# Patient Record
Sex: FEMALE | Race: OTHER | NOT HISPANIC OR LATINO | URBAN - METROPOLITAN AREA
[De-identification: names, ages, dates, MRNs, and addresses within clinical notes are randomized per-mention and may not be internally consistent; named-entity substitution may affect disease eponyms.]

---

## 2020-08-20 ENCOUNTER — INPATIENT (INPATIENT)
Facility: HOSPITAL | Age: 80
LOS: 2 days | Discharge: HOME HEALTH | DRG: 884 | End: 2020-08-23
Attending: STUDENT IN AN ORGANIZED HEALTH CARE EDUCATION/TRAINING PROGRAM | Admitting: STUDENT IN AN ORGANIZED HEALTH CARE EDUCATION/TRAINING PROGRAM
Payer: MEDICARE

## 2020-08-20 VITALS
SYSTOLIC BLOOD PRESSURE: 179 MMHG | OXYGEN SATURATION: 98 % | HEART RATE: 109 BPM | RESPIRATION RATE: 18 BRPM | TEMPERATURE: 98 F | DIASTOLIC BLOOD PRESSURE: 84 MMHG

## 2020-08-20 DIAGNOSIS — D50.9 IRON DEFICIENCY ANEMIA, UNSPECIFIED: ICD-10-CM

## 2020-08-20 DIAGNOSIS — H91.90 UNSPECIFIED HEARING LOSS, UNSPECIFIED EAR: ICD-10-CM

## 2020-08-20 DIAGNOSIS — N18.3 CHRONIC KIDNEY DISEASE, STAGE 3 (MODERATE): ICD-10-CM

## 2020-08-20 DIAGNOSIS — I12.9 HYPERTENSIVE CHRONIC KIDNEY DISEASE WITH STAGE 1 THROUGH STAGE 4 CHRONIC KIDNEY DISEASE, OR UNSPECIFIED CHRONIC KIDNEY DISEASE: ICD-10-CM

## 2020-08-20 DIAGNOSIS — E87.1 HYPO-OSMOLALITY AND HYPONATREMIA: ICD-10-CM

## 2020-08-20 DIAGNOSIS — F03.90 UNSPECIFIED DEMENTIA WITHOUT BEHAVIORAL DISTURBANCE: ICD-10-CM

## 2020-08-20 DIAGNOSIS — G93.40 ENCEPHALOPATHY, UNSPECIFIED: ICD-10-CM

## 2020-08-20 DIAGNOSIS — I95.1 ORTHOSTATIC HYPOTENSION: ICD-10-CM

## 2020-08-20 DIAGNOSIS — E11.22 TYPE 2 DIABETES MELLITUS WITH DIABETIC CHRONIC KIDNEY DISEASE: ICD-10-CM

## 2020-08-20 DIAGNOSIS — Z66 DO NOT RESUSCITATE: ICD-10-CM

## 2020-08-20 DIAGNOSIS — D63.1 ANEMIA IN CHRONIC KIDNEY DISEASE: ICD-10-CM

## 2020-08-20 DIAGNOSIS — I44.0 ATRIOVENTRICULAR BLOCK, FIRST DEGREE: ICD-10-CM

## 2020-08-20 DIAGNOSIS — R07.9 CHEST PAIN, UNSPECIFIED: ICD-10-CM

## 2020-08-20 LAB
ALBUMIN SERPL ELPH-MCNC: 3.5 G/DL — SIGNIFICANT CHANGE UP (ref 3.5–5)
ALP SERPL-CCNC: 83 U/L — SIGNIFICANT CHANGE UP (ref 40–120)
ALT FLD-CCNC: 58 U/L DA — SIGNIFICANT CHANGE UP (ref 10–60)
ANION GAP SERPL CALC-SCNC: 7 MMOL/L — SIGNIFICANT CHANGE UP (ref 5–17)
APAP SERPL-MCNC: 15 UG/ML — SIGNIFICANT CHANGE UP (ref 10–30)
APPEARANCE UR: CLEAR — SIGNIFICANT CHANGE UP
AST SERPL-CCNC: 31 U/L — SIGNIFICANT CHANGE UP (ref 10–40)
BACTERIA # UR AUTO: ABNORMAL /HPF
BASOPHILS # BLD AUTO: 0.03 K/UL — SIGNIFICANT CHANGE UP (ref 0–0.2)
BASOPHILS NFR BLD AUTO: 0.5 % — SIGNIFICANT CHANGE UP (ref 0–2)
BILIRUB SERPL-MCNC: 0.4 MG/DL — SIGNIFICANT CHANGE UP (ref 0.2–1.2)
BILIRUB UR-MCNC: NEGATIVE — SIGNIFICANT CHANGE UP
BUN SERPL-MCNC: 17 MG/DL — SIGNIFICANT CHANGE UP (ref 7–18)
CALCIUM SERPL-MCNC: 8.5 MG/DL — SIGNIFICANT CHANGE UP (ref 8.4–10.5)
CHLORIDE SERPL-SCNC: 100 MMOL/L — SIGNIFICANT CHANGE UP (ref 96–108)
CO2 SERPL-SCNC: 23 MMOL/L — SIGNIFICANT CHANGE UP (ref 22–31)
COLOR SPEC: YELLOW — SIGNIFICANT CHANGE UP
CREAT SERPL-MCNC: 1.06 MG/DL — SIGNIFICANT CHANGE UP (ref 0.5–1.3)
DIFF PNL FLD: NEGATIVE — SIGNIFICANT CHANGE UP
EOSINOPHIL # BLD AUTO: 0.1 K/UL — SIGNIFICANT CHANGE UP (ref 0–0.5)
EOSINOPHIL NFR BLD AUTO: 1.8 % — SIGNIFICANT CHANGE UP (ref 0–6)
EPI CELLS # UR: SIGNIFICANT CHANGE UP /HPF
GLUCOSE SERPL-MCNC: 191 MG/DL — HIGH (ref 70–99)
GLUCOSE UR QL: NEGATIVE — SIGNIFICANT CHANGE UP
HCT VFR BLD CALC: 33.6 % — LOW (ref 34.5–45)
HGB BLD-MCNC: 11.2 G/DL — LOW (ref 11.5–15.5)
IMM GRANULOCYTES NFR BLD AUTO: 0.2 % — SIGNIFICANT CHANGE UP (ref 0–1.5)
KETONES UR-MCNC: NEGATIVE — SIGNIFICANT CHANGE UP
LEUKOCYTE ESTERASE UR-ACNC: ABNORMAL
LIDOCAIN IGE QN: 249 U/L — SIGNIFICANT CHANGE UP (ref 73–393)
LYMPHOCYTES # BLD AUTO: 1.14 K/UL — SIGNIFICANT CHANGE UP (ref 1–3.3)
LYMPHOCYTES # BLD AUTO: 20.1 % — SIGNIFICANT CHANGE UP (ref 13–44)
MCHC RBC-ENTMCNC: 27.3 PG — SIGNIFICANT CHANGE UP (ref 27–34)
MCHC RBC-ENTMCNC: 33.3 GM/DL — SIGNIFICANT CHANGE UP (ref 32–36)
MCV RBC AUTO: 82 FL — SIGNIFICANT CHANGE UP (ref 80–100)
MONOCYTES # BLD AUTO: 0.57 K/UL — SIGNIFICANT CHANGE UP (ref 0–0.9)
MONOCYTES NFR BLD AUTO: 10.1 % — SIGNIFICANT CHANGE UP (ref 2–14)
NEUTROPHILS # BLD AUTO: 3.82 K/UL — SIGNIFICANT CHANGE UP (ref 1.8–7.4)
NEUTROPHILS NFR BLD AUTO: 67.3 % — SIGNIFICANT CHANGE UP (ref 43–77)
NITRITE UR-MCNC: NEGATIVE — SIGNIFICANT CHANGE UP
NRBC # BLD: 0 /100 WBCS — SIGNIFICANT CHANGE UP (ref 0–0)
NT-PROBNP SERPL-SCNC: 86 PG/ML — SIGNIFICANT CHANGE UP (ref 0–450)
PH UR: 6 — SIGNIFICANT CHANGE UP (ref 5–8)
PLATELET # BLD AUTO: 304 K/UL — SIGNIFICANT CHANGE UP (ref 150–400)
POTASSIUM SERPL-MCNC: 3.7 MMOL/L — SIGNIFICANT CHANGE UP (ref 3.5–5.3)
POTASSIUM SERPL-SCNC: 3.7 MMOL/L — SIGNIFICANT CHANGE UP (ref 3.5–5.3)
PROT SERPL-MCNC: 7 G/DL — SIGNIFICANT CHANGE UP (ref 6–8.3)
PROT UR-MCNC: NEGATIVE — SIGNIFICANT CHANGE UP
RBC # BLD: 4.1 M/UL — SIGNIFICANT CHANGE UP (ref 3.8–5.2)
RBC # FLD: 15.1 % — HIGH (ref 10.3–14.5)
RBC CASTS # UR COMP ASSIST: SIGNIFICANT CHANGE UP /HPF (ref 0–2)
SALICYLATES SERPL-MCNC: <1.7 MG/DL — LOW (ref 2.8–20)
SODIUM SERPL-SCNC: 130 MMOL/L — LOW (ref 135–145)
SP GR SPEC: 1.01 — SIGNIFICANT CHANGE UP (ref 1.01–1.02)
T4 AB SER-ACNC: 11.4 UG/DL — SIGNIFICANT CHANGE UP (ref 4.6–12)
TROPONIN I SERPL-MCNC: <0.015 NG/ML — SIGNIFICANT CHANGE UP (ref 0–0.04)
TSH SERPL-MCNC: 0.2 UU/ML — LOW (ref 0.34–4.82)
UROBILINOGEN FLD QL: NEGATIVE — SIGNIFICANT CHANGE UP
WBC # BLD: 5.67 K/UL — SIGNIFICANT CHANGE UP (ref 3.8–10.5)
WBC # FLD AUTO: 5.67 K/UL — SIGNIFICANT CHANGE UP (ref 3.8–10.5)
WBC UR QL: SIGNIFICANT CHANGE UP /HPF (ref 0–5)

## 2020-08-20 PROCEDURE — 70450 CT HEAD/BRAIN W/O DYE: CPT | Mod: 26

## 2020-08-20 PROCEDURE — 93010 ELECTROCARDIOGRAM REPORT: CPT

## 2020-08-20 RX ORDER — SODIUM CHLORIDE 9 MG/ML
1000 INJECTION INTRAMUSCULAR; INTRAVENOUS; SUBCUTANEOUS ONCE
Refills: 0 | Status: COMPLETED | OUTPATIENT
Start: 2020-08-20 | End: 2020-08-20

## 2020-08-20 RX ORDER — SODIUM CHLORIDE 9 MG/ML
3 INJECTION INTRAMUSCULAR; INTRAVENOUS; SUBCUTANEOUS EVERY 8 HOURS
Refills: 0 | Status: DISCONTINUED | OUTPATIENT
Start: 2020-08-20 | End: 2020-08-23

## 2020-08-20 RX ORDER — ONDANSETRON 8 MG/1
4 TABLET, FILM COATED ORAL ONCE
Refills: 0 | Status: COMPLETED | OUTPATIENT
Start: 2020-08-20 | End: 2020-08-20

## 2020-08-20 RX ORDER — ACETAMINOPHEN 500 MG
650 TABLET ORAL ONCE
Refills: 0 | Status: COMPLETED | OUTPATIENT
Start: 2020-08-20 | End: 2020-08-20

## 2020-08-20 RX ADMIN — Medication 650 MILLIGRAM(S): at 21:10

## 2020-08-20 RX ADMIN — ONDANSETRON 4 MILLIGRAM(S): 8 TABLET, FILM COATED ORAL at 21:01

## 2020-08-20 RX ADMIN — SODIUM CHLORIDE 1000 MILLILITER(S): 9 INJECTION INTRAMUSCULAR; INTRAVENOUS; SUBCUTANEOUS at 21:02

## 2020-08-20 RX ADMIN — Medication 650 MILLIGRAM(S): at 21:01

## 2020-08-20 RX ADMIN — SODIUM CHLORIDE 3 MILLILITER(S): 9 INJECTION INTRAMUSCULAR; INTRAVENOUS; SUBCUTANEOUS at 22:09
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
## 2020-08-21 ENCOUNTER — TRANSCRIPTION ENCOUNTER (OUTPATIENT)
Age: 80
End: 2020-08-21

## 2020-08-21 DIAGNOSIS — I10 ESSENTIAL (PRIMARY) HYPERTENSION: ICD-10-CM

## 2020-08-21 DIAGNOSIS — Z29.9 ENCOUNTER FOR PROPHYLACTIC MEASURES, UNSPECIFIED: ICD-10-CM

## 2020-08-21 DIAGNOSIS — D64.9 ANEMIA, UNSPECIFIED: ICD-10-CM

## 2020-08-21 DIAGNOSIS — R42 DIZZINESS AND GIDDINESS: ICD-10-CM

## 2020-08-21 DIAGNOSIS — E11.9 TYPE 2 DIABETES MELLITUS WITHOUT COMPLICATIONS: ICD-10-CM

## 2020-08-21 DIAGNOSIS — F03.90 UNSPECIFIED DEMENTIA WITHOUT BEHAVIORAL DISTURBANCE: ICD-10-CM

## 2020-08-21 DIAGNOSIS — G93.40 ENCEPHALOPATHY, UNSPECIFIED: ICD-10-CM

## 2020-08-21 DIAGNOSIS — R51 HEADACHE: ICD-10-CM

## 2020-08-21 DIAGNOSIS — N18.3 CHRONIC KIDNEY DISEASE, STAGE 3 (MODERATE): ICD-10-CM

## 2020-08-21 DIAGNOSIS — Z78.9 OTHER SPECIFIED HEALTH STATUS: Chronic | ICD-10-CM

## 2020-08-21 DIAGNOSIS — E87.1 HYPO-OSMOLALITY AND HYPONATREMIA: ICD-10-CM

## 2020-08-21 LAB
24R-OH-CALCIDIOL SERPL-MCNC: 32.7 NG/ML — SIGNIFICANT CHANGE UP (ref 30–80)
A1C WITH ESTIMATED AVERAGE GLUCOSE RESULT: 6.9 % — HIGH (ref 4–5.6)
ALBUMIN SERPL ELPH-MCNC: 3.3 G/DL — LOW (ref 3.5–5)
ALP SERPL-CCNC: 84 U/L — SIGNIFICANT CHANGE UP (ref 40–120)
ALT FLD-CCNC: 55 U/L DA — SIGNIFICANT CHANGE UP (ref 10–60)
ANION GAP SERPL CALC-SCNC: 7 MMOL/L — SIGNIFICANT CHANGE UP (ref 5–17)
AST SERPL-CCNC: 32 U/L — SIGNIFICANT CHANGE UP (ref 10–40)
BASOPHILS # BLD AUTO: 0.02 K/UL — SIGNIFICANT CHANGE UP (ref 0–0.2)
BASOPHILS NFR BLD AUTO: 0.3 % — SIGNIFICANT CHANGE UP (ref 0–2)
BILIRUB SERPL-MCNC: 0.4 MG/DL — SIGNIFICANT CHANGE UP (ref 0.2–1.2)
BUN SERPL-MCNC: 13 MG/DL — SIGNIFICANT CHANGE UP (ref 7–18)
CALCIUM SERPL-MCNC: 8.9 MG/DL — SIGNIFICANT CHANGE UP (ref 8.4–10.5)
CHLORIDE SERPL-SCNC: 105 MMOL/L — SIGNIFICANT CHANGE UP (ref 96–108)
CHOLEST SERPL-MCNC: 133 MG/DL — SIGNIFICANT CHANGE UP (ref 10–199)
CHOLEST SERPL-MCNC: 135 MG/DL — SIGNIFICANT CHANGE UP (ref 10–199)
CO2 SERPL-SCNC: 24 MMOL/L — SIGNIFICANT CHANGE UP (ref 22–31)
CREAT ?TM UR-MCNC: <13 MG/DL — SIGNIFICANT CHANGE UP
CREAT SERPL-MCNC: 1.02 MG/DL — SIGNIFICANT CHANGE UP (ref 0.5–1.3)
EOSINOPHIL # BLD AUTO: 0.07 K/UL — SIGNIFICANT CHANGE UP (ref 0–0.5)
EOSINOPHIL NFR BLD AUTO: 1 % — SIGNIFICANT CHANGE UP (ref 0–6)
ERYTHROCYTE [SEDIMENTATION RATE] IN BLOOD: 14 MM/HR — SIGNIFICANT CHANGE UP (ref 0–20)
ESTIMATED AVERAGE GLUCOSE: 151 MG/DL — HIGH (ref 68–114)
FERRITIN SERPL-MCNC: 33 NG/ML — SIGNIFICANT CHANGE UP (ref 15–150)
FOLATE SERPL-MCNC: >20 NG/ML — SIGNIFICANT CHANGE UP
GLUCOSE BLDC GLUCOMTR-MCNC: 126 MG/DL — HIGH (ref 70–99)
GLUCOSE BLDC GLUCOMTR-MCNC: 161 MG/DL — HIGH (ref 70–99)
GLUCOSE BLDC GLUCOMTR-MCNC: 87 MG/DL — SIGNIFICANT CHANGE UP (ref 70–99)
GLUCOSE BLDC GLUCOMTR-MCNC: 99 MG/DL — SIGNIFICANT CHANGE UP (ref 70–99)
GLUCOSE SERPL-MCNC: 204 MG/DL — HIGH (ref 70–99)
HCT VFR BLD CALC: 35.6 % — SIGNIFICANT CHANGE UP (ref 34.5–45)
HDLC SERPL-MCNC: 52 MG/DL — SIGNIFICANT CHANGE UP
HDLC SERPL-MCNC: 53 MG/DL — SIGNIFICANT CHANGE UP
HGB BLD-MCNC: 11.8 G/DL — SIGNIFICANT CHANGE UP (ref 11.5–15.5)
IMM GRANULOCYTES NFR BLD AUTO: 0.1 % — SIGNIFICANT CHANGE UP (ref 0–1.5)
IRON SATN MFR SERPL: 10 % — LOW (ref 15–50)
IRON SATN MFR SERPL: 30 UG/DL — LOW (ref 40–150)
LACTATE SERPL-SCNC: 2.3 MMOL/L — HIGH (ref 0.7–2)
LIPID PNL WITH DIRECT LDL SERPL: 52 MG/DL — SIGNIFICANT CHANGE UP
LIPID PNL WITH DIRECT LDL SERPL: 53 MG/DL — SIGNIFICANT CHANGE UP
LYMPHOCYTES # BLD AUTO: 0.99 K/UL — LOW (ref 1–3.3)
LYMPHOCYTES # BLD AUTO: 14.2 % — SIGNIFICANT CHANGE UP (ref 13–44)
MAGNESIUM SERPL-MCNC: 1.8 MG/DL — SIGNIFICANT CHANGE UP (ref 1.6–2.6)
MCHC RBC-ENTMCNC: 27.6 PG — SIGNIFICANT CHANGE UP (ref 27–34)
MCHC RBC-ENTMCNC: 33.1 GM/DL — SIGNIFICANT CHANGE UP (ref 32–36)
MCV RBC AUTO: 83.2 FL — SIGNIFICANT CHANGE UP (ref 80–100)
MONOCYTES # BLD AUTO: 0.44 K/UL — SIGNIFICANT CHANGE UP (ref 0–0.9)
MONOCYTES NFR BLD AUTO: 6.3 % — SIGNIFICANT CHANGE UP (ref 2–14)
NEUTROPHILS # BLD AUTO: 5.45 K/UL — SIGNIFICANT CHANGE UP (ref 1.8–7.4)
NEUTROPHILS NFR BLD AUTO: 78.1 % — HIGH (ref 43–77)
NRBC # BLD: 0 /100 WBCS — SIGNIFICANT CHANGE UP (ref 0–0)
OSMOLALITY UR: 169 MOS/KG — SIGNIFICANT CHANGE UP (ref 50–1200)
PHOSPHATE SERPL-MCNC: 2.9 MG/DL — SIGNIFICANT CHANGE UP (ref 2.5–4.5)
PLATELET # BLD AUTO: 327 K/UL — SIGNIFICANT CHANGE UP (ref 150–400)
POTASSIUM SERPL-MCNC: 3.3 MMOL/L — LOW (ref 3.5–5.3)
POTASSIUM SERPL-SCNC: 3.3 MMOL/L — LOW (ref 3.5–5.3)
PROT SERPL-MCNC: 6.9 G/DL — SIGNIFICANT CHANGE UP (ref 6–8.3)
RBC # BLD: 4.28 M/UL — SIGNIFICANT CHANGE UP (ref 3.8–5.2)
RBC # FLD: 15.2 % — HIGH (ref 10.3–14.5)
SARS-COV-2 IGG SERPL QL IA: NEGATIVE — SIGNIFICANT CHANGE UP
SARS-COV-2 IGM SERPL IA-ACNC: <0.1 INDEX — SIGNIFICANT CHANGE UP
SARS-COV-2 RNA SPEC QL NAA+PROBE: SIGNIFICANT CHANGE UP
SODIUM SERPL-SCNC: 136 MMOL/L — SIGNIFICANT CHANGE UP (ref 135–145)
SODIUM UR-SCNC: 49 MMOL/L — SIGNIFICANT CHANGE UP
T PALLIDUM AB TITR SER: NEGATIVE — SIGNIFICANT CHANGE UP
T3 SERPL-MCNC: 113 NG/DL — SIGNIFICANT CHANGE UP (ref 80–200)
TIBC SERPL-MCNC: 311 UG/DL — SIGNIFICANT CHANGE UP (ref 250–450)
TOTAL CHOLESTEROL/HDL RATIO MEASUREMENT: 2.5 RATIO — LOW (ref 3.3–7.1)
TOTAL CHOLESTEROL/HDL RATIO MEASUREMENT: 2.6 RATIO — LOW (ref 3.3–7.1)
TRANSFERRIN SERPL-MCNC: 239 MG/DL — SIGNIFICANT CHANGE UP (ref 200–360)
TRIGL SERPL-MCNC: 142 MG/DL — SIGNIFICANT CHANGE UP (ref 10–149)
TRIGL SERPL-MCNC: 148 MG/DL — SIGNIFICANT CHANGE UP (ref 10–149)
TSH SERPL-MCNC: 0.19 UU/ML — LOW (ref 0.34–4.82)
UIBC SERPL-MCNC: 281 UG/DL — SIGNIFICANT CHANGE UP (ref 110–370)
VIT B12 SERPL-MCNC: 1575 PG/ML — HIGH (ref 232–1245)
WBC # BLD: 6.98 K/UL — SIGNIFICANT CHANGE UP (ref 3.8–10.5)
WBC # FLD AUTO: 6.98 K/UL — SIGNIFICANT CHANGE UP (ref 3.8–10.5)

## 2020-08-21 PROCEDURE — 71045 X-RAY EXAM CHEST 1 VIEW: CPT | Mod: 26

## 2020-08-21 PROCEDURE — 99223 1ST HOSP IP/OBS HIGH 75: CPT

## 2020-08-21 PROCEDURE — 99223 1ST HOSP IP/OBS HIGH 75: CPT | Mod: GC

## 2020-08-21 PROCEDURE — 99255 IP/OBS CONSLTJ NEW/EST HI 80: CPT

## 2020-08-21 PROCEDURE — 99285 EMERGENCY DEPT VISIT HI MDM: CPT

## 2020-08-21 RX ORDER — FERROUS SULFATE 325(65) MG
325 TABLET ORAL DAILY
Refills: 0 | Status: DISCONTINUED | OUTPATIENT
Start: 2020-08-21 | End: 2020-08-23

## 2020-08-21 RX ORDER — NIFEDIPINE 30 MG
30 TABLET, EXTENDED RELEASE 24 HR ORAL DAILY
Refills: 0 | Status: DISCONTINUED | OUTPATIENT
Start: 2020-08-21 | End: 2020-08-22

## 2020-08-21 RX ORDER — LABETALOL HCL 100 MG
5 TABLET ORAL ONCE
Refills: 0 | Status: COMPLETED | OUTPATIENT
Start: 2020-08-21 | End: 2020-08-21

## 2020-08-21 RX ORDER — METOPROLOL TARTRATE 50 MG
25 TABLET ORAL
Refills: 0 | Status: DISCONTINUED | OUTPATIENT
Start: 2020-08-21 | End: 2020-08-23

## 2020-08-21 RX ORDER — AMLODIPINE BESYLATE 2.5 MG/1
5 TABLET ORAL DAILY
Refills: 0 | Status: DISCONTINUED | OUTPATIENT
Start: 2020-08-21 | End: 2020-08-21

## 2020-08-21 RX ORDER — QUETIAPINE FUMARATE 200 MG/1
25 TABLET, FILM COATED ORAL
Refills: 0 | Status: DISCONTINUED | OUTPATIENT
Start: 2020-08-21 | End: 2020-08-23

## 2020-08-21 RX ORDER — INSULIN LISPRO 100/ML
VIAL (ML) SUBCUTANEOUS
Refills: 0 | Status: DISCONTINUED | OUTPATIENT
Start: 2020-08-21 | End: 2020-08-23

## 2020-08-21 RX ORDER — POTASSIUM CHLORIDE 20 MEQ
40 PACKET (EA) ORAL EVERY 4 HOURS
Refills: 0 | Status: COMPLETED | OUTPATIENT
Start: 2020-08-21 | End: 2020-08-21

## 2020-08-21 RX ORDER — ENOXAPARIN SODIUM 100 MG/ML
40 INJECTION SUBCUTANEOUS DAILY
Refills: 0 | Status: DISCONTINUED | OUTPATIENT
Start: 2020-08-21 | End: 2020-08-23

## 2020-08-21 RX ORDER — AMLODIPINE BESYLATE 2.5 MG/1
5 TABLET ORAL ONCE
Refills: 0 | Status: COMPLETED | OUTPATIENT
Start: 2020-08-21 | End: 2020-08-21

## 2020-08-21 RX ADMIN — Medication 40 MILLIEQUIVALENT(S): at 19:14

## 2020-08-21 RX ADMIN — Medication 1: at 08:10

## 2020-08-21 RX ADMIN — AMLODIPINE BESYLATE 5 MILLIGRAM(S): 2.5 TABLET ORAL at 01:21

## 2020-08-21 RX ADMIN — Medication 5 MILLIGRAM(S): at 06:15

## 2020-08-21 RX ADMIN — AMLODIPINE BESYLATE 5 MILLIGRAM(S): 2.5 TABLET ORAL at 06:15

## 2020-08-21 RX ADMIN — QUETIAPINE FUMARATE 25 MILLIGRAM(S): 200 TABLET, FILM COATED ORAL at 11:56

## 2020-08-21 RX ADMIN — SODIUM CHLORIDE 3 MILLILITER(S): 9 INJECTION INTRAMUSCULAR; INTRAVENOUS; SUBCUTANEOUS at 22:11

## 2020-08-21 RX ADMIN — SODIUM CHLORIDE 3 MILLILITER(S): 9 INJECTION INTRAMUSCULAR; INTRAVENOUS; SUBCUTANEOUS at 06:19

## 2020-08-21 RX ADMIN — Medication 40 MILLIEQUIVALENT(S): at 13:34

## 2020-08-21 RX ADMIN — Medication 325 MILLIGRAM(S): at 17:51

## 2020-08-21 RX ADMIN — Medication 25 MILLIGRAM(S): at 19:14

## 2020-08-21 RX ADMIN — SODIUM CHLORIDE 3 MILLILITER(S): 9 INJECTION INTRAMUSCULAR; INTRAVENOUS; SUBCUTANEOUS at 13:24

## 2020-08-21 RX ADMIN — ENOXAPARIN SODIUM 40 MILLIGRAM(S): 100 INJECTION SUBCUTANEOUS at 11:56

## 2020-08-21 RX ADMIN — QUETIAPINE FUMARATE 25 MILLIGRAM(S): 200 TABLET, FILM COATED ORAL at 19:14

## 2020-08-21 NOTE — PATIENT PROFILE ADULT - IS PATIENT POST-MENOPAUSAL?
13 yo F with no PMH p/w left occipital scalp pain. Pt states that since yesterday, she initially developed a paresthesia to the scalp that today has progressed into a pain. Pt states that she feels tenderness when touching the left occipital scalp and left forehead. Pt denies any fever/chills, ha, dizziness, visual changes, sore throat, nasal congestion, cough, chest pain, sob, abdominal pain, n/v. NO trauma to the area, no rashes noted. a/p: vss, pt appears in nad, nontoxic appearing, ncat, perrla, no rashes noted to the scalp, minimal ttp to left occipital scalp, no erythema/warmth/indruation noted, norm TM b/l, norm post oropharynx, norm cardiac exam, lungs cta b/l, no clear etiology for pain, recommend d/c home with oral pain reliever and f/u with PMD if sx persist. Also advised to return to ED if sx acutely worsen
yes

## 2020-08-21 NOTE — DISCHARGE NOTE PROVIDER - NSDCCPCAREPLAN_GEN_ALL_CORE_FT
PRINCIPAL DISCHARGE DIAGNOSIS  Diagnosis: Dementia  Assessment and Plan of Treatment: You were worked up for encephalopathy and altered mental status in the hospital. Imaging of your head did not show any significant findings. Your infectious workup was negative. You are reccommended to follow up with a neurologist and your PCP when discharged.      SECONDARY DISCHARGE DIAGNOSES  Diagnosis: HTN (hypertension)  Assessment and Plan of Treatment: Your blood pressure was controlled in the hospital. Please resume your medications as directed and follow up with your PCP.    Diagnosis: Headache  Assessment and Plan of Treatment: Your headache resolved with tylenol. Please see above plan.    Diagnosis: Anemia, unspecified type  Assessment and Plan of Treatment: Your hemoglobin was low and your labs showed iron deficiency anemia. Please continue to take your iron supplement and follow up with your PCP for possible GI workup.    Diagnosis: Dizziness  Assessment and Plan of Treatment: You were found to have orthostatic hypotention - you get dizzy when you stand up. Your dizziness resolved after holding your trazodone and some blood pressure medications. Please only take your medications as directed and stand up slowly to avoid dizziness. Follow up with your PCP and neurology.    Diagnosis: DM (diabetes mellitus)  Assessment and Plan of Treatment: Your diabetes was controlled in the hospital. Please resume your home medications and follow up with your PCP. PRINCIPAL DISCHARGE DIAGNOSIS  Diagnosis: Dementia  Assessment and Plan of Treatment: You were worked up for encephalopathy and altered mental status in the hospital. Imaging of your head did not show any significant findings. Your infectious workup was negative. Your metabolic abnormalities were corrected. You likely have dementia that is worsening. You are reccommended to follow up with a neurologist and your PCP when discharged. Please call the neurologist office when they are open to schedule an appointment.      SECONDARY DISCHARGE DIAGNOSES  Diagnosis: HTN (hypertension)  Assessment and Plan of Treatment: Your blood pressure was controlled in the hospital on metoprolol and nifedipine only. Please discontinue all BP medications and only take metoprolol and nifedipine. Please resume your medications as directed and follow up with your PCP.    Diagnosis: Headache  Assessment and Plan of Treatment: Your headache resolved with tylenol. Please see above plan.    Diagnosis: Anemia, unspecified type  Assessment and Plan of Treatment: Your hemoglobin was low and your labs showed iron deficiency anemia. Please continue to take your iron supplement and follow up with your PCP for possible GI workup.    Diagnosis: Dizziness  Assessment and Plan of Treatment: You were found to have orthostatic hypotention - you get dizzy when you stand up. Your dizziness resolved after holding your trazodone and some blood pressure medications. Please only take your medications as directed and stand up slowly to avoid dizziness. Follow up with your PCP and neurology.    Diagnosis: DM (diabetes mellitus)  Assessment and Plan of Treatment: Your diabetes was controlled in the hospital. Please resume your home medications and follow up with your PCP. PRINCIPAL DISCHARGE DIAGNOSIS  Diagnosis: Dementia  Assessment and Plan of Treatment: You were worked up for encephalopathy and altered mental status in the hospital. Imaging of your head did not show any significant findings. Your infectious workup was negative. Your metabolic abnormalities were corrected. You likely have dementia that is worsening. You are reccommended to follow up with a neurologist and your PCP when discharged. Please call the neurologist office to schedule an appointment.      SECONDARY DISCHARGE DIAGNOSES  Diagnosis: Dizziness  Assessment and Plan of Treatment: You were found to have orthostatic hypotention - you get dizzy when you stand up. Your dizziness resolved after holding your trazodone and some blood pressure medications. Please only take your medications as directed and stand up slowly to avoid dizziness. Follow up with your PCP and neurology.    Diagnosis: Headache  Assessment and Plan of Treatment: Your headache resolved with tylenol. Please see above plan.    Diagnosis: Anemia, unspecified type  Assessment and Plan of Treatment: Your hemoglobin was low and your labs showed iron deficiency anemia. Please continue to take your iron supplement and follow up with your PCP for possible GI workup.    Diagnosis: HTN (hypertension)  Assessment and Plan of Treatment: Your blood pressure was controlled in the hospital on metoprolol and nifedipine only. Please discontinue all BP medications and only take metoprolol and nifedipine. Please resume your medications as directed and follow up with your PCP.    Diagnosis: DM (diabetes mellitus)  Assessment and Plan of Treatment: Your diabetes was controlled in the hospital. Please resume your home medications and follow up with your PCP.

## 2020-08-21 NOTE — PROGRESS NOTE ADULT - PROBLEM SELECTOR PLAN 6
p/w Hb 11.2  -f/u iron study  - monitor CBC
anemia, unspecified cause  iron studies shows decreased total iron, % sat iron  monitor CBC

## 2020-08-21 NOTE — PROGRESS NOTE ADULT - ATTENDING COMMENTS
- med rec done  - IVF   - appreciate SW eval  - verify if patient has CKD  - hyponatremia corrected   - rest of the management as above  - neuro recs to follow up as outpatient

## 2020-08-21 NOTE — H&P ADULT - NSHPPHYSICALEXAM_GEN_ALL_CORE
Vital Signs Last 24 Hrs  T(C): 36.5 (21 Aug 2020 03:43), Max: 37.1 (21 Aug 2020 00:17)  T(F): 97.7 (21 Aug 2020 03:43), Max: 98.7 (21 Aug 2020 00:17)  HR: 69 (21 Aug 2020 03:43) (69 - 109)  BP: 186/72 (21 Aug 2020 03:43) (179/84 - 197/81)  BP(mean): --  RR: 17 (21 Aug 2020 03:43) (17 - 18)  SpO2: 100% (21 Aug 2020 03:43) (98% - 100%)      PHYSICAL EXAM:  GENERAL: NAD, well-groomed, well-developed  HEAD:  Atraumatic, Normocephalic  EYES: PERRLA, conjunctiva and sclera clear  ENMT: No tonsillar erythema, exudates, or enlargement; Moist mucous membranes, Good dentition, No lesions  NECK: Supple, No JVD, Normal thyroid  NERVOUS SYSTEM:  Alert & Oriented X2, Good concentration; Motor Strength 5/5 B/L upper and lower extremities  CHEST/LUNG: Clear to percussion bilaterally; No rales, rhonchi, wheezing, or rubs  HEART: Regular rate and rhythm; No murmurs, rubs, or gallops  ABDOMEN: Soft, Nontender, Nondistended; Bowel sounds present  EXTREMITIES:  2+ Peripheral Pulses, No clubbing, cyanosis, or edema  LYMPH: No lymphadenopathy noted  SKIN: No rashes or lesions

## 2020-08-21 NOTE — ED PROVIDER NOTE - CLINICAL SUMMARY MEDICAL DECISION MAKING FREE TEXT BOX
Labs, urine , and CT head showing no acute path. Spoke at length with daughter in law Nancy who states that pt has "dementia for a few months" and that they're having trouble taking care of her at home and they need either HHA or placement. Endorsed to inpatient team. Pt stable.

## 2020-08-21 NOTE — H&P ADULT - PROBLEM SELECTOR PROBLEM 4
"0301/0301-01  Tika Au 84 year old female   Admission date:12/11/2019   Residence:   Code status: DNR/DNI   Isolation:No active isolations   Principal Problem:Hypernatremia   Diet: NDD1, nectar  Mobility Status: bedrest, personal w/c in bathroom  Discharge timeline & plan: unsure of discharge date and/or discharge needs at this time.  Vital signs:  Temp: 97.4  F (36.3  C) Temp src: Tympanic BP: 132/63 Pulse: 63 Heart Rate: 56 Resp: 12 SpO2: 95 % O2 Device: None (Room air) Oxygen Delivery: 2 LPM(Titrated to 1L) Height: 157.5 cm (5' 2\") Weight: 61.3 kg (135 lb 2.3 oz)  Estimated body mass index is 24.72 kg/m  as calculated from the following:    Height as of this encounter: 1.575 m (5' 2\").    Weight as of this encounter: 61.3 kg (135 lb 2.3 oz).  Abnormal Physical Assessment:LS diminished. Coffee ground emesis. Continuous drooling, hx of jaw dislocation and lock jaw. Minimal urine output, hammond patent. Diarrhea.    Labs: see results  Telemetry: No  Comments: Coccyx pressure sore. Asp. Precautions.      SAFETY CHECKLIST  Arm Bands/Risk clasps correct and in place (DNR, Fall risk, Allergy, Latex, Limb):  Yes  IVF and rate ordered correct: Yes  Physical assessment verification(IV site, wounds, dressing intact, incisions, LDA's, neuro, CIWA...): Yes  Environmental assessment (bed/chair alarm on, call light, side rails, restraints, sitter....): Yes  Whiteboard updated by oncoming RN:Yes    Nelsy Ivey RN ,.................................... 12/17/19, 5:02 AM      Bedside Handoff complete, safety checks verified by writer and are correct.    Jennifer Elliott RN on 12/17/2019 at 7:23 AM                " Hyponatremia

## 2020-08-21 NOTE — PROGRESS NOTE ADULT - PROBLEM SELECTOR PLAN 2
Patient complained of dizziness upon admission  -CTH negative  -orthostatic vital signs: lying down (196/80); sitting (186/74), standing (152/67)

## 2020-08-21 NOTE — H&P ADULT - PROBLEM SELECTOR PLAN 5
p/w /81  -s/p amlodipine 5mg and labetalol 5mg IV   - starting amlodipine 5mg daily  - please confirm home meds and adjust BP meds  - monitor BP

## 2020-08-21 NOTE — H&P ADULT - HISTORY OF PRESENT ILLNESS
80y Female from home, living with family, with PMH of HTN presented to ED c/o headache.  Labs, urine , and CT head showing no acute path. Spoke at length with daughter in law Nancy who states that pt has "dementia for a few months" and that they're having trouble taking care of her at home and they need either HHA or placement. Endorsed to inpatient team. Pt stable. 80y Female from home, living with family, walking with cane, with PMH of HTN presented to ED c/o headache and dizziness.   As per daughter in law Nancy ([phone], via [facility]  Fan # [phone]), pt has "dementia for a few months", getting worse and that they're having trouble taking care of her at home and they need either HHA or placement.     In ED;   UA negative, and CT head showing no acute pathology.     GOC: DNR/DNI

## 2020-08-21 NOTE — DISCHARGE NOTE PROVIDER - NSDCPNSUBOBJ_GEN_ALL_CORE
Patient is a 80y old  Female who presents with a chief complaint of Encephalopathy (22 Aug 2020 20:01)        Patient was seen and examined     Denies any complains         INTERVAL HPI/OVERNIGHT EVENTS:    T(C): 36.4 (08-23-20 @ 15:52), Max: 36.7 (08-23-20 @ 14:16)    HR: 81 (08-23-20 @ 15:52) (74 - 92)    BP: 148/71 (08-23-20 @ 15:52) (129/73 - 148/71)    RR: 17 (08-23-20 @ 15:52) (17 - 18)    SpO2: 98% (08-23-20 @ 15:52) (97% - 100%)    Wt(kg): --    I&O's Summary            REVIEW OF SYSTEMS: denies fever, chills, SOB, palpitations, chest pain, abdominal pain, nausea, vomiting, diarrhea, constipation, dizziness        MEDICATIONS  (STANDING):    enoxaparin Injectable 40 milliGRAM(s) SubCutaneous daily    ferrous    sulfate 325 milliGRAM(s) Oral daily    insulin lispro (HumaLOG) corrective regimen sliding scale   SubCutaneous three times a day before meals    melatonin 3 milliGRAM(s) Oral at bedtime    metoprolol tartrate 25 milliGRAM(s) Oral two times a day    NIFEdipine XL 60 milliGRAM(s) Oral daily    QUEtiapine 25 milliGRAM(s) Oral two times a day    sodium chloride 0.9% lock flush 3 milliLiter(s) IV Push every 8 hours        MEDICATIONS  (PRN):            PHYSICAL EXAM:    GENERAL: NAD    NERVOUS SYSTEM:  Alert & Oriented X2, Good concentration, no focal deficit     CHEST/LUNG: Clear to auscultation bilaterally; No rales, rhonchi, wheezing, or rubs    HEART: Regular rate and rhythm; No murmurs, rubs, or gallops    ABDOMEN: Soft, Nontender, Nondistended; Bowel sounds present    EXTREMITIES:  2+ Peripheral Pulses, No clubbing, cyanosis, or edema    SKIN: No rashes or lesions        LABS:                            11.6     5.70  )-----------( 341      ( 23 Aug 2020 07:40 )               36.2         133<L>  |  104  |  18    ----------------------------<  196<H>    4.1   |  23  |  1.06        Assessment and plan:    Dizziness- improved possibly due to orthostatic hypotension vs hyponatremia     DM    HTN     Possible worsening dementia     Hyponatremia resolved     Polypharmacy    possible h/o thyroidectomy         Plan:    CT head neg     T3 T4 normal    Normal vit B12, folate, syphilis screen    Cont Seroquel 25mg BID    Can follow up with Neuro as outpatient     Stable to be discharged Patient is a 80y old  Female who presents with a chief complaint of Encephalopathy (22 Aug 2020 20:01)        Patient was seen and examined     Denies any complains         INTERVAL HPI/OVERNIGHT EVENTS:    T(C): 36.4 (08-23-20 @ 15:52), Max: 36.7 (08-23-20 @ 14:16)    HR: 81 (08-23-20 @ 15:52) (74 - 92)    BP: 148/71 (08-23-20 @ 15:52) (129/73 - 148/71)    RR: 17 (08-23-20 @ 15:52) (17 - 18)    SpO2: 98% (08-23-20 @ 15:52) (97% - 100%)    Wt(kg): --    I&O's Summary            REVIEW OF SYSTEMS: denies fever, chills, SOB, palpitations, chest pain, abdominal pain, nausea, vomiting, diarrhea, constipation, dizziness        MEDICATIONS  (STANDING):    enoxaparin Injectable 40 milliGRAM(s) SubCutaneous daily    ferrous    sulfate 325 milliGRAM(s) Oral daily    insulin lispro (HumaLOG) corrective regimen sliding scale   SubCutaneous three times a day before meals    melatonin 3 milliGRAM(s) Oral at bedtime    metoprolol tartrate 25 milliGRAM(s) Oral two times a day    NIFEdipine XL 60 milliGRAM(s) Oral daily    QUEtiapine 25 milliGRAM(s) Oral two times a day    sodium chloride 0.9% lock flush 3 milliLiter(s) IV Push every 8 hours        MEDICATIONS  (PRN):            PHYSICAL EXAM:    GENERAL: NAD    NERVOUS SYSTEM:  Alert & Oriented X2, Good concentration, no focal deficit     CHEST/LUNG: Clear to auscultation bilaterally; No rales, rhonchi, wheezing, or rubs    HEART: Regular rate and rhythm; No murmurs, rubs, or gallops    ABDOMEN: Soft, Nontender, Nondistended; Bowel sounds present    EXTREMITIES:  2+ Peripheral Pulses, No clubbing, cyanosis, or edema    SKIN: No rashes or lesions        LABS:                            11.6     5.70  )-----------( 341      ( 23 Aug 2020 07:40 )               36.2         133<L>  |  104  |  18    ----------------------------<  196<H>    4.1   |  23  |  1.06        Assessment and plan:    Dizziness- improved possibly due to orthostatic hypotension vs hyponatremia     DM    HTN     Possible worsening dementia     Hyponatremia resolved     Polypharmacy    possible h/o thyroidectomy         Plan:    CT head neg     T3 T4 normal    Normal vit B12, folate, syphilis screen    Cont Seroquel 25mg BID    Can follow up with Neuro as outpatient     Urine cx grew <[id number].UA was neg. Patient was asymptomatic. Not treated for UTI    Stable to be discharged

## 2020-08-21 NOTE — ED PROVIDER NOTE - OBJECTIVE STATEMENT
80 year old female with PMHx of HTN, sent in by family members at home for worsening cognitive impairment. Patient herself complains of headache annd dysuria, however patient is disoriented. Daughter in law, Radha, states that for months, the patient has been increasingly disoriented and difficult to take care of at home. She states that the patient either needs either a home health aide or placement. Radha denies recent fever, vomiting, diarrhea, other recent illness, or other recent hospitalizations.

## 2020-08-21 NOTE — DISCHARGE NOTE PROVIDER - CARE PROVIDER_API CALL
Yuliana Alfaro (MD)  Clinical Neurophysiology; Neurology  [address] [facility], [address] [place] [address]  Phone: [phone]  Fax: [phone]  Follow Up Time:

## 2020-08-21 NOTE — H&P ADULT - NSHPREVIEWOFSYSTEMS_GEN_ALL_CORE
REVIEW OF SYSTEMS:  CONSTITUTIONAL: No fever, weight loss, or fatigue  EYES: No eye pain, visual disturbances, or discharge  ENMT:  No difficulty hearing, tinnitus, vertigo; No sinus or throat pain  NECK: No pain or stiffness  BREASTS: No pain, masses, or nipple discharge  RESPIRATORY: No cough, wheezing, chills or hemoptysis; No shortness of breath  CARDIOVASCULAR: No chest pain, palpitations, (+) dizziness, no leg swelling  GASTROINTESTINAL: No abdominal or epigastric pain. No nausea, vomiting, or hematemesis; No diarrhea or constipation. No melena or hematochezia.  GENITOURINARY: No dysuria, frequency  NEUROLOGICAL: (+) headaches, no memory loss, loss of strength, numbness, or tremors  SKIN: No itching, burning, rashes, or lesions   LYMPH NODES: No enlarged glands  ENDOCRINE: No heat or cold intolerance; No hair loss  MUSCULOSKELETAL: No joint pain or swelling; No muscle, back, or extremity pain  HEME/LYMPH: No easy bruising, or bleeding gums  ALLERY AND IMMUNOLOGIC: No hives or eczema

## 2020-08-21 NOTE — H&P ADULT - PROBLEM SELECTOR PLAN 2
Pt states she feel "dizzy"  - CTH negative  - f/u orthostatic vital sign  Neuro Dr. Wilkinson consulted

## 2020-08-21 NOTE — PROGRESS NOTE ADULT - PROBLEM SELECTOR PLAN 3
CTH negative  - p/w headache, subsided with tylenol  - tylenol PRN   Neuro eval
CTH negative  -p/w HA subsided with tylenol  -give tyelonol prn  Neuro eval

## 2020-08-21 NOTE — PROGRESS NOTE ADULT - PROBLEM SELECTOR PLAN 8
- Patient takes DM meds at home  - will hold home medications  - will start sliding scale  - f/u HgA1c  - diabetic diet - Patient takes DM meds at home - Janumet 100-100xr,   - will hold home medications  - will start sliding scale  - f/u HgA1c  - diabetic diet

## 2020-08-21 NOTE — H&P ADULT - ASSESSMENT
80y Female from home, living with family, walking with cane, with PMH of HTN presented to ED c/o headache and dizziness.   As per daughter in law Nancy ([phone], via [employer]  [place] # [id number]), pt has "dementia for a few months", getting worse and that they're having trouble taking care of her at home and they need either HHA or placement.     In ED;   UA negative, and CT head showing no acute pathology.     Pt was admitted for encephalopathy    pt's family doesn't know the medicaiton's name, only know the pt is taking meds for HTN, DM and sleep.  Please call pt's pharmacy ([facility] [phone])and confirm, adjust meds

## 2020-08-21 NOTE — PROGRESS NOTE ADULT - SUBJECTIVE AND OBJECTIVE BOX
PGY 1 Note discussed with supervising resident and primary attending  PGY-1: Kirera Rodriguez MD  PAGER #: [phone] TILL 5:00 PM  Please contact On Call team 5PM - 8:30PM  Please contact Nightfloat team 8:30PM - 7:30AM  Patient is a 80y old  Female who presents with a chief complaint of Encephalopathy (21 Aug 2020 11:04)    Brief Hospital Course: 80F from home PMH of diabetes, HTN here because family states she has had change in mental status with hallucinations over past few months, now worsened and with falls.     INTERVAL HPI/OVERNIGHT EVENTS: No acute events noted overnight. Patient with no complaints this AM, is not sure why she is in the hospital. Alert and oriented to place and name only, not to date.      MEDICATIONS  (STANDING):  enoxaparin Injectable 40 milliGRAM(s) SubCutaneous daily  insulin lispro (HumaLOG) corrective regimen sliding scale   SubCutaneous three times a day before meals  metoprolol tartrate 25 milliGRAM(s) Oral two times a day  NIFEdipine XL 30 milliGRAM(s) Oral daily  QUEtiapine 25 milliGRAM(s) Oral two times a day  sodium chloride 0.9% lock flush 3 milliLiter(s) IV Push every 8 hours    MEDICATIONS  (PRN):      __________________________________________________  REVIEW OF SYSTEMS:  CONSTITUTIONAL: No fever, weight loss, or fatigue  RESPIRATORY: No cough, wheezing, chills or hemoptysis; No shortness of breath  CARDIOVASCULAR: No chest pain, palpitations, dizziness, or leg swelling  GASTROINTESTINAL: No abdominal pain. No nausea, vomiting, or hematemesis; No diarrhea or constipation. No melena or hematochezia.  GENITOURINARY: No dysuria or hematuria, no burning micturition, no polyuria, no urinary hesitancy, no nocturia, normal urinary frequency  NEUROLOGICAL: No headaches, memory loss, loss of strength, numbness, or tremors  SKIN: No itching, burning, rashes, or lesions   All other ROS negative except noted above    Vital Signs Last 24 Hrs  T(C): 36.6 (21 Aug 2020 08:35), Max: 37.1 (21 Aug 2020 00:17)  T(F): 97.8 (21 Aug 2020 08:35), Max: 98.7 (21 Aug 2020 00:17)  HR: 88 (21 Aug 2020 11:15) (69 - 109)  BP: 158/70 (21 Aug 2020 11:15) (145/78 - 197/81)  BP(mean): --  RR: 16 (21 Aug 2020 08:35) (16 - 18)  SpO2: 98% (21 Aug 2020 11:15) (97% - 100%)    ________________________________________________  PHYSICAL EXAMINATION:  GENERAL: NAD, well-developed  HEAD:  Atraumatic, Normocephalic  EYES:  conjunctiva and sclera clear  NECK: Supple, No JVD, Normal thyroid  CHEST/LUNG: Clear to auscultation bilaterally; No rales, rhonchi, wheezing, or rubs  HEART: Regular rate and rhythm; No murmurs, rubs, or gallops  ABDOMEN: Soft, Nontender, Nondistended; Bowel sounds present  NERVOUS SYSTEM:  Alert & Oriented X2 to person and place,  Moving all 4 extremities  EXTREMITIES:  Peripheral pulses palpable. No clubbing, cyanosis, or edema  SKIN: Warm, Dry, no rashes or lesions    _________________________________________________  LABS:                        11.8   6.98  )-----------( 327      ( 21 Aug 2020 10:27 )             35.6     08-21    136  |  105  |  13  ----------------------------<  204<H>  3.3<L>   |  24  |  1.02    Ca    8.9      21 Aug 2020 10:27  Phos  2.9     08-21  Mg     1.8     08-21    TPro  6.9  /  Alb  3.3<L>  /  TBili  0.4  /  DBili  x   /  AST  32  /  ALT  55  /  AlkPhos  84  08-21      Urinalysis Basic - ( 20 Aug 2020 22:10 )    Color: Yellow / Appearance: Clear / S.010 / pH: x  Gluc: x / Ketone: Negative  / Bili: Negative / Urobili: Negative   Blood: x / Protein: Negative / Nitrite: Negative   Leuk Esterase: Trace / RBC: 0-2 /HPF / WBC 0-2 /HPF   Sq Epi: x / Non Sq Epi: Few /HPF / Bacteria: Trace /HPF      CAPILLARY BLOOD GLUCOSE      POCT Blood Glucose.: 161 mg/dL (21 Aug 2020 07:41)        RADIOLOGY & ADDITIONAL TESTS:  < from: CT Head No Cont (20 @ 23:23) >  There is no intracranial hemorrhage, abnormal extra-axial fluid collection, mass effect, midline shift or large acute cortical infarct.    Gray-white differentiation is maintained.  There are age appropriate involutional changes with commensurate dilatation of the CSF spaces.  There are extensive subcortical and periventricular white matter lucencies likely representing microvascular ischemic disease.    The mastoid air cells and visualized paranasal sinuses are well aerated.    IMPRESSION:    No intracranial hemorrhage, mass effect or large acute cortical infarct.    < end of copied text >    Imaging Personally Reviewed:  YES    Consultant(s) Notes Reviewed:   YES    Care Discussed with Consultants : YES     Plan of care was discussed with patient and /or primary care giver; all questions and concerns were addressed and care was aligned with patient's wishes. PGY 1 Note discussed with supervising resident and primary attending  PGY-1: Kierra Rodriguez MD  PAGER #: [phone] TILL 5:00 PM  Please contact On Call team 5PM - 8:30PM  Please contact Nightfloat team 8:30PM - 7:30AM  Patient is a 80y old  Female who presents with a chief complaint of Encephalopathy (21 Aug 2020 11:04)    Brief Hospital Course: 80F from home PMH of diabetes, HTN here to r/o encephalopathy after c/o headache and dizziness at PCP office. Family states she has had change in mental status with hallucinations over past few months, now worsened and with 2 falls in the past week. CT head negative. Infectious workup negative.     INTERVAL HPI/OVERNIGHT EVENTS: No acute events noted overnight. Patient with no complaints this AM, is not sure why she is in the hospital. Alert and oriented to place and name only, not to date.      MEDICATIONS  (STANDING):  enoxaparin Injectable 40 milliGRAM(s) SubCutaneous daily  insulin lispro (HumaLOG) corrective regimen sliding scale   SubCutaneous three times a day before meals  metoprolol tartrate 25 milliGRAM(s) Oral two times a day  NIFEdipine XL 30 milliGRAM(s) Oral daily  QUEtiapine 25 milliGRAM(s) Oral two times a day  sodium chloride 0.9% lock flush 3 milliLiter(s) IV Push every 8 hours    MEDICATIONS  (PRN):      __________________________________________________  REVIEW OF SYSTEMS:  CONSTITUTIONAL: No fever, weight loss, or fatigue  RESPIRATORY: No cough, wheezing, chills or hemoptysis; No shortness of breath  CARDIOVASCULAR: No chest pain, palpitations, dizziness, or leg swelling  GASTROINTESTINAL: No abdominal pain. No nausea, vomiting, or hematemesis; No diarrhea or constipation. No melena or hematochezia.  GENITOURINARY: No dysuria or hematuria, no burning micturition, no polyuria, no urinary hesitancy, no nocturia, normal urinary frequency  NEUROLOGICAL: No headaches, memory loss, loss of strength, numbness, or tremors  SKIN: No itching, burning, rashes, or lesions   All other ROS negative except noted above    Vital Signs Last 24 Hrs  T(C): 36.6 (21 Aug 2020 08:35), Max: 37.1 (21 Aug 2020 00:17)  T(F): 97.8 (21 Aug 2020 08:35), Max: 98.7 (21 Aug 2020 00:17)  HR: 88 (21 Aug 2020 11:15) (69 - 109)  BP: 158/70 (21 Aug 2020 11:15) (145/78 - 197/81)  BP(mean): --  RR: 16 (21 Aug 2020 08:35) (16 - 18)  SpO2: 98% (21 Aug 2020 11:15) (97% - 100%)    ________________________________________________  PHYSICAL EXAMINATION:  GENERAL: NAD, well-developed  HEAD:  Atraumatic, Normocephalic  EYES:  conjunctiva and sclera clear  NECK: Supple, No JVD, Normal thyroid  CHEST/LUNG: Clear to auscultation bilaterally; No rales, rhonchi, wheezing, or rubs  HEART: Regular rate and rhythm; No murmurs, rubs, or gallops  ABDOMEN: Soft, Nontender, Nondistended; Bowel sounds present  NERVOUS SYSTEM:  Alert & Oriented X2 to person and place,  Moving all 4 extremities  EXTREMITIES:  Peripheral pulses palpable. No clubbing, cyanosis, or edema  SKIN: Warm, Dry, no rashes or lesions    _________________________________________________  LABS:                        11.8   6.98  )-----------( 327      ( 21 Aug 2020 10:27 )             35.6     08-21    136  |  105  |  13  ----------------------------<  204<H>  3.3<L>   |  24  |  1.02    Ca    8.9      21 Aug 2020 10:27  Phos  2.9     08-21  Mg     1.8     08-21    TPro  6.9  /  Alb  3.3<L>  /  TBili  0.4  /  DBili  x   /  AST  32  /  ALT  55  /  AlkPhos  84  08-      Urinalysis Basic - ( 20 Aug 2020 22:10 )    Color: Yellow / Appearance: Clear / S.010 / pH: x  Gluc: x / Ketone: Negative  / Bili: Negative / Urobili: Negative   Blood: x / Protein: Negative / Nitrite: Negative   Leuk Esterase: Trace / RBC: 0-2 /HPF / WBC 0-2 /HPF   Sq Epi: x / Non Sq Epi: Few /HPF / Bacteria: Trace /HPF      CAPILLARY BLOOD GLUCOSE      POCT Blood Glucose.: 161 mg/dL (21 Aug 2020 07:41)        RADIOLOGY & ADDITIONAL TESTS:  < from: CT Head No Cont (20 @ 23:23) >  There is no intracranial hemorrhage, abnormal extra-axial fluid collection, mass effect, midline shift or large acute cortical infarct.    Gray-white differentiation is maintained.  There are age appropriate involutional changes with commensurate dilatation of the CSF spaces.  There are extensive subcortical and periventricular white matter lucencies likely representing microvascular ischemic disease.    The mastoid air cells and visualized paranasal sinuses are well aerated.    IMPRESSION:    No intracranial hemorrhage, mass effect or large acute cortical infarct.    < end of copied text >    Imaging Personally Reviewed:  YES    Consultant(s) Notes Reviewed:   YES    Care Discussed with Consultants : YES     Plan of care was discussed with patient and /or primary care giver; all questions and concerns were addressed and care was aligned with patient's wishes. PGY 1 Note discussed with supervising resident and primary attending  PGY-1: Kierra Rodriguez MD  PAGER #: [phone] TILL 5:00 PM  Please contact On Call team 5PM - 8:30PM  Please contact Nightfloat team 8:30PM - 7:30AM  Patient is a 80y old  Female who presents with a chief complaint of Encephalopathy (21 Aug 2020 11:04)    Brief Hospital Course: 80F from home PMH of diabetes, HTN here to r/o encephalopathy after c/o headache and dizziness at PCP office. Family states she has had change in mental status with hallucinations over past few months, now worsened and with 2 falls in the past week. CT head negative. Infectious workup negative.     INTERVAL HPI/OVERNIGHT EVENTS: No acute events noted overnight. Patient with no complaints this AM, is not sure why she is in the hospital. Alert and oriented to place and name only, not to date.      MEDICATIONS  (STANDING):  enoxaparin Injectable 40 milliGRAM(s) SubCutaneous daily  insulin lispro (HumaLOG) corrective regimen sliding scale   SubCutaneous three times a day before meals  metoprolol tartrate 25 milliGRAM(s) Oral two times a day  NIFEdipine XL 30 milliGRAM(s) Oral daily  QUEtiapine 25 milliGRAM(s) Oral two times a day  sodium chloride 0.9% lock flush 3 milliLiter(s) IV Push every 8 hours    MEDICATIONS  (PRN):      __________________________________________________  REVIEW OF SYSTEMS:  CONSTITUTIONAL: No fever, weight loss, or fatigue  RESPIRATORY: No cough, wheezing, chills or hemoptysis; No shortness of breath  CARDIOVASCULAR: No chest pain, palpitations, dizziness, or leg swelling  GASTROINTESTINAL: No abdominal pain. No nausea, vomiting, or hematemesis; No diarrhea or constipation. No melena or hematochezia.  GENITOURINARY: No dysuria or hematuria, no burning micturition, no polyuria, no urinary hesitancy, no nocturia, normal urinary frequency  NEUROLOGICAL: No headaches, memory loss, loss of strength, numbness, or tremors  SKIN: No itching, burning, rashes, or lesions   All other ROS negative except noted above    Vital Signs Last 24 Hrs  T(C): 36.6 (21 Aug 2020 08:35), Max: 37.1 (21 Aug 2020 00:17)  T(F): 97.8 (21 Aug 2020 08:35), Max: 98.7 (21 Aug 2020 00:17)  HR: 88 (21 Aug 2020 11:15) (69 - 109)  BP: 158/70 (21 Aug 2020 11:15) (145/78 - 197/81)  BP(mean): --  RR: 16 (21 Aug 2020 08:35) (16 - 18)  SpO2: 98% (21 Aug 2020 11:15) (97% - 100%)    ________________________________________________  PHYSICAL EXAMINATION:  GENERAL: NAD, well-developed  HEAD:  Atraumatic, Normocephalic  EYES:  conjunctiva and sclera clear  NECK: Supple, No JVD, Normal thyroid  CHEST/LUNG: Clear to auscultation bilaterally; No rales, rhonchi, wheezing, or rubs  HEART: Regular rate and rhythm; No murmurs, rubs, or gallops  ABDOMEN: Soft, Nontender, Nondistended; Bowel sounds present  NERVOUS SYSTEM:  Alert & Oriented X2 to person and place,  Moving all 4 extremities  EXTREMITIES:  Peripheral pulses palpable. No clubbing, cyanosis, or edema  SKIN: Warm, Dry, no rashes or lesions    _________________________________________________  LABS:                        11.8   6.98  )-----------( 327      ( 21 Aug 2020 10:27 )             35.6     08-21    136  |  105  |  13  ----------------------------<  204<H>  3.3<L>   |  24  |  1.02    Ca    8.9      21 Aug 2020 10:27  Phos  2.9     08-21  Mg     1.8     08-21    TPro  6.9  /  Alb  3.3<L>  /  TBili  0.4  /  DBili  x   /  AST  32  /  ALT  55  /  AlkPhos  84  -      Urinalysis Basic - ( 20 Aug 2020 22:10 )    Color: Yellow / Appearance: Clear / S.010 / pH: x  Gluc: x / Ketone: Negative  / Bili: Negative / Urobili: Negative   Blood: x / Protein: Negative / Nitrite: Negative   Leuk Esterase: Trace / RBC: 0-2 /HPF / WBC 0-2 /HPF   Sq Epi: x / Non Sq Epi: Few /HPF / Bacteria: Trace /HPF      CAPILLARY BLOOD GLUCOSE      POCT Blood Glucose.: 161 mg/dL (21 Aug 2020 07:41)        RADIOLOGY & ADDITIONAL TESTS:  < from: Xray Chest 1 View- PORTABLE-Urgent (20 @ 06:16) >    FINDINGS: Heart size appears within normal limits. No hilar or superior mediastinal abnormalities are identified. There is no evidence for focal infiltrate, lobar consolidation or pulmonary edema. No pleural effusion or pneumothorax is demonstrated. No mediastinal shift is noted. Degenerative changes of the bilateral shoulder regions noted.    IMPRESSION: No evidence for focal infiltrate or lobar consolidation.    < end of copied text >      < from: CT Head No Cont (20 @ 23:23) >  There is no intracranial hemorrhage, abnormal extra-axial fluid collection, mass effect, midline shift or large acute cortical infarct.    Gray-white differentiation is maintained.  There are age appropriate involutional changes with commensurate dilatation of the CSF spaces.  There are extensive subcortical and periventricular white matter lucencies likely representing microvascular ischemic disease.    The mastoid air cells and visualized paranasal sinuses are well aerated.    IMPRESSION:    No intracranial hemorrhage, mass effect or large acute cortical infarct.    < end of copied text >    Imaging Personally Reviewed:  YES    Consultant(s) Notes Reviewed:   YES    Care Discussed with Consultants : YES     Plan of care was discussed with patient and /or primary care giver; all questions and concerns were addressed and care was aligned with patient's wishes.

## 2020-08-21 NOTE — PROGRESS NOTE ADULT - PROBLEM SELECTOR PLAN 9
IMPROVE VTE Individual Risk Assessment  RISK                                                                Points  [  ] Previous VTE                                                  3  [  ] Thrombophilia                                               2  [  ] Lower limb paralysis                                      2        (unable to hold up >15 seconds)    [  ] Current Cancer                                              2         (within 6 months)  [x  ] Immobilization > 24 hrs                                1  [  ] ICU/CCU stay > 24 hours                              1  [ x ] Age > 60                                                      1  IMPROVE VTE Score ___2______  lovenox 40mg daily for DVT ppx
started on lovenox, age 80, hx diabetes

## 2020-08-21 NOTE — H&P ADULT - PROBLEM SELECTOR PLAN 8
- Patient takes DM meds at home  - will hold home medications  - will start sliding scale  - f/u HgA1c  - diabetic diet

## 2020-08-21 NOTE — PROGRESS NOTE ADULT - PROBLEM SELECTOR PLAN 1
CTH shows no acute pathology. UA was negative.  -evaluate cause of progression of dementia and hallucinations vs. infectious etiology  -low TSH, normal free T4   -CXR normal  -consult neuro on recent changes and AMS

## 2020-08-21 NOTE — H&P ADULT - PROBLEM SELECTOR PLAN 9
IMPROVE VTE Individual Risk Assessment  RISK                                                                Points  [  ] Previous VTE                                                  3  [  ] Thrombophilia                                               2  [  ] Lower limb paralysis                                      2        (unable to hold up >15 seconds)    [  ] Current Cancer                                              2         (within 6 months)  [x  ] Immobilization > 24 hrs                                1  [  ] ICU/CCU stay > 24 hours                              1  [ x ] Age > 60                                                      1  IMPROVE VTE Score ___2______  lovenox 40mg daily for DVT ppx

## 2020-08-21 NOTE — H&P ADULT - PROBLEM SELECTOR PLAN 4
p/w Na 130  - serum osm 277mosm, euvolemic, Urine Na 49, urine [id number]  - likely SIADH  - monitor BMP for now

## 2020-08-21 NOTE — PROGRESS NOTE ADULT - ASSESSMENT
80F from home PMH of diabetes, HTN here because family states she has had change in mental status with hallucinations over past few months, now worsened and with falls.     In ED;   UA negative, and CT head showing no acute pathology.   Pt was admitted for encephalopathy workup. 80F from home PMH of diabetes, HTN here because family states she has had change in mental status with hallucinations over past few months, now worsened and with falls c/o headache and dizziness.     In ED;   UA negative, and CT head showing no acute pathology.   Pt was admitted for encephalopathy workup.

## 2020-08-21 NOTE — PROGRESS NOTE ADULT - PROBLEM SELECTOR PLAN 7
Cr 1.06, GFR 49  - KIMBERLY vs CKD  - FeNa 3.1% -intrinsic  - monitor BMP
Patient takes DM meds at home  c/w home meds  f/u A1c  diabetic diet

## 2020-08-21 NOTE — CONSULT NOTE ADULT - ASSESSMENT
79yo female w/ toxic metabolic encephalopathy    Recommendations:    -TSH and Na abnormalities noted.  Consider TSH workup per primary team.    -PT as tolerated 81yo female w/ toxic metabolic encephalopathy and dizziness/ headache later likely due to orthostasis    Recommendations:  Correct metabolic dysfunction as per primary team  Consider hydration  The patient should be re-evaluated by her neurologist as outpatient once she is stable at home to evaluate for progession of her dementia    dw resident and Dr. Reyes

## 2020-08-21 NOTE — DISCHARGE NOTE PROVIDER - NSDCMRMEDTOKEN_GEN_ALL_CORE_FT
atorvastatin 20 mg oral tablet: 1 tab(s) orally once a day  hydrALAZINE 25 mg oral tablet: 1 tab(s) orally 3 times a day  Janumet  mg-1000 mg oral tablet, extended release: 1 tab(s) orally once a day (in the evening)  losartan 100 mg oral tablet: 1 tab(s) orally once a day  Metoprolol Tartrate 25 mg oral tablet: 0.5 tab(s) orally 2 times a day  Nifedical XL 60 mg oral tablet, extended release: 1 tab(s) orally once a day  QUEtiapine 25 mg oral tablet: 1 tab(s) orally 2 times a day  traZODone 50 mg oral tablet: 1 tab(s) orally once a day (at bedtime)  Vitamin C 500 mg oral capsule: 1 cap(s) orally once a day with food atorvastatin 20 mg oral tablet: 1 tab(s) orally once a day  ferrous sulfate 325 mg (65 mg elemental iron) oral tablet: 1 tab(s) orally once a day  Janumet  mg-1000 mg oral tablet, extended release: 1 tab(s) orally once a day (in the evening)  Metoprolol Tartrate 25 mg oral tablet: 0.5 tab(s) orally 2 times a day  Nifedical XL 60 mg oral tablet, extended release: 1 tab(s) orally once a day  QUEtiapine 25 mg oral tablet: 1 tab(s) orally 2 times a day  Vitamin C 500 mg oral capsule: 1 cap(s) orally once a day with food

## 2020-08-21 NOTE — CONSULT NOTE ADULT - SUBJECTIVE AND OBJECTIVE BOX
++++++++++++++++++++NOTE NOT COMPLETED++++++++++++++++++++++++++++++++++++++++++++++++++++++++++++    Patient is a 80y old  Female who presents with a chief complaint of Encephalopathy (21 Aug 2020 00:28)      HPI:  80y Female from home, living with family, walking with cane, with PMH of HTN presented to ED c/o headache and dizziness.   As per daughter in law Nancy ([phone], via [employer] # [id number]), pt has "dementia for a few months", getting worse and that they're having trouble taking care of her at home and they need either HHA or placement.     In ED;   UA negative, and CT head showing no acute pathology.     GOC: DNR/DNI (21 Aug 2020 00:28)    Pt stated, "I don't know."   However, reports ambulating w/ a cane.   [facility] -Christine # [id number].      Neurological Review of Systems:  No difficulty with language.  No vision loss or double vision.  No dizziness, vertigo or new hearing loss.  No difficulty with speech or swallowing.  No focal weakness.  No focal sensory changes.  No numbness or tingling in the bilateral lower extremities.  No difficulty with balance.  No difficulty with ambulation.        MEDICATIONS  (STANDING):  enoxaparin Injectable 40 milliGRAM(s) SubCutaneous daily  insulin lispro (HumaLOG) corrective regimen sliding scale   SubCutaneous three times a day before meals  metoprolol tartrate 25 milliGRAM(s) Oral two times a day  NIFEdipine XL 30 milliGRAM(s) Oral daily  QUEtiapine 25 milliGRAM(s) Oral two times a day  sodium chloride 0.9% lock flush 3 milliLiter(s) IV Push every 8 hours    MEDICATIONS  (PRN):    Allergies    No Known Allergies    Intolerances      PAST MEDICAL & SURGICAL HISTORY:  Dementia  HTN (hypertension)  DM (diabetes mellitus)  No pertinent past surgical history    FAMILY HISTORY:    SOCIAL HISTORY: non smoker    Review of Systems:  Limited exam in pt w/ reported Dementia  Constitutional: No generalized weakness. No fevers or chills               Eyes, Ears, Mouth, Throat: No vision loss.  Denies HA or dizziness  Respiratory: No shortness of breath or cough                              Cardiovascular: No chest pain or palpitations  Gastrointestinal: No nausea or vomiting                                      Genitourinary: No urinary incontinence or burning on urination  Musculoskeletal: No joint pain                                                       Dermatologic: No rash  Neurological: as per HPI                                                                      Psychiatric: No behavioral problems  Endocrine: No known hypoglycemia               Hematologic/Lymphatic: No easy bleeding    O:  Vital Signs Last 24 Hrs  T(C): 36.6 (21 Aug 2020 08:35), Max: 37.1 (21 Aug 2020 00:17)  T(F): 97.8 (21 Aug 2020 08:35), Max: 98.7 (21 Aug 2020 00:17)  HR: 92 (21 Aug 2020 08:35) (69 - 109)  BP: 166/69 (21 Aug 2020 08:35) (145/78 - 197/81)  RR: 16 (21 Aug 2020 08:35) (16 - 18)  SpO2: 98% (21 Aug 2020 08:35) (97% - 100%)    General Exam:   General appearance: No acute distress                 Cardiovascular: Pedal dorsalis pulses intact bilaterally    Mental Status: Orientated to self, date and place.  Attention intact.  No dysarthria, aphasia or neglect.  Knowledge, registration, short and long term memory grossly impaired.     Cranial Nerves: CN I - not tested.  PERRL, EOMI, visually impaired, no nystagmus or diplopia.  No APD.  Fundi not visualized.  CN V1-3 intact to light touch.  No facial asymmetry.  Hearing intact to finger rub bilaterally.  Tongue, uvula and palate midline.  Sternocleidomastoid and Trapezius intact bilaterally.    Motor:   Tone: Normal                  Strength intact throughout  No pronator drift bilaterally                      No dysmetria on finger-nose-finger or heel-shin-heel  No truncal ataxia.  No resting, postural or action tremor.  No myoclonus.    Sensation: intact to light touch    Deep Tendon Reflexes: 2+ bilateral biceps, triceps, brachioradialis, knee and ankle  Toes flexor bilaterally    Gait: Unsteady.  Rhomberg -prakash.    Other:     LABS:                        11.8   6.98  )-----------( 327      ( 21 Aug 2020 10:27 )             35.6     08-21    136  |  105  |  13  ----------------------------<  204<H>  3.3<L>   |  24  |  1.02    Ca    8.9      21 Aug 2020 10:27  Phos  2.9     08-  Mg     1.8     -    TPro  6.9  /  Alb  3.3<L>  /  TBili  0.4  /  DBili  x   /  AST  32  /  ALT  55  /  AlkPhos  84  08-      Urinalysis Basic - ( 20 Aug 2020 22:10 )    Color: Yellow / Appearance: Clear / S.010 / pH: x  Gluc: x / Ketone: Negative  / Bili: Negative / Urobili: Negative   Blood: x / Protein: Negative / Nitrite: Negative   Leuk Esterase: Trace / RBC: 0-2 /HPF / WBC 0-2 /HPF   Sq Epi: x / Non Sq Epi: Few /HPF / Bacteria: Trace /HPF          RADIOLOGY & ADDITIONAL STUDIES:  < from: CT Head No Cont (20 @ 23:23) >    EXAM:  CT BRAIN                            PROCEDURE DATE:  2020          INTERPRETATION:  Brain CT    Indication: Altered Mental status    Technique: Axial images were obtained, along with reformatted coronal and sagittal images.    Comparison:  None    FINDINGS:    There is no intracranial hemorrhage, abnormal extra-axial fluid collection, mass effect, midline shift or large acute cortical infarct.    Gray-white differentiation is maintained.  There are age appropriate involutional changes with commensurate dilatation of the CSF spaces.  There are extensive subcortical and periventricular white matter lucencies likely representing microvascular ischemic disease.    The mastoid air cells and visualized paranasal sinuses are well aerated.    IMPRESSION:    No intracranial hemorrhage, mass effect or large acute cortical infarct.              MICHAEL LOCKE M.D, ATTENDING RADIOLOGIST  This document has been electronically signed. Aug 20 2020 11:36PM                < end of copied text > Patient is a 80y old  Female who presents with a chief complaint of Encephalopathy (21 Aug 2020 00:28)      HPI:  80y Female from home, living with family, walking with cane, with PMH of HTN presented to ED c/o headache and dizziness.   As per daughter in law Cruz ([phone], via [employer] # [id number]), pt has "dementia for a few months", getting worse and that they're having trouble taking care of her at home and they need either HHA or placement.     In ED;   UA negative, and CT head showing no acute pathology.     GOC: DNR/DNI (21 Aug 2020 00:28)    Pt stated, "I don't know."   However, reports ambulating w/ a cane.   [facility] # [id number].      Neurological Review of Systems:  No difficulty with language.  No vision loss or double vision.  No dizziness, vertigo or new hearing loss.  No difficulty with speech or swallowing.  No focal weakness.  No focal sensory changes.  No numbness or tingling in the bilateral lower extremities.  No difficulty with balance.  No difficulty with ambulation.        MEDICATIONS  (STANDING):  enoxaparin Injectable 40 milliGRAM(s) SubCutaneous daily  insulin lispro (HumaLOG) corrective regimen sliding scale   SubCutaneous three times a day before meals  metoprolol tartrate 25 milliGRAM(s) Oral two times a day  NIFEdipine XL 30 milliGRAM(s) Oral daily  QUEtiapine 25 milliGRAM(s) Oral two times a day  sodium chloride 0.9% lock flush 3 milliLiter(s) IV Push every 8 hours    MEDICATIONS  (PRN):    Allergies    No Known Allergies    Intolerances      PAST MEDICAL & SURGICAL HISTORY:  Dementia  HTN (hypertension)  DM (diabetes mellitus)  No pertinent past surgical history    FAMILY HISTORY:    SOCIAL HISTORY: non smoker    Review of Systems:  Limited exam in pt w/ reported Dementia  Constitutional: No generalized weakness. No fevers or chills               Eyes, Ears, Mouth, Throat: No vision loss.  Denies HA or dizziness  Respiratory: No shortness of breath or cough                              Cardiovascular: No chest pain or palpitations  Gastrointestinal: No nausea or vomiting                                      Genitourinary: No urinary incontinence or burning on urination  Musculoskeletal: No joint pain                                                       Dermatologic: No rash  Neurological: as per HPI                                                                      Psychiatric: No behavioral problems  Endocrine: No known hypoglycemia               Hematologic/Lymphatic: No easy bleeding    O:  Vital Signs Last 24 Hrs  T(C): 36.6 (21 Aug 2020 08:35), Max: 37.1 (21 Aug 2020 00:17)  T(F): 97.8 (21 Aug 2020 08:35), Max: 98.7 (21 Aug 2020 00:17)  HR: 92 (21 Aug 2020 08:35) (69 - 109)  BP: 166/69 (21 Aug 2020 08:35) (145/78 - 197/81)  RR: 16 (21 Aug 2020 08:35) (16 - 18)  SpO2: 98% (21 Aug 2020 08:35) (97% - 100%)    General Exam:   General appearance: No acute distress                 Cardiovascular: Pedal dorsalis pulses intact bilaterally    Mental Status: Orientated to self, date and place.  Attention intact.  No dysarthria, aphasia or neglect.  Knowledge, registration, short and long term memory grossly impaired.     Cranial Nerves: CN I - not tested.  PERRL, EOMI, visually impaired, no nystagmus or diplopia.  No APD.  Fundi not visualized.  CN V1-3 intact to light touch.  No facial asymmetry.  Hearing intact to finger rub bilaterally.  Tongue, uvula and palate midline.  Sternocleidomastoid and Trapezius intact bilaterally.    Motor:   Tone: Normal                  Strength intact throughout  No pronator drift bilaterally                      No dysmetria on finger-nose-finger or heel-shin-heel  No truncal ataxia.  No resting, postural or action tremor.  No myoclonus.    Sensation: intact to light touch    Deep Tendon Reflexes: 2+ bilateral biceps, triceps, brachioradialis, knee and ankle  Toes flexor bilaterally    Gait: Unsteady.  Rhomberg -prakash.    Other:     LABS:                        11.8   6.98  )-----------( 327      ( 21 Aug 2020 10:27 )             35.6     08-21    136  |  105  |  13  ----------------------------<  204<H>  3.3<L>   |  24  |  1.02    Ca    8.9      21 Aug 2020 10:27  Phos  2.9     08-21  Mg     1.8     08-21    TPro  6.9  /  Alb  3.3<L>  /  TBili  0.4  /  DBili  x   /  AST  32  /  ALT  55  /  AlkPhos  84  08-      Urinalysis Basic - ( 20 Aug 2020 22:10 )    Color: Yellow / Appearance: Clear / S.010 / pH: x  Gluc: x / Ketone: Negative  / Bili: Negative / Urobili: Negative   Blood: x / Protein: Negative / Nitrite: Negative   Leuk Esterase: Trace / RBC: 0-2 /HPF / WBC 0-2 /HPF   Sq Epi: x / Non Sq Epi: Few /HPF / Bacteria: Trace /HPF          RADIOLOGY & ADDITIONAL STUDIES:  < from: CT Head No Cont (20 @ 23:23) >    EXAM:  CT BRAIN                            PROCEDURE DATE:  2020          INTERPRETATION:  Brain CT    Indication: Altered Mental status    Technique: Axial images were obtained, along with reformatted coronal and sagittal images.    Comparison:  None    FINDINGS:    There is no intracranial hemorrhage, abnormal extra-axial fluid collection, mass effect, midline shift or large acute cortical infarct.    Gray-white differentiation is maintained.  There are age appropriate involutional changes with commensurate dilatation of the CSF spaces.  There are extensive subcortical and periventricular white matter lucencies likely representing microvascular ischemic disease.    The mastoid air cells and visualized paranasal sinuses are well aerated.    IMPRESSION:    No intracranial hemorrhage, mass effect or large acute cortical infarct.              MICHAEL LOCKE M.D, ATTENDING RADIOLOGIST  This document has been electronically signed. Aug 20 2020 11:36PM                < end of copied text > Patient is a 80y old  Female who presents with a chief complaint of Encephalopathy (21 Aug 2020 00:28)      HPI:  80y Female from home, living with family, walking with cane, with PMH of HTN presented to ED c/o headache and dizziness.   As per daughter in law Cruz ([phone], via [employer] # [id number]), pt has "dementia for a few months", getting worse and that they're having trouble taking care of her at home and they need either HHA or placement.     In ED;   UA negative, and CT head showing no acute pathology.     GOC: DNR/DNI (21 Aug 2020 00:28)    Pt stated, "I don't know."   However, reports ambulating w/ a cane.   [facility] # [id number].      Neurological Review of Systems:  No difficulty with language.  No vision loss or double vision.  No dizziness, vertigo or new hearing loss.  No difficulty with speech or swallowing.  No focal weakness.  No focal sensory changes.  No numbness or tingling in the bilateral lower extremities.  No difficulty with balance.  No difficulty with ambulation.        MEDICATIONS  (STANDING):  enoxaparin Injectable 40 milliGRAM(s) SubCutaneous daily  insulin lispro (HumaLOG) corrective regimen sliding scale   SubCutaneous three times a day before meals  metoprolol tartrate 25 milliGRAM(s) Oral two times a day  NIFEdipine XL 30 milliGRAM(s) Oral daily  QUEtiapine 25 milliGRAM(s) Oral two times a day  sodium chloride 0.9% lock flush 3 milliLiter(s) IV Push every 8 hours    MEDICATIONS  (PRN):    Allergies    No Known Allergies    Intolerances      PAST MEDICAL & SURGICAL HISTORY:  Dementia  HTN (hypertension)  DM (diabetes mellitus)  No pertinent past surgical history    FAMILY HISTORY:    SOCIAL HISTORY: non smoker    Review of Systems:  Limited exam in pt w/ reported Dementia  Constitutional: No generalized weakness. No fevers or chills               Eyes, Ears, Mouth, Throat: No vision loss.  Denies HA or dizziness  Respiratory: No shortness of breath or cough                              Cardiovascular: No chest pain or palpitations  Gastrointestinal: No nausea or vomiting                                      Genitourinary: No urinary incontinence or burning on urination  Musculoskeletal: No joint pain                                                       Dermatologic: No rash  Neurological: as per HPI                                                                      Psychiatric: No behavioral problems  Endocrine: No known hypoglycemia               Hematologic/Lymphatic: No easy bleeding    O:  Vital Signs Last 24 Hrs  T(C): 36.6 (21 Aug 2020 08:35), Max: 37.1 (21 Aug 2020 00:17)  T(F): 97.8 (21 Aug 2020 08:35), Max: 98.7 (21 Aug 2020 00:17)  HR: 92 (21 Aug 2020 08:35) (69 - 109)  BP: 166/69 (21 Aug 2020 08:35) (145/78 - 197/81)  RR: 16 (21 Aug 2020 08:35) (16 - 18)  SpO2: 98% (21 Aug 2020 08:35) (97% - 100%)    General Exam:   General appearance: No acute distress                 Cardiovascular: Pedal dorsalis pulses intact bilaterally    Mental Status: Orientated to self and place, not date.  Attention intact.  No dysarthria, aphasia or neglect.  Knowledge, registration, short and long term memory grossly impaired.     Cranial Nerves: CN I - not tested.  PERRL, EOMI, visually impaired, no nystagmus or diplopia.  No APD.  Fundi not visualized.  CN V1-3 intact to light touch.  No facial asymmetry.  Hearing intact to finger rub bilaterally.  Tongue, uvula and palate midline.  Sternocleidomastoid and Trapezius intact bilaterally.    Motor:   Tone: Normal                  Strength intact throughout  No pronator drift bilaterally                      No dysmetria on finger-nose-finger or heel-shin-heel  No truncal ataxia.  No resting, postural or action tremor.  No myoclonus.    Sensation: intact to light touch    Deep Tendon Reflexes: 2+ bilateral biceps, triceps, brachioradialis, knee and ankle  Toes flexor bilaterally    Gait: Unsteady.  Rhomberg -prakash.    Other:     LABS:                        11.8   6.98  )-----------( 327      ( 21 Aug 2020 10:27 )             35.6     08-21    136  |  105  |  13  ----------------------------<  204<H>  3.3<L>   |  24  |  1.02    Ca    8.9      21 Aug 2020 10:27  Phos  2.9     08-21  Mg     1.8     08-21    TPro  6.9  /  Alb  3.3<L>  /  TBili  0.4  /  DBili  x   /  AST  32  /  ALT  55  /  AlkPhos  84  -      Urinalysis Basic - ( 20 Aug 2020 22:10 )    Color: Yellow / Appearance: Clear / S.010 / pH: x  Gluc: x / Ketone: Negative  / Bili: Negative / Urobili: Negative   Blood: x / Protein: Negative / Nitrite: Negative   Leuk Esterase: Trace / RBC: 0-2 /HPF / WBC 0-2 /HPF   Sq Epi: x / Non Sq Epi: Few /HPF / Bacteria: Trace /HPF          RADIOLOGY & ADDITIONAL STUDIES:  < from: CT Head No Cont (20 @ 23:23) >    EXAM:  CT BRAIN                            PROCEDURE DATE:  2020          INTERPRETATION:  Brain CT    Indication: Altered Mental status    Technique: Axial images were obtained, along with reformatted coronal and sagittal images.    Comparison:  None    FINDINGS:    There is no intracranial hemorrhage, abnormal extra-axial fluid collection, mass effect, midline shift or large acute cortical infarct.    Gray-white differentiation is maintained.  There are age appropriate involutional changes with commensurate dilatation of the CSF spaces.  There are extensive subcortical and periventricular white matter lucencies likely representing microvascular ischemic disease.    The mastoid air cells and visualized paranasal sinuses are well aerated.    IMPRESSION:    No intracranial hemorrhage, mass effect or large acute cortical infarct.              MICHAEL LOCKE M.D, ATTENDING RADIOLOGIST  This document has been electronically signed. Aug 20 2020 11:36PM                < end of copied text > Patient is a 80y old  Female who presents with a chief complaint of Encephalopathy (21 Aug 2020 00:28)  [facility] -Christine # [id number].      HPI:  80y Female from home, living with family, walking with cane, with PMH of HTN presented to ED c/o headache and dizziness.  As per daughter in law Nancy ([phone], via [employer] # [id number]), pt has "dementia for a few months", getting worse and that they're having trouble taking care of her at home and they need either HHA or placement.     In ED;   UA negative, and CT head showing no acute pathology.     Pt stated, "I don't know," when asked why she is here.   However, reports ambulating w/ a cane.       Neurological Review of Systems:  No difficulty with language.  No vision loss or double vision.  + dizziness.  No vertigo.  + chronic hearing loss.  No difficulty with speech or swallowing.  No focal weakness.  No focal sensory changes.  No numbness or tingling in the bilateral lower extremities.  No difficulty with balance.      MEDICATIONS  (STANDING):  enoxaparin Injectable 40 milliGRAM(s) SubCutaneous daily  insulin lispro (HumaLOG) corrective regimen sliding scale   SubCutaneous three times a day before meals  metoprolol tartrate 25 milliGRAM(s) Oral two times a day  NIFEdipine XL 30 milliGRAM(s) Oral daily  QUEtiapine 25 milliGRAM(s) Oral two times a day  sodium chloride 0.9% lock flush 3 milliLiter(s) IV Push every 8 hours    MEDICATIONS  (PRN):    Allergies    No Known Allergies    Intolerances      PAST MEDICAL & SURGICAL HISTORY:  Dementia  HTN (hypertension)  DM (diabetes mellitus)  No pertinent past surgical history    FAMILY HISTORY: nc parents    SOCIAL HISTORY: non smoker    Review of Systems:  Limited exam in pt w/ reported Dementia  Constitutional: No fevers   Eyes, Ears, Mouth, Throat: +HA or dizziness  Respiratory: No cough                              Cardiovascular: No chest pain  Gastrointestinal: No vomiting                                      Genitourinary: No known burning on urination  Musculoskeletal: No known joint pain                                                       Dermatologic: No known  rash  Neurological: as per HPI                                                                      Psychiatric: No known behavioral problems  Endocrine: No known hypoglycemia               Hematologic/Lymphatic: No known  easy bleeding    O:  Vital Signs Last 24 Hrs  T(C): 36.6 (21 Aug 2020 08:35), Max: 37.1 (21 Aug 2020 00:17)  T(F): 97.8 (21 Aug 2020 08:35), Max: 98.7 (21 Aug 2020 00:17)  HR: 92 (21 Aug 2020 08:35) (69 - 109)  BP: 166/69 (21 Aug 2020 08:35) (145/78 - 197/81)  RR: 16 (21 Aug 2020 08:35) (16 - 18)  SpO2: 98% (21 Aug 2020 08:35) (97% - 100%)    General Exam:   General appearance: No acute distress                 Cardiovascular: Pedal dorsalis pulses intact bilaterally    Mental Status: Orientated to self but  not to place, not date.  Attention intact.  No dysarthria, aphasia or neglect.  Knowledge, registration, short and long term memory grossly impaired.     Cranial Nerves: CN I - not tested.  PERRL, EOMI, visually impaired, no nystagmus or diplopia.  No APD.  Fundi not visualized.  CN V1-3 intact to light touch.  No facial asymmetry.  Hearing intact to finger rub bilaterally.  Tongue, uvula and palate midline.  Sternocleidomastoid and Trapezius intact bilaterally.    Motor:   Tone: Normal                  Strength intact throughout  No pronator drift bilaterally                      No dysmetria on finger-nose-finger or heel-shin-heel    Sensation: intact to light touch    Deep Tendon Reflexes: 2+ bilateral biceps, triceps, brachioradialis, knee and ankle  Toes flexor bilaterally    Gait: Unsteady.      Other: + grasp reflex bl, no palmomental reflex    LABS:                        11.8   6.98  )-----------( 327      ( 21 Aug 2020 10:27 )             35.6     08-21    136  |  105  |  13  ----------------------------<  204<H>  3.3<L>   |  24  |  1.02    Ca    8.9      21 Aug 2020 10:27  Phos  2.9     08-21  Mg     1.8     08-21    TPro  6.9  /  Alb  3.3<L>  /  TBili  0.4  /  DBili  x   /  AST  32  /  ALT  55  /  AlkPhos  84  08-21      Urinalysis Basic - ( 20 Aug 2020 22:10 )    Color: Yellow / Appearance: Clear / S.010 / pH: x  Gluc: x / Ketone: Negative  / Bili: Negative / Urobili: Negative   Blood: x / Protein: Negative / Nitrite: Negative   Leuk Esterase: Trace / RBC: 0-2 /HPF / WBC 0-2 /HPF   Sq Epi: x / Non Sq Epi: Few /HPF / Bacteria: Trace /HPF      Na on admission 130  TSH low    RADIOLOGY & ADDITIONAL STUDIES:      < from: CT Head No Cont (20 @ 23:23) > (images reviwed_    EXAM:  CT BRAIN                            PROCEDURE DATE:  2020          INTERPRETATION:  Brain CT    Indication: Altered Mental status    Technique: Axial images were obtained, along with reformatted coronal and sagittal images.    Comparison:  None    FINDINGS:    There is no intracranial hemorrhage, abnormal extra-axial fluid collection, mass effect, midline shift or large acute cortical infarct.    Gray-white differentiation is maintained.  There are age appropriate involutional changes with commensurate dilatation of the CSF spaces.  There are extensive subcortical and periventricular white matter lucencies likely representing microvascular ischemic disease.    The mastoid air cells and visualized paranasal sinuses are well aerated.    IMPRESSION:    No intracranial hemorrhage, mass effect or large acute cortical infarct.              MICHAEL LOCKE M.D, ATTENDING RADIOLOGIST  This document has been electronically signed. Aug 20 2020 11:36PM                < end of copied text >

## 2020-08-21 NOTE — PHYSICAL THERAPY INITIAL EVALUATION ADULT - LIVES WITH, PROFILE
family in a house. Patient is mainly homebound and lives on main floor. Family assist if patient needs to negotiate stairs.

## 2020-08-21 NOTE — DISCHARGE NOTE PROVIDER - HOSPITAL COURSE
The patient is an 80 year old [place]speaking [place]  female who presents from home to the ED with headaches and dizziness. The patient has a PMH of HTN and diabetes and is a poor historian. HPI was obtained from her daughter in law, Nancy ([phone]), who lives at home with the patient and from the patient's son. Of note, the daughter describes that the patient has had "dementia for a few months". For the past few months, she has had altered mental status described as "seeing people, people giving her medicine and saying they will kill her", additionally her memory has deteriorated and she can no longer remember things. The patient fell twice in the last week but did not hit her head. The family mentions they are having trouble taking care of her at home and are requesting a HHA or NH placement. The patient denies SOB, chest pain, abdominal pain, vision changes, pain in limbs, paresthesias, N/V/D. The patient is hard of hearing.         In the hospital workup was negative for infectious or metabolic pathology. CT head showed only chronic microvascular changes. Patient found to have orthostatic hypotension, trazodone and excess HTN medications held. Patient discharged on --- The patient is an 80 year old [place]speaking [place]  female who presents from home to the ED with headaches and dizziness. The patient has a PMH of HTN and diabetes and is a poor historian. HPI was obtained from her daughter in law, Nancy ([phone]), who lives at home with the patient and from the patient's son. Of note, the daughter describes that the patient has had "dementia for a few months". For the past few months, she has had altered mental status described as "seeing people, people giving her medicine and saying they will kill her", additionally her memory has deteriorated and she can no longer remember things. The patient fell twice in the last week but did not hit her head. The family mentions they are having trouble taking care of her at home and are requesting a HHA or NH placement. The patient denies SOB, chest pain, abdominal pain, vision changes, pain in limbs, paresthesias, N/V/D. The patient is hard of hearing.         In the hospital workup of encephalopathy was negative for infectious or metabolic pathology. CT head showed only chronic microvascular changes. Patient found to have orthostatic hypotension, trazodone and excess HTN medications discontinued. Patient discharged on metoprolol 25mg BID and Nifedipine 60mg QD. To follow with PCP for further BP management.        Patient is stable for discharge to follow up with Neurology outpatient for dementia workup.        Case discussed with attending. The patient is an 80 year old [place]speaking [place]  female who presents from home to the ED with headaches and dizziness. The patient has a PMH of HTN and diabetes and is a poor historian. HPI was obtained from her daughter in law, Nancy ([phone]), who lives at home with the patient and from the patient's son. Of note, the daughter describes that the patient has had "dementia for a few months". For the past few months, she has had altered mental status described as "seeing people, people giving her medicine and saying they will kill her", additionally her memory has deteriorated and she can no longer remember things. The patient fell twice in the last week but did not hit her head. The family mentions they are having trouble taking care of her at home and are requesting a HHA or NH placement. The patient denies SOB, chest pain, abdominal pain, vision changes, pain in limbs, paresthesias, N/V/D. The patient is hard of hearing.         In the hospital workup of encephalopathy was negative for infectious or metabolic pathology (Folate, B12, syphillis wnl). TSH was low, but FT4 and Total T3 were within normal limits. CT head showed only chronic microvascular changes. Patient found to have orthostatic hypotension - trazodone and excess HTN medications discontinued. Patient discharged on metoprolol 25mg BID and Nifedipine 60mg QD. To follow with PCP for further BP management.        Patient is stable for discharge to follow up with Neurology outpatient for dementia workup.        Case discussed with attending.

## 2020-08-21 NOTE — H&P ADULT - PROBLEM SELECTOR PLAN 1
CTH: no acute pathology, UA negative  - progression of dementia vs infectious etiology   - TSH is low, f/u free T4, total T3  - f/u vit B12, folate, syphilis screen, lactate  - f/u CXR  Neuro Dr. Wilkinson consulted

## 2020-08-21 NOTE — PROGRESS NOTE ADULT - SUBJECTIVE AND OBJECTIVE BOX
Medical Student note discussed with residents and attending    CHIEF COMPLAINT: headache and dizziness    HPI:  The patient is an 80 year old [place]speaking [place]  female who presents from home to the ED with headaches and dizziness. The patient has a PMH of HTN and diabetes and is a poor historian. HPI was obtained from her daughter in law, Nancy ([phone]), who lives at home with the patient and from the patient's son. Of note, the daughter describes that the patient has had "dementia for a few months". For the past few months, she has had altered mental status described as "seeing people, people giving her medicine and saying they will kill her", additionally her memory has deteriorated and she can no longer remember things. The patient fell twice in the last week but did not hit her head. The family mentions they are having trouble taking care of her at home and are requesting a HHA or NH placement. The patient denies SOB, chest pain, abdominal pain, vision changes, pain in limbs, paresthesias, N/V/D. The patient is hard of hearing.     Overnight: no overnight events. Patient was given insulin 1U this morning. She denies HA and dizziness this morning. She also denies any pain.    PMH: diabetes, HTN    PSH: thyroid/neck surgery     ALL: NKDA, NKA    Rx: medrec obtained from pharmacy ([phone])    FHx: hypertension in the family    Social: The patient is a poor historian so social hx was obtained from the son. The patient lives at home with her daughter in law and son. She is able to ambulate at home by herself, without assistance. She is a nonsmoker (never smoked), denies alcohol or recreational drug use. The patient has been having memory issues within the past few months.    HCM: The patient last saw her PCP, Dr. Zhang, last Saturday. Dr. Zhang referred the patient to [facility] ED after being told the patient was experiencing HA and dizziness.    REVIEW OF SYSTEMS:  CONSTITUTIONAL: No fever, weight loss, or fatigue  RESPIRATORY: No cough, wheezing, chills or hemoptysis; No shortness of breath   CARDIOVASCULAR: No chest pain, palpitations, or leg swelling  GASTROINTESTINAL: No abdominal pain. No nausea, vomiting, or hematemesis; No diarrhea or constipation. No melena or hematochezia.  GENITOURINARY: No dysuria or hematuria, urinary frequency  NEUROLOGICAL: HA upon admission, memory loss observed; no loss of strength, numbness, or tremors  SKIN: No itching, burning, rashes, or lesions     MEDICATIONS  (STANDING):  amLODIPine   Tablet 5 milliGRAM(s) Oral daily  enoxaparin Injectable 40 milliGRAM(s) SubCutaneous daily  insulin lispro (HumaLOG) corrective regimen sliding scale   SubCutaneous three times a day before meals  sodium chloride 0.9% lock flush 3 milliLiter(s) IV Push every 8 hours    MEDICATIONS  (PRN):      Vital Signs Last 24 Hrs  T(C): 36.6 (21 Aug 2020 08:35), Max: 37.1 (21 Aug 2020 00:17)  T(F): 97.8 (21 Aug 2020 08:35), Max: 98.7 (21 Aug 2020 00:17)  HR: 92 (21 Aug 2020 08:35) (69 - 109)  BP: 166/69 (21 Aug 2020 08:35) (145/78 - 197/81)  BP(mean): --  RR: 16 (21 Aug 2020 08:35) (16 - 18)  SpO2: 98% (21 Aug 2020 08:35) (97% - 100%)    PHYSICAL EXAMINATION:  GENERAL: The patient is a thinly built 81 yo F, laying supine in bed in NAD.  HEAD:  Atraumatic, Normocephalic  EYES:  conjunctiva and sclera clear  NECK: Supple, No JVD, Normal thyroid  CHEST/LUNG: Clear to auscultation. Clear to percussion bilaterally; No rales, rhonchi, wheezing, or rubs  HEART: Regular rate and rhythm; No murmurs, rubs, or gallops  ABDOMEN: Soft, Nontender, Nondistended; Bowel sounds present  NERVOUS SYSTEM:  Alert & Oriented x2 (the patient is able to state her name and her location, the hospital, but does not know the date/year; she said "I would know if I looked at a calendar")   EXTREMITIES:  2+ Peripheral Pulses, No clubbing, cyanosis, or edema  SKIN: warm dry, no bruises, no ulcers                          11.8   6.98  )-----------( 327      ( 21 Aug 2020 10:27 )             35.6     08-20    130<L>  |  100  |  17  ----------------------------<  191<H>  3.7   |  23  |  1.06    Ca    8.5      20 Aug 2020 21:26    TPro  7.0  /  Alb  3.5  /  TBili  0.4  /  DBili  x   /  AST  31  /  ALT  58  /  AlkPhos  83  08-20    LIVER FUNCTIONS - ( 20 Aug 2020 21:26 )  Alb: 3.5 g/dL / Pro: 7.0 g/dL / ALK PHOS: 83 U/L / ALT: 58 U/L DA / AST: 31 U/L / GGT: x           CARDIAC MARKERS ( 20 Aug 2020 21:26 )  <0.015 ng/mL / x     / x     / x     / x              CAPILLARY BLOOD GLUCOSE      RADIOLOGY & ADDITIONAL TESTS: Medical Student note discussed with residents and attending    CHIEF COMPLAINT: headache and dizziness    HPI:  The patient is an 80 year old [place]speaking [place]  female who presents from home to the ED with headaches and dizziness. The patient has a PMH of HTN and diabetes and is a poor historian. HPI was obtained from her daughter in law, Nancy ([phone]), who lives at home with the patient and from the patient's son. Of note, the daughter describes that the patient has had "dementia for a few months". For the past few months, she has had altered mental status described as "seeing people, people giving her medicine and saying they will kill her", additionally her memory has deteriorated and she can no longer remember things. The patient fell twice in the last week but did not hit her head. The family mentions they are having trouble taking care of her at home and are requesting a HHA or NH placement. The patient denies SOB, chest pain, abdominal pain, vision changes, pain in limbs, paresthesias, N/V/D. The patient is hard of hearing.     RECENT HOSPITAL COURSE: In ED- UA negative and CT showed no acute pathology.    OVERNIGHT: no overnight events. Patient was given insulin 1U this morning. She denies HA and dizziness this morning. She also denies any pain. Patient does not know why she is in the hospital    PMH: diabetes, HTN    PSH: thyroid/neck surgery     ALL: NKDA, NKA    Rx: medrec obtained from pharmacy ([phone])    FHx: hypertension in the family    Social: The patient is a poor historian so social hx was obtained from the son. The patient lives at home with her daughter in law and son. She is able to ambulate at home by herself, without assistance. She is a nonsmoker (never smoked), denies alcohol or recreational drug use. The patient has been having memory issues within the past few months.    HCM: The patient last saw her PCP, Dr. Zhang, last Saturday. Dr. Zhang referred the patient to [facility] ED after being told the patient was experiencing HA and dizziness.    REVIEW OF SYSTEMS:  CONSTITUTIONAL: No fever, weight loss, or fatigue  RESPIRATORY: No cough, wheezing, chills or hemoptysis; No shortness of breath   CARDIOVASCULAR: No chest pain, palpitations, or leg swelling  GASTROINTESTINAL: No abdominal pain. No nausea, vomiting, or hematemesis; No diarrhea or constipation. No melena or hematochezia.  GENITOURINARY: No dysuria or hematuria, urinary frequency  NEUROLOGICAL: HA upon admission, memory loss observed; no loss of strength, numbness, or tremors  SKIN: No itching, burning, rashes, or lesions     MEDICATIONS  (STANDING):  amLODIPine   Tablet 5 milliGRAM(s) Oral daily  enoxaparin Injectable 40 milliGRAM(s) SubCutaneous daily  insulin lispro (HumaLOG) corrective regimen sliding scale   SubCutaneous three times a day before meals  sodium chloride 0.9% lock flush 3 milliLiter(s) IV Push every 8 hours    MEDICATIONS  (PRN):      Vital Signs Last 24 Hrs  T(C): 36.6 (21 Aug 2020 08:35), Max: 37.1 (21 Aug 2020 00:17)  T(F): 97.8 (21 Aug 2020 08:35), Max: 98.7 (21 Aug 2020 00:17)  HR: 92 (21 Aug 2020 08:35) (69 - 109)  BP: 166/69 (21 Aug 2020 08:35) (145/78 - 197/81)  BP(mean): --  RR: 16 (21 Aug 2020 08:35) (16 - 18)  SpO2: 98% (21 Aug 2020 08:35) (97% - 100%)    PHYSICAL EXAMINATION:  GENERAL: The patient is a thinly built 81 yo F, laying supine in bed in NAD.  HEAD:  Atraumatic, Normocephalic  EYES:  conjunctiva and sclera clear  NECK: Supple, No JVD, Normal thyroid  CHEST/LUNG: Clear to auscultation. Clear to percussion bilaterally; No rales, rhonchi, wheezing, or rubs  HEART: Regular rate and rhythm; No murmurs, rubs, or gallops  ABDOMEN: Soft, Nontender, Nondistended; Bowel sounds present  NERVOUS SYSTEM:  Alert & Oriented x2 (the patient is able to state her name and her location, the hospital, but does not know the date/year; she said "I would know if I looked at a calendar")   EXTREMITIES:  2+ Peripheral Pulses, No clubbing, cyanosis, or edema  SKIN: warm dry, no bruises, no ulcers                          11.8   6.98  )-----------( 327      ( 21 Aug 2020 10:27 )             35.6     08-20    130<L>  |  100  |  17  ----------------------------<  191<H>  3.7   |  23  |  1.06    Ca    8.5      20 Aug 2020 21:26    TPro  7.0  /  Alb  3.5  /  TBili  0.4  /  DBili  x   /  AST  31  /  ALT  58  /  AlkPhos  83  08-20    LIVER FUNCTIONS - ( 20 Aug 2020 21:26 )  Alb: 3.5 g/dL / Pro: 7.0 g/dL / ALK PHOS: 83 U/L / ALT: 58 U/L DA / AST: 31 U/L / GGT: x           CARDIAC MARKERS ( 20 Aug 2020 21:26 )  <0.015 ng/mL / x     / x     / x     / x              CAPILLARY BLOOD GLUCOSE      RADIOLOGY & ADDITIONAL TESTS:

## 2020-08-21 NOTE — PROGRESS NOTE ADULT - PROBLEM SELECTOR PLAN 2
Pt states she feel "dizzy"  Orthostatics Positive  CTH negative Pt states she feel "dizzy"  Orthostatics Positive  Holding home trazadone  CTH negative

## 2020-08-21 NOTE — PROGRESS NOTE ADULT - PROBLEM SELECTOR PLAN 1
CTH: no acute pathology, UA negative  - progression of dementia vs infectious etiology   - TSH is low, f/u free T4, total T3  - f/u vit B12, folate, syphilis screen, lactate  - f/u CXR  Neuro Dr. Wilkinson consulted Recent falls with hallucinations, headache, dizziness  CTH: no acute pathology, UA negative, ESR wnl  Lactate 2.3  - progression of dementia vs infectious etiology vs other cause of dementia  - TSH is low, f/u free T4, total T3 - possible hx of thyroidectomy  - f/u vit B12, folate, syphilis screen,  - f/u CXR  -Holding home trazadone  -C/w home Seroquel 25mg BID  Neuro Dr. Wilkinson consulted Recent falls with hallucinations, headache, dizziness  CTH: no acute pathology, UA negative, ESR wnl, CXR wnl  Lactate 2.3  - progression of dementia vs infectious etiology vs other cause of dementia  - TSH is low, f/u free T4, total T3 - possible hx of thyroidectomy  - f/u vit B12, folate, syphilis screen  - f/u CXR  -Holding home trazadone  -C/w home Seroquel 25mg BID  Neuro Dr. Wilkinson consulted

## 2020-08-21 NOTE — PROGRESS NOTE ADULT - PROBLEM SELECTOR PLAN 5
p/w /81  -s/p amlodipine 5mg and labetalol 5mg IV   -On Metoprolol 25mg, Nifedipine 60mg, Losartan 100mg, Hydralazine 25 TID at home  -C/w Metoprolol 25mg QD and Nifedipine 30mg daily for now  -Re add medications as needed  - monitor BP
p/w /81  -given labetalol 5 mg IV push this morning  -confirmed home medications, continue with home meds

## 2020-08-21 NOTE — PROGRESS NOTE ADULT - PROBLEM SELECTOR PLAN 4
p/w Na 130, now resolved with IVF bolus  - serum osm 277mosm, euvolemic, Urine Na 49, urine [id number]  - monitor BMP for now p/w Na 130, now resolved with IVF bolus  - serum osm 277mosm, euvolemic, Urine Na 49, urine [id number]  -Holding home trazadone  - monitor BMP

## 2020-08-22 LAB
ALBUMIN SERPL ELPH-MCNC: 3.2 G/DL — LOW (ref 3.5–5)
ALP SERPL-CCNC: 73 U/L — SIGNIFICANT CHANGE UP (ref 40–120)
ALT FLD-CCNC: 50 U/L DA — SIGNIFICANT CHANGE UP (ref 10–60)
ANION GAP SERPL CALC-SCNC: 7 MMOL/L — SIGNIFICANT CHANGE UP (ref 5–17)
AST SERPL-CCNC: 32 U/L — SIGNIFICANT CHANGE UP (ref 10–40)
BASOPHILS # BLD AUTO: 0.03 K/UL — SIGNIFICANT CHANGE UP (ref 0–0.2)
BASOPHILS NFR BLD AUTO: 0.5 % — SIGNIFICANT CHANGE UP (ref 0–2)
BILIRUB SERPL-MCNC: 0.4 MG/DL — SIGNIFICANT CHANGE UP (ref 0.2–1.2)
BUN SERPL-MCNC: 12 MG/DL — SIGNIFICANT CHANGE UP (ref 7–18)
CALCIUM SERPL-MCNC: 8.8 MG/DL — SIGNIFICANT CHANGE UP (ref 8.4–10.5)
CHLORIDE SERPL-SCNC: 107 MMOL/L — SIGNIFICANT CHANGE UP (ref 96–108)
CO2 SERPL-SCNC: 23 MMOL/L — SIGNIFICANT CHANGE UP (ref 22–31)
CREAT SERPL-MCNC: 0.87 MG/DL — SIGNIFICANT CHANGE UP (ref 0.5–1.3)
CRP SERPL-MCNC: <0.1 MG/DL — SIGNIFICANT CHANGE UP (ref 0–0.4)
EOSINOPHIL # BLD AUTO: 0.21 K/UL — SIGNIFICANT CHANGE UP (ref 0–0.5)
EOSINOPHIL NFR BLD AUTO: 3.2 % — SIGNIFICANT CHANGE UP (ref 0–6)
GLUCOSE BLDC GLUCOMTR-MCNC: 116 MG/DL — HIGH (ref 70–99)
GLUCOSE BLDC GLUCOMTR-MCNC: 118 MG/DL — HIGH (ref 70–99)
GLUCOSE BLDC GLUCOMTR-MCNC: 130 MG/DL — HIGH (ref 70–99)
GLUCOSE BLDC GLUCOMTR-MCNC: 134 MG/DL — HIGH (ref 70–99)
GLUCOSE SERPL-MCNC: 115 MG/DL — HIGH (ref 70–99)
HCT VFR BLD CALC: 34 % — LOW (ref 34.5–45)
HGB BLD-MCNC: 10.9 G/DL — LOW (ref 11.5–15.5)
IMM GRANULOCYTES NFR BLD AUTO: 0.3 % — SIGNIFICANT CHANGE UP (ref 0–1.5)
LYMPHOCYTES # BLD AUTO: 1.11 K/UL — SIGNIFICANT CHANGE UP (ref 1–3.3)
LYMPHOCYTES # BLD AUTO: 17.1 % — SIGNIFICANT CHANGE UP (ref 13–44)
MAGNESIUM SERPL-MCNC: 1.9 MG/DL — SIGNIFICANT CHANGE UP (ref 1.6–2.6)
MCHC RBC-ENTMCNC: 27.5 PG — SIGNIFICANT CHANGE UP (ref 27–34)
MCHC RBC-ENTMCNC: 32.1 GM/DL — SIGNIFICANT CHANGE UP (ref 32–36)
MCV RBC AUTO: 85.9 FL — SIGNIFICANT CHANGE UP (ref 80–100)
MONOCYTES # BLD AUTO: 0.56 K/UL — SIGNIFICANT CHANGE UP (ref 0–0.9)
MONOCYTES NFR BLD AUTO: 8.6 % — SIGNIFICANT CHANGE UP (ref 2–14)
NEUTROPHILS # BLD AUTO: 4.57 K/UL — SIGNIFICANT CHANGE UP (ref 1.8–7.4)
NEUTROPHILS NFR BLD AUTO: 70.3 % — SIGNIFICANT CHANGE UP (ref 43–77)
NRBC # BLD: 0 /100 WBCS — SIGNIFICANT CHANGE UP (ref 0–0)
PHOSPHATE SERPL-MCNC: 2.6 MG/DL — SIGNIFICANT CHANGE UP (ref 2.5–4.5)
PLATELET # BLD AUTO: 308 K/UL — SIGNIFICANT CHANGE UP (ref 150–400)
POTASSIUM SERPL-MCNC: 4.4 MMOL/L — SIGNIFICANT CHANGE UP (ref 3.5–5.3)
POTASSIUM SERPL-SCNC: 4.4 MMOL/L — SIGNIFICANT CHANGE UP (ref 3.5–5.3)
PROT SERPL-MCNC: 6.7 G/DL — SIGNIFICANT CHANGE UP (ref 6–8.3)
RBC # BLD: 3.96 M/UL — SIGNIFICANT CHANGE UP (ref 3.8–5.2)
RBC # FLD: 15.4 % — HIGH (ref 10.3–14.5)
SODIUM SERPL-SCNC: 137 MMOL/L — SIGNIFICANT CHANGE UP (ref 135–145)
T4 FREE SERPL-MCNC: 1.7 NG/DL — SIGNIFICANT CHANGE UP (ref 0.9–1.8)
WBC # BLD: 6.5 K/UL — SIGNIFICANT CHANGE UP (ref 3.8–10.5)
WBC # FLD AUTO: 6.5 K/UL — SIGNIFICANT CHANGE UP (ref 3.8–10.5)

## 2020-08-22 PROCEDURE — 99232 SBSQ HOSP IP/OBS MODERATE 35: CPT | Mod: GC

## 2020-08-22 RX ORDER — NIFEDIPINE 30 MG
60 TABLET, EXTENDED RELEASE 24 HR ORAL DAILY
Refills: 0 | Status: DISCONTINUED | OUTPATIENT
Start: 2020-08-22 | End: 2020-08-23

## 2020-08-22 RX ORDER — LANOLIN ALCOHOL/MO/W.PET/CERES
1 CREAM (GRAM) TOPICAL AT BEDTIME
Refills: 0 | Status: DISCONTINUED | OUTPATIENT
Start: 2020-08-22 | End: 2020-08-23

## 2020-08-22 RX ADMIN — ENOXAPARIN SODIUM 40 MILLIGRAM(S): 100 INJECTION SUBCUTANEOUS at 11:59

## 2020-08-22 RX ADMIN — QUETIAPINE FUMARATE 25 MILLIGRAM(S): 200 TABLET, FILM COATED ORAL at 17:33

## 2020-08-22 RX ADMIN — Medication 1 MILLIGRAM(S): at 23:10

## 2020-08-22 RX ADMIN — Medication 25 MILLIGRAM(S): at 17:33

## 2020-08-22 RX ADMIN — Medication 25 MILLIGRAM(S): at 05:20

## 2020-08-22 RX ADMIN — SODIUM CHLORIDE 3 MILLILITER(S): 9 INJECTION INTRAMUSCULAR; INTRAVENOUS; SUBCUTANEOUS at 05:21

## 2020-08-22 RX ADMIN — SODIUM CHLORIDE 3 MILLILITER(S): 9 INJECTION INTRAMUSCULAR; INTRAVENOUS; SUBCUTANEOUS at 22:16

## 2020-08-22 RX ADMIN — SODIUM CHLORIDE 3 MILLILITER(S): 9 INJECTION INTRAMUSCULAR; INTRAVENOUS; SUBCUTANEOUS at 14:32

## 2020-08-22 RX ADMIN — Medication 325 MILLIGRAM(S): at 12:00

## 2020-08-22 RX ADMIN — Medication 30 MILLIGRAM(S): at 05:21

## 2020-08-22 RX ADMIN — QUETIAPINE FUMARATE 25 MILLIGRAM(S): 200 TABLET, FILM COATED ORAL at 05:21

## 2020-08-22 NOTE — PROGRESS NOTE ADULT - SUBJECTIVE AND OBJECTIVE BOX
Patient is a 80y old  Female who presents with a chief complaint of Encephalopathy (21 Aug 2020 16:53)    Metafused  used [id number]  Patient complains of ankle pain and dizziness    INTERVAL HPI/OVERNIGHT EVENTS:  T(C): 36.7 (20 @ 13:03), Max: 36.7 (20 @ 13:03)  HR: 77 (20 @ 17:34) (64 - 86)  BP: 129/73 (20 @ 17:34) (125/66 - 186/79)  RR: 18 (20 @ 13:03) (18 - 18)  SpO2: 99% (20 @ 13:03) (97% - 100%)  Wt(kg): --  I&O's Summary      REVIEW OF SYSTEMS: denies fever, chills, SOB, palpitations, chest pain, abdominal pain, nausea, vomiting diarrhea, constipation, dizziness    MEDICATIONS  (STANDING):  enoxaparin Injectable 40 milliGRAM(s) SubCutaneous daily  ferrous    sulfate 325 milliGRAM(s) Oral daily  insulin lispro (HumaLOG) corrective regimen sliding scale   SubCutaneous three times a day before meals  metoprolol tartrate 25 milliGRAM(s) Oral two times a day  NIFEdipine XL 60 milliGRAM(s) Oral daily  QUEtiapine 25 milliGRAM(s) Oral two times a day  sodium chloride 0.9% lock flush 3 milliLiter(s) IV Push every 8 hours    MEDICATIONS  (PRN):      PHYSICAL EXAM:  GENERAL: NAD, well-groomed, well-developed  NERVOUS SYSTEM:  Alert & Oriented X2, Good concentration; no focal deficit   CHEST/LUNG: Clear to auscultation bilaterally; No rales, rhonchi, wheezing, or rubs  HEART: Regular rate and rhythm; No murmurs, rubs, or gallops  ABDOMEN: Soft, Nontender, Nondistended; Bowel sounds present  EXTREMITIES:  2+ Peripheral Pulses, No clubbing, cyanosis, or edema, no joint pain or swelling   SKIN: No rashes or lesions    LABS:                        10.9   6.50  )-----------( 308      ( 22 Aug 2020 05:49 )             34.0         137  |  107  |  12  ----------------------------<  115<H>  4.4   |  23  |  0.87    Ca    8.8      22 Aug 2020 05:49  Phos  2.6     08-  Mg     1.9     -    TPro  6.7  /  Alb  3.2<L>  /  TBili  0.4  /  DBili  x   /  AST  32  /  ALT  50  /  AlkPhos  73  08-      Urinalysis Basic - ( 20 Aug 2020 22:10 )    Color: Yellow / Appearance: Clear / S.010 / pH: x  Gluc: x / Ketone: Negative  / Bili: Negative / Urobili: Negative   Blood: x / Protein: Negative / Nitrite: Negative   Leuk Esterase: Trace / RBC: 0-2 /HPF / WBC 0-2 /HPF   Sq Epi: x / Non Sq Epi: Few /HPF / Bacteria: Trace /HPF

## 2020-08-22 NOTE — PROGRESS NOTE ADULT - ASSESSMENT
Dizziness possibly due to orthostatic hypotension vs hyponatremia   DM  HTN   Possible worsening dementia   Hyponatremia resolved   Polypharmacy  possible h/o thyroidectomy     Plan:  CT head neg   Follow up TFT   F/U vit B12, folate, syphilis screen  Cont Seroquel 25mg BID  Increased Nifedipine   pending Neuro consult

## 2020-08-23 ENCOUNTER — TRANSCRIPTION ENCOUNTER (OUTPATIENT)
Age: 80
End: 2020-08-23

## 2020-08-23 VITALS
SYSTOLIC BLOOD PRESSURE: 148 MMHG | OXYGEN SATURATION: 98 % | DIASTOLIC BLOOD PRESSURE: 71 MMHG | RESPIRATION RATE: 17 BRPM | HEART RATE: 81 BPM | TEMPERATURE: 98 F

## 2020-08-23 LAB
-  AMIKACIN: SIGNIFICANT CHANGE UP
-  AMOXICILLIN/CLAVULANIC ACID: SIGNIFICANT CHANGE UP
-  AMPICILLIN/SULBACTAM: SIGNIFICANT CHANGE UP
-  AMPICILLIN: SIGNIFICANT CHANGE UP
-  AZTREONAM: SIGNIFICANT CHANGE UP
-  CEFAZOLIN: SIGNIFICANT CHANGE UP
-  CEFEPIME: SIGNIFICANT CHANGE UP
-  CEFOTAXIME: SIGNIFICANT CHANGE UP
-  CEFOXITIN: SIGNIFICANT CHANGE UP
-  CEFTAZIDIME: SIGNIFICANT CHANGE UP
-  CEFTRIAXONE: SIGNIFICANT CHANGE UP
-  CEFUROXIME: SIGNIFICANT CHANGE UP
-  CIPROFLOXACIN: SIGNIFICANT CHANGE UP
-  ERTAPENEM: SIGNIFICANT CHANGE UP
-  GENTAMICIN: SIGNIFICANT CHANGE UP
-  LEVOFLOXACIN: SIGNIFICANT CHANGE UP
-  MEROPENEM: SIGNIFICANT CHANGE UP
-  MINOCYCLINE: SIGNIFICANT CHANGE UP
-  NITROFURANTOIN: SIGNIFICANT CHANGE UP
-  PIPERACILLIN/TAZOBACTAM: SIGNIFICANT CHANGE UP
-  TIGECYCLINE: SIGNIFICANT CHANGE UP
-  TOBRAMYCIN: SIGNIFICANT CHANGE UP
-  TRIMETHOPRIM/SULFAMETHOXAZOLE: SIGNIFICANT CHANGE UP
ALBUMIN SERPL ELPH-MCNC: 3.6 G/DL — SIGNIFICANT CHANGE UP (ref 3.5–5)
ALP SERPL-CCNC: 83 U/L — SIGNIFICANT CHANGE UP (ref 40–120)
ALT FLD-CCNC: 54 U/L DA — SIGNIFICANT CHANGE UP (ref 10–60)
ANION GAP SERPL CALC-SCNC: 6 MMOL/L — SIGNIFICANT CHANGE UP (ref 5–17)
AST SERPL-CCNC: 27 U/L — SIGNIFICANT CHANGE UP (ref 10–40)
BASOPHILS # BLD AUTO: 0.03 K/UL — SIGNIFICANT CHANGE UP (ref 0–0.2)
BASOPHILS NFR BLD AUTO: 0.5 % — SIGNIFICANT CHANGE UP (ref 0–2)
BILIRUB SERPL-MCNC: 0.3 MG/DL — SIGNIFICANT CHANGE UP (ref 0.2–1.2)
BUN SERPL-MCNC: 18 MG/DL — SIGNIFICANT CHANGE UP (ref 7–18)
CALCIUM SERPL-MCNC: 9.1 MG/DL — SIGNIFICANT CHANGE UP (ref 8.4–10.5)
CHLORIDE SERPL-SCNC: 104 MMOL/L — SIGNIFICANT CHANGE UP (ref 96–108)
CO2 SERPL-SCNC: 23 MMOL/L — SIGNIFICANT CHANGE UP (ref 22–31)
CREAT SERPL-MCNC: 1.06 MG/DL — SIGNIFICANT CHANGE UP (ref 0.5–1.3)
CULTURE RESULTS: SIGNIFICANT CHANGE UP
EOSINOPHIL # BLD AUTO: 0.19 K/UL — SIGNIFICANT CHANGE UP (ref 0–0.5)
EOSINOPHIL NFR BLD AUTO: 3.3 % — SIGNIFICANT CHANGE UP (ref 0–6)
GLUCOSE BLDC GLUCOMTR-MCNC: 166 MG/DL — HIGH (ref 70–99)
GLUCOSE BLDC GLUCOMTR-MCNC: 189 MG/DL — HIGH (ref 70–99)
GLUCOSE SERPL-MCNC: 196 MG/DL — HIGH (ref 70–99)
HCT VFR BLD CALC: 36.2 % — SIGNIFICANT CHANGE UP (ref 34.5–45)
HGB BLD-MCNC: 11.6 G/DL — SIGNIFICANT CHANGE UP (ref 11.5–15.5)
IMM GRANULOCYTES NFR BLD AUTO: 0.4 % — SIGNIFICANT CHANGE UP (ref 0–1.5)
LYMPHOCYTES # BLD AUTO: 0.84 K/UL — LOW (ref 1–3.3)
LYMPHOCYTES # BLD AUTO: 14.7 % — SIGNIFICANT CHANGE UP (ref 13–44)
MAGNESIUM SERPL-MCNC: 2.1 MG/DL — SIGNIFICANT CHANGE UP (ref 1.6–2.6)
MCHC RBC-ENTMCNC: 27.7 PG — SIGNIFICANT CHANGE UP (ref 27–34)
MCHC RBC-ENTMCNC: 32 GM/DL — SIGNIFICANT CHANGE UP (ref 32–36)
MCV RBC AUTO: 86.4 FL — SIGNIFICANT CHANGE UP (ref 80–100)
METHOD TYPE: SIGNIFICANT CHANGE UP
MONOCYTES # BLD AUTO: 0.43 K/UL — SIGNIFICANT CHANGE UP (ref 0–0.9)
MONOCYTES NFR BLD AUTO: 7.5 % — SIGNIFICANT CHANGE UP (ref 2–14)
NEUTROPHILS # BLD AUTO: 4.19 K/UL — SIGNIFICANT CHANGE UP (ref 1.8–7.4)
NEUTROPHILS NFR BLD AUTO: 73.6 % — SIGNIFICANT CHANGE UP (ref 43–77)
NRBC # BLD: 0 /100 WBCS — SIGNIFICANT CHANGE UP (ref 0–0)
ORGANISM # SPEC MICROSCOPIC CNT: SIGNIFICANT CHANGE UP
ORGANISM # SPEC MICROSCOPIC CNT: SIGNIFICANT CHANGE UP
PHOSPHATE SERPL-MCNC: 4.1 MG/DL — SIGNIFICANT CHANGE UP (ref 2.5–4.5)
PLATELET # BLD AUTO: 341 K/UL — SIGNIFICANT CHANGE UP (ref 150–400)
POTASSIUM SERPL-MCNC: 4.1 MMOL/L — SIGNIFICANT CHANGE UP (ref 3.5–5.3)
POTASSIUM SERPL-SCNC: 4.1 MMOL/L — SIGNIFICANT CHANGE UP (ref 3.5–5.3)
PROT SERPL-MCNC: 7.3 G/DL — SIGNIFICANT CHANGE UP (ref 6–8.3)
RBC # BLD: 4.19 M/UL — SIGNIFICANT CHANGE UP (ref 3.8–5.2)
RBC # FLD: 15.6 % — HIGH (ref 10.3–14.5)
SODIUM SERPL-SCNC: 133 MMOL/L — LOW (ref 135–145)
SPECIMEN SOURCE: SIGNIFICANT CHANGE UP
WBC # BLD: 5.7 K/UL — SIGNIFICANT CHANGE UP (ref 3.8–10.5)
WBC # FLD AUTO: 5.7 K/UL — SIGNIFICANT CHANGE UP (ref 3.8–10.5)

## 2020-08-23 PROCEDURE — 82962 GLUCOSE BLOOD TEST: CPT

## 2020-08-23 PROCEDURE — 83935 ASSAY OF URINE OSMOLALITY: CPT

## 2020-08-23 PROCEDURE — 84300 ASSAY OF URINE SODIUM: CPT

## 2020-08-23 PROCEDURE — 70450 CT HEAD/BRAIN W/O DYE: CPT

## 2020-08-23 PROCEDURE — 83540 ASSAY OF IRON: CPT

## 2020-08-23 PROCEDURE — 84436 ASSAY OF TOTAL THYROXINE: CPT

## 2020-08-23 PROCEDURE — 86769 SARS-COV-2 COVID-19 ANTIBODY: CPT

## 2020-08-23 PROCEDURE — 85652 RBC SED RATE AUTOMATED: CPT

## 2020-08-23 PROCEDURE — 86780 TREPONEMA PALLIDUM: CPT

## 2020-08-23 PROCEDURE — 84100 ASSAY OF PHOSPHORUS: CPT

## 2020-08-23 PROCEDURE — 87086 URINE CULTURE/COLONY COUNT: CPT

## 2020-08-23 PROCEDURE — 83735 ASSAY OF MAGNESIUM: CPT

## 2020-08-23 PROCEDURE — 84443 ASSAY THYROID STIM HORMONE: CPT

## 2020-08-23 PROCEDURE — 86140 C-REACTIVE PROTEIN: CPT

## 2020-08-23 PROCEDURE — 84480 ASSAY TRIIODOTHYRONINE (T3): CPT

## 2020-08-23 PROCEDURE — 87635 SARS-COV-2 COVID-19 AMP PRB: CPT

## 2020-08-23 PROCEDURE — 81001 URINALYSIS AUTO W/SCOPE: CPT

## 2020-08-23 PROCEDURE — 99239 HOSP IP/OBS DSCHRG MGMT >30: CPT | Mod: GC

## 2020-08-23 PROCEDURE — 84439 ASSAY OF FREE THYROXINE: CPT

## 2020-08-23 PROCEDURE — 84466 ASSAY OF TRANSFERRIN: CPT

## 2020-08-23 PROCEDURE — 83880 ASSAY OF NATRIURETIC PEPTIDE: CPT

## 2020-08-23 PROCEDURE — 93005 ELECTROCARDIOGRAM TRACING: CPT

## 2020-08-23 PROCEDURE — 96374 THER/PROPH/DIAG INJ IV PUSH: CPT

## 2020-08-23 PROCEDURE — 99285 EMERGENCY DEPT VISIT HI MDM: CPT | Mod: 25

## 2020-08-23 PROCEDURE — 83550 IRON BINDING TEST: CPT

## 2020-08-23 PROCEDURE — 83036 HEMOGLOBIN GLYCOSYLATED A1C: CPT

## 2020-08-23 PROCEDURE — 87186 SC STD MICRODIL/AGAR DIL: CPT

## 2020-08-23 PROCEDURE — 82306 VITAMIN D 25 HYDROXY: CPT

## 2020-08-23 PROCEDURE — 82746 ASSAY OF FOLIC ACID SERUM: CPT

## 2020-08-23 PROCEDURE — 82570 ASSAY OF URINE CREATININE: CPT

## 2020-08-23 PROCEDURE — 36415 COLL VENOUS BLD VENIPUNCTURE: CPT

## 2020-08-23 PROCEDURE — 84484 ASSAY OF TROPONIN QUANT: CPT

## 2020-08-23 PROCEDURE — 71045 X-RAY EXAM CHEST 1 VIEW: CPT

## 2020-08-23 PROCEDURE — 82728 ASSAY OF FERRITIN: CPT

## 2020-08-23 PROCEDURE — 83690 ASSAY OF LIPASE: CPT

## 2020-08-23 PROCEDURE — 80061 LIPID PANEL: CPT

## 2020-08-23 PROCEDURE — 85027 COMPLETE CBC AUTOMATED: CPT

## 2020-08-23 PROCEDURE — 82607 VITAMIN B-12: CPT

## 2020-08-23 PROCEDURE — 80053 COMPREHEN METABOLIC PANEL: CPT

## 2020-08-23 PROCEDURE — 80307 DRUG TEST PRSMV CHEM ANLYZR: CPT

## 2020-08-23 PROCEDURE — 83605 ASSAY OF LACTIC ACID: CPT

## 2020-08-23 RX ORDER — LOSARTAN POTASSIUM 100 MG/1
1 TABLET, FILM COATED ORAL
Qty: 0 | Refills: 0 | DISCHARGE

## 2020-08-23 RX ORDER — FERROUS SULFATE 325(65) MG
1 TABLET ORAL
Qty: 0 | Refills: 0 | DISCHARGE
Start: 2020-08-23

## 2020-08-23 RX ORDER — TRAZODONE HCL 50 MG
1 TABLET ORAL
Qty: 0 | Refills: 0 | DISCHARGE

## 2020-08-23 RX ORDER — LANOLIN ALCOHOL/MO/W.PET/CERES
3 CREAM (GRAM) TOPICAL AT BEDTIME
Refills: 0 | Status: DISCONTINUED | OUTPATIENT
Start: 2020-08-23 | End: 2020-08-23

## 2020-08-23 RX ORDER — HYDRALAZINE HCL 50 MG
1 TABLET ORAL
Qty: 0 | Refills: 0 | DISCHARGE

## 2020-08-23 RX ADMIN — Medication 1: at 08:07

## 2020-08-23 RX ADMIN — ENOXAPARIN SODIUM 40 MILLIGRAM(S): 100 INJECTION SUBCUTANEOUS at 12:11

## 2020-08-23 RX ADMIN — Medication 25 MILLIGRAM(S): at 06:30

## 2020-08-23 RX ADMIN — Medication 325 MILLIGRAM(S): at 12:10

## 2020-08-23 RX ADMIN — QUETIAPINE FUMARATE 25 MILLIGRAM(S): 200 TABLET, FILM COATED ORAL at 06:30

## 2020-08-23 RX ADMIN — Medication 1: at 12:10

## 2020-08-23 RX ADMIN — Medication 60 MILLIGRAM(S): at 06:30

## 2020-08-23 NOTE — DISCHARGE NOTE NURSING/CASE MANAGEMENT/SOCIAL WORK - PATIENT PORTAL LINK FT
You can access the FollowMyHealth Patient Portal offered by [facility] by registering at the following website: [email or web address]. By joining [facility]’s FollowMyHealth portal, you will also be able to view your health information using other applications (apps) compatible with our system.

## 2020-10-04 ENCOUNTER — INPATIENT (INPATIENT)
Facility: HOSPITAL | Age: 80
LOS: 10 days | Discharge: HOME | DRG: 56 | End: 2020-10-15
Attending: INTERNAL MEDICINE | Admitting: INTERNAL MEDICINE
Payer: MEDICARE

## 2020-10-04 VITALS
DIASTOLIC BLOOD PRESSURE: 85 MMHG | RESPIRATION RATE: 20 BRPM | SYSTOLIC BLOOD PRESSURE: 147 MMHG | OXYGEN SATURATION: 98 % | TEMPERATURE: 99 F | HEART RATE: 92 BPM

## 2020-10-04 DIAGNOSIS — R07.9 CHEST PAIN, UNSPECIFIED: ICD-10-CM

## 2020-10-04 DIAGNOSIS — Z78.9 OTHER SPECIFIED HEALTH STATUS: Chronic | ICD-10-CM

## 2020-10-04 LAB
ALBUMIN SERPL ELPH-MCNC: 3.5 G/DL — SIGNIFICANT CHANGE UP (ref 3.5–5)
ALP SERPL-CCNC: 104 U/L — SIGNIFICANT CHANGE UP (ref 40–120)
ALT FLD-CCNC: 45 U/L DA — SIGNIFICANT CHANGE UP (ref 10–60)
ANION GAP SERPL CALC-SCNC: 7 MMOL/L — SIGNIFICANT CHANGE UP (ref 5–17)
APTT BLD: 32.6 SEC — SIGNIFICANT CHANGE UP (ref 27.5–35.5)
AST SERPL-CCNC: 24 U/L — SIGNIFICANT CHANGE UP (ref 10–40)
BILIRUB SERPL-MCNC: 0.2 MG/DL — SIGNIFICANT CHANGE UP (ref 0.2–1.2)
BUN SERPL-MCNC: 15 MG/DL — SIGNIFICANT CHANGE UP (ref 7–18)
CALCIUM SERPL-MCNC: 9 MG/DL — SIGNIFICANT CHANGE UP (ref 8.4–10.5)
CHLORIDE SERPL-SCNC: 100 MMOL/L — SIGNIFICANT CHANGE UP (ref 96–108)
CO2 SERPL-SCNC: 26 MMOL/L — SIGNIFICANT CHANGE UP (ref 22–31)
CREAT SERPL-MCNC: 0.83 MG/DL — SIGNIFICANT CHANGE UP (ref 0.5–1.3)
GLUCOSE SERPL-MCNC: 121 MG/DL — HIGH (ref 70–99)
HCT VFR BLD CALC: 34.2 % — LOW (ref 34.5–45)
HGB BLD-MCNC: 11.6 G/DL — SIGNIFICANT CHANGE UP (ref 11.5–15.5)
INR BLD: 0.91 RATIO — SIGNIFICANT CHANGE UP (ref 0.88–1.16)
LACTATE SERPL-SCNC: 1.6 MMOL/L — SIGNIFICANT CHANGE UP (ref 0.7–2)
LIDOCAIN IGE QN: 217 U/L — SIGNIFICANT CHANGE UP (ref 73–393)
MCHC RBC-ENTMCNC: 28.5 PG — SIGNIFICANT CHANGE UP (ref 27–34)
MCHC RBC-ENTMCNC: 33.9 GM/DL — SIGNIFICANT CHANGE UP (ref 32–36)
MCV RBC AUTO: 84 FL — SIGNIFICANT CHANGE UP (ref 80–100)
NRBC # BLD: 0 /100 WBCS — SIGNIFICANT CHANGE UP (ref 0–0)
NT-PROBNP SERPL-SCNC: 35 PG/ML — SIGNIFICANT CHANGE UP (ref 0–450)
PLATELET # BLD AUTO: 339 K/UL — SIGNIFICANT CHANGE UP (ref 150–400)
POTASSIUM SERPL-MCNC: 4.2 MMOL/L — SIGNIFICANT CHANGE UP (ref 3.5–5.3)
POTASSIUM SERPL-SCNC: 4.2 MMOL/L — SIGNIFICANT CHANGE UP (ref 3.5–5.3)
PROT SERPL-MCNC: 7.4 G/DL — SIGNIFICANT CHANGE UP (ref 6–8.3)
PROTHROM AB SERPL-ACNC: 10.9 SEC — SIGNIFICANT CHANGE UP (ref 10.6–13.6)
RBC # BLD: 4.07 M/UL — SIGNIFICANT CHANGE UP (ref 3.8–5.2)
RBC # FLD: 13.8 % — SIGNIFICANT CHANGE UP (ref 10.3–14.5)
SODIUM SERPL-SCNC: 133 MMOL/L — LOW (ref 135–145)
TROPONIN I SERPL-MCNC: <0.015 NG/ML — SIGNIFICANT CHANGE UP (ref 0–0.04)
WBC # BLD: 8.84 K/UL — SIGNIFICANT CHANGE UP (ref 3.8–10.5)
WBC # FLD AUTO: 8.84 K/UL — SIGNIFICANT CHANGE UP (ref 3.8–10.5)

## 2020-10-04 PROCEDURE — 71045 X-RAY EXAM CHEST 1 VIEW: CPT | Mod: 26

## 2020-10-04 PROCEDURE — 74174 CTA ABD&PLVS W/CONTRAST: CPT | Mod: 26

## 2020-10-04 PROCEDURE — 99222 1ST HOSP IP/OBS MODERATE 55: CPT

## 2020-10-04 PROCEDURE — 99285 EMERGENCY DEPT VISIT HI MDM: CPT

## 2020-10-04 PROCEDURE — 93010 ELECTROCARDIOGRAM REPORT: CPT

## 2020-10-04 PROCEDURE — 71275 CT ANGIOGRAPHY CHEST: CPT | Mod: 26

## 2020-10-04 RX ORDER — LIDOCAINE 4 G/100G
10 CREAM TOPICAL ONCE
Refills: 0 | Status: COMPLETED | OUTPATIENT
Start: 2020-10-04 | End: 2020-10-04

## 2020-10-04 RX ADMIN — Medication 30 MILLILITER(S): at 20:50

## 2020-10-04 RX ADMIN — LIDOCAINE 10 MILLILITER(S): 4 CREAM TOPICAL at 20:49

## 2020-10-04 NOTE — H&P ADULT - HISTORY OF PRESENT ILLNESS
79 YO F with PMH of dementia, HTN,DM,HLD  , cantonese speaking presented with constant  non localized chest pain ,non radiating ,non exertional since 3 days. the patient is dementic and poor historian , she was very anxious at time of interview at bed side and the total history was taken from the son . the son states that the patient had a similar episode of chest pain last week and she spoke with the family doctor who prescribed her medication that he doesnot know that improved her condition at that time. as per son ,she doesnot have any fever, SOB , sorethroat , cough , abdominal pain or headache .

## 2020-10-04 NOTE — H&P ADULT - ASSESSMENT
79 YO F with PMH of dementia, HTN,DM,HLD  , cantonese speaking presented with constant  non localized chest pain ,non radiating ,non exertional since 3 days.

## 2020-10-04 NOTE — ED PROVIDER NOTE - PROGRESS NOTE DETAILS
EKG - nsr, rate 91, QTc 450, 1st AV block EKG - nsr, rate 91, QTc 450, 1st AV block  CTA - no dissection, no PE  labs - trop negative  Pt still with waxing and waning CP while in ED. Will admit for further CP eval/ACS r/o. Endorsed to unattached Dr Bynum and MAR.

## 2020-10-04 NOTE — ED PROVIDER NOTE - OBJECTIVE STATEMENT
81 yo F with pmh of of dementia, HTN, and DM presents with vague complaint of chest pain today. Pt's son is at bedside and was told by pt's daughter that pt had CP. Pt disoriented and unable to provide detailed history.

## 2020-10-04 NOTE — H&P ADULT - ATTENDING COMMENTS
Vital Signs Last 24 Hrs  T(C): 37.1 (04 Oct 2020 19:30), Max: 37.1 (04 Oct 2020 19:30)  T(F): 98.7 (04 Oct 2020 19:30), Max: 98.7 (04 Oct 2020 19:30)  HR: 92 (04 Oct 2020 19:30) (92 - 92)  BP: 147/85 (04 Oct 2020 19:30) (147/85 - 147/85)  RR: 20 (04 Oct 2020 19:30) (20 - 20)  SpO2: 98% (04 Oct 2020 19:30) (98% - 98%) Pt seen at bedside with son   Case discussed with housestaff.  Pt unable to provide history due to baseline cognitive deficits.  Otherwise she is an 80 year old Cantonese speaking woman known to our service with PMH of DM2, HTN and progressive cognitive deficits suggestive of dementia. She was brought in by family on account of discomfort thought to be related to her chest area. Unable to obtain any additional information.    Vital Signs Last 24 Hrs  T(C): 37.1 (04 Oct 2020 19:30), Max: 37.1 (04 Oct 2020 19:30)  T(F): 98.7 (04 Oct 2020 19:30), Max: 98.7 (04 Oct 2020 19:30)  HR: 92 (04 Oct 2020 19:30) (92 - 92)  BP: 147/85 (04 Oct 2020 19:30) (147/85 - 147/85)  RR: 20 (04 Oct 2020 19:30) (20 - 20)  SpO2: 98% (04 Oct 2020 19:30) (98% - 98%)    Refusing exams presently    labs                        11.6   8.84  )-----------( 339      ( 04 Oct 2020 21:04 )             34.2     10-04    133  |  100  |  15  ----------------------------<  121<H>  4.2   |  26  |  0.83    Ca    9.0      04 Oct 2020 21:04  TPro  7.4  /  Alb  3.5  /  TBili  0.2  /  DBili  x   /  AST  24  /  ALT  45  /  AlkPhos  104  10-04    PT/INR - ( 04 Oct 2020 21:04 )   PT: 10.9 sec;   INR: 0.91 ratio  PTT - ( 04 Oct 2020 21:04 )  PTT:32.6    CARDIAC MARKERS ( 04 Oct 2020 21:04 )  <0.015 ng/mL / x     / x     / x     / x        EKG - NSR with 1st degree AVB    Impression  Suspected chest pain - r/o ACS  - Admit to tele  - Serial trop and EKG  - BB/ ASA/ statin/  - PRN nitrate/ morphine for pain control  - ECHO  - Cardiology consult     Suspected dementia with behavioral disturbance  - Continue seroquel  - PRN haldol+/- low dose BDZ (ativan)  - Re -orientation; fall risk    HTN /DM2  - resume home BP meds; monitor closely  - Hold home DM meds; RISS for now    Code status  Discuss goals of care and advanced directives with family

## 2020-10-04 NOTE — H&P ADULT - PROBLEM SELECTOR PLAN 1
-patient presented with constant  non localized chest pain ,non radiating ,non exertional since 3 days.   -EKG showed NSR  -troponin neg x1 , fu troponin 2,3  -CTA chest normal -patient presented with constant  non localized chest pain ,non radiating ,non exertional since 3 days.   -EKG showed NSR  -troponin neg x1 , fu troponin 2,3  -CTA chest showed no aortic dissection nor PE   -FU echo -patient presented with constant  non localized chest pain ,non radiating ,non exertional since 3 days.   -on statin ,aspirin and metoprolol  -EKG showed NSR  -troponin neg x1 , fu troponin 2,3  -CTA chest showed no aortic dissection nor PE   -monitor on tele  -FU echo  -cardiology consult -patient presented with constant  non localized chest pain ,non radiating ,non exertional since 3 days.   -on statin ,aspirin and metoprolol  -EKG showed NSR  -troponin neg x1 , fu troponin 2,3  -CTA chest showed no aortic dissection nor PE   -monitor on tele  -FU echo  -cardiology consulted

## 2020-10-04 NOTE — ED ADULT NURSE NOTE - OBJECTIVE STATEMENT
The patient presents with her son to the ED with complaints of chest pain.  However, the  conveyed that the patient has been having epigastric pain ascending to her chest for three days.  The patient is disoriented to time, date and situation.  She often says that she does not know when asked for information, even when asked for what is bothering her.  She is able to provide limited h/o due to her Alzheimer's disease.  She also has HTN, HDL, and DM as per the son.  Epigastric and upper quadrant tenderness palpated.  She also voiced dizziness with nausea.  She denies other pains and symptoms.

## 2020-10-04 NOTE — ED ADULT NURSE NOTE - CHPI ED NUR SYMPTOMS NEG
no hematuria/no abdominal distension/no chills/no vomiting/no blood in stool/no burning urination/no diarrhea

## 2020-10-04 NOTE — H&P ADULT - PROBLEM SELECTOR PLAN 4
patient has past history of HTN  on metoprolol and nifidipine at home  resumed home meds with parameters

## 2020-10-04 NOTE — H&P ADULT - PROBLEM SELECTOR PLAN 3
patient has PMH of dementia , she wasnot able to give the history .   on seroquel at home  resumed home meds patient has PMH of dementia , she was not able to give the history .   on seroquel at home  resumed home meds

## 2020-10-04 NOTE — ED PROVIDER NOTE - PHYSICAL EXAMINATION
GENERAL: well appearing, no acute distress   HEAD: atraumatic   EYES: EOMI, pink conjunctiva   ENT: moist oral mucosa   CARDIAC: RRR, no edema, distal pulses present   RESPIRATORY: lungs CTAB, no increased work of breathing   GASTROINTESTINAL: no abdominal tenderness, no rebound or guarding, bowel sounds presents  GENITOURINARY: no CVA tenderness   MUSCULOSKELETAL: no deformity   NEUROLOGICAL: alert, spontaneous movement of extremities   SKIN: intact   PSYCHIATRIC: cooperative  HEME LYMPH: no lymphadenopathy

## 2020-10-04 NOTE — ED PROVIDER NOTE - CADM POA PRESS ULCER
HPI





- HPI


Patient complains to provider of: stumbled on brick steps


Onset: Just prior to arrival


Onset/Duration: Sudden


Pain Level: 3


Context: 





50-year-old female stumbled on brick front steps and falling onto her right 

knee and twisting her left knee the only place she has pain is her anterior 

distal tibia and proximal tibia. She wants to make sure they are not broken. 

Needs work note.


Associated Symptoms: None


Exacerbated by: Movement


Relieved by: Denies


Similar symptoms previously: No


Recently seen / treated by doctor: No





- ROS


ROS below otherwise negative: Yes


Systems Reviewed and Negative: Yes All other systems reviewed and negative





- REPRODUCTIVE


Reproductive: DENIES: Pregnant:





- DERM


Skin Color: Normal





Past Medical History





- General


Information source: Patient





- Social History


Smoking Status: Unknown if Ever Smoked


Frequency of alcohol use: None


Drug Abuse: None


Lives with: Family


Family History: Reviewed & Not Pertinent





- Past Medical History


Cardiac Medical History: Reports: Hx Coronary Artery Disease, Hx Heart Attack - 

3/2011, Hx Hypertension


Endocrine Medical History: Reports: Hx Diabetes Mellitus Type 2


Renal/ Medical History: Denies: Hx Peritoneal Dialysis


Musculoskeltal Medical History: Reports Hx Arthritis - Degenerative, Rt hip


Skin Medical History: Denies Hx MRSA


Past Surgical History: Reports: Hx Cardiac Catheterization





- Immunizations


Hx Diphtheria, Pertussis, Tetanus Vaccination: No - unsure





Vertical Provider Document





- CONSTITUTIONAL


Agree With Documented VS: Yes


Exam Limitations: No Limitations


General Appearance: No Apparent Distress





- INFECTION CONTROL


TRAVEL OUTSIDE OF THE [place] IN LAST 30 DAYS: No





- HEENT


HEENT: Normocephalic





- NECK


Neck: Supple





- RESPIRATORY


O2 Sat by Pulse Oximetry: 97





- MUSCULOSKELETAL/EXTREMETIES


Musculoskeletal/Extremeties: MAEW, FROM, Tender - anterior proximal and distal 

tibia abrasions, No Edema





- NEURO


Level of Consciousness: Awake, Alert


Motor/Sensory: No Motor Deficit, No Sensory Deficit





- DERM


Integumentary: Warm, Dry


Notes: 





see above





Course





- Re-evaluation


Re-evalutation: 





09/25/17 15:05


late entyr: xray negative. 





- Vital Signs


Vital signs: 


 











Temp Pulse Resp BP Pulse Ox


 


 97.4 F   79   20   145/78 H  97 


 


 09/25/17 08:11  09/25/17 08:11  09/25/17 08:11  09/25/17 08:11  09/25/17 08:11














Discharge





- Discharge


Clinical Impression: 


 distal tibia abrasion, proximal right tibia contusion abrasion





Condition: Good


Disposition: HOME, SELF-CARE


Instructions:  Abrasions ([facility]), Acetaminophen, Antibiotic Ointment Protection (

[facility]), Contusion ([facility])


Additional Instructions: 


keep the abrasions clean


bacitracin


to er any concerns





Please complete the patient satisfaction survey if you get one, and return it.. 

If you do not receive a survey,  then you can go to the [facility] website, [email or web address] 

and place your comments about your very good care. Thank you very much. It was 

a pleasure being your medical provider today.


Forms:  Return to Work No

## 2020-10-04 NOTE — H&P ADULT - PROBLEM SELECTOR PLAN 6
patient has past history of hyperlipidemia  on atorvastatin   resumed home meds   F /U lipid profile

## 2020-10-05 DIAGNOSIS — E78.5 HYPERLIPIDEMIA, UNSPECIFIED: ICD-10-CM

## 2020-10-05 DIAGNOSIS — E11.9 TYPE 2 DIABETES MELLITUS WITHOUT COMPLICATIONS: ICD-10-CM

## 2020-10-05 DIAGNOSIS — R07.9 CHEST PAIN, UNSPECIFIED: ICD-10-CM

## 2020-10-05 DIAGNOSIS — F03.90 UNSPECIFIED DEMENTIA WITHOUT BEHAVIORAL DISTURBANCE: ICD-10-CM

## 2020-10-05 DIAGNOSIS — Z71.89 OTHER SPECIFIED COUNSELING: ICD-10-CM

## 2020-10-05 DIAGNOSIS — I10 ESSENTIAL (PRIMARY) HYPERTENSION: ICD-10-CM

## 2020-10-05 DIAGNOSIS — E04.2 NONTOXIC MULTINODULAR GOITER: ICD-10-CM

## 2020-10-05 DIAGNOSIS — E04.1 NONTOXIC SINGLE THYROID NODULE: ICD-10-CM

## 2020-10-05 LAB
A1C WITH ESTIMATED AVERAGE GLUCOSE RESULT: 7.1 % — HIGH (ref 4–5.6)
ALBUMIN SERPL ELPH-MCNC: 3.3 G/DL — LOW (ref 3.5–5)
ALP SERPL-CCNC: 103 U/L — SIGNIFICANT CHANGE UP (ref 40–120)
ALT FLD-CCNC: 42 U/L DA — SIGNIFICANT CHANGE UP (ref 10–60)
ANION GAP SERPL CALC-SCNC: 7 MMOL/L — SIGNIFICANT CHANGE UP (ref 5–17)
APPEARANCE UR: CLEAR — SIGNIFICANT CHANGE UP
AST SERPL-CCNC: 22 U/L — SIGNIFICANT CHANGE UP (ref 10–40)
BACTERIA # UR AUTO: ABNORMAL /HPF
BILIRUB SERPL-MCNC: 0.3 MG/DL — SIGNIFICANT CHANGE UP (ref 0.2–1.2)
BILIRUB UR-MCNC: NEGATIVE — SIGNIFICANT CHANGE UP
BUN SERPL-MCNC: 15 MG/DL — SIGNIFICANT CHANGE UP (ref 7–18)
CALCIUM SERPL-MCNC: 8.9 MG/DL — SIGNIFICANT CHANGE UP (ref 8.4–10.5)
CHLORIDE SERPL-SCNC: 100 MMOL/L — SIGNIFICANT CHANGE UP (ref 96–108)
CHOLEST SERPL-MCNC: 165 MG/DL — SIGNIFICANT CHANGE UP (ref 10–199)
CO2 SERPL-SCNC: 27 MMOL/L — SIGNIFICANT CHANGE UP (ref 22–31)
COLOR SPEC: YELLOW — SIGNIFICANT CHANGE UP
CREAT SERPL-MCNC: 0.85 MG/DL — SIGNIFICANT CHANGE UP (ref 0.5–1.3)
DIFF PNL FLD: NEGATIVE — SIGNIFICANT CHANGE UP
EPI CELLS # UR: SIGNIFICANT CHANGE UP /HPF
ESTIMATED AVERAGE GLUCOSE: 157 MG/DL — HIGH (ref 68–114)
GLUCOSE BLDC GLUCOMTR-MCNC: 149 MG/DL — HIGH (ref 70–99)
GLUCOSE BLDC GLUCOMTR-MCNC: 160 MG/DL — HIGH (ref 70–99)
GLUCOSE BLDC GLUCOMTR-MCNC: 186 MG/DL — HIGH (ref 70–99)
GLUCOSE BLDC GLUCOMTR-MCNC: 187 MG/DL — HIGH (ref 70–99)
GLUCOSE SERPL-MCNC: 133 MG/DL — HIGH (ref 70–99)
GLUCOSE UR QL: NEGATIVE — SIGNIFICANT CHANGE UP
HCT VFR BLD CALC: 33.6 % — LOW (ref 34.5–45)
HDLC SERPL-MCNC: 53 MG/DL — SIGNIFICANT CHANGE UP
HGB BLD-MCNC: 11 G/DL — LOW (ref 11.5–15.5)
HYALINE CASTS # UR AUTO: ABNORMAL /LPF
KETONES UR-MCNC: NEGATIVE — SIGNIFICANT CHANGE UP
LEUKOCYTE ESTERASE UR-ACNC: ABNORMAL
LIPID PNL WITH DIRECT LDL SERPL: 70 MG/DL — SIGNIFICANT CHANGE UP
MAGNESIUM SERPL-MCNC: 2.5 MG/DL — SIGNIFICANT CHANGE UP (ref 1.6–2.6)
MCHC RBC-ENTMCNC: 27.6 PG — SIGNIFICANT CHANGE UP (ref 27–34)
MCHC RBC-ENTMCNC: 32.7 GM/DL — SIGNIFICANT CHANGE UP (ref 32–36)
MCV RBC AUTO: 84.4 FL — SIGNIFICANT CHANGE UP (ref 80–100)
NITRITE UR-MCNC: NEGATIVE — SIGNIFICANT CHANGE UP
NRBC # BLD: 0 /100 WBCS — SIGNIFICANT CHANGE UP (ref 0–0)
PH UR: 6 — SIGNIFICANT CHANGE UP (ref 5–8)
PHOSPHATE SERPL-MCNC: 3.5 MG/DL — SIGNIFICANT CHANGE UP (ref 2.5–4.5)
PLATELET # BLD AUTO: 363 K/UL — SIGNIFICANT CHANGE UP (ref 150–400)
POTASSIUM SERPL-MCNC: 4 MMOL/L — SIGNIFICANT CHANGE UP (ref 3.5–5.3)
POTASSIUM SERPL-SCNC: 4 MMOL/L — SIGNIFICANT CHANGE UP (ref 3.5–5.3)
PROT SERPL-MCNC: 6.9 G/DL — SIGNIFICANT CHANGE UP (ref 6–8.3)
PROT UR-MCNC: 30 MG/DL
RBC # BLD: 3.98 M/UL — SIGNIFICANT CHANGE UP (ref 3.8–5.2)
RBC # FLD: 13.9 % — SIGNIFICANT CHANGE UP (ref 10.3–14.5)
RBC CASTS # UR COMP ASSIST: SIGNIFICANT CHANGE UP /HPF (ref 0–2)
SARS-COV-2 IGG SERPL QL IA: NEGATIVE — SIGNIFICANT CHANGE UP
SARS-COV-2 IGM SERPL IA-ACNC: <0.1 INDEX — SIGNIFICANT CHANGE UP
SARS-COV-2 RNA SPEC QL NAA+PROBE: SIGNIFICANT CHANGE UP
SODIUM SERPL-SCNC: 134 MMOL/L — LOW (ref 135–145)
SP GR SPEC: 1.01 — SIGNIFICANT CHANGE UP (ref 1.01–1.02)
TOTAL CHOLESTEROL/HDL RATIO MEASUREMENT: 3.1 RATIO — LOW (ref 3.3–7.1)
TRIGL SERPL-MCNC: 210 MG/DL — HIGH (ref 10–149)
TROPONIN I SERPL-MCNC: <0.015 NG/ML — SIGNIFICANT CHANGE UP (ref 0–0.04)
TSH SERPL-MCNC: 0.71 UU/ML — SIGNIFICANT CHANGE UP (ref 0.34–4.82)
UROBILINOGEN FLD QL: NEGATIVE — SIGNIFICANT CHANGE UP
VIT B12 SERPL-MCNC: 762 PG/ML — SIGNIFICANT CHANGE UP (ref 232–1245)
VIT D25+D1,25 OH+D1,25 PNL SERPL-MCNC: 22.3 PG/ML — SIGNIFICANT CHANGE UP (ref 19.9–79.3)
WBC # BLD: 8.85 K/UL — SIGNIFICANT CHANGE UP (ref 3.8–10.5)
WBC # FLD AUTO: 8.85 K/UL — SIGNIFICANT CHANGE UP (ref 3.8–10.5)
WBC UR QL: SIGNIFICANT CHANGE UP /HPF (ref 0–5)

## 2020-10-05 PROCEDURE — 99223 1ST HOSP IP/OBS HIGH 75: CPT

## 2020-10-05 PROCEDURE — 99233 SBSQ HOSP IP/OBS HIGH 50: CPT | Mod: GC

## 2020-10-05 RX ORDER — METOPROLOL TARTRATE 50 MG
25 TABLET ORAL
Refills: 0 | Status: DISCONTINUED | OUTPATIENT
Start: 2020-10-05 | End: 2020-10-15

## 2020-10-05 RX ORDER — ATORVASTATIN CALCIUM 80 MG/1
20 TABLET, FILM COATED ORAL AT BEDTIME
Refills: 0 | Status: DISCONTINUED | OUTPATIENT
Start: 2020-10-05 | End: 2020-10-15

## 2020-10-05 RX ORDER — HEPARIN SODIUM 5000 [USP'U]/ML
5000 INJECTION INTRAVENOUS; SUBCUTANEOUS EVERY 12 HOURS
Refills: 0 | Status: DISCONTINUED | OUTPATIENT
Start: 2020-10-05 | End: 2020-10-08

## 2020-10-05 RX ORDER — NIFEDIPINE 30 MG
60 TABLET, EXTENDED RELEASE 24 HR ORAL DAILY
Refills: 0 | Status: DISCONTINUED | OUTPATIENT
Start: 2020-10-05 | End: 2020-10-15

## 2020-10-05 RX ORDER — MORPHINE SULFATE 50 MG/1
2 CAPSULE, EXTENDED RELEASE ORAL EVERY 4 HOURS
Refills: 0 | Status: DISCONTINUED | OUTPATIENT
Start: 2020-10-05 | End: 2020-10-05

## 2020-10-05 RX ORDER — HALOPERIDOL DECANOATE 100 MG/ML
2 INJECTION INTRAMUSCULAR ONCE
Refills: 0 | Status: COMPLETED | OUTPATIENT
Start: 2020-10-05 | End: 2020-10-05

## 2020-10-05 RX ORDER — ASPIRIN/CALCIUM CARB/MAGNESIUM 324 MG
81 TABLET ORAL DAILY
Refills: 0 | Status: DISCONTINUED | OUTPATIENT
Start: 2020-10-05 | End: 2020-10-10

## 2020-10-05 RX ORDER — HALOPERIDOL DECANOATE 100 MG/ML
2 INJECTION INTRAMUSCULAR ONCE
Refills: 0 | Status: DISCONTINUED | OUTPATIENT
Start: 2020-10-05 | End: 2020-10-05

## 2020-10-05 RX ORDER — ASCORBIC ACID 60 MG
500 TABLET,CHEWABLE ORAL DAILY
Refills: 0 | Status: DISCONTINUED | OUTPATIENT
Start: 2020-10-05 | End: 2020-10-15

## 2020-10-05 RX ORDER — ACETAMINOPHEN 500 MG
650 TABLET ORAL EVERY 6 HOURS
Refills: 0 | Status: DISCONTINUED | OUTPATIENT
Start: 2020-10-05 | End: 2020-10-15

## 2020-10-05 RX ORDER — QUETIAPINE FUMARATE 200 MG/1
25 TABLET, FILM COATED ORAL
Refills: 0 | Status: DISCONTINUED | OUTPATIENT
Start: 2020-10-05 | End: 2020-10-11

## 2020-10-05 RX ORDER — MORPHINE SULFATE 50 MG/1
2 CAPSULE, EXTENDED RELEASE ORAL EVERY 8 HOURS
Refills: 0 | Status: DISCONTINUED | OUTPATIENT
Start: 2020-10-05 | End: 2020-10-05

## 2020-10-05 RX ORDER — DONEPEZIL HYDROCHLORIDE 10 MG/1
5 TABLET, FILM COATED ORAL AT BEDTIME
Refills: 0 | Status: DISCONTINUED | OUTPATIENT
Start: 2020-10-05 | End: 2020-10-09

## 2020-10-05 RX ORDER — HALOPERIDOL DECANOATE 100 MG/ML
0.5 INJECTION INTRAMUSCULAR ONCE
Refills: 0 | Status: COMPLETED | OUTPATIENT
Start: 2020-10-05 | End: 2020-10-05

## 2020-10-05 RX ORDER — INSULIN LISPRO 100/ML
VIAL (ML) SUBCUTANEOUS
Refills: 0 | Status: DISCONTINUED | OUTPATIENT
Start: 2020-10-05 | End: 2020-10-15

## 2020-10-05 RX ADMIN — Medication 1: at 12:11

## 2020-10-05 RX ADMIN — Medication 1: at 16:48

## 2020-10-05 RX ADMIN — DONEPEZIL HYDROCHLORIDE 5 MILLIGRAM(S): 10 TABLET, FILM COATED ORAL at 22:22

## 2020-10-05 RX ADMIN — Medication 25 MILLIGRAM(S): at 17:44

## 2020-10-05 RX ADMIN — Medication 25 MILLIGRAM(S): at 06:01

## 2020-10-05 RX ADMIN — HALOPERIDOL DECANOATE 0.5 MILLIGRAM(S): 100 INJECTION INTRAMUSCULAR at 22:22

## 2020-10-05 RX ADMIN — Medication 500 MILLIGRAM(S): at 12:12

## 2020-10-05 RX ADMIN — Medication 81 MILLIGRAM(S): at 12:12

## 2020-10-05 RX ADMIN — ATORVASTATIN CALCIUM 20 MILLIGRAM(S): 80 TABLET, FILM COATED ORAL at 22:22

## 2020-10-05 RX ADMIN — QUETIAPINE FUMARATE 25 MILLIGRAM(S): 200 TABLET, FILM COATED ORAL at 17:44

## 2020-10-05 RX ADMIN — Medication 1 MILLIGRAM(S): at 02:01

## 2020-10-05 RX ADMIN — QUETIAPINE FUMARATE 25 MILLIGRAM(S): 200 TABLET, FILM COATED ORAL at 06:00

## 2020-10-05 RX ADMIN — HEPARIN SODIUM 5000 UNIT(S): 5000 INJECTION INTRAVENOUS; SUBCUTANEOUS at 17:44

## 2020-10-05 RX ADMIN — Medication 60 MILLIGRAM(S): at 06:01

## 2020-10-05 RX ADMIN — HALOPERIDOL DECANOATE 2 MILLIGRAM(S): 100 INJECTION INTRAMUSCULAR at 00:30

## 2020-10-05 NOTE — ED ADULT NURSE REASSESSMENT NOTE - NS ED NURSE REASSESS COMMENT FT1
Patient noted with agitation, confusion and attempting to exit the hospital.  The son agreed to Haldol to calm the mother down.  The son is present and at the bedside in the ED.

## 2020-10-05 NOTE — PROGRESS NOTE ADULT - PROBLEM SELECTOR PLAN 4
patient has past history of HTN  on metoprolol and nifidipine at home  resumed home meds with parameters -cw home BB and CCB as above

## 2020-10-05 NOTE — PROGRESS NOTE ADULT - SUBJECTIVE AND OBJECTIVE BOX
PGY-1 Progress Note discussed with attending    PAGER #: [[phone]] TILL 5:00 PM  PLEASE CONTACT ON CALL TEAM:  - On Call Team (Please refer to [place]) FROM 5:00 PM - 8:30PM  - Nightfloat Team FROM 8:30 -7:30 AM    CHIEF COMPLAINT & BRIEF HOSPITAL COURSE:    INTERVAL HPI/OVERNIGHT EVENTS:     MEDICATIONS  (STANDING):  ascorbic acid 500 milliGRAM(s) Oral daily  aspirin  chewable 81 milliGRAM(s) Oral daily  atorvastatin 20 milliGRAM(s) Oral at bedtime  insulin lispro (HumaLOG) corrective regimen sliding scale   SubCutaneous three times a day before meals  metoprolol tartrate 25 milliGRAM(s) Oral two times a day  NIFEdipine XL 60 milliGRAM(s) Oral daily  QUEtiapine 25 milliGRAM(s) Oral two times a day    MEDICATIONS  (PRN):  acetaminophen   Tablet .. 650 milliGRAM(s) Oral every 6 hours PRN Mild Pain (1 - 3), Moderate Pain (4 - 6)  morphine  - Injectable 2 milliGRAM(s) IV Push every 8 hours PRN Severe Pain (7 - 10)        REVIEW OF SYSTEMS:  CONSTITUTIONAL: No fever, weight loss, or fatigue  RESPIRATORY: No cough, wheezing, chills or hemoptysis; No shortness of breath  CARDIOVASCULAR: No chest pain, palpitations, dizziness, or leg swelling  GASTROINTESTINAL: No abdominal pain. No nausea, vomiting, or hematemesis; No diarrhea or constipation. No melena or hematochezia.  GENITOURINARY: No dysuria or hematuria, urinary frequency  NEUROLOGICAL: No headaches, memory loss, loss of strength, numbness, or tremors  SKIN: No itching, burning, rashes, or lesions     Vital Signs Last 24 Hrs  T(C): 36.6 (05 Oct 2020 04:58), Max: 37.1 (04 Oct 2020 19:30)  T(F): 97.8 (05 Oct 2020 04:58), Max: 98.7 (04 Oct 2020 19:30)  HR: 92 (05 Oct 2020 04:58) (84 - 98)  BP: 128/99 (05 Oct 2020 04:58) (128/99 - 157/74)  BP(mean): --  RR: 20 (05 Oct 2020 04:58) (18 - 20)  SpO2: 99% (05 Oct 2020 04:58) (95% - 99%)    PHYSICAL EXAMINATION:  GENERAL: NAD, well built  HEAD:  Atraumatic, Normocephalic  EYES:  conjunctiva and sclera clear  NECK: Supple, No JVD, Normal thyroid  CHEST/LUNG: Clear to auscultation. Clear to percussion bilaterally; No rales, rhonchi, wheezing, or rubs  HEART: Regular rate and rhythm; No murmurs, rubs, or gallops  ABDOMEN: Soft, Nontender, Nondistended; Bowel sounds present  NERVOUS SYSTEM: alert and oriented x3  EXTREMITIES:  2+ Peripheral Pulses, No clubbing, cyanosis, or edema  SKIN: warm dry                          11.0   8.85  )-----------( 363      ( 05 Oct 2020 05:47 )             33.6     10-05    134<L>  |  100  |  15  ----------------------------<  133<H>  4.0   |  27  |  0.85    Ca    8.9      05 Oct 2020 05:47  Phos  3.5     10-05  Mg     2.5     10-05    TPro  6.9  /  Alb  3.3<L>  /  TBili  0.3  /  DBili  x   /  AST  22  /  ALT  42  /  AlkPhos  103  10-05    LIVER FUNCTIONS - ( 05 Oct 2020 05:47 )  Alb: 3.3 g/dL / Pro: 6.9 g/dL / ALK PHOS: 103 U/L / ALT: 42 U/L DA / AST: 22 U/L / GGT: x           CARDIAC MARKERS ( 05 Oct 2020 02:34 )  <0.015 ng/mL / x     / x     / x     / x      CARDIAC MARKERS ( 04 Oct 2020 21:04 )  <0.015 ng/mL / x     / x     / x     / x          PT/INR - ( 04 Oct 2020 21:04 )   PT: 10.9 sec;   INR: 0.91 ratio         PTT - ( 04 Oct 2020 21:04 )  PTT:32.6 sec    CAPILLARY BLOOD GLUCOSE      RADIOLOGY & ADDITIONAL TESTS:                   PGY-1 Progress Note discussed with attending    PAGER #: [[phone]] TILL 5:00 PM  PLEASE CONTACT ON CALL TEAM:  - On Call Team (Please refer to [place]) FROM 5:00 PM - 8:30PM  - Nightfloat Team FROM 8:30 -7:30 AM    INTERVAL HPI/OVERNIGHT EVENTS: NAEON. VS wnl this am. Patient denies all complaints this am. Alert and oriented to self and place, although unable to recall why she is at the hospital. TFTs wnl. Will obtain TTE and thyroid US today. Tele dc as it exacerbates episodes of agitation. Neuro Dr. Alfaro consulted. Per patient's family, patient was able to perform ADLs as recently as August, but over the last 2 months has become increasingly confused and forgetful. Family requests possible placement for patient in addition to cardiac/neuro workup.   Interview conducted with assistance of Cantonese-language [place]  Sno #[phone].    MEDICATIONS  (STANDING):  ascorbic acid 500 milliGRAM(s) Oral daily  aspirin  chewable 81 milliGRAM(s) Oral daily  atorvastatin 20 milliGRAM(s) Oral at bedtime  insulin lispro (HumaLOG) corrective regimen sliding scale   SubCutaneous three times a day before meals  metoprolol tartrate 25 milliGRAM(s) Oral two times a day  NIFEdipine XL 60 milliGRAM(s) Oral daily  QUEtiapine 25 milliGRAM(s) Oral two times a day    MEDICATIONS  (PRN):  acetaminophen   Tablet .. 650 milliGRAM(s) Oral every 6 hours PRN Mild Pain (1 - 3), Moderate Pain (4 - 6)  morphine  - Injectable 2 milliGRAM(s) IV Push every 8 hours PRN Severe Pain (7 - 10)    REVIEW OF SYSTEMS: limited by patient condition   CONSTITUTIONAL: reports feeling well  RESPIRATORY: No cough, no shortness of breath  CARDIOVASCULAR: No chest pain  GASTROINTESTINAL: No abdominal pain. No nausea, vomiting, No diarrhea or constipation.  NEUROLOGICAL: No headaches     Vital Signs Last 24 Hrs  T(C): 36.6 (05 Oct 2020 04:58), Max: 37.1 (04 Oct 2020 19:30)  T(F): 97.8 (05 Oct 2020 04:58), Max: 98.7 (04 Oct 2020 19:30)  HR: 92 (05 Oct 2020 04:58) (84 - 98)  BP: 128/99 (05 Oct 2020 04:58) (128/99 - 157/74)  BP(mean): --  RR: 20 (05 Oct 2020 04:58) (18 - 20)  SpO2: 99% (05 Oct 2020 04:58) (95% - 99%)    PHYSICAL EXAMINATION:  GENERAL: NAD, appears stated age  HEAD:  Atraumatic, Normocephalic  EYES:  conjunctiva and sclera clear  NECK: Supple  CHEST/LUNG: Clear to auscultation. Clear to percussion bilaterally; No rales, rhonchi, wheezing, or rubs  HEART: Regular rate and rhythm; No murmurs, rubs, or gallops  ABDOMEN: Soft, Nontender, Nondistended; Bowel sounds present  NERVOUS SYSTEM: alert and oriented x2  EXTREMITIES:  2+ Peripheral Pulses, no edema  SKIN: warm dry                          11.0   8.85  )-----------( 363      ( 05 Oct 2020 05:47 )             33.6     10-05    134<L>  |  100  |  15  ----------------------------<  133<H>  4.0   |  27  |  0.85    Ca    8.9      05 Oct 2020 05:47  Phos  3.5     10-05  Mg     2.5     10-05    TPro  6.9  /  Alb  3.3<L>  /  TBili  0.3  /  DBili  x   /  AST  22  /  ALT  42  /  AlkPhos  103  10-05    LIVER FUNCTIONS - ( 05 Oct 2020 05:47 )  Alb: 3.3 g/dL / Pro: 6.9 g/dL / ALK PHOS: 103 U/L / ALT: 42 U/L DA / AST: 22 U/L / GGT: x           CARDIAC MARKERS ( 05 Oct 2020 02:34 )  <0.015 ng/mL / x     / x     / x     / x      CARDIAC MARKERS ( 04 Oct 2020 21:04 )  <0.015 ng/mL / x     / x     / x     / x          PT/INR - ( 04 Oct 2020 21:04 )   PT: 10.9 sec;   INR: 0.91 ratio         PTT - ( 04 Oct 2020 21:04 )  PTT:32.6 sec    CAPILLARY BLOOD GLUCOSE      RADIOLOGY & ADDITIONAL TESTS:

## 2020-10-05 NOTE — CONSULT NOTE ADULT - SUBJECTIVE AND OBJECTIVE BOX
Patient is a 80y old  Female who presents with a chief complaint of chest pain (05 Oct 2020 06:47)      HPI:  81 YO F with PMH of dementia, HTN,DM,HLD  , cantonese speaking presented with constant  non localized chest pain ,non radiating ,non exertional since 3 days. the patient is dementic and poor historian , she was very anxious at time of interview at bed side and the total history was taken from the son . the son states that the patient had a similar episode of chest pain last week and she spoke with the family doctor who prescribed her medication that he doesnot know that improved her condition at that time. as per son ,she doesnot have any fever, SOB , sorethroat , cough , abdominal pain or headache .  (04 Oct 2020 23:53)      PAST MEDICAL & SURGICAL HISTORY:  HTN (hypertension)    Dementia    HTN (hypertension)    DM (diabetes mellitus)    No pertinent past surgical history           MEDICATIONS  (STANDING):  ascorbic acid 500 milliGRAM(s) Oral daily  aspirin  chewable 81 milliGRAM(s) Oral daily  atorvastatin 20 milliGRAM(s) Oral at bedtime  heparin   Injectable 5000 Unit(s) SubCutaneous every 12 hours  insulin lispro (HumaLOG) corrective regimen sliding scale   SubCutaneous three times a day before meals  metoprolol tartrate 25 milliGRAM(s) Oral two times a day  NIFEdipine XL 60 milliGRAM(s) Oral daily  QUEtiapine 25 milliGRAM(s) Oral two times a day    MEDICATIONS  (PRN):  acetaminophen   Tablet .. 650 milliGRAM(s) Oral every 6 hours PRN Mild Pain (1 - 3), Moderate Pain (4 - 6)      FAMILY HISTORY:      SOCIAL HISTORY:      REVIEW OF SYSTEMS:  CONSTITUTIONAL: No fever, weight loss, or fatigue  EYES: No eye pain, visual disturbances, or discharge  ENT:  No difficulty hearing, tinnitus, vertigo; No sinus or throat pain  NECK: No pain or stiffness  RESPIRATORY: No cough, wheezing, chills or hemoptysis; No Shortness of Breath  CARDIOVASCULAR: No chest pain, palpitations, passing out, dizziness, or leg swelling  GASTROINTESTINAL: No abdominal or epigastric pain. No nausea, vomiting, or hematemesis; No diarrhea or constipation. No melena or hematochezia.  GENITOURINARY: No dysuria, frequency, hematuria, or incontinence  NEUROLOGICAL: No headaches, memory loss, loss of strength, numbness, or tremors  SKIN: No itching, burning, rashes, or lesions   LYMPH Nodes: No enlarged glands  ENDOCRINE: No heat or cold intolerance; No hair loss  MUSCULOSKELETAL: No joint pain or swelling; No muscle, back, or extremity pain  PSYCHIATRIC: No depression, anxiety, mood swings, or difficulty sleeping  HEME/LYMPH: No easy bruising, or bleeding gums  ALLERGY AND IMMUNOLOGIC: No hives or eczema	        Vital Signs Last 24 Hrs  T(C): 36.7 (05 Oct 2020 08:12), Max: 37.1 (04 Oct 2020 19:30)  T(F): 98.1 (05 Oct 2020 08:12), Max: 98.7 (04 Oct 2020 19:30)  HR: 73 (05 Oct 2020 08:12) (73 - 98)  BP: 136/68 (05 Oct 2020 08:12) (128/99 - 157/74)  BP(mean): --  RR: 18 (05 Oct 2020 08:12) (18 - 20)  SpO2: 99% (05 Oct 2020 08:12) (95% - 99%)      Constitutional:    NC/AT:    HEENT:    Neck:  No JVD, bruits or thyromegaly    Respiratory:  Clear without rales or rhonchi    Cardiovascular:  RR without murmur, rub or gallop.    Gastrointestinal: Soft without hepatosplenomegaly.    Extremities: without cyanosis, clubbing or edema.    Neurological:  Oriented   x      . No gross sensory or motor defects.        LABS:                        11.0   8.85  )-----------( 363      ( 05 Oct 2020 05:47 )             33.6     10-05    134<L>  |  100  |  15  ----------------------------<  133<H>  4.0   |  27  |  0.85    Ca    8.9      05 Oct 2020 05:47  Phos  3.5     10-05  Mg     2.5     10-05    TPro  6.9  /  Alb  3.3<L>  /  TBili  0.3  /  DBili  x   /  AST  22  /  ALT  42  /  AlkPhos  103  10-05    CARDIAC MARKERS ( 05 Oct 2020 02:34 )  <0.015 ng/mL / x     / x     / x     / x      CARDIAC MARKERS ( 04 Oct 2020 21:04 )  <0.015 ng/mL / x     / x     / x     / x          PT/INR - ( 04 Oct 2020 21:04 )   PT: 10.9 sec;   INR: 0.91 ratio         PTT - ( 04 Oct 2020 21:04 )  PTT:32.6 sec  Urinalysis Basic - ( 05 Oct 2020 04:22 )    Color: Yellow / Appearance: Clear / S.010 / pH: x  Gluc: x / Ketone: Negative  / Bili: Negative / Urobili: Negative   Blood: x / Protein: 30 mg/dL / Nitrite: Negative   Leuk Esterase: Small / RBC: 0-2 /HPF / WBC 3-5 /HPF   Sq Epi: x / Non Sq Epi: Few /HPF / Bacteria: Few /HPF      CAPILLARY BLOOD GLUCOSE      POCT Blood Glucose.: 149 mg/dL (05 Oct 2020 07:48)      RADIOLOGY & ADDITIONAL STUDIES: Patient is a 80y old  Female who presents with a chief complaint of chest pain (05 Oct 2020 06:47)      HPI:  81 YO F with PMH of dementia, HTN,DM,HLD  , cantonese speaking presented with constant  non localized chest pain ,non radiating ,non exertional since 3 days. the patient is dementic and poor historian , she was very anxious at time of interview at bed side and the total history was taken from the son . the son states that the patient had a similar episode of chest pain last week and she spoke with the family doctor who prescribed her medication that he doesnot know that improved her condition at that time. as per son ,she doesnot have any fever, SOB , sorethroat , cough , abdominal pain or headache. Found to have incidental goiter. Pt gives a hx of thyroidectomy ? partial. Admits to not being on meds. Denies dysphagia and dysphonia. Hx obtained via .       PAST MEDICAL & SURGICAL HISTORY:  HTN (hypertension)    Dementia    HTN (hypertension)    DM (diabetes mellitus)    No pertinent past surgical history           MEDICATIONS  (STANDING):  ascorbic acid 500 milliGRAM(s) Oral daily  aspirin  chewable 81 milliGRAM(s) Oral daily  atorvastatin 20 milliGRAM(s) Oral at bedtime  heparin   Injectable 5000 Unit(s) SubCutaneous every 12 hours  insulin lispro (HumaLOG) corrective regimen sliding scale   SubCutaneous three times a day before meals  metoprolol tartrate 25 milliGRAM(s) Oral two times a day  NIFEdipine XL 60 milliGRAM(s) Oral daily  QUEtiapine 25 milliGRAM(s) Oral two times a day    MEDICATIONS  (PRN):  acetaminophen   Tablet .. 650 milliGRAM(s) Oral every 6 hours PRN Mild Pain (1 - 3), Moderate Pain (4 - 6)      FAMILY HISTORY:      SOCIAL HISTORY:      REVIEW OF SYSTEMS:  CONSTITUTIONAL: No fever, weight loss, or fatigue  EYES: No eye pain, visual disturbances, or discharge  ENT:  No difficulty hearing, tinnitus, vertigo; No sinus or throat pain  NECK: No pain or stiffness  RESPIRATORY: No cough, wheezing, chills or hemoptysis; No Shortness of Breath  CARDIOVASCULAR: No chest pain, palpitations, passing out, dizziness, or leg swelling  GASTROINTESTINAL: No abdominal or epigastric pain. No nausea, vomiting, or hematemesis; No diarrhea or constipation. No melena or hematochezia.  GENITOURINARY: No dysuria, frequency, hematuria, or incontinence  NEUROLOGICAL: No headaches, memory loss, loss of strength, numbness, or tremors  SKIN: No itching, burning, rashes, or lesions   LYMPH Nodes: No enlarged glands  ENDOCRINE: No heat or cold intolerance; No hair loss  MUSCULOSKELETAL: No joint pain or swelling; No muscle, back, or extremity pain  PSYCHIATRIC: No depression, anxiety, mood swings, or difficulty sleeping  HEME/LYMPH: No easy bruising, or bleeding gums  ALLERGY AND IMMUNOLOGIC: No hives or eczema	        Vital Signs Last 24 Hrs  T(C): 36.7 (05 Oct 2020 08:12), Max: 37.1 (04 Oct 2020 19:30)  T(F): 98.1 (05 Oct 2020 08:12), Max: 98.7 (04 Oct 2020 19:30)  HR: 73 (05 Oct 2020 08:12) (73 - 98)  BP: 136/68 (05 Oct 2020 08:12) (128/99 - 157/74)  BP(mean): --  RR: 18 (05 Oct 2020 08:12) (18 - 20)  SpO2: 99% (05 Oct 2020 08:12) (95% - 99%)      Constitutional:    HEENT: nad    Neck:  No JVD, bruits or thyromegaly    Respiratory:  Clear without rales or rhonchi    Cardiovascular:  RR without murmur, rub or gallop.    Gastrointestinal: Soft without hepatosplenomegaly.    Extremities: without cyanosis, clubbing or edema.    Neurological:  Oriented   x  2    . No gross sensory or motor defects.        LABS:                        11.0   8.85  )-----------( 363      ( 05 Oct 2020 05:47 )             33.6     10-05    134<L>  |  100  |  15  ----------------------------<  133<H>  4.0   |  27  |  0.85    Ca    8.9      05 Oct 2020 05:47  Phos  3.5     10-05  Mg     2.5     10-05    TPro  6.9  /  Alb  3.3<L>  /  TBili  0.3  /  DBili  x   /  AST  22  /  ALT  42  /  AlkPhos  103  10-05    CARDIAC MARKERS ( 05 Oct 2020 02:34 )  <0.015 ng/mL / x     / x     / x     / x      CARDIAC MARKERS ( 04 Oct 2020 21:04 )  <0.015 ng/mL / x     / x     / x     / x          PT/INR - ( 04 Oct 2020 21:04 )   PT: 10.9 sec;   INR: 0.91 ratio         PTT - ( 04 Oct 2020 21:04 )  PTT:32.6 sec  Urinalysis Basic - ( 05 Oct 2020 04:22 )    Color: Yellow / Appearance: Clear / S.010 / pH: x  Gluc: x / Ketone: Negative  / Bili: Negative / Urobili: Negative   Blood: x / Protein: 30 mg/dL / Nitrite: Negative   Leuk Esterase: Small / RBC: 0-2 /HPF / WBC 3-5 /HPF   Sq Epi: x / Non Sq Epi: Few /HPF / Bacteria: Few /HPF    Thyroid Stimulating Hormone, Serum (10.05.20 @ 05:47)    Thyroid Stimulating Hormone, Serum: 0.71 uU/mL    < from: CT Angio Chest w/ IV Cont (10.04.20 @ 22:39) >  PROCEDURE DATE:  10/04/2020          INTERPRETATION:  INDICATION: Chest pain    COMPARISON: None    PROCEDURE: CT Angiography of the Chest, Abdomen and Pelvis.  Precontrast imaging was performed through the chest followed by arterial phase imaging of the chest, abdomen and pelvis.  Intravenous contrast: 90 ml Omnipaque 350. 10 ml discarded.  Oral contrast: None.  Sagittal and coronal reformats were performed as well as 3D (MIP) reconstructions.    FINDINGS:  LIMITATIONS: Beam hardening artifact from the presence of the patient's arms within the field of the beam.    CHEST:  CHEST/ABDOMINAL WALL: 3 cm heterogeneous right thyroid lobe nodule with calcification. Enlarged right thyroid lobe.  VESSELS: Extensive calcific atherosclerosis of the aorta and its branches. No aortic dissection, aneurysm, or other injury. No pulmonary embolism.    LUNGS, AIRWAYS and PLEURA: Patent central airways. Tiny right upper lobe calcified granuloma. Trace left pleural effusion.  HEART: Cardiomegaly. No pericardial effusion.  MEDIASTINUM AND ZAK: 1.3 cm mildly enlarged lymph node in the prevascular space, nonspecific.    ABDOMEN AND PELVIS:  BOWEL: No evidence of obstruction. Normal distal esophagus, stomach and duodenum.There are scattered colonic diverticula without inflammation to suggest diverticulitis. Appendix normal.  LIVER: Within normal limits.  BILE DUCTS: Normal caliber  GALLBLADDER: Within normal limits.  SPLEEN: Within normal limits.  PANCREAS: 1 cm hypodense lesion at the pancreatic neck. Additional lobulated low-density lesion associated with pancreatic head/neck, measuring 1.6 cm. Prominent pancreatic duct.  ADRENALS: Within normal limits.  KIDNEYS/URETERS: No renal stones or hydronephrosis. Subcentimeter probable cysts in both kidneys, too small to evaluate. Bilateral cortical scarring.    BLADDER: Within normal limits.  REPRODUCTIVE ORGANS: Within normal limits.    PERITONEUM: No ascites.  RETROPERITONEUM/LYMPH NODES: No lymphadenopathy.    BONES: Severe bilateral glenohumeral osteoarthritis. Scoliosis with degeneration. Very minimal anterolisthesis of L4 upon L5.    IMPRESSION:  1.  No evidence of acute intrathoracic or intra-abdominal pathology.  2.  Multiple low-density pancreatic lesions, probably benign cysts. If clinically indicated, contrast-enhanced MR could be performed.  3.  Enlarged, heterogeneous right thyroid lobe with multiple nodules.    < end of copied text >            CAPILLARY BLOOD GLUCOSE      POCT Blood Glucose.: 149 mg/dL (05 Oct 2020 07:48)      RADIOLOGY & ADDITIONAL STUDIES:

## 2020-10-05 NOTE — PROGRESS NOTE ADULT - PROBLEM SELECTOR PLAN 6
patient has past history of hyperlipidemia  on atorvastatin   resumed home meds   F /U lipid profile -cw home statin  -TG elevated 210

## 2020-10-05 NOTE — PROGRESS NOTE ADULT - PROBLEM SELECTOR PLAN 1
-patient presented with constant  non localized chest pain ,non radiating ,non exertional since 3 days.   -on statin ,aspirin and metoprolol  -EKG showed NSR  -troponin neg x1 , fu troponin 2,3  -CTA chest showed no aortic dissection nor PE   -monitor on tele  -FU echo  -cardiology consulted  pw x3 days non-radiating, non-exertional, non-localized CP  -EKG NSR, trops negative x2, CTA negative for aortic dissection and PE  -cw home statin, asa, BB, CCB  -FU echo  -cardiology  consulted  -tele dc 2/2 patient agitation

## 2020-10-05 NOTE — PROGRESS NOTE ADULT - PROBLEM SELECTOR PLAN 5
patient has past history of DM   on janumet at home   started on ssc  fu HBA1c -hold home Janumet  -cw SS  -A1c 7.1

## 2020-10-05 NOTE — PROGRESS NOTE ADULT - PROBLEM SELECTOR PLAN 7
discussed with the son and he said that he wanted time to discuss with the family son expresses difficulty caring for patient at home, will discuss placement with CM

## 2020-10-05 NOTE — CONSULT NOTE ADULT - SUBJECTIVE AND OBJECTIVE BOX
80F Cantonese-speaking, PMH dementia (recent onset), HTN, DM, HLD pw x3 days constant, non-localized, non-radiating, non-exertional chest pain, admitted to [facility] for cardiac monitoring and management/placement of recent-onset dementia.     MEDICATIONS  (STANDING):  ascorbic acid 500 milliGRAM(s) Oral daily  aspirin  chewable 81 milliGRAM(s) Oral daily  atorvastatin 20 milliGRAM(s) Oral at bedtime  insulin lispro (HumaLOG) corrective regimen sliding scale   SubCutaneous three times a day before meals  metoprolol tartrate 25 milliGRAM(s) Oral two times a day  NIFEdipine XL 60 milliGRAM(s) Oral daily  QUEtiapine 25 milliGRAM(s) Oral two times a day    MEDICATIONS  (PRN):  acetaminophen   Tablet .. 650 milliGRAM(s) Oral every 6 hours PRN Mild Pain (1 - 3), Moderate Pain (4 - 6)  morphine  - Injectable 2 milliGRAM(s) IV Push every 8 hours PRN Severe Pain (7 - 10)    REVIEW OF SYSTEMS: limited by patient condition   CONSTITUTIONAL: reports feeling well  RESPIRATORY: No cough, no shortness of breath  CARDIOVASCULAR: No chest pain  GASTROINTESTINAL: No abdominal pain. No nausea, vomiting, No diarrhea or constipation.  NEUROLOGICAL: No headaches     Vital Signs Last 24 Hrs  T(C): 36.6 (05 Oct 2020 04:58), Max: 37.1 (04 Oct 2020 19:30)  T(F): 97.8 (05 Oct 2020 04:58), Max: 98.7 (04 Oct 2020 19:30)  HR: 92 (05 Oct 2020 04:58) (84 - 98)  BP: 128/99 (05 Oct 2020 04:58) (128/99 - 157/74)  BP(mean): --  RR: 20 (05 Oct 2020 04:58) (18 - 20)  SpO2: 99% (05 Oct 2020 04:58) (95% - 99%)    PHYSICAL EXAMINATION:  GENERAL: NAD, appears stated age  HEAD:  Atraumatic, Normocephalic  EYES:  conjunctiva and sclera clear  NECK: Supple  CHEST/LUNG: Clear to auscultation. Clear to percussion bilaterally; No rales, rhonchi, wheezing, or rubs  HEART: Regular rate and rhythm; No murmurs, rubs, or gallops  ABDOMEN: Soft, Nontender, Nondistended; Bowel sounds present  EXTREMITIES:  2+ Peripheral Pulses, no edema  SKIN: warm dry    Nervous System: Tremulous, wide stance assisted                          11.0   8.85  )-----------( 363      ( 05 Oct 2020 05:47 )             33.6     10-05    134<L>  |  100  |  15  ----------------------------<  133<H>  4.0   |  27  |  0.85    Ca    8.9      05 Oct 2020 05:47  Phos  3.5     10-05  Mg     2.5     10-05    TPro  6.9  /  Alb  3.3<L>  /  TBili  0.3  /  DBili  x   /  AST  22  /  ALT  42  /  AlkPhos  103  10-05    LIVER FUNCTIONS - ( 05 Oct 2020 05:47 )  Alb: 3.3 g/dL / Pro: 6.9 g/dL / ALK PHOS: 103 U/L / ALT: 42 U/L DA / AST: 22 U/L / GGT: x           CARDIAC MARKERS ( 05 Oct 2020 02:34 )  <0.015 ng/mL / x     / x     / x     / x      CARDIAC MARKERS ( 04 Oct 2020 21:04 )  <0.015 ng/mL / x     / x     / x     / x          PT/INR - ( 04 Oct 2020 21:04 )   PT: 10.9 sec;   INR: 0.91 ratio         PTT - ( 04 Oct 2020 21:04 )  PTT:32.6 sec    CAPILLARY BLOOD GLUCOSE      RADIOLOGY & ADDITIONAL TESTS:

## 2020-10-05 NOTE — PROGRESS NOTE ADULT - PROBLEM SELECTOR PLAN 3
patient has PMH of dementia , she was not able to give the history .   on seroquel at home  resumed home meds per family, patient able to perform ADLs as recently as August, has become abruptly more forgetful, disoriented, and paranoid over past 1-2 months  -cw home seroquel  -discuss placement with CM, as family now having difficulties caring for her   -neuro Dr. Alfaro consulted, awaiting recs  -B12 and folate wnl

## 2020-10-05 NOTE — CONSULT NOTE ADULT - ASSESSMENT
79 YO F with PMH of dementia, HTN,DM,HLD  , cantonese speaking presented with constant  non localized chest pain ,non radiating ,non exertional since 3 days. Found to have incidental goiter. Pt gives a hx of thyroidectomy ? partial. Admits to not being on meds. Denies dysphagia and dysphonia. Hx obtained via .

## 2020-10-05 NOTE — CONSULT NOTE ADULT - PROBLEM SELECTOR RECOMMENDATION 9
incidental nodular goiter- euthyroid  without obstructive symptoms  check u/s of thyroid gland- pending  d/w hs

## 2020-10-05 NOTE — CONSULT NOTE ADULT - ASSESSMENT
Rapidly progressive dementia, consider paraneoplastic process and CJD. Plan MRI brain, EEG and spinal tap.

## 2020-10-05 NOTE — PROGRESS NOTE ADULT - ASSESSMENT
79 YO F with PMH of dementia, HTN,DM,HLD  , cantonese speaking presented with constant  non localized chest pain ,non radiating ,non exertional since 3 days. 80F Cantonese-speaking, PMH dementia (recent onset), HTN, DM, HLD pw x3 days constant, non-localized, non-radiating, non-exertional chest pain, admitted to [facility] for cardiac monitoring and management/placement of recent-onset dementia.

## 2020-10-05 NOTE — PROGRESS NOTE ADULT - PROBLEM SELECTOR PLAN 2
CTA chest showed thyroid nodules  F/U TSH thyroid nodules noted on CTA  -TSH, T3, T4 wnl  -fu thyroid US  -endo Dr. Ramos consulted

## 2020-10-05 NOTE — PROGRESS NOTE ADULT - ATTENDING COMMENTS
Patient was seen and examined at bedside with medical student Ena Zhang interpreting for me   Very irritable, restless  AAOx1-2, says she is hospital because she is sick but does not know exactly why   Complains of cough with productive sputum, feverish feeling few days ago but not anymore  Spoke with patient's son- before COVID patient was able to walk outside, with her cane, but since July she has been more forgetful, does not trust anyone, always complains of pain everywhere, has become very irritable    Vitals stable     P/E:   NAD, restless  CN intact, no focal deficit, muscle strength 5/5 but very unstable gait   Lungs CTABL  S1S2 WNL, no MRG   Abd soft, non tender, BS present   BL LE trace edema     Labs: noted    A/P:   Chest pain to rule out ACS  Pancreatic cyst   Thyroid nodule   Worsening dementia  DM  HTN  HLD    Plan:   CT chest/ abd/ pelvis neg for PE or dissection   Pending TTE   Trop neg   Endocrine consult appreciated, pending thyroid US   Consulted Dr Alfaro for patient's relatively subacute onset of dementia with behavioral changes  Rest of the management as above Patient was seen and examined at bedside with medical student Ena Zhang interpreting for me   Very irritable, restless  AAOx1-2, says she is hospital because she is sick but does not know exactly why   Complains of cough with productive sputum, feverish feeling few days ago but not anymore  Spoke with patient's son- before COVID patient was able to walk outside, with her cane, but since July she has been more forgetful, does not trust anyone, always complains of pain everywhere, has become very irritable    Vitals stable     P/E:   NAD, restless  CN intact, no focal deficit, muscle strength 5/5 but very unstable gait   Lungs CTABL  S1S2 WNL, no MRG   Abd soft, non tender, BS present   BL LE trace edema     Labs: noted    A/P:   Chest pain to rule out ACS  Pancreatic cyst   Thyroid nodule   Worsening dementia  DM  HTN  HLD    Plan:   CT chest/ abd/ pelvis neg for PE or dissection   Pending TTE   Trop neg   Endocrine consult appreciated, pending thyroid US   Consulted Dr Alfaro for patient's relatively subacute onset of dementia with behavioral changes  Pancreatic cyst benign vs malignant, can follow up as outpatient unless worsening dementia is a part of paraneoplastic syndrome   Rest of the management as above Patient was seen and examined at bedside with medical student Ena Zhang interpreting for me   Very irritable, restless  AAOx1-2, says she is hospital because she is sick but does not know exactly why   Complains of cough with productive sputum, feverish feeling few days ago but not anymore  Spoke with patient's son- before COVID patient was able to walk outside, oriented, with her cane, but since July she has been more forgetful, has acute change in her personality, does not trust anyone, always complains of pain everywhere, has become very irritable    Vitals stable     P/E:   NAD, restless  CN intact, no focal deficit, muscle strength 5/5 but very unstable gait   Lungs CTABL  S1S2 WNL, no MRG   Abd soft, non tender, BS present   BL LE trace edema     Labs: noted    A/P:   Chest pain to rule out ACS  Pancreatic cyst   Thyroid nodule   Worsening dementia  DM  HTN  HLD    Plan:   CT chest/ abd/ pelvis neg for PE or dissection   Pending TTE   Trop neg   Endocrine consult appreciated, pending thyroid US   Consulted Dr Alfaro for patient's relatively subacute onset of dementia with behavioral changes  Pancreatic cyst benign vs malignant, can follow up as outpatient unless worsening dementia is a part of paraneoplastic syndrome   Rest of the management as above

## 2020-10-06 LAB
ANION GAP SERPL CALC-SCNC: 7 MMOL/L — SIGNIFICANT CHANGE UP (ref 5–17)
BUN SERPL-MCNC: 18 MG/DL — SIGNIFICANT CHANGE UP (ref 7–18)
CALCIUM SERPL-MCNC: 9 MG/DL — SIGNIFICANT CHANGE UP (ref 8.4–10.5)
CHLORIDE SERPL-SCNC: 96 MMOL/L — SIGNIFICANT CHANGE UP (ref 96–108)
CO2 SERPL-SCNC: 26 MMOL/L — SIGNIFICANT CHANGE UP (ref 22–31)
CREAT SERPL-MCNC: 0.81 MG/DL — SIGNIFICANT CHANGE UP (ref 0.5–1.3)
GLUCOSE BLDC GLUCOMTR-MCNC: 159 MG/DL — HIGH (ref 70–99)
GLUCOSE BLDC GLUCOMTR-MCNC: 177 MG/DL — HIGH (ref 70–99)
GLUCOSE BLDC GLUCOMTR-MCNC: 197 MG/DL — HIGH (ref 70–99)
GLUCOSE BLDC GLUCOMTR-MCNC: 227 MG/DL — HIGH (ref 70–99)
GLUCOSE SERPL-MCNC: 168 MG/DL — HIGH (ref 70–99)
HCT VFR BLD CALC: 36.2 % — SIGNIFICANT CHANGE UP (ref 34.5–45)
HGB BLD-MCNC: 11.9 G/DL — SIGNIFICANT CHANGE UP (ref 11.5–15.5)
MAGNESIUM SERPL-MCNC: 2.3 MG/DL — SIGNIFICANT CHANGE UP (ref 1.6–2.6)
MCHC RBC-ENTMCNC: 27.5 PG — SIGNIFICANT CHANGE UP (ref 27–34)
MCHC RBC-ENTMCNC: 32.9 GM/DL — SIGNIFICANT CHANGE UP (ref 32–36)
MCV RBC AUTO: 83.8 FL — SIGNIFICANT CHANGE UP (ref 80–100)
NRBC # BLD: 0 /100 WBCS — SIGNIFICANT CHANGE UP (ref 0–0)
PHOSPHATE SERPL-MCNC: 3.1 MG/DL — SIGNIFICANT CHANGE UP (ref 2.5–4.5)
PLATELET # BLD AUTO: 424 K/UL — HIGH (ref 150–400)
POTASSIUM SERPL-MCNC: 4.2 MMOL/L — SIGNIFICANT CHANGE UP (ref 3.5–5.3)
POTASSIUM SERPL-SCNC: 4.2 MMOL/L — SIGNIFICANT CHANGE UP (ref 3.5–5.3)
RBC # BLD: 4.32 M/UL — SIGNIFICANT CHANGE UP (ref 3.8–5.2)
RBC # FLD: 13.8 % — SIGNIFICANT CHANGE UP (ref 10.3–14.5)
SODIUM SERPL-SCNC: 129 MMOL/L — LOW (ref 135–145)
WBC # BLD: 14.76 K/UL — HIGH (ref 3.8–10.5)
WBC # FLD AUTO: 14.76 K/UL — HIGH (ref 3.8–10.5)

## 2020-10-06 PROCEDURE — 99233 SBSQ HOSP IP/OBS HIGH 50: CPT

## 2020-10-06 PROCEDURE — 99233 SBSQ HOSP IP/OBS HIGH 50: CPT | Mod: GC

## 2020-10-06 PROCEDURE — 76536 US EXAM OF HEAD AND NECK: CPT | Mod: 26

## 2020-10-06 RX ORDER — SODIUM CHLORIDE 9 MG/ML
1000 INJECTION INTRAMUSCULAR; INTRAVENOUS; SUBCUTANEOUS
Refills: 0 | Status: DISCONTINUED | OUTPATIENT
Start: 2020-10-06 | End: 2020-10-07

## 2020-10-06 RX ORDER — HALOPERIDOL DECANOATE 100 MG/ML
0.5 INJECTION INTRAMUSCULAR ONCE
Refills: 0 | Status: DISCONTINUED | OUTPATIENT
Start: 2020-10-06 | End: 2020-10-06

## 2020-10-06 RX ORDER — HALOPERIDOL DECANOATE 100 MG/ML
1 INJECTION INTRAMUSCULAR ONCE
Refills: 0 | Status: COMPLETED | OUTPATIENT
Start: 2020-10-06 | End: 2020-10-06

## 2020-10-06 RX ORDER — LATANOPROST 0.05 MG/ML
1 SOLUTION/ DROPS OPHTHALMIC; TOPICAL AT BEDTIME
Refills: 0 | Status: DISCONTINUED | OUTPATIENT
Start: 2020-10-06 | End: 2020-10-15

## 2020-10-06 RX ORDER — SODIUM CHLORIDE 9 MG/ML
1000 INJECTION INTRAMUSCULAR; INTRAVENOUS; SUBCUTANEOUS
Refills: 0 | Status: DISCONTINUED | OUTPATIENT
Start: 2020-10-06 | End: 2020-10-06

## 2020-10-06 RX ORDER — TIMOLOL 0.5 %
1 DROPS OPHTHALMIC (EYE)
Refills: 0 | Status: DISCONTINUED | OUTPATIENT
Start: 2020-10-06 | End: 2020-10-15

## 2020-10-06 RX ADMIN — HEPARIN SODIUM 5000 UNIT(S): 5000 INJECTION INTRAVENOUS; SUBCUTANEOUS at 17:16

## 2020-10-06 RX ADMIN — ATORVASTATIN CALCIUM 20 MILLIGRAM(S): 80 TABLET, FILM COATED ORAL at 21:25

## 2020-10-06 RX ADMIN — Medication 1: at 12:03

## 2020-10-06 RX ADMIN — Medication 1 DROP(S): at 21:25

## 2020-10-06 RX ADMIN — LATANOPROST 1 DROP(S): 0.05 SOLUTION/ DROPS OPHTHALMIC; TOPICAL at 21:25

## 2020-10-06 RX ADMIN — HALOPERIDOL DECANOATE 1 MILLIGRAM(S): 100 INJECTION INTRAMUSCULAR at 02:09

## 2020-10-06 RX ADMIN — DONEPEZIL HYDROCHLORIDE 5 MILLIGRAM(S): 10 TABLET, FILM COATED ORAL at 21:25

## 2020-10-06 RX ADMIN — Medication 60 MILLIGRAM(S): at 06:30

## 2020-10-06 RX ADMIN — QUETIAPINE FUMARATE 25 MILLIGRAM(S): 200 TABLET, FILM COATED ORAL at 17:16

## 2020-10-06 RX ADMIN — SODIUM CHLORIDE 70 MILLILITER(S): 9 INJECTION INTRAMUSCULAR; INTRAVENOUS; SUBCUTANEOUS at 11:54

## 2020-10-06 RX ADMIN — Medication 25 MILLIGRAM(S): at 06:30

## 2020-10-06 RX ADMIN — Medication 25 MILLIGRAM(S): at 17:16

## 2020-10-06 RX ADMIN — Medication 81 MILLIGRAM(S): at 11:53

## 2020-10-06 RX ADMIN — Medication 2: at 08:14

## 2020-10-06 RX ADMIN — Medication 1: at 17:06

## 2020-10-06 RX ADMIN — QUETIAPINE FUMARATE 25 MILLIGRAM(S): 200 TABLET, FILM COATED ORAL at 06:30

## 2020-10-06 RX ADMIN — HEPARIN SODIUM 5000 UNIT(S): 5000 INJECTION INTRAVENOUS; SUBCUTANEOUS at 06:30

## 2020-10-06 RX ADMIN — Medication 500 MILLIGRAM(S): at 11:53

## 2020-10-06 NOTE — CHART NOTE - NSCHARTNOTEFT_GEN_A_CORE
Intraocular pressure measurement was requested. Requested ED Physician Dr. Benitez to help.  Left eye pressure noted to be 39 mm (significantly elevated) and Right eye mildly elevated at 26mm  Patient not in acute pain.  Will start Xalatan and Timolol

## 2020-10-06 NOTE — PHYSICAL THERAPY INITIAL EVALUATION ADULT - GENERAL OBSERVATIONS, REHAB EVAL
Pt. received supine in bed, restless and attempting to get OOB. Pt. is confused but was able to participate in PT eval. cooperative and motivated during eval. Pt. A&O x 1.

## 2020-10-06 NOTE — PROGRESS NOTE ADULT - SUBJECTIVE AND OBJECTIVE BOX
PGY-1 Progress Note discussed with attending    PAGER #: [[phone]] TILL 5:00 PM  PLEASE CONTACT ON CALL TEAM:  - On Call Team (Please refer to [place]) FROM 5:00 PM - 8:30PM  - Nightfloat Team FROM 8:30 -7:30 AM    CHIEF COMPLAINT & BRIEF HOSPITAL COURSE: 80 Cantonese-speaking F with PMH dementia, HTN, DM, HLD pw x3 days non-localized, non-exertional, non-radiating CP, admitted to [facility] for ACS workup, additionally found to have thyroid nodule.     INTERVAL HPI/OVERNIGHT EVENTS: NAEON. SBP elevated to 140s, VS otherwise wnl this am. Patient continues to be intermittently agitated, remains on enhanced supervision. Not fully cooperative with interview, but endorses feeling somewhat dizzy. A&Ox1, states that she is at home and was unable to recall year. LVH and G1DD on TTE. Awaiting thyroid US today. WBC 14, platelets >400, and Na 129 this am, started on NS 70cc/hr. Dispo to [facility] per PT.   Interview conducted with assistance of Cantonese-language [employer]  Azael [id number].     MEDICATIONS  (STANDING):  ascorbic acid 500 milliGRAM(s) Oral daily  aspirin  chewable 81 milliGRAM(s) Oral daily  atorvastatin 20 milliGRAM(s) Oral at bedtime  donepezil 5 milliGRAM(s) Oral at bedtime  heparin   Injectable 5000 Unit(s) SubCutaneous every 12 hours  insulin lispro (HumaLOG) corrective regimen sliding scale   SubCutaneous three times a day before meals  metoprolol tartrate 25 milliGRAM(s) Oral two times a day  NIFEdipine XL 60 milliGRAM(s) Oral daily  QUEtiapine 25 milliGRAM(s) Oral two times a day  sodium chloride 0.9%. 1000 milliLiter(s) (70 mL/Hr) IV Continuous <Continuous>    MEDICATIONS  (PRN):  acetaminophen   Tablet .. 650 milliGRAM(s) Oral every 6 hours PRN Mild Pain (1 - 3), Moderate Pain (4 - 6)        REVIEW OF SYSTEMS: limited by patient condition   CONSTITUTIONAL: No fatigue  RESPIRATORY: No cough, no shortness of breath  CARDIOVASCULAR: No chest pain. +dizziness, no leg swelling  GASTROINTESTINAL: No abdominal pain.   GENITOURINARY: did not respond to questions  NEUROLOGICAL: mild HA    Vital Signs Last 24 Hrs  T(C): 36.2 (06 Oct 2020 12:08), Max: 37.1 (06 Oct 2020 05:34)  T(F): 97.1 (06 Oct 2020 12:08), Max: 98.7 (06 Oct 2020 05:34)  HR: 82 (06 Oct 2020 12:08) (78 - 100)  BP: 141/45 (06 Oct 2020 12:08) (100/58 - 149/86)  BP(mean): --  RR: 17 (06 Oct 2020 12:08) (17 - 19)  SpO2: 97% (06 Oct 2020 12:08) (94% - 98%)    PHYSICAL EXAMINATION:  GENERAL: NAD, appears stated age  HEAD:  Atraumatic, Normocephalic  EYES:  conjunctiva and sclera clear  NECK: Supple  CHEST/LUNG: Clear to auscultation. No rales, rhonchi, wheezing, or rubs  HEART: Regular rate and rhythm; No murmurs, rubs, or gallops  ABDOMEN: Soft, Nontender, Nondistended; Bowel sounds present  NERVOUS SYSTEM: alert and oriented x1  EXTREMITIES:  2+ Peripheral Pulses, No edema  SKIN: warm dry                          11.9   14.76 )-----------( 424      ( 06 Oct 2020 06:03 )             36.2     10-06    129<L>  |  96  |  18  ----------------------------<  168<H>  4.2   |  26  |  0.81    Ca    9.0      06 Oct 2020 06:03  Phos  3.1     10-06  Mg     2.3     10-06    TPro  6.9  /  Alb  3.3<L>  /  TBili  0.3  /  DBili  x   /  AST  22  /  ALT  42  /  AlkPhos  103  10-05    LIVER FUNCTIONS - ( 05 Oct 2020 05:47 )  Alb: 3.3 g/dL / Pro: 6.9 g/dL / ALK PHOS: 103 U/L / ALT: 42 U/L DA / AST: 22 U/L / GGT: x           CARDIAC MARKERS ( 05 Oct 2020 02:34 )  <0.015 ng/mL / x     / x     / x     / x      CARDIAC MARKERS ( 04 Oct 2020 21:04 )  <0.015 ng/mL / x     / x     / x     / x          PT/INR - ( 04 Oct 2020 21:04 )   PT: 10.9 sec;   INR: 0.91 ratio         PTT - ( 04 Oct 2020 21:04 )  PTT:32.6 sec    CAPILLARY BLOOD GLUCOSE      RADIOLOGY & ADDITIONAL TESTS:

## 2020-10-06 NOTE — PROGRESS NOTE ADULT - SUBJECTIVE AND OBJECTIVE BOX
Interval Events:  pt in nad  sitting in the chair    Allergies    No Known Allergies    Intolerances      Endocrine/Metabolic Medications:  atorvastatin 20 milliGRAM(s) Oral at bedtime  insulin lispro (HumaLOG) corrective regimen sliding scale   SubCutaneous three times a day before meals      Vital Signs Last 24 Hrs  T(C): 36.9 (06 Oct 2020 07:56), Max: 37.1 (06 Oct 2020 05:34)  T(F): 98.5 (06 Oct 2020 07:56), Max: 98.7 (06 Oct 2020 05:34)  HR: 78 (06 Oct 2020 09:31) (66 - 100)  BP: 102/41 (06 Oct 2020 09:31) (100/58 - 149/86)  BP(mean): --  RR: 18 (06 Oct 2020 07:56) (18 - 19)  SpO2: 94% (06 Oct 2020 09:31) (94% - 99%)      PHYSICAL EXAM  All physical exam findings normal, except those marked:  General:	Alert, active, cooperative, NAD, well hydrated  .		[] Abnormal:  Neck		Normal: supple, no cervical adenopathy, no palpable thyroid  .		[] Abnormal:  Cardiovascular	Normal: regular rate, normal S1, S2, no murmurs  .		[] Abnormal:  Respiratory	Normal: no chest wall deformity, normal respiratory pattern, CTA B/L  .		[] Abnormal:  Abdominal	Normal: soft, ND, NT, bowel sounds present, no masses, no organomegaly  .		[] Abnormal:  		Normal normal genitalia, testes descended, circumcised/uncircumcised  .		Mike stage:			Breast mike:  .		Menstrual history:  .		[] Abnormal:  Extremities	Normal: FROM x4  .		[] Abnormal:  Skin		Normal: intact and not indurated, no rash, no acanthosis nigricans  .		[] Abnormal:  Neurologic	Normal: grossly intact  .		[] Abnormal:    LABS                        11.9   14.76 )-----------( 424      ( 06 Oct 2020 06:03 )             36.2                               129    |  96     |  18                  Calcium: 9.0   / iCa: x      (10-06 @ 06:03)    ----------------------------<  168       Magnesium: 2.3                              4.2     |  26     |  0.81             Phosphorous: 3.1        CAPILLARY BLOOD GLUCOSE      POCT Blood Glucose.: 227 mg/dL (06 Oct 2020 08:04)  POCT Blood Glucose.: 186 mg/dL (05 Oct 2020 21:07)  POCT Blood Glucose.: 187 mg/dL (05 Oct 2020 16:37)  POCT Blood Glucose.: 160 mg/dL (05 Oct 2020 11:25)        Assesment/plan      81 YO F with PMH of dementia, HTN,DM,HLD  , cantonese speaking presented with constant  non localized chest pain ,non radiating ,non exertional since 3 days. Found to have incidental goiter. Pt gives a hx of thyroidectomy ? partial. Admits to not being on meds. Denies dysphagia and dysphonia. Hx obtained via .               Problem/Recommendation - 1:  Problem: Goiter, nontoxic, multinodular. Recommendation: incidental nodular goiter- euthyroid  without obstructive symptoms  await u/s of thyroid gland- pending  d/w hs.     Problem/Recommendation - 2:  ·  Problem: DM (diabetes mellitus).  Recommendation: decently controlled -a1c 7.1%  insulin prn for now  consider low dose lantus if fsg >200  restart janumet upon d/c.

## 2020-10-06 NOTE — PHYSICAL THERAPY INITIAL EVALUATION ADULT - PERTINENT HX OF CURRENT PROBLEM, REHAB EVAL
Pt. presented to ED with constant  non localized chest pain ,non radiating ,non exertional since 3 days (as per son).

## 2020-10-06 NOTE — PHYSICAL THERAPY INITIAL EVALUATION ADULT - IMPAIRMENTS FOUND, PT EVAL
gross motor/gait, locomotion, and balance/aerobic capacity/endurance/cognitive impairment/muscle strength

## 2020-10-06 NOTE — PROGRESS NOTE ADULT - SUBJECTIVE AND OBJECTIVE BOX
INTERVAL HPI/OVERNIGHT EVENTS:    HEALTH ISSUES - PROBLEM Dx:  Goiter, nontoxic, multinodular  Goiter, nontoxic, multinodular    Goals of care, counseling/discussion  Goals of care, counseling/discussion    Thyroid nodule  Thyroid nodule    Hyperlipidemia  Hyperlipidemia    DM (diabetes mellitus)  DM (diabetes mellitus)    Dementia  Dementia    HTN (hypertension)  HTN (hypertension)    Chest pain  Chest pain            MEDICATIONS  (STANDING):  ascorbic acid 500 milliGRAM(s) Oral daily  aspirin  chewable 81 milliGRAM(s) Oral daily  atorvastatin 20 milliGRAM(s) Oral at bedtime  donepezil 5 milliGRAM(s) Oral at bedtime  heparin   Injectable 5000 Unit(s) SubCutaneous every 12 hours  insulin lispro (HumaLOG) corrective regimen sliding scale   SubCutaneous three times a day before meals  metoprolol tartrate 25 milliGRAM(s) Oral two times a day  NIFEdipine XL 60 milliGRAM(s) Oral daily  QUEtiapine 25 milliGRAM(s) Oral two times a day  sodium chloride 0.9%. 1000 milliLiter(s) (70 mL/Hr) IV Continuous <Continuous>    MEDICATIONS  (PRN):  acetaminophen   Tablet .. 650 milliGRAM(s) Oral every 6 hours PRN Mild Pain (1 - 3), Moderate Pain (4 - 6)      Allergies    No Known Allergies    Intolerances        REVIEW OF SYSTEMS      General:	    Skin/Breast:  	  Ophthalmologic:  	  ENMT:	    Respiratory and Thorax:  	  Cardiovascular:	    Gastrointestinal:	    Genitourinary:	    Musculoskeletal:	    Neurological:	    Psychiatric:	    Hematology/Lymphatics:	    Endocrine:	    Allergic/Immunologic:	    Vital Signs Last 24 Hrs  T(C): 36.7 (06 Oct 2020 15:50), Max: 37.1 (06 Oct 2020 05:34)  T(F): 98 (06 Oct 2020 15:50), Max: 98.7 (06 Oct 2020 05:34)  HR: 89 (06 Oct 2020 15:50) (78 - 100)  BP: 120/53 (06 Oct 2020 15:50) (100/58 - 149/86)  BP(mean): --  RR: 19 (06 Oct 2020 15:50) (17 - 19)  SpO2: 97% (06 Oct 2020 15:50) (94% - 98%)    PHYSICAL EXAM:      Awakens briefly opening eyes; fluent but unable to repeat or name. Ayo, VF ?neck rigis. Moves all 4 extremities picking at bed clothes.       LABS:                        11.9   14.76 )-----------( 424      ( 06 Oct 2020 06:03 )             36.2     10-06    129<L>  |  96  |  18  ----------------------------<  168<H>  4.2   |  26  |  0.81    Ca    9.0      06 Oct 2020 06:03  Phos  3.1     10-06  Mg     2.3     10-06    TPro  6.9  /  Alb  3.3<L>  /  TBili  0.3  /  DBili  x   /  AST  22  /  ALT  42  /  AlkPhos  103  10-05    PT/INR - ( 04 Oct 2020 21:04 )   PT: 10.9 sec;   INR: 0.91 ratio         PTT - ( 04 Oct 2020 21:04 )  PTT:32.6 sec  Urinalysis Basic - ( 05 Oct 2020 04:22 )    Color: Yellow / Appearance: Clear / S.010 / pH: x  Gluc: x / Ketone: Negative  / Bili: Negative / Urobili: Negative   Blood: x / Protein: 30 mg/dL / Nitrite: Negative   Leuk Esterase: Small / RBC: 0-2 /HPF / WBC 3-5 /HPF   Sq Epi: x / Non Sq Epi: Few /HPF / Bacteria: Few /HPF        RADIOLOGY & ADDITIONAL TESTS:  t

## 2020-10-06 NOTE — PROGRESS NOTE ADULT - ASSESSMENT
80F Cantonese-speaking, PMH dementia (recent onset), HTN, DM, HLD pw x3 days constant, non-localized, non-radiating, non-exertional chest pain, admitted to [facility] for cardiac monitoring and management/placement of recent-onset dementia.

## 2020-10-06 NOTE — PHYSICAL THERAPY INITIAL EVALUATION ADULT - CRITERIA FOR SKILLED THERAPEUTIC INTERVENTIONS
risk reduction/prevention/therapy frequency/functional limitations in following categories/rehab potential/anticipated discharge recommendation/impairments found/predicted duration of therapy intervention

## 2020-10-06 NOTE — PROGRESS NOTE ADULT - PROBLEM SELECTOR PLAN 7
son expresses difficulty caring for patient at home, will have GOC at bedside with family today if possible

## 2020-10-06 NOTE — PROGRESS NOTE ADULT - PROBLEM SELECTOR PLAN 3
per family, patient able to perform ADLs as recently as August, has become abruptly more forgetful, disoriented, and paranoid over past 1-2 months  -cw home seroquel  -discuss placement with CM, as family now having difficulties caring for her   -[facility] per PT  -neuro Dr. Alfaro consulted, will consider EEG, MRI brain, LP if family agrees  -B12 and folate wnl

## 2020-10-06 NOTE — PROGRESS NOTE ADULT - ASSESSMENT
Awaiting EEG MRI and spinal tap; investigate paraneoplastic encephalitis and CJD other chronic meningitedes

## 2020-10-06 NOTE — PROGRESS NOTE ADULT - ATTENDING COMMENTS
Patient was seen and examined at bedside with medical student Ena Zhang interpreting for me with Son also at bedside.  Agree with PGY1 A/P above with editing as needed. My independent assessment, findings on exam, diagnosis and plan of care as listed below. Discussed with Dr. Hampton.     Patient is calmer today, not aguitated but keeping her eyes closed while being talked to. As per students she had her eyes open yesterday. When asked why, she told she has glare on keeping eyes open. No pain in eyes. Unclear if she uses any eye drops   Patient is alert and oriented x 2, wants to go home as Son arrrived and requested her clothes.   No significant cough at this time  As per  patient's son- before COVID patient was able to walk outside, oriented, with her cane, but since July since visiting her Son in [place] and on returning, she has been more forgetful, has acute change in her personality, does not trust anyone, thinks people are poisoning her and has become very irritable    Vital Signs Last 24 Hrs  T(C): 36.7 (06 Oct 2020 15:50), Max: 37.1 (06 Oct 2020 05:34)  T(F): 98 (06 Oct 2020 15:50), Max: 98.7 (06 Oct 2020 05:34)  HR: 89 (06 Oct 2020 15:50) (78 - 100)  BP: 120/53 (06 Oct 2020 15:50) (100/58 - 149/86)  RR: 19 (06 Oct 2020 15:50) (17 - 19)  SpO2: 97% (06 Oct 2020 15:50) (94% - 98%)    P/E:   NAD, restless  CN intact, no focal deficit, muscle strength 5/5 but very unstable gait   Lungs CTABL  S1S2 WNL, no MRG   Abd soft, non tender, BS present   BL LE trace edema     Labs:                         11.9   14.76 )-----------( 424      ( 06 Oct 2020 06:03 )             36.2   10-06    129<L>  |  96  |  18  ----------------------------<  168<H>  4.2   |  26  |  0.81    Ca    9.0      06 Oct 2020 06:03  Phos  3.1     10-06  Mg     2.3     10-06    TPro  6.9  /  Alb  3.3<L>  /  TBili  0.3  /  DBili  x   /  AST  22  /  ALT  42  /  AlkPhos  103  10-05      Vitamin B12, Serum (10.05.20 @ 10:04)   Vitamin B12, Serum: 762 pg/mL A1C with Estimated Average Glucose (10.05.20 @ 09:50)   A1C with Estimated Average Glucose Result: 7.1: Method: Immunoassay   Thyroid Stimulating Hormone, Serum (10.05.20 @ 05:47)   Thyroid Stimulating Hormone, Serum: 0.71 uU/mL     Transthoracic Echocardiogram (10.05.20 @ 07:04)    CONCLUSIONS:  1. Normal mitral valve. Mild mitral regurgitation.  2. Normal trileaflet aortic valve.  3. Aortic Root: 3.7 cm.  4. Normal left atrium.  LA volume index = 31 cc/m2.  5. Mild concentric left ventricular hypertrophy.  6. Normal Left Ventricular Systolic Function,  (EF = 55 to 60%)  7. Grade I diastolic dysfunction (Impaired relaxation).  8. Normal right atrium.  9. Normal right ventricular size and systolic function (TAPSE  2.0cm).  10. Unable to estimate RVSP.  11. Normal tricuspid valve.  12. Pulmonic valve not well seen.  13. Normal pericardium with no pericardial effusion.    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  Eye glare concerning for Glaucoma  Pancreatic cyst   Thyroid nodule   DM  HTN  HLD    Plan:     patient taken off telemetry as she was not keeping box, now calmer  Increased IOP concerning for Glaucoma;   Endocrine consult appreciated, pending thyroid US   Consulted Dr Alfaro for patient's relatively subacute onset of dementia with behavioral changes  Pancreatic cyst benign vs malignant, can follow up as outpatient unless worsening dementia is a part of paraneoplastic syndrome   Rest of the management as above Patient was seen and examined at bedside with medical student Ena Zhang interpreting for me with Son also at bedside.  Agree with PGY1 A/P above with editing as needed. My independent assessment, findings on exam, diagnosis and plan of care as listed below. Discussed with Dr. Hampton.     Patient is calmer today, not aguitated but keeping her eyes closed while being talked to. As per students she had her eyes open yesterday. When asked why, she told she has glare on keeping eyes open. No pain in eyes. Unclear if she uses any eye drops   Patient is alert and oriented x 2, wants to go home as Son arrrived and requested her clothes.   No significant cough at this time  As per  patient's son- before COVID patient was able to walk outside, oriented, with her cane, but since July since visiting her Son in [place] and on returning, she has been more forgetful, has acute change in her personality, does not trust anyone, thinks people are poisoning her and has become very irritable    Vital Signs Last 24 Hrs  T(C): 36.7 (06 Oct 2020 15:50), Max: 37.1 (06 Oct 2020 05:34)  T(F): 98 (06 Oct 2020 15:50), Max: 98.7 (06 Oct 2020 05:34)  HR: 89 (06 Oct 2020 15:50) (78 - 100)  BP: 120/53 (06 Oct 2020 15:50) (100/58 - 149/86)  RR: 19 (06 Oct 2020 15:50) (17 - 19)  SpO2: 97% (06 Oct 2020 15:50) (94% - 98%)    P/E:   NAD, restless  CN intact, no focal deficit, muscle strength 5/5 but very unstable gait   Lungs CTABL  S1S2 WNL, no MRG   Abd soft, non tender, BS present   BL LE trace edema     Labs:                         11.9   14.76 )-----------( 424      ( 06 Oct 2020 06:03 )             36.2   10-06    129<L>  |  96  |  18  ----------------------------<  168<H>  4.2   |  26  |  0.81    Ca    9.0      06 Oct 2020 06:03  Phos  3.1     10-06  Mg     2.3     10-06    TPro  6.9  /  Alb  3.3<L>  /  TBili  0.3  /  DBili  x   /  AST  22  /  ALT  42  /  AlkPhos  103  10-05      Vitamin B12, Serum (10.05.20 @ 10:04)   Vitamin B12, Serum: 762 pg/mL A1C with Estimated Average Glucose (10.05.20 @ 09:50)   A1C with Estimated Average Glucose Result: 7.1: Method: Immunoassay   Thyroid Stimulating Hormone, Serum (10.05.20 @ 05:47)   Thyroid Stimulating Hormone, Serum: 0.71 uU/mL     Transthoracic Echocardiogram (10.05.20 @ 07:04)    CONCLUSIONS:  1. Normal mitral valve. Mild mitral regurgitation.  2. Normal trileaflet aortic valve.  3. Aortic Root: 3.7 cm.  4. Normal left atrium.  LA volume index = 31 cc/m2.  5. Mild concentric left ventricular hypertrophy.  6. Normal Left Ventricular Systolic Function,  (EF = 55 to 60%)  7. Grade I diastolic dysfunction (Impaired relaxation).  8. Normal right atrium.  9. Normal right ventricular size and systolic function (TAPSE  2.0cm).  10. Unable to estimate RVSP.  11. Normal tricuspid valve.  12. Pulmonic valve not well seen.  13. Normal pericardium with no pericardial effusion.    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  Eye glare concerning for Glaucoma  Pancreatic cyst   Thyroid nodule   DM  HTN  HLD    Plan:     patient taken off telemetry as she was not keeping box, now calmer  Increased IOP concerning for Glaucoma; Started Xalatan and Timolol  d/w Son, need to follow up with ophthalmologist as outpatient on discharge   Endocrine follow up appreciated, Follow up thyroid US;   Neuro consult appreciated; d/w Dr Alfaro for patient's relatively subacute onset of dementia with behavioral changes will need LP, MRI and EEG if patient can co-operate; Discussed with patient with  MS3, Patient agreed with Son at bedside.   Pancreatic cyst benign vs malignant, can follow up as outpatient unless worsening dementia is a part of paraneoplastic syndrome   Rest of the management as above  Please get updated medication list from outpatient pharmacy to check eye drops prescribed in the past    Discussed with PGY1 Dr. Hampton  Discussed with Son at bedside, RN Snehal, ED Physician Dr. Benitez (to arrange IOP measurement) and with Nursing supervisor.

## 2020-10-06 NOTE — PROGRESS NOTE ADULT - PROBLEM SELECTOR PLAN 1
pw x3 days non-radiating, non-exertional, non-localized CP  -EKG NSR, trops negative x2, CTA negative for aortic dissection and PE  -cw home statin, asa, BB, CCB  -TTE showed LVH and G1DD  -cardiology Dr. Walton consulted  -remains off tele 2/2 patient agitation

## 2020-10-07 ENCOUNTER — RESULT REVIEW (OUTPATIENT)
Age: 80
End: 2020-10-07

## 2020-10-07 DIAGNOSIS — R14.0 ABDOMINAL DISTENSION (GASEOUS): ICD-10-CM

## 2020-10-07 DIAGNOSIS — H40.9 UNSPECIFIED GLAUCOMA: ICD-10-CM

## 2020-10-07 LAB
ANION GAP SERPL CALC-SCNC: 9 MMOL/L — SIGNIFICANT CHANGE UP (ref 5–17)
APPEARANCE CSF: CLEAR — SIGNIFICANT CHANGE UP
APPEARANCE SPUN FLD: COLORLESS — SIGNIFICANT CHANGE UP
APTT BLD: 27.3 SEC — LOW (ref 27.5–35.5)
BUN SERPL-MCNC: 38 MG/DL — HIGH (ref 7–18)
CALCIUM SERPL-MCNC: 9 MG/DL — SIGNIFICANT CHANGE UP (ref 8.4–10.5)
CHLORIDE SERPL-SCNC: 99 MMOL/L — SIGNIFICANT CHANGE UP (ref 96–108)
CO2 SERPL-SCNC: 22 MMOL/L — SIGNIFICANT CHANGE UP (ref 22–31)
COLOR CSF: SIGNIFICANT CHANGE UP
CREAT SERPL-MCNC: 1.07 MG/DL — SIGNIFICANT CHANGE UP (ref 0.5–1.3)
CSF PCR RESULT: SIGNIFICANT CHANGE UP
GLUCOSE BLDC GLUCOMTR-MCNC: 119 MG/DL — HIGH (ref 70–99)
GLUCOSE BLDC GLUCOMTR-MCNC: 144 MG/DL — HIGH (ref 70–99)
GLUCOSE BLDC GLUCOMTR-MCNC: 186 MG/DL — HIGH (ref 70–99)
GLUCOSE BLDC GLUCOMTR-MCNC: 207 MG/DL — HIGH (ref 70–99)
GLUCOSE CSF-MCNC: 89 MG/DL — HIGH (ref 40–70)
GLUCOSE SERPL-MCNC: 188 MG/DL — HIGH (ref 70–99)
GRAM STN FLD: SIGNIFICANT CHANGE UP
HCT VFR BLD CALC: 33.3 % — LOW (ref 34.5–45)
HGB BLD-MCNC: 11.5 G/DL — SIGNIFICANT CHANGE UP (ref 11.5–15.5)
INR BLD: 0.99 RATIO — SIGNIFICANT CHANGE UP (ref 0.88–1.16)
LABORATORY COMMENT REPORT: SIGNIFICANT CHANGE UP
LDH CSF L TO P-CCNC: 18 U/L — SIGNIFICANT CHANGE UP
LDH FLD-CCNC: 18 U/L — SIGNIFICANT CHANGE UP
MAGNESIUM SERPL-MCNC: 2.7 MG/DL — HIGH (ref 1.6–2.6)
MCHC RBC-ENTMCNC: 28.3 PG — SIGNIFICANT CHANGE UP (ref 27–34)
MCHC RBC-ENTMCNC: 34.5 GM/DL — SIGNIFICANT CHANGE UP (ref 32–36)
MCV RBC AUTO: 82 FL — SIGNIFICANT CHANGE UP (ref 80–100)
NEUTROPHILS # CSF: 0 % — SIGNIFICANT CHANGE UP (ref 0–6)
NIGHT BLUE STAIN TISS: SIGNIFICANT CHANGE UP
NRBC # BLD: 0 /100 WBCS — SIGNIFICANT CHANGE UP (ref 0–0)
NRBC NFR CSF: <1 /UL — SIGNIFICANT CHANGE UP (ref 0–5)
PHOSPHATE SERPL-MCNC: 5 MG/DL — HIGH (ref 2.5–4.5)
PLATELET # BLD AUTO: 389 K/UL — SIGNIFICANT CHANGE UP (ref 150–400)
POTASSIUM SERPL-MCNC: 4 MMOL/L — SIGNIFICANT CHANGE UP (ref 3.5–5.3)
POTASSIUM SERPL-SCNC: 4 MMOL/L — SIGNIFICANT CHANGE UP (ref 3.5–5.3)
PROT CSF-MCNC: 35 MG/DL — SIGNIFICANT CHANGE UP (ref 15–45)
PROTHROM AB SERPL-ACNC: 11.8 SEC — SIGNIFICANT CHANGE UP (ref 10.6–13.6)
RBC # BLD: 4.06 M/UL — SIGNIFICANT CHANGE UP (ref 3.8–5.2)
RBC # CSF: 0 /UL — SIGNIFICANT CHANGE UP (ref 0–0)
RBC # FLD: 13.8 % — SIGNIFICANT CHANGE UP (ref 10.3–14.5)
SODIUM SERPL-SCNC: 130 MMOL/L — LOW (ref 135–145)
SOURCE HSV 1/2: SIGNIFICANT CHANGE UP
SPECIMEN SOURCE: SIGNIFICANT CHANGE UP
SPECIMEN SOURCE: SIGNIFICANT CHANGE UP
TUBE TYPE: SIGNIFICANT CHANGE UP
WBC # BLD: 13.33 K/UL — HIGH (ref 3.8–10.5)
WBC # FLD AUTO: 13.33 K/UL — HIGH (ref 3.8–10.5)

## 2020-10-07 PROCEDURE — 62270 DX LMBR SPI PNXR: CPT

## 2020-10-07 PROCEDURE — 95819 EEG AWAKE AND ASLEEP: CPT | Mod: 26

## 2020-10-07 PROCEDURE — 99233 SBSQ HOSP IP/OBS HIGH 50: CPT | Mod: 25

## 2020-10-07 PROCEDURE — 70551 MRI BRAIN STEM W/O DYE: CPT | Mod: 26

## 2020-10-07 PROCEDURE — 88108 CYTOPATH CONCENTRATE TECH: CPT | Mod: 26

## 2020-10-07 PROCEDURE — 99233 SBSQ HOSP IP/OBS HIGH 50: CPT | Mod: GC

## 2020-10-07 RX ORDER — SODIUM CHLORIDE 9 MG/ML
1000 INJECTION INTRAMUSCULAR; INTRAVENOUS; SUBCUTANEOUS
Refills: 0 | Status: DISCONTINUED | OUTPATIENT
Start: 2020-10-07 | End: 2020-10-09

## 2020-10-07 RX ORDER — IOHEXOL 300 MG/ML
30 INJECTION, SOLUTION INTRAVENOUS ONCE
Refills: 0 | Status: COMPLETED | OUTPATIENT
Start: 2020-10-07 | End: 2020-10-07

## 2020-10-07 RX ADMIN — Medication 1 DROP(S): at 06:33

## 2020-10-07 RX ADMIN — DONEPEZIL HYDROCHLORIDE 5 MILLIGRAM(S): 10 TABLET, FILM COATED ORAL at 21:17

## 2020-10-07 RX ADMIN — Medication 25 MILLIGRAM(S): at 06:33

## 2020-10-07 RX ADMIN — Medication 1: at 12:22

## 2020-10-07 RX ADMIN — QUETIAPINE FUMARATE 25 MILLIGRAM(S): 200 TABLET, FILM COATED ORAL at 06:33

## 2020-10-07 RX ADMIN — Medication 25 MILLIGRAM(S): at 18:15

## 2020-10-07 RX ADMIN — HEPARIN SODIUM 5000 UNIT(S): 5000 INJECTION INTRAVENOUS; SUBCUTANEOUS at 06:33

## 2020-10-07 RX ADMIN — Medication 60 MILLIGRAM(S): at 06:33

## 2020-10-07 RX ADMIN — Medication 500 MILLIGRAM(S): at 12:22

## 2020-10-07 RX ADMIN — ATORVASTATIN CALCIUM 20 MILLIGRAM(S): 80 TABLET, FILM COATED ORAL at 21:17

## 2020-10-07 RX ADMIN — Medication 81 MILLIGRAM(S): at 12:22

## 2020-10-07 RX ADMIN — Medication 2: at 07:59

## 2020-10-07 RX ADMIN — QUETIAPINE FUMARATE 25 MILLIGRAM(S): 200 TABLET, FILM COATED ORAL at 18:15

## 2020-10-07 RX ADMIN — SODIUM CHLORIDE 70 MILLILITER(S): 9 INJECTION INTRAMUSCULAR; INTRAVENOUS; SUBCUTANEOUS at 15:00

## 2020-10-07 RX ADMIN — Medication 1 DROP(S): at 18:15

## 2020-10-07 RX ADMIN — LATANOPROST 1 DROP(S): 0.05 SOLUTION/ DROPS OPHTHALMIC; TOPICAL at 21:18

## 2020-10-07 RX ADMIN — HEPARIN SODIUM 5000 UNIT(S): 5000 INJECTION INTRAVENOUS; SUBCUTANEOUS at 18:13

## 2020-10-07 NOTE — PROGRESS NOTE ADULT - PROBLEM SELECTOR PLAN 9
son expresses difficulty caring for patient at home, family agrees to workup to assess for organic cause of cognitive decline

## 2020-10-07 NOTE — PROGRESS NOTE ADULT - PROBLEM SELECTOR PLAN 1
per family, patient able to perform ADLs as recently as August, has become abruptly more forgetful, disoriented, and paranoid over past 1-2 months  -cw home seroquel, aricept added  -LP performed 3pm today, will fu CSF tests  -MR head no acute pathologies, chronic ischemic changes noted  -EEG obtained today, awaiting official read  -discuss placement with CM, as family now having difficulties caring for her   -[facility] per PT  -neuro Dr. Alfaro consulted, will consider EEG, MRI brain, LP if family agrees  -B12 and folate wnl

## 2020-10-07 NOTE — EEG REPORT - NS EEG TEXT BOX
ANNELIESE HERRERA [id number]  Admitting MD: Dr. Betty Riddle    Study Date: 10-07-20    ROUTINE EEG    Technical Information:			  		  Placement and Labeling of Electrodes:  The EEG was performed utilizing 20 channels referential EEG connections (coronal over temporal over parasagittal montage) using all standard 10-20 electrode placements with EKG.  Recording was at a sampling rate of 256 samples per second per channel.  Time synchronized digital video recording was done simultaneously with EEG recording.  A low light infrared camera was used for low light recording.  Elbert and seizure detection algorithms were utilized.    CSA Technical Component:  Quantitative EEG analysis using a separate Compressed Spectral Array (CSA) software package was conducted in real-time and run at bedside after set up by the technician, digitally displaying the power of electrographic frequencies included in the 1-30Hz band using a graded color map.  This data was reviewed and interpreted independently, and is reported in a separate section below.    --------------------------------------------------------------------------------------------------  History:  CC/ HPI Patient is a 80y old  Female who presents with a chief complaint of chest pain (07 Oct 2020 10:32)    ascorbic acid 500 milliGRAM(s) Oral daily  aspirin  chewable 81 milliGRAM(s) Oral daily  atorvastatin 20 milliGRAM(s) Oral at bedtime  donepezil 5 milliGRAM(s) Oral at bedtime  heparin   Injectable 5000 Unit(s) SubCutaneous every 12 hours  insulin lispro (HumaLOG) corrective regimen sliding scale   SubCutaneous three times a day before meals  iohexol 300 mG (iodine)/mL Oral Solution 30 milliLiter(s) Oral once  latanoprost 0.005% Ophthalmic Solution 1 Drop(s) Both EYES at bedtime  metoprolol tartrate 25 milliGRAM(s) Oral two times a day  NIFEdipine XL 60 milliGRAM(s) Oral daily  QUEtiapine 25 milliGRAM(s) Oral two times a day  sodium chloride 0.9%. 1000 milliLiter(s) IV Continuous <Continuous>  timolol 0.25% Solution 1 Drop(s) Both EYES two times a day    --------------------------------------------------------------------------------------------------  Study Interpretation:    [[[Abbreviation Key:  PDR=alpha rhythm/posterior dominant rhythm. A-P=anterior posterior gradient.  Amplitude: ‘very low’:<20; ‘low’:20-50; ‘medium’:; ‘high’:>200uV.  Persistence for periodic/rhythmic patterns (% of epoch) ‘rare’:<1%; ‘occasional’:1-10%; ‘frequent’:10-50%; ‘abundant’:50-90%; ‘continuous’:>90%.  Persistence for sporadic discharges: ‘rare’:<1/hr; ‘occasional’:1/min-1/hr; ‘frequent’:>1/min; ‘abundant’:>1/10 sec.  GRDA=generalized rhythmic delta activity, LRDA=lateralized rhythmic delta activity, TIRDA=temporal intermittent rhythmic delta activity, FIRDA=frontal intermittent rhythmic activity. LPD=PLED=lateralized periodic discharges, GPD=generalized periodic discharges, BiPDs=BiPLEDs=bilateral independent periodic epileptiform discharges, SIRPID=stimulus induced rhythmic, periodic, or ictal appearing discharges.  Modifiers: +F=with fast component, +S=with spike component, +R=with rhythmic component.  S-B=burst suppression pattern.  Max=maximal. N1-drowsy, N2-stage II sleep, N3-slow wave sleep.  HV=hyperventilation, PS=photic stimulation]]]    FINDINGS:  The background was continuous, spontaneously variable and reactive.  During wakefulness, the posteriorly dominant rhythm consisted of symmetric, well modulated 10Hz activity, with an amplitude to 30 uV, that attenuated to eye opening.  Low amplitude central beta was noted in wakefulness.    Background Slowing:  Generalized slowing: none was present.  Focal slowing: none was present.    Sleep Background:  Frequent drowsiness was characterized by fragmentation, attenuation, and slowing of the background activity.    Sleep was characterized by the presence of vertex waves, symmetric spindles, and K-complexes.    Epileptiform Activity:   No epileptiform discharges were present.    Events:  No clinical events were recorded.  No seizures were recorded.    Activation Procedures:   -Hyperventilation was not performed.    -Photic stimulation was performed and did not elicit any abnormalities.      Artifacts:  Intermittent myogenic and movement artifacts were noted.    ECG:  The heart rate on single channel ECG at baseline was predominantly near BPM = 70-80  -----------------------------------------------------------------------------------------------------    EEG Classification / Summary:  Normal EEG study, awake / drowsy / asleep    -Frequent drowsiness noted.  -----------------------------------------------------------------------------------------------------    Clinical Impression:  There were no epileptiform abnormalities recorded.      -------------------------------------------------------------------------------------------------------  Huy Jeronimo M.D.   of Neurology, [facility]

## 2020-10-07 NOTE — PROGRESS NOTE ADULT - SUBJECTIVE AND OBJECTIVE BOX
PGY-1 Progress Note discussed with attending    PAGER #: [phone]] TILL 5:00 PM  PLEASE CONTACT ON CALL TEAM:  - On Call Team (Please refer to [place]) FROM 5:00 PM - 8:30PM  - Nightfloat Team FROM 8:30 -7:30 AM    CHIEF COMPLAINT & BRIEF HOSPITAL COURSE: 80 Cantonese-speaking F with PMH dementia, HTN, DM, HLD pw x3 days non-localized, non-exertional, non-radiating CP, admitted to [facility] for ACS workup, additionally found to have thyroid nodule.     INTERVAL HPI/OVERNIGHT EVENTS: Patient noted to be closing her eyes against "glare" yesterday pm, intraocular pressure 39mmHg in left eye and 26mmHg right eye (nl <20mmHg). Started on Xalatan and timolol drops. VS wnl this am. Patient noted to have distended belly this am, possibly 2/2 constipation, CT ab/pelvis with PO and IV contrast obtained. US thyroid yesterday revealed 3 nodules in right lobe, largest likely benign spongiform lesion, with no left lobe. Per family patient had "neck surgery" in 2010, likely partial thyroidectomy. No acute pathologies on MR head, chronic ischemic changes noted. Underwent EEG, awaiting official read. LP performed 3pm, CSF tests sent. Continuing on NS 70cc/hr.     MEDICATIONS  (STANDING):  ascorbic acid 500 milliGRAM(s) Oral daily  aspirin  chewable 81 milliGRAM(s) Oral daily  atorvastatin 20 milliGRAM(s) Oral at bedtime  donepezil 5 milliGRAM(s) Oral at bedtime  heparin   Injectable 5000 Unit(s) SubCutaneous every 12 hours  insulin lispro (HumaLOG) corrective regimen sliding scale   SubCutaneous three times a day before meals  iohexol 300 mG (iodine)/mL Oral Solution 30 milliLiter(s) Oral once  latanoprost 0.005% Ophthalmic Solution 1 Drop(s) Both EYES at bedtime  metoprolol tartrate 25 milliGRAM(s) Oral two times a day  NIFEdipine XL 60 milliGRAM(s) Oral daily  QUEtiapine 25 milliGRAM(s) Oral two times a day  sodium chloride 0.9%. 1000 milliLiter(s) (70 mL/Hr) IV Continuous <Continuous>  timolol 0.25% Solution 1 Drop(s) Both EYES two times a day    MEDICATIONS  (PRN):  acetaminophen   Tablet .. 650 milliGRAM(s) Oral every 6 hours PRN Mild Pain (1 - 3), Moderate Pain (4 - 6)    REVIEW OF SYSTEMS:  CONSTITUTIONAL: No fever or fatigue  RESPIRATORY: No cough, No shortness of breath  CARDIOVASCULAR: No chest pain  GASTROINTESTINAL: No abdominal pain. No nausea, vomiting; denies diarrhea or constipation.   NEUROLOGICAL: No headaches, states eyes feel tired    Vital Signs Last 24 Hrs  T(C): 36.8 (07 Oct 2020 11:27), Max: 37.1 (06 Oct 2020 19:34)  T(F): 98.2 (07 Oct 2020 11:27), Max: 98.8 (06 Oct 2020 19:34)  HR: 80 (07 Oct 2020 07:39) (80 - 93)  BP: 121/49 (07 Oct 2020 11:27) (119/53 - 145/74)  BP(mean): 144 (07 Oct 2020 04:40) (144 - 144)  RR: 19 (07 Oct 2020 11:27) (17 - 19)  SpO2: 95% (07 Oct 2020 11:27) (92% - 97%)    PHYSICAL EXAMINATION:  GENERAL: NAD, appears stated age  HEAD: Atraumatic, Normocephalic  EYES: closed; endorses feeling too weak to open   NECK: Supple  CHEST/LUNG: Clear to auscultation. No rales, rhonchi, wheezing, or rubs  HEART: Regular rate and rhythm; No murmurs, rubs, or gallops  ABDOMEN: firm, distended  NERVOUS SYSTEM: alert and oriented x2  EXTREMITIES:  2+ Peripheral Pulses, No cedema  SKIN: warm dry                          11.5   13.33 )-----------( 389      ( 07 Oct 2020 07:51 )             33.3     10-07    130<L>  |  99  |  38<H>  ----------------------------<  188<H>  4.0   |  22  |  1.07    Ca    9.0      07 Oct 2020 07:51  Phos  5.0     10-07  Mg     2.7     10-07            PT/INR - ( 07 Oct 2020 07:51 )   PT: 11.8 sec;   INR: 0.99 ratio         PTT - ( 07 Oct 2020 07:51 )  PTT:27.3 sec    CAPILLARY BLOOD GLUCOSE      RADIOLOGY & ADDITIONAL TESTS:

## 2020-10-07 NOTE — PROGRESS NOTE ADULT - SUBJECTIVE AND OBJECTIVE BOX
INTERVAL HPI/OVERNIGHT EVENTS:    HEALTH ISSUES - PROBLEM Dx:  Glaucoma  Glaucoma    Abdominal distention  Abdominal distention    Goiter, nontoxic, multinodular  Goiter, nontoxic, multinodular    Goals of care, counseling/discussion  Goals of care, counseling/discussion    Thyroid nodule  Thyroid nodule    Hyperlipidemia  Hyperlipidemia    DM (diabetes mellitus)  DM (diabetes mellitus)    Dementia  Dementia    HTN (hypertension)  HTN (hypertension)    Chest pain  Chest pain            MEDICATIONS  (STANDING):  ascorbic acid 500 milliGRAM(s) Oral daily  aspirin  chewable 81 milliGRAM(s) Oral daily  atorvastatin 20 milliGRAM(s) Oral at bedtime  donepezil 5 milliGRAM(s) Oral at bedtime  heparin   Injectable 5000 Unit(s) SubCutaneous every 12 hours  insulin lispro (HumaLOG) corrective regimen sliding scale   SubCutaneous three times a day before meals  latanoprost 0.005% Ophthalmic Solution 1 Drop(s) Both EYES at bedtime  metoprolol tartrate 25 milliGRAM(s) Oral two times a day  NIFEdipine XL 60 milliGRAM(s) Oral daily  QUEtiapine 25 milliGRAM(s) Oral two times a day  sodium chloride 0.9%. 1000 milliLiter(s) (70 mL/Hr) IV Continuous <Continuous>  timolol 0.25% Solution 1 Drop(s) Both EYES two times a day    MEDICATIONS  (PRN):  acetaminophen   Tablet .. 650 milliGRAM(s) Oral every 6 hours PRN Mild Pain (1 - 3), Moderate Pain (4 - 6)      Allergies    No Known Allergies    Intolerances        REVIEW OF SYSTEMS    CONSTITUTIONAL: No fever or fatigue  RESPIRATORY: No cough, No shortness of breath  CARDIOVASCULAR: No chest pain  GASTROINTESTINAL: No abdominal pain. No nausea, vomiting; denies diarrhea or constipation.   NEUROLOGICAL: No headaches, states eyes feel tired  Endocrine:	    Allergic/Immunologic:	    Vital Signs Last 24 HrsHrs  T(C): 36.8 (07 Oct 2020 11:27), Max: 37.1 (06 Oct 2020 19:34)  T(F): 98.2 (07 Oct 2020 11:27), Max: 98.8 (06 Oct 2020 19:34)  HR: 80 (07 Oct 2020 07:39) (80 - 93)  BP: 121/49 (07 Oct 2020 11:27) (119/53 - 145/74)  BP(mean): 144 (07 Oct 2020 04:40) (144 - 144)  RR: 19 (07 Oct 2020 11:27) (17 - 19)  SpO2: 95% (07 Oct 2020 11:27) (92% - 97%)      PHYSICAL EXAM:      PHYSICAL EXAMINATION:  GENERAL: NAD, appears stated age  HEAD: Atraumatic, Normocephalic  EYES: closed; endorses feeling too weak to open   NECK: Supple  CHEST/LUNG: Clear to auscultation. No rales, rhonchi, wheezing, or rubs  HEART: Regular rate and rhythm; No murmurs, rubs, or gallops  ABDOMEN: firm, distended  NERVOUS SYSTEM: alert and oriented x2  EXTREMITIES:  2+ Peripheral Pulses, No cedema  SKIN: warm dry        LABS:                        11.5   13.33 )-----------( 389      ( 07 Oct 2020 07:51 )             33.3     10-07    130<L>  |  99  |  38<H>  ----------------------------<  188<H>  4.0   |  22  |  1.07    Ca    9.0      07 Oct 2020 07:51  Phos  5.0     10-07  Mg     2.7     10-07      PT/INR - ( 07 Oct 2020 07:51 )   PT: 11.8 sec;   INR: 0.99 ratio         PTT - ( 07 Oct 2020 07:51 )  PTT:27.3 sec      RADIOLOGY & ADDITIONAL TESTS:  < from: MR Head No Cont (10.07.20 @ 10:28) >  EXAM:  MR BRAIN                            PROCEDURE DATE:  10/07/2020          INTERPRETATION:  Exam Date: 10/7/2020 10:28 AM    MR brain  without gadolinium    CLINICAL INFORMATION:  sudden onset dementia    TECHNIQUE:   Sagittal and axial T1-weighted images, axial FLAIR images, gradient echo, axial  T2-weighted images and axial diffusion weighted images of the brain were obtained.    FINDINGS: Comparison is made to CT head of 8/20/2020.    There is no evidence of acute infarct, hemorrhage, or mass lesion. There are patchy and scattered foci of FLAIR hyperintensity in the periventricular and subcortical white matter of the bilateral cerebri, compatible with moderate chronic microvascular ischemic changes. There is no midline shift or herniation pattern. No mass effect is found in the brain.    The ventricles, sulci and basal cisterns appear unremarkable.    The vertebral and internal carotid arteries demonstrate expected flow voids indicating their patency.    The paranasal sinuses are clear.    IMPRESSION:   No acute infarct. Moderate periventricular and subcortical white matter chronic microvascular ischemic changes.            LEXI PEREZ M.D., ATTENDING RADIOLOGIST  This document has been electronically signed. Oct  34536 10:35AM    < end of copied text >   INTERVAL HPI/OVERNIGHT EVENTS:    HEALTH ISSUES - PROBLEM Dx:  Glaucoma    Abdominal distention    Goiter, nontoxic, multinodular      Goals of care, counseling/discussion    Thyroid nodule    Hyperlipidemia  DM (diabetes mellitus)    Dementia  HTN (hypertension)      Chest pain            MEDICATIONS  (STANDING):  ascorbic acid 500 milliGRAM(s) Oral daily  aspirin  chewable 81 milliGRAM(s) Oral daily  atorvastatin 20 milliGRAM(s) Oral at bedtime  donepezil 5 milliGRAM(s) Oral at bedtime  heparin   Injectable 5000 Unit(s) SubCutaneous every 12 hours  insulin lispro (HumaLOG) corrective regimen sliding scale   SubCutaneous three times a day before meals  latanoprost 0.005% Ophthalmic Solution 1 Drop(s) Both EYES at bedtime  metoprolol tartrate 25 milliGRAM(s) Oral two times a day  NIFEdipine XL 60 milliGRAM(s) Oral daily  QUEtiapine 25 milliGRAM(s) Oral two times a day  sodium chloride 0.9%. 1000 milliLiter(s) (70 mL/Hr) IV Continuous <Continuous>  timolol 0.25% Solution 1 Drop(s) Both EYES two times a day    MEDICATIONS  (PRN):  acetaminophen   Tablet .. 650 milliGRAM(s) Oral every 6 hours PRN Mild Pain (1 - 3), Moderate Pain (4 - 6)      Allergies    No Known Allergies    Intolerances        REVIEW OF SYSTEMS    CONSTITUTIONAL: No fever or fatigue  RESPIRATORY: No cough, No shortness of breath  CARDIOVASCULAR: No chest pain  GASTROINTESTINAL: No abdominal pain. No nausea, vomiting; denies diarrhea or constipation.   NEUROLOGICAL: No headaches, states eyes feel tired  Endocrine:	    Allergic/Immunologic:	    Vital Signs Last 24 HrsHrs  T(C): 36.8 (07 Oct 2020 11:27), Max: 37.1 (06 Oct 2020 19:34)  T(F): 98.2 (07 Oct 2020 11:27), Max: 98.8 (06 Oct 2020 19:34)  HR: 80 (07 Oct 2020 07:39) (80 - 93)  BP: 121/49 (07 Oct 2020 11:27) (119/53 - 145/74)  BP(mean): 144 (07 Oct 2020 04:40) (144 - 144)  RR: 19 (07 Oct 2020 11:27) (17 - 19)  SpO2: 95% (07 Oct 2020 11:27) (92% - 97%)      PHYSICAL EXAM:      PHYSICAL EXAMINATION:  GENERAL: NAD, appears stated age  HEAD: Atraumatic, Normocephalic  EYES: closed;    NECK: Supple  CHEST/LUNG: Clear to auscultation. No rales, rhonchi, wheezing, or rubs  HEART: Regular rate and rhythm; No murmurs, rubs, or gallops  ABDOMEN: firm, distended  EXTREMITIES:  2+ Peripheral Pulses, No cedema  SKIN: warm dry    NERVOUS sYSTEM: Keeps eyes closed, which is worse than before; also has stopped picking bed clothes - brittanyly appears to be sleeping and lethargic; responds in grunts or by compliance to loud instructions; BRYCE, VF cannot be tested; fundus - discs normal; EOM full, neck supple, able to swallow; moves all 4 extremities but rigidity noted .DTR symmetrical. Plantar flexor    LABS:                        11.5   13.33 )-----------( 389      ( 07 Oct 2020 07:51 )             33.3     10-07    130<L>  |  99  |  38<H>  ----------------------------<  188<H>  4.0   |  22  |  1.07    Ca    9.0      07 Oct 2020 07:51  Phos  5.0     10-07  Mg     2.7     10-07      PT/INR - ( 07 Oct 2020 07:51 )   PT: 11.8 sec;   INR: 0.99 ratio         PTT - ( 07 Oct 2020 07:51 )  PTT:27.3 sec      RADIOLOGY & ADDITIONAL TESTS:  < from: MR Head No Cont (10.07.20 @ 10:28) >  EXAM:  MR BRAIN                            PROCEDURE DATE:  10/07/2020          INTERPRETATION:  Exam Date: 10/7/2020 10:28 AM    MR brain  without gadolinium    CLINICAL INFORMATION:  sudden onset dementia    TECHNIQUE:   Sagittal and axial T1-weighted images, axial FLAIR images, gradient echo, axial  T2-weighted images and axial diffusion weighted images of the brain were obtained.    FINDINGS: Comparison is made to CT head of 8/20/2020.    There is no evidence of acute infarct, hemorrhage, or mass lesion. There are patchy and scattered foci of FLAIR hyperintensity in the periventricular and subcortical white matter of the bilateral cerebri, compatible with moderate chronic microvascular ischemic changes. There is no midline shift or herniation pattern. No mass effect is found in the brain.    The ventricles, sulci and basal cisterns appear unremarkable.    The vertebral and internal carotid arteries demonstrate expected flow voids indicating their patency.    The paranasal sinuses are clear.    IMPRESSION:   No acute infarct. Moderate periventricular and subcortical white matter chronic microvascular ischemic changes.            LEXI PEREZ M.D., ATTENDING RADIOLOGIST  This document has been electronically signed. Oct  90953 10:35AM    < end of copied text >

## 2020-10-07 NOTE — PROGRESS NOTE ADULT - ASSESSMENT
80F Cantonese-speaking, PMH dementia (recent onset), HTN, DM, HLD pw x3 days constant, non-localized, non-radiating, non-exertional chest pain, admitted to [facility] for cardiac monitoring and management/placement of recent-onset dementia.

## 2020-10-07 NOTE — PROGRESS NOTE ADULT - SUBJECTIVE AND OBJECTIVE BOX
DATE OF SERVICE:Patient was seen and examined :    PRESENTING CC:    SUBJ:       PMH -reviewed admission note, no change since admission  Heart failure: acute [ ] chronic [ ] acute or chronic [ ] diastolic [ ] systolic [ ] combined systolic and diastolic[ ]  KIMBERLY: ATN[ ] renal medullary necrosis [ ] CKD I [ ]CKDII [ ]CKD III [ ]CKD IV [ ]CKD V [ ]Other pathological lesions [ ]    MEDICATIONS  (STANDING):  ascorbic acid 500 milliGRAM(s) Oral daily  aspirin  chewable 81 milliGRAM(s) Oral daily  atorvastatin 20 milliGRAM(s) Oral at bedtime  donepezil 5 milliGRAM(s) Oral at bedtime  heparin   Injectable 5000 Unit(s) SubCutaneous every 12 hours  insulin lispro (HumaLOG) corrective regimen sliding scale   SubCutaneous three times a day before meals  latanoprost 0.005% Ophthalmic Solution 1 Drop(s) Both EYES at bedtime  metoprolol tartrate 25 milliGRAM(s) Oral two times a day  NIFEdipine XL 60 milliGRAM(s) Oral daily  QUEtiapine 25 milliGRAM(s) Oral two times a day  sodium chloride 0.9%. 1000 milliLiter(s) (70 mL/Hr) IV Continuous <Continuous>  timolol 0.25% Solution 1 Drop(s) Both EYES two times a day    MEDICATIONS  (PRN):  acetaminophen   Tablet .. 650 milliGRAM(s) Oral every 6 hours PRN Mild Pain (1 - 3), Moderate Pain (4 - 6)              REVIEW OF SYSTEMS:  Constitutional: [ ] fever, [ ]weight loss,  [ ]fatigue  Eyes: [ ] visual changes  Respiratory: [ ]shortness of breath;  [ ] cough, [ ]wheezing, [ ]chills, [ ]hemoptysis  Cardiovascular: [ ] chest pain, [ ]palpitations, [ ]dizziness,  [ ]leg swelling[ ]orthopnea[ ]PND  Gastrointestinal: [ ] abdominal pain, [ ]nausea, [ ]vomiting,  [ ]diarrhea   Genitourinary: [ ] dysuria, [ ] hematuria  Neurologic: [ ] headaches [ ] tremors[ ]weakness  Skin: [ ] itching, [ ]burning, [ ] rashes  Endocrine: [ ] heat or cold intolerance  Musculoskeletal: [ ] joint pain or swelling; [ ] muscle, back, or extremity pain  Psychiatric: [ ] depression, [ ]anxiety, [ ]mood swings, or [ ]difficulty sleeping  Hematologic: [ ] easy bruising, [ ] bleeding gums    [x] All remaining systems negative except as per above.   [ ]Unable to obtain.    Vital Signs Last 24 Hrs  T(C): 36.4 (07 Oct 2020 07:39), Max: 37.1 (06 Oct 2020 19:34)  T(F): 97.6 (07 Oct 2020 07:39), Max: 98.8 (06 Oct 2020 19:34)  HR: 80 (07 Oct 2020 07:39) (80 - 93)  BP: 119/53 (07 Oct 2020 07:39) (119/53 - 145/74)  BP(mean): 144 (07 Oct 2020 04:40) (144 - 144)  RR: 19 (07 Oct 2020 07:39) (17 - 19)  SpO2: 92% (07 Oct 2020 07:39) (92% - 97%)  I&O's Summary    06 Oct 2020 07:01  -  07 Oct 2020 07:00  --------------------------------------------------------  IN: 190 mL / OUT: 400 mL / NET: -210 mL        PHYSICAL EXAM:  General: No acute distress BMI-  HEENT: EOMI, PERRL  Neck: Supple, [ ] JVD  Lungs: Equal air entry bilaterally; [ ] rales [ ] wheezing [ ] rhonchi  Heart: Regular rate and rhythm; [ ] murmur   /6 [ ] systolic [ ] diastolic [ ] radiation[ ] rubs [ ]  gallops  Abdomen: Nontender, bowel sounds present  Extremities: No clubbing, cyanosis, [ ] edema  Nervous system:  Alert & Oriented X3, no focal deficits  Psychiatric: Normal affect  Skin: No rashes or lesions    LABS:  10-07    130<L>  |  99  |  38<H>  ----------------------------<  188<H>  4.0   |  22  |  1.07    Ca    9.0      07 Oct 2020 07:51  Phos  5.0     10-07  Mg     2.7     10-07      Creatinine Trend: 1.07<--, 0.81<--, 0.85<--, 0.83<--                        11.5   13.33 )-----------( 389      ( 07 Oct 2020 07:51 )             33.3     PT/INR - ( 07 Oct 2020 07:51 )   PT: 11.8 sec;   INR: 0.99 ratio         PTT - ( 07 Oct 2020 07:51 )  PTT:27.3 sec  Lipid Panel:   Cardiac Enzymes:           RADIOLOGY:    ECG [my interpretation]:    TELEMETRY:    ECHO:    STRESS TEST:    CATHETERIZATION:      IMPRESSION AND PLAN:

## 2020-10-07 NOTE — PROGRESS NOTE ADULT - ATTENDING COMMENTS
Patient was seen and examined at bedside with medical student Ena Zhang interpreting this morning;  Agree with PGY1 A/P above with editing as needed. My independent assessment, findings on exam, diagnosis and plan of care as listed below. Discussed with Dr. Hampton.     Patient is much calmer today, not agitated underwent EEG and MRI today without any issues. She still keeping her eyes closed but tried to open but feels weak as per patient.  Patient was placed on eye drops last night. Spoke with Neuro Dr. Alfaro and as planned obtained consent from Son at bedside and successful LP was performed by Resident with Neurology supervising,     Vital Signs Last 24 Hrs  T(C): 36.9 (07 Oct 2020 16:12), Max: 37.1 (06 Oct 2020 19:34)  T(F): 98.4 (07 Oct 2020 16:12), Max: 98.8 (06 Oct 2020 19:34)  HR: 77 (07 Oct 2020 16:12) (77 - 93)  BP: 117/48 (07 Oct 2020 16:12) (117/48 - 145/74)  BP(mean): 144 (07 Oct 2020 04:40) (144 - 144)  RR: 19 (07 Oct 2020 16:12) (17 - 19)  SpO2: 96% (07 Oct 2020 16:12) (92% - 97%)    P/E:   elderly female, comfortable at rest, NAD  Neuro: AAO x1-2; No focal deficits  CVS: S1S2 present, regular  Resp: BLAE+, No wheeze or Rhonchi  GI: Soft, Obese, BS+, palpable mass LLQ  Extr: No edema or calf tenderness      Labs:                         11.5   13.33 )-----------( 389      ( 07 Oct 2020 07:51 )             33.3   10-07    130<L>  |  99  |  38<H>  ----------------------------<  188<H>  4.0   |  22  |  1.07    Ca    9.0      07 Oct 2020 07:51  Phos  5.0     10-07  Mg     2.7     10-07    < from: US Thyroid (10.06.20 @ 11:56) >    IMPRESSION: Limited by patient's inability to cooperate.  Left thyroid lobe not seen also not seen on the CAT scan  At least 3 right nodules are noted. The largest is suggestive of a benign spongiform nodule.    MR Head No Cont (10.07.20 @ 10:28)     IMPRESSION:   No acute infarct. Moderate periventricular and subcortical white matter chronic microvascular ischemic changes.    A/P:   Atypical Chest pain negative ACS  Subacute onset Dementia etiology unclear with Psychosis   ??Ocular muscle weakness concern for Myasthenia Gravis   Eye glare possibly due to Glaucoma  Pancreatic cyst   Thyroid nodule   DM  HTN  HLD    Plan:   MRI with no structural pathology; EEG negative for epileptiform activity  Increased IOP concerning for Glaucoma; Continue Xalatan and Timolol  d/w Son, need to follow up with ophthalmologist as outpatient on discharge   Endocrine follow up appreciated,   Neuro follow up appreciated; d/w Dr Alfaro; Follow up LP   Pancreatic cyst benign vs malignant, can follow up as outpatient unless worsening dementia is a part of paraneoplastic syndrome   Indwelling Dorado catheter placed as Bladder scan showed 850 CC;   Get CT Abdomen and Pelvis with oral and IV contrast  IV hydration as patient getting contrast  Rest of the management as above    Discussed with PGY1 Dr. Hampton and PGY3  Discussed with Son at bedside, CHESTER Alexandre,

## 2020-10-07 NOTE — PROGRESS NOTE ADULT - ASSESSMENT
Stable pending cardiology investigations/EEG . Cotinue aspirin atorvasy Worsening sensorium; stupor; investigate encephalitis. Plan LP CSF evaluation for paraneoplastic encephalitis , RPR , other encephalitides and prion disease

## 2020-10-07 NOTE — PROGRESS NOTE ADULT - PROBLEM SELECTOR PLAN 3
thyroid nodules noted on CTA  -TSH, T3, T4 wnl  -thyroid US shows 3 right-sided nodules, largest likely benign spongiform lesion, s/p left thyroid lobectomy  -endo Dr. Ramos following

## 2020-10-07 NOTE — PROGRESS NOTE ADULT - PROBLEM SELECTOR PLAN 4
noted to be closing eyes continually yesterday, endorsed "a glare"  -IOP found to be 39mmHg left eye and 26mmHg right eye (nl >20 mmHg)  -started on timolol and latanoprost drops

## 2020-10-07 NOTE — PROGRESS NOTE ADULT - SUBJECTIVE AND OBJECTIVE BOX
Interval Events:      Allergies    No Known Allergies    Intolerances      Endocrine/Metabolic Medications:  atorvastatin 20 milliGRAM(s) Oral at bedtime  insulin lispro (HumaLOG) corrective regimen sliding scale   SubCutaneous three times a day before meals      Vital Signs Last 24 Hrs  T(C): 36.4 (07 Oct 2020 07:39), Max: 37.1 (06 Oct 2020 19:34)  T(F): 97.6 (07 Oct 2020 07:39), Max: 98.8 (06 Oct 2020 19:34)  HR: 80 (07 Oct 2020 07:39) (80 - 93)  BP: 119/53 (07 Oct 2020 07:39) (119/53 - 145/74)  BP(mean): 144 (07 Oct 2020 04:40) (144 - 144)  RR: 19 (07 Oct 2020 07:39) (17 - 19)  SpO2: 92% (07 Oct 2020 07:39) (92% - 97%)      PHYSICAL EXAM  All physical exam findings normal, except those marked:  General:	Alert, active, cooperative, NAD, well hydrated  .		[] Abnormal:  Neck		Normal: supple, no cervical adenopathy, no palpable thyroid  .		[] Abnormal:  Cardiovascular	Normal: regular rate, normal S1, S2, no murmurs  .		[] Abnormal:  Respiratory	Normal: no chest wall deformity, normal respiratory pattern, CTA B/L  .		[] Abnormal:  Abdominal	Normal: soft, ND, NT, bowel sounds present, no masses, no organomegaly  .		[] Abnormal:  		Normal normal genitalia, testes descended, circumcised/uncircumcised  .		Mike stage:			Breast mike:  .		Menstrual history:  .		[] Abnormal:  Extremities	Normal: FROM x4  .		[] Abnormal:  Skin		Normal: intact and not indurated, no rash, no acanthosis nigricans  .		[] Abnormal:  Neurologic	Normal: grossly intact  .		[] Abnormal:    LABS                        11.5   13.33 )-----------( 389      ( 07 Oct 2020 07:51 )             33.3                               130    |  99     |  38                  Calcium: 9.0   / iCa: x      (10-07 @ 07:51)    ----------------------------<  188       Magnesium: 2.7                              4.0     |  22     |  1.07             Phosphorous: 5.0        CAPILLARY BLOOD GLUCOSE      POCT Blood Glucose.: 207 mg/dL (07 Oct 2020 07:54)  POCT Blood Glucose.: 177 mg/dL (06 Oct 2020 21:18)  POCT Blood Glucose.: 159 mg/dL (06 Oct 2020 16:59)  POCT Blood Glucose.: 197 mg/dL (06 Oct 2020 11:59)        Assesment/plan       Interval Events:  pt in nad    Allergies    No Known Allergies    Intolerances      Endocrine/Metabolic Medications:  atorvastatin 20 milliGRAM(s) Oral at bedtime  insulin lispro (HumaLOG) corrective regimen sliding scale   SubCutaneous three times a day before meals      Vital Signs Last 24 Hrs  T(C): 36.4 (07 Oct 2020 07:39), Max: 37.1 (06 Oct 2020 19:34)  T(F): 97.6 (07 Oct 2020 07:39), Max: 98.8 (06 Oct 2020 19:34)  HR: 80 (07 Oct 2020 07:39) (80 - 93)  BP: 119/53 (07 Oct 2020 07:39) (119/53 - 145/74)  BP(mean): 144 (07 Oct 2020 04:40) (144 - 144)  RR: 19 (07 Oct 2020 07:39) (17 - 19)  SpO2: 92% (07 Oct 2020 07:39) (92% - 97%)      PHYSICAL EXAM  All physical exam findings normal, except those marked:  General:	Alert, active, cooperative, NAD, well hydrated  .		[] Abnormal:  Neck		Normal: supple, no cervical adenopathy, no palpable thyroid  .		[] Abnormal:  Cardiovascular	Normal: regular rate, normal S1, S2, no murmurs  .		[] Abnormal:  Respiratory	Normal: no chest wall deformity, normal respiratory pattern, CTA B/L  .		[] Abnormal:  Abdominal	Normal: soft, ND, NT, bowel sounds present, no masses, no organomegaly  .		[] Abnormal:  		Normal normal genitalia, testes descended, circumcised/uncircumcised  .		Mike stage:			Breast mike:  .		Menstrual history:  .		[] Abnormal:  Extremities	Normal: FROM x4  .		[] Abnormal:  Skin		Normal: intact and not indurated, no rash, no acanthosis nigricans  .		[] Abnormal:  Neurologic	Normal: grossly intact  .		[] Abnormal:    LABS                        11.5   13.33 )-----------( 389      ( 07 Oct 2020 07:51 )             33.3                               130    |  99     |  38                  Calcium: 9.0   / iCa: x      (10-07 @ 07:51)    ----------------------------<  188       Magnesium: 2.7                              4.0     |  22     |  1.07             Phosphorous: 5.0            < from: US Thyroid (10.06.20 @ 11:56) >    EXAM:  US THYROID                            PROCEDURE DATE:  10/06/2020          INTERPRETATION:  CLINICAL INFORMATION: Thyroid nodule seen on CAT scan    COMPARISON: Routine images from CT angiogram of the chest of 10/4/2020.    TECHNIQUE: Sonography of the thyroid. Study is limited by patient's inability to cooperate    FINDINGS:  Right Lobe: 5.4 x 2.9 x 3.6 cm. there is a midpole nodule with cystic foci suggestive of a spongiform nodule measuring 3.2 x 2.3 x 2.8 cm. Midpole cystic focus measures 1.0 x 1.4 x 1.0 cm. Small anterior mid pole nodule measures 7 x 6 x 6 mm and appears cystic and solid. Additional solid nodule right lobe measures 1.2 x 1.8 x 2.0 cm in the lower pole.    Left Lobe: Not identified cm.    Isthmus: 4 mm. Unremarkable    Cervical Lymph Nodes: No enlarged or abnormal morphology cervical nodes.    IMPRESSION:    Limited by patient's inability to cooperate.  Left thyroid lobe not seen also not seen on the CAT scan  At least 3 right nodules are noted. The largest is suggestive of a benign spongiform nodule.    < end of copied text >          CAPILLARY BLOOD GLUCOSE      POCT Blood Glucose.: 207 mg/dL (07 Oct 2020 07:54)  POCT Blood Glucose.: 177 mg/dL (06 Oct 2020 21:18)  POCT Blood Glucose.: 159 mg/dL (06 Oct 2020 16:59)  POCT Blood Glucose.: 197 mg/dL (06 Oct 2020 11:59)        Assesment/plan      79 YO F with PMH of dementia, HTN,DM,HLD  , cantonese speaking presented with constant  non localized chest pain ,non radiating ,non exertional since 3 days. Found to have incidental goiter. Pt gives a hx of thyroidectomy ? partial. Admits to not being on meds. Denies dysphagia and dysphonia. Hx obtained via .               Problem/Recommendation - 1:  Problem: Goiter, nontoxic, multinodular. Recommendation: incidental nodular goiter- euthyroid  without obstructive symptoms  u/s of thyroid gland- as above  no intervention needed  f/u us in 6months to a year       Problem/Recommendation - 2:  ·  Problem: DM (diabetes mellitus).  Recommendation: decently controlled -a1c 7.1%  insulin prn for now  consider low dose lantus if fsg >200  restart janumet upon d/c.

## 2020-10-07 NOTE — PROCEDURE NOTE - NSPROCDETAILS_GEN_ALL_CORE
area cleaned in sterile fashion/location identified, draped/prepped, sterile technique used, needle inserted/introduced/CSF Obtained area cleaned in sterile fashion/CSF Obtained/location identified, draped/prepped, sterile technique used, needle inserted/introduced/Ultrasound guidance used for location

## 2020-10-08 LAB
ANION GAP SERPL CALC-SCNC: 7 MMOL/L — SIGNIFICANT CHANGE UP (ref 5–17)
BUN SERPL-MCNC: 24 MG/DL — HIGH (ref 7–18)
CALCIUM SERPL-MCNC: 9.1 MG/DL — SIGNIFICANT CHANGE UP (ref 8.4–10.5)
CHLORIDE SERPL-SCNC: 106 MMOL/L — SIGNIFICANT CHANGE UP (ref 96–108)
CO2 SERPL-SCNC: 23 MMOL/L — SIGNIFICANT CHANGE UP (ref 22–31)
CREAT SERPL-MCNC: 0.88 MG/DL — SIGNIFICANT CHANGE UP (ref 0.5–1.3)
CRYPTOC AG CSF-ACNC: NEGATIVE — SIGNIFICANT CHANGE UP
GLUCOSE BLDC GLUCOMTR-MCNC: 133 MG/DL — HIGH (ref 70–99)
GLUCOSE BLDC GLUCOMTR-MCNC: 138 MG/DL — HIGH (ref 70–99)
GLUCOSE BLDC GLUCOMTR-MCNC: 177 MG/DL — HIGH (ref 70–99)
GLUCOSE SERPL-MCNC: 137 MG/DL — HIGH (ref 70–99)
HCT VFR BLD CALC: 36.3 % — SIGNIFICANT CHANGE UP (ref 34.5–45)
HGB BLD-MCNC: 11.9 G/DL — SIGNIFICANT CHANGE UP (ref 11.5–15.5)
MAGNESIUM SERPL-MCNC: 2.5 MG/DL — SIGNIFICANT CHANGE UP (ref 1.6–2.6)
MCHC RBC-ENTMCNC: 28.1 PG — SIGNIFICANT CHANGE UP (ref 27–34)
MCHC RBC-ENTMCNC: 32.8 GM/DL — SIGNIFICANT CHANGE UP (ref 32–36)
MCV RBC AUTO: 85.6 FL — SIGNIFICANT CHANGE UP (ref 80–100)
NRBC # BLD: 0 /100 WBCS — SIGNIFICANT CHANGE UP (ref 0–0)
PHOSPHATE SERPL-MCNC: 3.4 MG/DL — SIGNIFICANT CHANGE UP (ref 2.5–4.5)
PLATELET # BLD AUTO: 357 K/UL — SIGNIFICANT CHANGE UP (ref 150–400)
POTASSIUM SERPL-MCNC: 4 MMOL/L — SIGNIFICANT CHANGE UP (ref 3.5–5.3)
POTASSIUM SERPL-SCNC: 4 MMOL/L — SIGNIFICANT CHANGE UP (ref 3.5–5.3)
RBC # BLD: 4.24 M/UL — SIGNIFICANT CHANGE UP (ref 3.8–5.2)
RBC # FLD: 14.3 % — SIGNIFICANT CHANGE UP (ref 10.3–14.5)
SODIUM SERPL-SCNC: 136 MMOL/L — SIGNIFICANT CHANGE UP (ref 135–145)
WBC # BLD: 10.8 K/UL — HIGH (ref 3.8–10.5)
WBC # FLD AUTO: 10.8 K/UL — HIGH (ref 3.8–10.5)

## 2020-10-08 PROCEDURE — 74177 CT ABD & PELVIS W/CONTRAST: CPT | Mod: 26

## 2020-10-08 PROCEDURE — 99233 SBSQ HOSP IP/OBS HIGH 50: CPT | Mod: GC

## 2020-10-08 RX ORDER — ENOXAPARIN SODIUM 100 MG/ML
60 INJECTION SUBCUTANEOUS EVERY 12 HOURS
Refills: 0 | Status: DISCONTINUED | OUTPATIENT
Start: 2020-10-08 | End: 2020-10-10

## 2020-10-08 RX ORDER — IOHEXOL 300 MG/ML
30 INJECTION, SOLUTION INTRAVENOUS ONCE
Refills: 0 | Status: COMPLETED | OUTPATIENT
Start: 2020-10-08 | End: 2020-10-08

## 2020-10-08 RX ORDER — ENOXAPARIN SODIUM 100 MG/ML
60 INJECTION SUBCUTANEOUS ONCE
Refills: 0 | Status: DISCONTINUED | OUTPATIENT
Start: 2020-10-08 | End: 2020-10-08

## 2020-10-08 RX ADMIN — Medication 1 DROP(S): at 18:03

## 2020-10-08 RX ADMIN — ENOXAPARIN SODIUM 60 MILLIGRAM(S): 100 INJECTION SUBCUTANEOUS at 18:45

## 2020-10-08 RX ADMIN — ATORVASTATIN CALCIUM 20 MILLIGRAM(S): 80 TABLET, FILM COATED ORAL at 22:06

## 2020-10-08 RX ADMIN — QUETIAPINE FUMARATE 25 MILLIGRAM(S): 200 TABLET, FILM COATED ORAL at 18:03

## 2020-10-08 RX ADMIN — IOHEXOL 30 MILLILITER(S): 300 INJECTION, SOLUTION INTRAVENOUS at 10:15

## 2020-10-08 RX ADMIN — HEPARIN SODIUM 5000 UNIT(S): 5000 INJECTION INTRAVENOUS; SUBCUTANEOUS at 05:30

## 2020-10-08 RX ADMIN — Medication 500 MILLIGRAM(S): at 12:08

## 2020-10-08 RX ADMIN — DONEPEZIL HYDROCHLORIDE 5 MILLIGRAM(S): 10 TABLET, FILM COATED ORAL at 22:06

## 2020-10-08 RX ADMIN — Medication 1: at 07:55

## 2020-10-08 RX ADMIN — Medication 81 MILLIGRAM(S): at 12:08

## 2020-10-08 RX ADMIN — SODIUM CHLORIDE 70 MILLILITER(S): 9 INJECTION INTRAMUSCULAR; INTRAVENOUS; SUBCUTANEOUS at 08:01

## 2020-10-08 RX ADMIN — LATANOPROST 1 DROP(S): 0.05 SOLUTION/ DROPS OPHTHALMIC; TOPICAL at 22:06

## 2020-10-08 RX ADMIN — QUETIAPINE FUMARATE 25 MILLIGRAM(S): 200 TABLET, FILM COATED ORAL at 05:31

## 2020-10-08 RX ADMIN — Medication 1 DROP(S): at 05:31

## 2020-10-08 RX ADMIN — Medication 25 MILLIGRAM(S): at 18:02

## 2020-10-08 RX ADMIN — Medication 1 DROP(S): at 18:02

## 2020-10-08 RX ADMIN — Medication 25 MILLIGRAM(S): at 05:31

## 2020-10-08 RX ADMIN — Medication 60 MILLIGRAM(S): at 05:31

## 2020-10-08 NOTE — PROGRESS NOTE ADULT - PROBLEM SELECTOR PLAN 5
pw x3 days non-radiating, non-exertional, non-localized CP  -EKG NSR, trops negative x2, CTA negative for aortic dissection and PE  -cw home statin, asa, BB, CCB  -TTE showed LVH and G1DD  -cardiology Dr. Walton consulted  -remains off tele 2/2 patient agitation pw x3 days non-radiating, non-exertional, non-localized CP  -EKG NSR, trops negative x2, CTA negative for aortic dissection and PE  -cw home statin, asa, BB, CCB  -TTE showed LVH and G1DD  -cardiology Dr. Walton consulted  -remains off tele 2/2 patient agitation, now calm, no new complaints

## 2020-10-08 NOTE — DIETITIAN INITIAL EVALUATION ADULT. - NS FNS WEIGHT USED FOR CALC
Ht=5'    PGU=950 lb   Current xh=881.8 lb 10/5/2020; 130 lb ? 10/8/2020, changes in-house may due to scale/fluid variance/ideal Yes

## 2020-10-08 NOTE — DIETITIAN INITIAL EVALUATION ADULT. - OTHER INFO
Pt from home, lives with family, HHA help; alert, verbally responsive, but confused with dementia, agitated/anxious at times, on Enhanced Supervision, asleep when visited todayl Limited intake/wt change history data available at present; 25% intake at times per flow sheet, observed lunch today--0% intake; s/p  consult for ?Food [place], no food insecurity reported; Pt from home, lives with family, A help; alert, verbally responsive, but confused with dementia, agitated/anxious at times, on Enhanced Supervision, able to communicate with native Chinese speakin RD, but limited information; spoke to family ( daughter-in-law) at [phone], decreased intake at times x 1m at home, but varied intake depending on food and her mental status, no wt loss, but some wt gain per PCP, then discontinued "Milk shake" supplement at home per family, food choices obtained and forwarded to dietary, family willing pt to have oral nutritional supplement in hospital; 25% intake at times per flow sheet, observed lunch today--<25% intake; s/p  consult for ?Food [place], no food insecurity reported; Discussed with MD/Nursing Pt from home, lives with family, A help; alert, verbally responsive, but confused with dementia, agitated/anxious at times, on Enhanced Supervision, able to communicate with native Chinese john RD, but limited information; spoke to family ( daughter-in-law) at [phone], decreased intake at times x 1m at home, but varied intake depending on food and her mental status, no wt loss, but some wt gain per PCP, then discontinued "Milk shake" supplement at home per family, food choices obtained and forwarded to dietary, family willing pt to have oral nutritional supplement in hospital; 25% intake at times per flow sheet, observed lunch today--<25% intake; s/p  consult for ?Food [place], no food insecurity reported; Discussed with MD/Nursing  Addendum 2:30pm: Also spoke to son at bedside as family visiting, food/nutrition concerns addressed, encouraged po intake with nutritional supplement between meals as needed.

## 2020-10-08 NOTE — DIETITIAN INITIAL EVALUATION ADULT. - ADD RECOMMEND
Spoke to family over phone Spoke to daughter-in-law over phone, then son at bedside Spoke to daughter-in-law over phone, then son at bedside.

## 2020-10-08 NOTE — CHART NOTE - NSCHARTNOTEFT_GEN_A_CORE
Patient CT abdomen was doen woth PO and IV contrast was done to rule out any intra abdominal pathology after noticing abdominal distension and discomfort on physical exam.   it is reported as    ct Patient CT abdomen was done with PO and IV contrast was done to rule out any intra abdominal pathology after noticing abdominal distension and discomfort on physical exam.   it is reported as      Findings at the right lung base suspicious for a pulmonary embolus, new since 10/4/2020.  Presacral edema and fluid of uncertain etiology not seen on the prior study.  No evidence of ascites or intestinal obstruction.    Patients recent vitals reported as     Vital Signs Last 24 Hrs  T(C): 37 (08 Oct 2020 15:48), Max: 37.2 (08 Oct 2020 04:48)  T(F): 98.6 (08 Oct 2020 15:48), Max: 98.9 (08 Oct 2020 04:48)  HR: 89 (08 Oct 2020 15:48) (75 - 89)  BP: 113/53 (08 Oct 2020 15:48) (93/76 - 140/74)  BP(mean): --  RR: 18 (08 Oct 2020 15:48) (18 - 18)  SpO2: 97% (08 Oct 2020 15:48) (94% - 98%)      Dedicated CT angio chest could not be done because patient got IV contrast today ,Patient was started on Full dose Lovenox 1mg/kg bid, will try to get dedicated CT Angio tomorrow. Will monitor the patient closely.  Plan discussed with Attending Dr Riddle. Patient CT abdomen was done with PO and IV contrast was done to rule out any intra abdominal pathology after noticing abdominal distension and discomfort on physical exam.   it is reported as      Findings at the right lung base suspicious for a pulmonary embolus, new since 10/4/2020.  Presacral edema and fluid of uncertain etiology not seen on the prior study.  No evidence of ascites or intestinal obstruction.    Patients recent vitals reported as     Vital Signs Last 24 Hrs  T(C): 37 (08 Oct 2020 15:48), Max: 37.2 (08 Oct 2020 04:48)  T(F): 98.6 (08 Oct 2020 15:48), Max: 98.9 (08 Oct 2020 04:48)  HR: 89 (08 Oct 2020 15:48) (75 - 89)  BP: 113/53 (08 Oct 2020 15:48) (93/76 - 140/74)  BP(mean): --  RR: 18 (08 Oct 2020 15:48) (18 - 18)  SpO2: 97% (08 Oct 2020 15:48) (94% - 98%)      Dedicated CT angio chest could not be done because patient got IV contrast today ,Patient was started on Full dose Lovenox 1mg/kg bid, will try to get dedicated CT Angio tomorrow. Will monitor the patient closely.  Plan discussed with Attending Dr Riddle.    Attending Addendum:  Above noted; d/w PGY1 Dr. Hampton and PGY 2 Dr. Blas  If okay with Radiologist, will arrange a CT Angio Chest tomorrow afternoon but will Rx with therapeutic Lovenox until dedicated scan obtained.

## 2020-10-08 NOTE — DIETITIAN INITIAL EVALUATION ADULT. - FACTORS AFF FOOD INTAKE
change in mental status/Hinduism/ethnic/cultural/personal food preferences/acute on chronic comorbidities

## 2020-10-08 NOTE — PROGRESS NOTE ADULT - ASSESSMENT
80F Cantonese-speaking, PMH dementia (recent onset), HTN, DM, HLD pw x3 days constant, non-localized, non-radiating, non-exertional chest pain, admitted to [facility] for cardiac monitoring and management/placement of recent-onset dementia.

## 2020-10-08 NOTE — DIETITIAN INITIAL EVALUATION ADULT. - REASON INDICATOR FOR ASSESSMENT
verbal consult requested for assessment verbal consult requested for assessment; also nutrition assessment for length of stay

## 2020-10-08 NOTE — PROGRESS NOTE ADULT - PROBLEM SELECTOR PLAN 9
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

## 2020-10-08 NOTE — PROGRESS NOTE ADULT - PROBLEM SELECTOR PLAN 4
noted to be closing eyes continually yesterday, endorsed "a glare"  -IOP found to be 39mmHg left eye and 26mmHg right eye (nl >20 mmHg)  -started on timolol and latanoprost drops noted to be closing eyes continually 10/6, endorsed "a glare"  -IOP at that time found to be 39mmHg left eye and 26mmHg right eye (nl >20 mmHg)  -cw timolol and latanoprost drops

## 2020-10-08 NOTE — PROGRESS NOTE ADULT - PROBLEM SELECTOR PLAN 2
patient noted to have firm, distended bowel on exam today  -possible constipation 2/2 urinary retention, FC placed  -will obtain CT ab/pelvis with PO and IV contrast patient noted to have firm, distended bowel on exam yesterday  -FC placed yesterday, possibly relieved urinary obstruction, which may in turn have been causing constipation   -FC dc today, will continue to monitor  -awaiting CT ab/pelvis with PO and IV contrast today

## 2020-10-08 NOTE — PROGRESS NOTE ADULT - PROBLEM SELECTOR PLAN 1
per family, patient able to perform ADLs as recently as August, has become abruptly more forgetful, disoriented, and paranoid over past 1-2 months  -cw home seroquel, aricept added  -LP performed today, will fu CSF tests  -MR head no acute pathologies, chronic ischemic changes noted  -EEG obtained today, awaiting official read  -discuss placement with CM, as family now having difficulties caring for her   -MARIAA per PT  -neuro Dr. 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

## 2020-10-08 NOTE — DIETITIAN INITIAL EVALUATION ADULT. - ETIOLOGY
acute on chronic comorbidities, cognitive deficit with dementia; individual/cultural food preference

## 2020-10-08 NOTE — DIETITIAN INITIAL EVALUATION ADULT. - DIET TYPE
Add Glucerna Shake 1can bid as medically feasible (440kcal, 20g protein) DASH/TLC (sodium and cholesterol restricted diet)/consistent carbohydrate (no snacks)/Add Glucerna Shake 1can bid as medically feasible (440kcal, 20g protein)/soft consistent carbohydrate (no snacks)/Add Glucerna Shake 1can bid as medically feasible (440kcal, 20g protein)./DASH/TLC (sodium and cholesterol restricted diet)/soft

## 2020-10-08 NOTE — DIETITIAN INITIAL EVALUATION ADULT. - PERTINENT MEDS FT
MEDICATIONS  (STANDING):  artificial  tears Solution 1 Drop(s) Both EYES two times a day  ascorbic acid 500 milliGRAM(s) Oral daily  aspirin  chewable 81 milliGRAM(s) Oral daily  atorvastatin 20 milliGRAM(s) Oral at bedtime  donepezil 5 milliGRAM(s) Oral at bedtime  heparin   Injectable 5000 Unit(s) SubCutaneous every 12 hours  insulin lispro (HumaLOG) corrective regimen sliding scale   SubCutaneous three times a day before meals  latanoprost 0.005% Ophthalmic Solution 1 Drop(s) Both EYES at bedtime  metoprolol tartrate 25 milliGRAM(s) Oral two times a day  NIFEdipine XL 60 milliGRAM(s) Oral daily  QUEtiapine 25 milliGRAM(s) Oral two times a day  sodium chloride 0.9%. 1000 milliLiter(s) (70 mL/Hr) IV Continuous <Continuous>  timolol 0.25% Solution 1 Drop(s) Both EYES two times a day

## 2020-10-08 NOTE — PROGRESS NOTE ADULT - SUBJECTIVE AND OBJECTIVE BOX
PGY-1 Progress Note discussed with attending    PAGER #: [[phone]] TILL 5:00 PM  PLEASE CONTACT ON CALL TEAM:  - On Call Team (Please refer to [place]) FROM 5:00 PM - 8:30PM  - Nightfloat Team FROM 8:30 -7:30 AM    CHIEF COMPLAINT & BRIEF HOSPITAL COURSE: 80 Cantonese-speaking F with PMH dementia, HTN, DM, HLD pw x3 days non-localized, non-exertional, non-radiating CP, admitted to [facility] for ACS workup, additionally found to have thyroid nodule.     INTERVAL HPI/OVERNIGHT EVENTS: NAEON. VS wnl. Patient agitated overnight and placed in mittens, this am reports feeling frustrated that she is not allowed to walk around, refused to answer other questions. Abdominal distention improved.     MEDICATIONS  (STANDING):  artificial  tears Solution 1 Drop(s) Both EYES two times a day  ascorbic acid 500 milliGRAM(s) Oral daily  aspirin  chewable 81 milliGRAM(s) Oral daily  atorvastatin 20 milliGRAM(s) Oral at bedtime  donepezil 5 milliGRAM(s) Oral at bedtime  heparin   Injectable 5000 Unit(s) SubCutaneous every 12 hours  insulin lispro (HumaLOG) corrective regimen sliding scale   SubCutaneous three times a day before meals  latanoprost 0.005% Ophthalmic Solution 1 Drop(s) Both EYES at bedtime  metoprolol tartrate 25 milliGRAM(s) Oral two times a day  NIFEdipine XL 60 milliGRAM(s) Oral daily  QUEtiapine 25 milliGRAM(s) Oral two times a day  sodium chloride 0.9%. 1000 milliLiter(s) (70 mL/Hr) IV Continuous <Continuous>  timolol 0.25% Solution 1 Drop(s) Both EYES two times a day    MEDICATIONS  (PRN):  acetaminophen   Tablet .. 650 milliGRAM(s) Oral every 6 hours PRN Mild Pain (1 - 3), Moderate Pain (4 - 6)        REVIEW OF SYSTEMS:  CONSTITUTIONAL: No fever, weight loss, or fatigue  RESPIRATORY: No cough, wheezing, chills or hemoptysis; No shortness of breath  CARDIOVASCULAR: No chest pain, palpitations, dizziness, or leg swelling  GASTROINTESTINAL: No abdominal pain. No nausea, vomiting, or hematemesis; No diarrhea or constipation. No melena or hematochezia.  GENITOURINARY: No dysuria or hematuria, urinary frequency  NEUROLOGICAL: No headaches, memory loss, loss of strength, numbness, or tremors  SKIN: No itching, burning, rashes, or lesions     Vital Signs Last 24 Hrs  T(C): 37 (08 Oct 2020 15:48), Max: 37.2 (08 Oct 2020 04:48)  T(F): 98.6 (08 Oct 2020 15:48), Max: 98.9 (08 Oct 2020 04:48)  HR: 89 (08 Oct 2020 15:48) (75 - 89)  BP: 113/53 (08 Oct 2020 15:48) (93/76 - 140/74)  BP(mean): --  RR: 18 (08 Oct 2020 15:48) (18 - 18)  SpO2: 97% (08 Oct 2020 15:48) (94% - 98%)    PHYSICAL EXAMINATION:  GENERAL: NAD, well built  HEAD:  Atraumatic, Normocephalic  EYES:  conjunctiva and sclera clear  NECK: Supple, No JVD, Normal thyroid  CHEST/LUNG: Clear to auscultation. Clear to percussion bilaterally; No rales, rhonchi, wheezing, or rubs  HEART: Regular rate and rhythm; No murmurs, rubs, or gallops  ABDOMEN: Soft, Nontender, Nondistended; Bowel sounds present  NERVOUS SYSTEM: alert and oriented x3  EXTREMITIES:  2+ Peripheral Pulses, No clubbing, cyanosis, or edema  SKIN: warm dry                          11.9   10.80 )-----------( 357      ( 08 Oct 2020 06:26 )             36.3     10-08    136  |  106  |  24<H>  ----------------------------<  137<H>  4.0   |  23  |  0.88    Ca    9.1      08 Oct 2020 06:26  Phos  3.4     10-08  Mg     2.5     10-08            PT/INR - ( 07 Oct 2020 07:51 )   PT: 11.8 sec;   INR: 0.99 ratio         PTT - ( 07 Oct 2020 07:51 )  PTT:27.3 sec    CAPILLARY BLOOD GLUCOSE      RADIOLOGY & ADDITIONAL TESTS:                   PGY-1 Progress Note discussed with attending    PAGER #: [[phone]] TILL 5:00 PM  PLEASE CONTACT ON CALL TEAM:  - On Call Team (Please refer to [place]) FROM 5:00 PM - 8:30PM  - Nightfloat Team FROM 8:30 -7:30 AM    CHIEF COMPLAINT & BRIEF HOSPITAL COURSE: 80 Cantonese-speaking F with PMH dementia, HTN, DM, HLD pw x3 days non-localized, non-exertional, non-radiating CP, admitted to [facility] for ACS workup, additionally found to have thyroid nodule.     INTERVAL HPI/OVERNIGHT EVENTS: NAEON. VS wnl. Patient agitated overnight and placed in mittens, this am reports feeling frustrated that she is not allowed to walk around, refused to answer other questions. This pm mentation has improved. S/p LP yesterday, -40L. Abdominal distention improved, possibly urinary retention causing constipation. Combination of dementia with improvement following LP and resolution of urinary retention suggests likely NPH picture. CT abdomen with PO and IV contrast today. No abnormalities noted on CSF studies to date.     MEDICATIONS  (STANDING):  artificial  tears Solution 1 Drop(s) Both EYES two times a day  ascorbic acid 500 milliGRAM(s) Oral daily  aspirin  chewable 81 milliGRAM(s) Oral daily  atorvastatin 20 milliGRAM(s) Oral at bedtime  donepezil 5 milliGRAM(s) Oral at bedtime  heparin   Injectable 5000 Unit(s) SubCutaneous every 12 hours  insulin lispro (HumaLOG) corrective regimen sliding scale   SubCutaneous three times a day before meals  latanoprost 0.005% Ophthalmic Solution 1 Drop(s) Both EYES at bedtime  metoprolol tartrate 25 milliGRAM(s) Oral two times a day  NIFEdipine XL 60 milliGRAM(s) Oral daily  QUEtiapine 25 milliGRAM(s) Oral two times a day  sodium chloride 0.9%. 1000 milliLiter(s) (70 mL/Hr) IV Continuous <Continuous>  timolol 0.25% Solution 1 Drop(s) Both EYES two times a day    MEDICATIONS  (PRN):  acetaminophen   Tablet .. 650 milliGRAM(s) Oral every 6 hours PRN Mild Pain (1 - 3), Moderate Pain (4 - 6)    REVIEW OF SYSTEMS:  CONSTITUTIONAL: No fever or fatigue  RESPIRATORY: No cough, No shortness of breath  CARDIOVASCULAR: No chest pain  GASTROINTESTINAL: No abdominal pain. No nausea, vomiting; denies diarrhea or constipation.   NEUROLOGICAL: No headaches  PSYCH: states frustrated with inability to walk around     Vital Signs Last 24 Hrs  T(C): 37 (08 Oct 2020 15:48), Max: 37.2 (08 Oct 2020 04:48)  T(F): 98.6 (08 Oct 2020 15:48), Max: 98.9 (08 Oct 2020 04:48)  HR: 89 (08 Oct 2020 15:48) (75 - 89)  BP: 113/53 (08 Oct 2020 15:48) (93/76 - 140/74)  BP(mean): --  RR: 18 (08 Oct 2020 15:48) (18 - 18)  SpO2: 97% (08 Oct 2020 15:48) (94% - 98%)    PHYSICAL EXAMINATION:  GENERAL: NAD, appears stated age  HEAD: Atraumatic, Normocephalic  EYES: closed, opens occasionally  NECK: Supple  CHEST/LUNG: Clear to auscultation. No rales, rhonchi, wheezing, or rubs  HEART: Regular rate and rhythm; No murmurs, rubs, or gallops  ABDOMEN: bloated, firmness improved since yesterday  NERVOUS SYSTEM: alert and oriented x2-3  EXTREMITIES:  2+ Peripheral Pulses, No edema  SKIN: warm dry                            11.9   10.80 )-----------( 357      ( 08 Oct 2020 06:26 )             36.3     10-08    136  |  106  |  24<H>  ----------------------------<  137<H>  4.0   |  23  |  0.88    Ca    9.1      08 Oct 2020 06:26  Phos  3.4     10-08  Mg     2.5     10-08            PT/INR - ( 07 Oct 2020 07:51 )   PT: 11.8 sec;   INR: 0.99 ratio         PTT - ( 07 Oct 2020 07:51 )  PTT:27.3 sec    CAPILLARY BLOOD GLUCOSE      RADIOLOGY & ADDITIONAL TESTS:                   PGY-1 Progress Note discussed with attending    PAGER #: [[phone]] TILL 5:00 PM  PLEASE CONTACT ON CALL TEAM:  - On Call Team (Please refer to [place]) FROM 5:00 PM - 8:30PM  - Nightfloat Team FROM 8:30 -7:30 AM    CHIEF COMPLAINT & BRIEF HOSPITAL COURSE: 80 Cantonese-speaking F with PMH dementia, HTN, DM, HLD pw x3 days non-localized, non-exertional, non-radiating CP, admitted to [facility] for ACS workup, additionally found to have thyroid nodule.     INTERVAL HPI/OVERNIGHT EVENTS: NAEON. VS wnl. Patient agitated overnight and placed in mittens, this am reports feeling frustrated that she is not allowed to walk around, refused to answer other questions. This pm mentation has improved. S/p LP yesterday. Abdominal distention improved, possibly urinary retention causing constipation. Combination of dementia with improvement following LP and resolution of urinary retention suggests likely NPH picture. CT abdomen with PO and IV contrast today. No abnormalities noted on CSF studies to date.     MEDICATIONS  (STANDING):  artificial  tears Solution 1 Drop(s) Both EYES two times a day  ascorbic acid 500 milliGRAM(s) Oral daily  aspirin  chewable 81 milliGRAM(s) Oral daily  atorvastatin 20 milliGRAM(s) Oral at bedtime  donepezil 5 milliGRAM(s) Oral at bedtime  heparin   Injectable 5000 Unit(s) SubCutaneous every 12 hours  insulin lispro (HumaLOG) corrective regimen sliding scale   SubCutaneous three times a day before meals  latanoprost 0.005% Ophthalmic Solution 1 Drop(s) Both EYES at bedtime  metoprolol tartrate 25 milliGRAM(s) Oral two times a day  NIFEdipine XL 60 milliGRAM(s) Oral daily  QUEtiapine 25 milliGRAM(s) Oral two times a day  sodium chloride 0.9%. 1000 milliLiter(s) (70 mL/Hr) IV Continuous <Continuous>  timolol 0.25% Solution 1 Drop(s) Both EYES two times a day    MEDICATIONS  (PRN):  acetaminophen   Tablet .. 650 milliGRAM(s) Oral every 6 hours PRN Mild Pain (1 - 3), Moderate Pain (4 - 6)    REVIEW OF SYSTEMS:  CONSTITUTIONAL: No fever or fatigue  RESPIRATORY: No cough, No shortness of breath  CARDIOVASCULAR: No chest pain  GASTROINTESTINAL: No abdominal pain. No nausea, vomiting; denies diarrhea or constipation.   NEUROLOGICAL: No headaches  PSYCH: states frustrated with inability to walk around     Vital Signs Last 24 Hrs  T(C): 37 (08 Oct 2020 15:48), Max: 37.2 (08 Oct 2020 04:48)  T(F): 98.6 (08 Oct 2020 15:48), Max: 98.9 (08 Oct 2020 04:48)  HR: 89 (08 Oct 2020 15:48) (75 - 89)  BP: 113/53 (08 Oct 2020 15:48) (93/76 - 140/74)  BP(mean): --  RR: 18 (08 Oct 2020 15:48) (18 - 18)  SpO2: 97% (08 Oct 2020 15:48) (94% - 98%)    PHYSICAL EXAMINATION:  GENERAL: NAD, appears stated age  HEAD: Atraumatic, Normocephalic  EYES: closed, opens occasionally  NECK: Supple  CHEST/LUNG: Clear to auscultation. No rales, rhonchi, wheezing, or rubs  HEART: Regular rate and rhythm; No murmurs, rubs, or gallops  ABDOMEN: bloated, firmness improved since yesterday  NERVOUS SYSTEM: alert and oriented x2-3  EXTREMITIES:  2+ Peripheral Pulses, No edema  SKIN: warm dry                            11.9   10.80 )-----------( 357      ( 08 Oct 2020 06:26 )             36.3     10-08    136  |  106  |  24<H>  ----------------------------<  137<H>  4.0   |  23  |  0.88    Ca    9.1      08 Oct 2020 06:26  Phos  3.4     10-08  Mg     2.5     10-08            PT/INR - ( 07 Oct 2020 07:51 )   PT: 11.8 sec;   INR: 0.99 ratio         PTT - ( 07 Oct 2020 07:51 )  PTT:27.3 sec    CAPILLARY BLOOD GLUCOSE      RADIOLOGY & ADDITIONAL TESTS:

## 2020-10-08 NOTE — PROGRESS NOTE ADULT - ATTENDING COMMENTS
Patient was seen and examined at bedside with medical student Ena Zhang interpreting this morning;  Agree with PGY1 A/P above with editing as needed. My independent assessment, findings on exam, diagnosis and plan of care as listed below. Discussed with Dr. Hampton.     Patient is much calmer today, not agitated underwent EEG and MRI today without any issues. She still keeping her eyes closed but tried to open but feels weak as per patient.  Patient was placed on eye drops last night. Spoke with Neuro Dr. Alfaro and as planned obtained consent from Son at bedside and successful LP was performed by Resident with Neurology supervising,     Vital Signs Last 24 Hrs  T(C): 37 (08 Oct 2020 15:48), Max: 37.2 (08 Oct 2020 04:48)  T(F): 98.6 (08 Oct 2020 15:48), Max: 98.9 (08 Oct 2020 04:48)  HR: 89 (08 Oct 2020 15:48) (75 - 89)  BP: 113/53 (08 Oct 2020 15:48) (93/76 - 140/74)  RR: 18 (08 Oct 2020 15:48) (18 - 18)  SpO2: 97% (08 Oct 2020 15:48) (94% - 98%)    P/E:   elderly female, comfortable at rest, NAD  Neuro: AAO x1-2; No focal deficits  CVS: S1S2 present, regular  Resp: BLAE+, No wheeze or Rhonchi  GI: Soft, Obese, BS+, palpable mass LLQ  Extr: No edema or calf tenderness      Labs:        MR Head No Cont (10.07.20 @ 10:28)     IMPRESSION:   No acute infarct. Moderate periventricular and subcortical white matter chronic microvascular ischemic changes.    A/P:   Subacute onset Dementia etiology unclear with Psychosis much improved post LP concern for Normal Pressure hydrocephalus  ??Ocular muscle weakness concern for Myasthenia Gravis less likely given clinical improvement  Eye glare possibly due to Glaucoma  Atypical Chest pain negative ACS  Pancreatic cyst   Thyroid nodule   DM  HTN  HLD    Plan:   Significant clinical improvement in Mental status post LP yesterday; d/w Neuro agree concern for NPH   Neuro follow up in AM  CT Abdomen awaited to rule out any intestinal pathology  MRI Brain with no structural pathology; EEG negative for epileptiform activity  Increased IOP concerning for Glaucoma; Continue Xalatan and Timolol   follow up with ophthalmologist as outpatient on discharge   Endocrine follow up appreciated,   Pancreatic cyst benign vs malignant, can follow up as outpatient unless worsening dementia is a part of paraneoplastic syndrome   Indwelling Dorado catheter removed; Trial of void  Await CT Abdomen and Pelvis with oral and IV contrast  IV hydration as patient getting contrast  Rest of the management as above    Discussed with PGY1 Dr. Hampton and PGY2 Dr. Blas  Discussed with Son at bedside, CHESTER Alexandre, Patient was seen and examined at bedside with medical student Ena Zhang interpreting this morning;  Agree with PGY1 A/P above with editing as needed. My independent assessment, findings on exam, diagnosis and plan of care as listed below. Discussed with Dr. Hampton.     Patient is doing much better today, more awake, opening eyes and communicating well. She was placed OOB to chair. Patient had Urinary retention yesterday 850 cc and mckeon catheter was placed. Mckeon catheter discontinued this afternoon. CT scan requested yesterday was only performed late this evening. Patient also had a large bowel movement this afternoon. Abdominal swelling has resolved.   Son arrived this afternoon now would prefer to take patient home given clinical improvement. (was earlier planned for MARIAA/ LTP)    Vital Signs Last 24 Hrs  T(C): 37 (08 Oct 2020 15:48), Max: 37.2 (08 Oct 2020 04:48)  T(F): 98.6 (08 Oct 2020 15:48), Max: 98.9 (08 Oct 2020 04:48)  HR: 89 (08 Oct 2020 15:48) (75 - 89)  BP: 113/53 (08 Oct 2020 15:48) (93/76 - 140/74)  RR: 18 (08 Oct 2020 15:48) (18 - 18)  SpO2: 97% (08 Oct 2020 15:48) (94% - 98%)    P/E:   elderly female, comfortable at rest, OOB to chair, NAD  Neuro: AAO x 2; No gross focal deficits  CVS: S1S2 present, regular  Resp: BLAE+, No wheeze or Rhonchi  GI: Soft, Obese, BS+, Non distended, Non tender  Extr: No edema or calf tenderness      Labs:                         11.9   10.80 )-----------( 357      ( 08 Oct 2020 06:26 )             36.3   10-08    136  |  106  |  24<H>  ----------------------------<  137<H>  4.0   |  23  |  0.88    Ca    9.1      08 Oct 2020 06:26  Phos  3.4     10-08  Mg     2.5     10-08    A/P:   Subacute onset Dementia etiology unclear with Psychosis much improved post LP concern for Normal Pressure hydrocephalus  ??Ocular muscle weakness concern for Myasthenia Gravis less likely given clinical improvement  Eye glare possibly due to Glaucoma  Atypical Chest pain negative ACS  Pancreatic cyst likely benign  Thyroid nodule   DM  HTN  HLD    Plan:   Significant clinical improvement in Mental status post LP yesterday; d/w Neuro Dr. Alfaro, agree concern for NPH   Neuro follow up in AM  CSF negative for any infectious etiology  CT Abdomen no acute abdominal pathology but suspicion for RLL filling defect concerning for PE  Will repeat dedicated CT Angio after 24 hrs as patient just had contrast to avoid GERRY  Placed on Lovenox therapeutic  MRI Brain with no structural pathology; EEG negative for epileptiform activity  Increased IOP concerning for Glaucoma; Continue Xalatan and Timolol;  follow up with ophthalmologist as outpatient on discharge   Endocrine follow up.   Pancreatic cyst previously reported but current CT reported normal  Indwelling Mckeon catheter removed; Trial of void  IV hydration as patient getting contrast    PT EVALUATION/ FOLLOW UP    Rest of the management as above    Discussed with PGY1 Dr. Hampton and PGY2 Dr. Blas  Discussed with Son at bedside and CHESTER Alexandre, Patient was seen and examined at bedside with medical student Ena Zhang interpreting this morning;  Agree with PGY1 A/P above with editing as needed. My independent assessment, findings on exam, diagnosis and plan of care as listed below. Discussed with Dr. Hampton.     Patient is doing much better today, more awake, opening eyes and communicating well. She was placed OOB to chair. Patient had Urinary retention yesterday 850 cc and mckeon catheter was placed. Mckeon catheter discontinued this afternoon. CT scan requested yesterday was only performed late this evening. Patient also had a large bowel movement this afternoon. Abdominal swelling has resolved.   Son arrived this afternoon now would prefer to take patient home given clinical improvement. (was earlier planned for MARIAA/ LTP)    Vital Signs Last 24 Hrs  T(C): 37 (08 Oct 2020 15:48), Max: 37.2 (08 Oct 2020 04:48)  T(F): 98.6 (08 Oct 2020 15:48), Max: 98.9 (08 Oct 2020 04:48)  HR: 89 (08 Oct 2020 15:48) (75 - 89)  BP: 113/53 (08 Oct 2020 15:48) (93/76 - 140/74)  RR: 18 (08 Oct 2020 15:48) (18 - 18)  SpO2: 97% (08 Oct 2020 15:48) (94% - 98%)    P/E:   elderly female, comfortable at rest, OOB to chair, NAD  Neuro: AAO x 2; No gross focal deficits  CVS: S1S2 present, regular  Resp: BLAE+, No wheeze or Rhonchi  GI: Soft, Obese, BS+, Non distended, Non tender  Extr: No edema or calf tenderness    Labs:                       11.9   10.80 )-----------( 357      ( 08 Oct 2020 06:26 )             36.3   10-08    136  |  106  |  24<H>  ----------------------------<  137<H>  4.0   |  23  |  0.88    Ca    9.1      08 Oct 2020 06:26  Phos  3.4     10-08  Mg     2.5     10-08    CT Abdomen and Pelvis w/ Oral Cont and w/ IV Cont (10.08.20 @ 16:42)   FINDINGS:  LOWER CHEST: There is a filling defect in a right lower lobe pulmonary arterial branch representing a new finding since the prior study of 10/4/2020 suspicious for a pulmonary embolus.  LIVER: Within normal limits.  BILE DUCTS: Normal caliber.  GALLBLADDER: Within normal limits.  SPLEEN: Within normal limits.  PANCREAS: Within normal limits.  ADRENALS: Within normal limits.  KIDNEYS/URETERS: Small left renal cyst. No evidence of hydronephrosis.  BLADDER: Predominantly decompressed however no focal abnormality seen.  REPRODUCTIVE ORGANS: Calcified uterine fibroid.  BOWEL: No bowel obstruction. The appendix is visualized and is normal. Colonic diverticulosis without evidence of diverticulitis. There is presacral soft tissue thickening and edema. Ill-defined fluid in the presacral region. This is a new finding since 10/4/2020.  PERITONEUM: No ascites.  VESSELS: Atherosclerotic changes.  RETROPERITONEUM/LYMPH NODES: No lymphadenopathy.  ABDOMINAL WALL: Within normal limits.  BONES: Degenerative changes and scoliosis.    IMPRESSION: Findings at the right lung base suspicious for a pulmonary embolus, new since 10/4/2020.  Presacral edema and fluid of uncertain etiology not seen on the prior study.  No evidence of ascites or intestinal obstruction.    A/P: Right lung base suspected PE  Subacute onset Dementia etiology unclear with Psychosis much improved post LP concern for Normal Pressure hydrocephalus  ??Ocular muscle weakness concern for Myasthenia Gravis less likely given clinical improvement  Eye glare possibly due to Glaucoma  Atypical Chest pain negative ACS  Pancreatic cyst likely benign  Thyroid nodule    Hx DM, HTN, HLD    Plan:   Significant clinical improvement in Mental status post LP yesterday; d/w Neuro Dr. Alfaro, agree concern for NPH   Neuro follow up in AM  CSF negative for any infectious etiology  CT Abdomen no acute abdominal pathology but suspicion for RLL filling defect concerning for PE  Will repeat dedicated CT Angio after 24 hrs as patient just had contrast to avoid GERRY  Placed on Lovenox therapeutic  MRI Brain with no structural pathology; EEG negative for epileptiform activity  Increased IOP concerning for Glaucoma; Continue Xalatan and Timolol;  follow up with ophthalmologist as outpatient on discharge   Endocrine follow up.   Pancreatic cyst previously reported but current CT reported normal  Indwelling Mckeon catheter removed; Trial of void  IV hydration as patient getting contrast  PT EVALUATION/ FOLLOW UP  Rest of the management as above    Discussed with PGY1 Dr. Hampton and PGY2 Dr. Blas  Discussed with Son at bedside and CHESTER Alexandre,

## 2020-10-08 NOTE — DIETITIAN INITIAL EVALUATION ADULT. - PERTINENT LABORATORY DATA
10-08 Na136 mmol/L Glu 137 mg/dL<H> K+ 4.0 mmol/L Cr  0.88 mg/dL BUN 24 mg/dL<H>   10-08 Phos 3.4 mg/dL   10-05 Alb 3.3 g/dL<L>       10-05 Chol 165 mg/dL LDL 70 mg/dL HDL 53 mg/dL Trig 210 mg/dL<H>  10-05-20 @ 09:50 HgbA1C 7.1 [4.0 - 5.6]

## 2020-10-09 LAB
ANION GAP SERPL CALC-SCNC: 6 MMOL/L — SIGNIFICANT CHANGE UP (ref 5–17)
BUN SERPL-MCNC: 23 MG/DL — HIGH (ref 7–18)
CALCIUM SERPL-MCNC: 8.5 MG/DL — SIGNIFICANT CHANGE UP (ref 8.4–10.5)
CHLORIDE SERPL-SCNC: 110 MMOL/L — HIGH (ref 96–108)
CO2 SERPL-SCNC: 24 MMOL/L — SIGNIFICANT CHANGE UP (ref 22–31)
CREAT SERPL-MCNC: 0.71 MG/DL — SIGNIFICANT CHANGE UP (ref 0.5–1.3)
GLUCOSE BLDC GLUCOMTR-MCNC: 125 MG/DL — HIGH (ref 70–99)
GLUCOSE BLDC GLUCOMTR-MCNC: 170 MG/DL — HIGH (ref 70–99)
GLUCOSE BLDC GLUCOMTR-MCNC: 185 MG/DL — HIGH (ref 70–99)
GLUCOSE BLDC GLUCOMTR-MCNC: 191 MG/DL — HIGH (ref 70–99)
GLUCOSE SERPL-MCNC: 160 MG/DL — HIGH (ref 70–99)
HCT VFR BLD CALC: 28.9 % — LOW (ref 34.5–45)
HGB BLD-MCNC: 9.7 G/DL — LOW (ref 11.5–15.5)
MAGNESIUM SERPL-MCNC: 2.1 MG/DL — SIGNIFICANT CHANGE UP (ref 1.6–2.6)
MCHC RBC-ENTMCNC: 28.4 PG — SIGNIFICANT CHANGE UP (ref 27–34)
MCHC RBC-ENTMCNC: 33.6 GM/DL — SIGNIFICANT CHANGE UP (ref 32–36)
MCV RBC AUTO: 84.5 FL — SIGNIFICANT CHANGE UP (ref 80–100)
NON-GYNECOLOGICAL CYTOLOGY STUDY: SIGNIFICANT CHANGE UP
NRBC # BLD: 0 /100 WBCS — SIGNIFICANT CHANGE UP (ref 0–0)
PHOSPHATE SERPL-MCNC: 3 MG/DL — SIGNIFICANT CHANGE UP (ref 2.5–4.5)
PLATELET # BLD AUTO: 268 K/UL — SIGNIFICANT CHANGE UP (ref 150–400)
POTASSIUM SERPL-MCNC: 3.7 MMOL/L — SIGNIFICANT CHANGE UP (ref 3.5–5.3)
POTASSIUM SERPL-SCNC: 3.7 MMOL/L — SIGNIFICANT CHANGE UP (ref 3.5–5.3)
RBC # BLD: 3.42 M/UL — LOW (ref 3.8–5.2)
RBC # FLD: 14.3 % — SIGNIFICANT CHANGE UP (ref 10.3–14.5)
SODIUM SERPL-SCNC: 140 MMOL/L — SIGNIFICANT CHANGE UP (ref 135–145)
VDRL CSF-TITR: NEGATIVE — SIGNIFICANT CHANGE UP
WBC # BLD: 7.99 K/UL — SIGNIFICANT CHANGE UP (ref 3.8–10.5)
WBC # FLD AUTO: 7.99 K/UL — SIGNIFICANT CHANGE UP (ref 3.8–10.5)

## 2020-10-09 PROCEDURE — 99233 SBSQ HOSP IP/OBS HIGH 50: CPT | Mod: GC

## 2020-10-09 PROCEDURE — 71275 CT ANGIOGRAPHY CHEST: CPT | Mod: 26

## 2020-10-09 PROCEDURE — 99232 SBSQ HOSP IP/OBS MODERATE 35: CPT

## 2020-10-09 RX ORDER — RIVAROXABAN 15 MG-20MG
1 KIT ORAL
Qty: 42 | Refills: 0
Start: 2020-10-09 | End: 2020-10-29

## 2020-10-09 RX ORDER — SODIUM CHLORIDE 9 MG/ML
1000 INJECTION INTRAMUSCULAR; INTRAVENOUS; SUBCUTANEOUS
Refills: 0 | Status: DISCONTINUED | OUTPATIENT
Start: 2020-10-09 | End: 2020-10-11

## 2020-10-09 RX ORDER — SODIUM CHLORIDE 9 MG/ML
1000 INJECTION INTRAMUSCULAR; INTRAVENOUS; SUBCUTANEOUS
Refills: 0 | Status: DISCONTINUED | OUTPATIENT
Start: 2020-10-09 | End: 2020-10-09

## 2020-10-09 RX ADMIN — Medication 25 MILLIGRAM(S): at 17:12

## 2020-10-09 RX ADMIN — Medication 1 DROP(S): at 05:19

## 2020-10-09 RX ADMIN — QUETIAPINE FUMARATE 25 MILLIGRAM(S): 200 TABLET, FILM COATED ORAL at 17:12

## 2020-10-09 RX ADMIN — QUETIAPINE FUMARATE 25 MILLIGRAM(S): 200 TABLET, FILM COATED ORAL at 05:19

## 2020-10-09 RX ADMIN — Medication 500 MILLIGRAM(S): at 11:33

## 2020-10-09 RX ADMIN — ENOXAPARIN SODIUM 60 MILLIGRAM(S): 100 INJECTION SUBCUTANEOUS at 17:12

## 2020-10-09 RX ADMIN — Medication 25 MILLIGRAM(S): at 05:19

## 2020-10-09 RX ADMIN — Medication 1 DROP(S): at 17:11

## 2020-10-09 RX ADMIN — LATANOPROST 1 DROP(S): 0.05 SOLUTION/ DROPS OPHTHALMIC; TOPICAL at 21:43

## 2020-10-09 RX ADMIN — Medication 1 DROP(S): at 17:13

## 2020-10-09 RX ADMIN — Medication 81 MILLIGRAM(S): at 11:33

## 2020-10-09 RX ADMIN — Medication 1: at 17:11

## 2020-10-09 RX ADMIN — Medication 1: at 12:00

## 2020-10-09 RX ADMIN — SODIUM CHLORIDE 75 MILLILITER(S): 9 INJECTION INTRAMUSCULAR; INTRAVENOUS; SUBCUTANEOUS at 10:13

## 2020-10-09 RX ADMIN — ATORVASTATIN CALCIUM 20 MILLIGRAM(S): 80 TABLET, FILM COATED ORAL at 21:43

## 2020-10-09 RX ADMIN — ENOXAPARIN SODIUM 60 MILLIGRAM(S): 100 INJECTION SUBCUTANEOUS at 05:19

## 2020-10-09 RX ADMIN — Medication 1: at 08:34

## 2020-10-09 RX ADMIN — Medication 60 MILLIGRAM(S): at 05:19

## 2020-10-09 NOTE — PROGRESS NOTE ADULT - ASSESSMENT
80F Cantonese-speaking, PMH dementia (recent onset), HTN, DM, HLD pw x3 days constant, non-localized, non-radiating, non-exertional chest pain, admitted to [facility] for cardiac monitoring and management/placement of recent-onset dementia.

## 2020-10-09 NOTE — PROGRESS NOTE ADULT - PROBLEM SELECTOR PLAN 1
per family, patient able to perform ADLs as recently as August, has become abruptly more forgetful, disoriented, and paranoid over past 1-2 months  -cw home seroquel, aricept added  -LP performed 3pm today csf wnl to date  -MR head no acute pathologies, chronic ischemic changes noted  -EEG obtained today, awaiting official read  -discuss placement with CM, as family now having difficulties caring for her   -MARIAA per PT, family now wants to take pt home given clinical improvement  -neuro Dr. Alfaro   -B12 and folate wnl

## 2020-10-09 NOTE — PROGRESS NOTE ADULT - PROBLEM SELECTOR PLAN 4
noted to be closing eyes continually yesterday, endorsed "a glare" --> improved today  -IOP found to be 39mmHg left eye and 26mmHg right eye (nl >20 mmHg)  -c/w timolol and latanoprost drops

## 2020-10-09 NOTE — CONSULT NOTE ADULT - SUBJECTIVE AND OBJECTIVE BOX
HPI  80 year old Cantonese speaking woman with dementia, HTN, DM presented to ED with chest pain. Admitted for ACS evaluation. Neurology consulted for progressive neurologic decline. Endocrinology following for goiter. CT angio chest demonstrated PE. Patient is poor historian, Hx in chart primarily provided by son.    Urology consulted for urinary retention overnight and mckeon catheter placed. Evaluation thus far revealed no  abnormalities seen on CTAP to explain urinary retention. No hydronephrosis. UA not suspicious for infection. Kidney function appears normal.      PAST MEDICAL & SURGICAL HISTORY:  HTN (hypertension)    Dementia    HTN (hypertension)    DM (diabetes mellitus)    No pertinent past surgical history        MEDICATIONS  (STANDING):  artificial  tears Solution 1 Drop(s) Both EYES two times a day  ascorbic acid 500 milliGRAM(s) Oral daily  aspirin  chewable 81 milliGRAM(s) Oral daily  atorvastatin 20 milliGRAM(s) Oral at bedtime  enoxaparin Injectable 60 milliGRAM(s) SubCutaneous every 12 hours  insulin lispro (HumaLOG) corrective regimen sliding scale   SubCutaneous three times a day before meals  latanoprost 0.005% Ophthalmic Solution 1 Drop(s) Both EYES at bedtime  metoprolol tartrate 25 milliGRAM(s) Oral two times a day  NIFEdipine XL 60 milliGRAM(s) Oral daily  QUEtiapine 25 milliGRAM(s) Oral two times a day  sodium chloride 0.9%. 1000 milliLiter(s) (75 mL/Hr) IV Continuous <Continuous>  timolol 0.25% Solution 1 Drop(s) Both EYES two times a day    MEDICATIONS  (PRN):  acetaminophen   Tablet .. 650 milliGRAM(s) Oral every 6 hours PRN Mild Pain (1 - 3), Moderate Pain (4 - 6)      FAMILY HISTORY:      Allergies    No Known Allergies    Intolerances        SOCIAL HISTORY:    REVIEW OF SYSTEMS: Otherwise negative as stated in HPI    Physical Exam  Vital signs  T(C): 36.7 (10-09-20 @ 16:20), Max: 37.1 (10-08-20 @ 19:17)  HR: 87 (10-09-20 @ 16:20)  BP: 152/61 (10-09-20 @ 16:20)  SpO2: 98% (10-09-20 @ 16:20)  Wt(kg): --    Output    OUT:    Indwelling Catheter - Urethral (mL): 1200 mL  Total OUT: 1200 mL    Total NET: -1200 mL      OUT:    Indwelling Catheter - Urethral (mL): 400 mL  Total OUT: 400 mL    Total NET: -400 mL          Gen: NAD  Pulm: No respiratory distress	  GI: S/ND/NT  : mckeon catheter secured in place, draining CYU    LABS:      10-09 @ 06:06    WBC 7.99  / Hct 28.9  / SCr 0.71     10-08 @ 06:26    WBC 10.80 / Hct 36.3  / SCr 0.88     10-09    140  |  110<H>  |  23<H>  ----------------------------<  160<H>  3.7   |  24  |  0.71    Ca    8.5      09 Oct 2020 06:06  Phos  3.0     10-09  Mg     2.1     10-09      RADIOLOGY  < from: CT Abdomen and Pelvis w/ Oral Cont and w/ IV Cont (10.08.20 @ 16:42) >  EXAM:  CT ABDOMEN AND PELVIS OC IC                            PROCEDURE DATE:  10/08/2020          INTERPRETATION:  CLINICAL INFORMATION: Abdominal distention.    COMPARISON: 10/4/2020    PROCEDURE:  CT of the Abdomen and Pelvis was performed with intravenous contrast.  Intravenous contrast: 90 ml Omnipaque 350. 10 ml discarded.  Oral contrast: positive contrast was administered.  Sagittal and coronal reformats were performed.    FINDINGS:  LOWER CHEST: There is a filling defect in a right lower lobe pulmonary arterial branch representing a new finding since the prior study of 10/4/2020 suspicious for a pulmonary embolus.    LIVER: Within normal limits.  BILE DUCTS: Normal caliber.  GALLBLADDER: Within normal limits.  SPLEEN: Within normal limits.  PANCREAS: Within normal limits.  ADRENALS: Within normal limits.  KIDNEYS/URETERS: Small left renal cyst. No evidence of hydronephrosis.    BLADDER: Predominantly decompressed however no focal abnormality seen.  REPRODUCTIVE ORGANS: Calcifieduterine fibroid.    BOWEL: No bowel obstruction. The appendix is visualized and is normal.Colonic diverticulosis without evidence of diverticulitis. There is presacral soft tissue thickening and edema. Ill-defined fluid in the presacral region. This is a new finding since 10/4/2020.  PERITONEUM: No ascites.  VESSELS: Atherosclerotic changes.  RETROPERITONEUM/LYMPH NODES: No lymphadenopathy.  ABDOMINAL WALL: Within normal limits.  BONES: Degenerative changes and scoliosis.    IMPRESSION:  Findingsat the right lung base suspicious for a pulmonary embolus, new since 10/4/2020.    Presacral edema and fluid of uncertain etiology not seen on the prior study.    No evidence of ascites or intestinal obstruction.    These critical values were discussed by Dr. Ty Stover with Dr. Chidi Manning on 10/8/2020 4:58 PM with read back.            TY STOVER M.D., ATTENDING RADIOLOGIST  This document has been electronically signed. Oct  8 2020  5:01PM    < end of copied text >   [employer] used for Cantonese translation for interview ([place], ID: [id number]    HPI  80 year old Cantonese speaking woman with dementia, HTN, DM presented to ED with chest pain. Admitted for ACS evaluation. Neurology consulted for progressive neurologic decline. Endocrinology following for goiter. CT angio chest demonstrated PE. Patient is poor historian, Hx in chart primarily provided by son.    Urology consulted for urinary retention and failed TOV. Evaluation thus far revealed no  abnormalities seen on CTAP to explain urinary retention. No hydronephrosis. UA not suspicious for infection. Kidney function appears normal.      PAST MEDICAL & SURGICAL HISTORY:  HTN (hypertension)    Dementia    HTN (hypertension)    DM (diabetes mellitus)    No pertinent past surgical history        MEDICATIONS  (STANDING):  artificial  tears Solution 1 Drop(s) Both EYES two times a day  ascorbic acid 500 milliGRAM(s) Oral daily  aspirin  chewable 81 milliGRAM(s) Oral daily  atorvastatin 20 milliGRAM(s) Oral at bedtime  enoxaparin Injectable 60 milliGRAM(s) SubCutaneous every 12 hours  insulin lispro (HumaLOG) corrective regimen sliding scale   SubCutaneous three times a day before meals  latanoprost 0.005% Ophthalmic Solution 1 Drop(s) Both EYES at bedtime  metoprolol tartrate 25 milliGRAM(s) Oral two times a day  NIFEdipine XL 60 milliGRAM(s) Oral daily  QUEtiapine 25 milliGRAM(s) Oral two times a day  sodium chloride 0.9%. 1000 milliLiter(s) (75 mL/Hr) IV Continuous <Continuous>  timolol 0.25% Solution 1 Drop(s) Both EYES two times a day    MEDICATIONS  (PRN):  acetaminophen   Tablet .. 650 milliGRAM(s) Oral every 6 hours PRN Mild Pain (1 - 3), Moderate Pain (4 - 6)      FAMILY HISTORY:      Allergies    No Known Allergies    Intolerances        SOCIAL HISTORY:    REVIEW OF SYSTEMS: Otherwise negative as stated in HPI    Physical Exam  Vital signs  T(C): 36.7 (10-09-20 @ 16:20), Max: 37.1 (10-08-20 @ 19:17)  HR: 87 (10-09-20 @ 16:20)  BP: 152/61 (10-09-20 @ 16:20)  SpO2: 98% (10-09-20 @ 16:20)  Wt(kg): --    Output    OUT:    Indwelling Catheter - Urethral (mL): 1200 mL  Total OUT: 1200 mL    Total NET: -1200 mL      OUT:    Indwelling Catheter - Urethral (mL): 400 mL  Total OUT: 400 mL    Total NET: -400 mL          Gen: NAD  Pulm: No respiratory distress	  GI: S/ND/NT  : mckeon catheter secured in place, draining CYU    LABS:      10-09 @ 06:06    WBC 7.99  / Hct 28.9  / SCr 0.71     10-08 @ 06:26    WBC 10.80 / Hct 36.3  / SCr 0.88     10-09    140  |  110<H>  |  23<H>  ----------------------------<  160<H>  3.7   |  24  |  0.71    Ca    8.5      09 Oct 2020 06:06  Phos  3.0     10-09  Mg     2.1     10-09      RADIOLOGY  < from: CT Abdomen and Pelvis w/ Oral Cont and w/ IV Cont (10.08.20 @ 16:42) >  EXAM:  CT ABDOMEN AND PELVIS OC IC                            PROCEDURE DATE:  10/08/2020          INTERPRETATION:  CLINICAL INFORMATION: Abdominal distention.    COMPARISON: 10/4/2020    PROCEDURE:  CT of the Abdomen and Pelvis was performed with intravenous contrast.  Intravenous contrast: 90 ml Omnipaque 350. 10 ml discarded.  Oral contrast: positive contrast was administered.  Sagittal and coronal reformats were performed.    FINDINGS:  LOWER CHEST: There is a filling defect in a right lower lobe pulmonary arterial branch representing a new finding since the prior study of 10/4/2020 suspicious for a pulmonary embolus.    LIVER: Within normal limits.  BILE DUCTS: Normal caliber.  GALLBLADDER: Within normal limits.  SPLEEN: Within normal limits.  PANCREAS: Within normal limits.  ADRENALS: Within normal limits.  KIDNEYS/URETERS: Small left renal cyst. No evidence of hydronephrosis.    BLADDER: Predominantly decompressed however no focal abnormality seen.  REPRODUCTIVE ORGANS: Calcifieduterine fibroid.    BOWEL: No bowel obstruction. The appendix is visualized and is normal.Colonic diverticulosis without evidence of diverticulitis. There is presacral soft tissue thickening and edema. Ill-defined fluid in the presacral region. This is a new finding since 10/4/2020.  PERITONEUM: No ascites.  VESSELS: Atherosclerotic changes.  RETROPERITONEUM/LYMPH NODES: No lymphadenopathy.  ABDOMINAL WALL: Within normal limits.  BONES: Degenerative changes and scoliosis.    IMPRESSION:  Findingsat the right lung base suspicious for a pulmonary embolus, new since 10/4/2020.    Presacral edema and fluid of uncertain etiology not seen on the prior study.    No evidence of ascites or intestinal obstruction.    These critical values were discussed by Dr. Bhavya Stover with Dr. Chidi Manning on 10/8/2020 4:58 PM with read back.            BHAVYA STOVER M.D., ATTENDING RADIOLOGIST  This document has been electronically signed. Oct  8 2020  5:01PM    < end of copied text >

## 2020-10-09 NOTE — PROGRESS NOTE ADULT - PROBLEM SELECTOR PLAN 2
patient noted to have firm, distended bowel on exam today  -possible constipation 2/2 urinary retention, FC placed  -Will repeat CTA Chest in PM 10/9 due to ?PE on CT ab/pelvis w/ contrast.   Pt w/o symptoms suspicious for PE at this time.

## 2020-10-09 NOTE — CONSULT NOTE ADULT - ASSESSMENT
80 year old woman with dementia currently being evaluated for neurologic decline and chest pain, now with urinary retention s/p mckeon placement.      -Bowel regimen, senna, colace, miralax  -Hydration  -May TOV after 24 hours if ambulating (out of bed to chair) and having soft BMs  -If fails TOV, replace Mckeon, home with Mckeon, TOV as outpatient  -Follow up with Dr. De Leon in clinic [phone] for further evaluation          Please see the urinary retention protocol on the Intranet but searching "female retention" -- this is a summary below:  Urinary Retention Protocol    **please ask if patient has urologist**    Unable to urinate and pain/discomfort and bladder scan >500cc --> straight catheterize #1 (send UA and urine culture) then provide voiding trial    Steps to optimize voiding trial:  -encourage ambulation/out of bed  -initiate/continue bowel regimen  -minimize narcotics/benzodiazepines/anticholinergics/antihistamines  -encourage timed voiding  -encourage bedpan/commode/urinal  -initiate tamsulosin if patient has prostate    If still unable to void after 8 hours with pain/discomfort and bladder scan >500 --> repeat straight catheterization #2 + voiding trial + confirm optimization steps    If still unable to void after 8 hours with pain/discomfort and bladder scan >500 --> place indwelling mckeon catheter for 2-3 days then attempt voiding trial    If fails, replace indwelling mckeon and discharge with mckeon + tamsulosin (if has prostate) --> outpatient FU with urology for TOV      CALL UROLOGY IF:  -gross hematuria with clots  -recent pelvic trauma  -new or worsening hydronephrosis  -new neurologic deficits  -KIMBERLY  **please ask if patient has urologist prior to calling urology**   80 year old woman with dementia currently being evaluated for neurologic decline and chest pain, now with urinary retention, s/p failed TOV.      -Bowel regimen, senna, colace, miralax  -Hydration  -Plan to discharge with mckeon catheter for outpatient TOV  -Follow up with Dr. De Leon in clinic [phone] for further evaluation          Please see the urinary retention protocol on the Intranet but searching "female retention" for more information about managing urinary retention.

## 2020-10-09 NOTE — PROGRESS NOTE ADULT - SUBJECTIVE AND OBJECTIVE BOX
INTERVAL HPI/OVERNIGHT EVENTS:    HEALTH ISSUES - PROBLEM Dx:  Glaucoma  Glaucoma    Abdominal distention  Abdominal distention    Goiter, nontoxic, multinodular  Goiter, nontoxic, multinodular    Goals of care, counseling/discussion  Goals of care, counseling/discussion    Thyroid nodule  Thyroid nodule    Hyperlipidemia  Hyperlipidemia    DM (diabetes mellitus)  DM (diabetes mellitus)    Dementia  Dementia    HTN (hypertension)  HTN (hypertension)    Chest pain  Chest pain            MEDICATIONS  (STANDING):  artificial  tears Solution 1 Drop(s) Both EYES two times a day  ascorbic acid 500 milliGRAM(s) Oral daily  aspirin  chewable 81 milliGRAM(s) Oral daily  atorvastatin 20 milliGRAM(s) Oral at bedtime  enoxaparin Injectable 60 milliGRAM(s) SubCutaneous every 12 hours  insulin lispro (HumaLOG) corrective regimen sliding scale   SubCutaneous three times a day before meals  latanoprost 0.005% Ophthalmic Solution 1 Drop(s) Both EYES at bedtime  metoprolol tartrate 25 milliGRAM(s) Oral two times a day  NIFEdipine XL 60 milliGRAM(s) Oral daily  QUEtiapine 25 milliGRAM(s) Oral two times a day  sodium chloride 0.9%. 1000 milliLiter(s) (75 mL/Hr) IV Continuous <Continuous>  timolol 0.25% Solution 1 Drop(s) Both EYES two times a day    MEDICATIONS  (PRN):  acetaminophen   Tablet .. 650 milliGRAM(s) Oral every 6 hours PRN Mild Pain (1 - 3), Moderate Pain (4 - 6)      Allergies    No Known Allergies    Intolerances        REVIEW OF SYSTEMS      General:	    Skin/Breast:  	  Ophthalmologic:  	  ENMT:	    Respiratory and Thorax:  	  Cardiovascular:	    Gastrointestinal:	    Genitourinary:	    Musculoskeletal:	    Neurological:	    Psychiatric:	    Hematology/Lymphatics:	    Endocrine:	    Allergic/Immunologic:	    Vital Signs Last 24 Hrs  T(C): 36.7 (09 Oct 2020 16:20), Max: 36.8 (09 Oct 2020 09:02)  T(F): 98.1 (09 Oct 2020 16:20), Max: 98.2 (09 Oct 2020 09:02)  HR: 87 (09 Oct 2020 16:20) (66 - 87)  BP: 152/61 (09 Oct 2020 16:20) (106/44 - 156/60)  BP(mean): 72 (09 Oct 2020 05:14) (72 - 72)  RR: 18 (09 Oct 2020 16:20) (17 - 18)  SpO2: 98% (09 Oct 2020 16:20) (95% - 100%)    PHYSICAL EXAM:    Awake alert normal language. Disoriented in time and place. Difficulty naming and better at repetition. Memory and praxis (folding a rectangular paper in half)  poor. Abstract judging similarities abnormal.BRYCE VF full EOM full, face jaw tongue and palate normal; Moves all 4 extremities equally. Symmetrical reflexes and sensation. Gait unsteady assisted.        LABS:                        9.7    7.99  )-----------( 268      ( 09 Oct 2020 06:06 )             28.9     10-09    140  |  110<H>  |  23<H>  ----------------------------<  160<H>  3.7   |  24  |  0.71    Ca    8.5      09 Oct 2020 06:06  Phos  3.0     10-09  Mg     2.1     10-09  Total Nucleated Cell Count, CSF: <1 /uL (10.07.20 @ 15:25)   CSF Segmented Neutrophils: 0 % (10.07.20 @ 15:25)   RBC Count - Spinal Fluid: 0 /uL (10.07.20 @ 15:25)   Protein, CSF: 35 mg/dL (10.07.20 @ 15:24)   Glucose, CSF: 89 mg/dL (10.07.20 @ 15:24)   VDRL Titer, CSF: Negative: Test Performed by:   Cryptococcal Antigen - CSF: Negative (10.07.20 @ 19:20)   CSF PCR Result: NotDete: The meningitis/encephalitis (ME) panel (CSFPCR) is a PCR based assay that Cryptococcal Antigen - CSF: Negative (10.07.20 @ 19:20)   Acid Fast Bacilli Smear:   Less than 5 cc of CSF received,   unable to perform AFB smear. (10.07.20 @ 19:01)       RADIOLOGY & ADDITIONAL TESTS:  < from: MR Head No Cont (10.07.20 @ 10:28) >  EXAM:  MR BRAIN                            PROCEDURE DATE:  10/07/2020          INTERPRETATION:  Exam Date: 10/7/2020 10:28 AM    MR brain  without gadolinium    CLINICAL INFORMATION:  sudden onset dementia    TECHNIQUE:   Sagittal and axial T1-weighted images, axial FLAIR images, gradient echo, axial  T2-weighted images and axial diffusion weighted images of the brain were obtained.    FINDINGS: Comparison is made to CT head of 8/20/2020.    There is no evidence of acute infarct, hemorrhage, or mass lesion. There are patchy and scattered foci of FLAIR hyperintensity in the periventricular and subcortical white matter of the bilateral cerebri, compatible with moderate chronic microvascular ischemic changes. There is no midline shift or herniation pattern. No mass effect is found in the brain.    The ventricles, sulci and basal cisterns appear unremarkable.    The vertebral and internal carotid arteries demonstrate expected flow voids indicating their patency.    The paranasal sinuses are clear.    IMPRESSION:   No acute infarct. Moderate periventricular and subcortical white matter chronic microvascular ischemic changes.      LEXI PEREZ M.D., ATTENDING RADIOLOGIST  This document has been electronically signed. Oct  94379 10:35AM    < end of copied text >  EEG REPORT:   EEG Report:  · EEG Report	  ANNELIESE HERRERA [id number] ([id number])F  Admitting MD: Dr. Betty Riddle    FINDINGS:  The background was continuous, spontaneously variable and reactive.  During wakefulness, the posteriorly dominant rhythm consisted of symmetric, well modulated 10Hz activity, with an amplitude to 30 uV, that attenuated to eye opening.  Low amplitude central beta was noted in wakefulness.    Background Slowing:  Generalized slowing: none was present.  Focal slowing: none was present.    Sleep Background:  Frequent drowsiness was characterized by fragmentation, attenuation, and slowing of the background activity.    Sleep was characterized by the presence of vertex waves, symmetric spindles, and K-complexes.    Epileptiform Activity:   No epileptiform discharges were present.    Events:  No clinical events were recorded.  No seizures were recorded.    Activation Procedures:   -Hyperventilation was not performed.    -Photic stimulation was performed and did not elicit any abnormalities.      Artifacts:  Intermittent myogenic and movement artifacts were noted.    ECG:  The heart rate on single channel ECG at baseline was predominantly near BPM = 70-80  -----------------------------------------------------------------------------------------------------    EEG Classification / Summary:  Normal EEG study, awake / drowsy / asleep    -Frequent drowsiness noted.  -----------------------------------------------------------------------------------------------------    Clinical Impression:  There were no epileptiform abnormalities recorded.      -------------------------------------------------------------------------------------------------------  Huy Jeronimo M.D.   of Neurology, [facility]	        Electronic Signatures:  Huy Jeronimo)  (Signed 07-Oct-2020 15:55)  	Authored: EEG REPORT      Last Updated: 07-Oct-2020 15:55 by Huy Jeronimo) INTERVAL HPI/OVERNIGHT EVENTS: Improved lethargy and stupor after large volume spinal tap recorded; Also developed urinary retention overnight. Continues to have difficulty walking.  Translatin provided by medical student Ena Zahng with whose help a Chinese MOCA test was administered    HEALTH ISSUES - PROBLEM Dx:  Glaucoma  Abdominal distention  Goiter, nontoxic, multinodular  Goals of care, counseling/discussion  Thyroid nodule  Hyperlipidemia  DM (diabetes mellitus)  Dementia  HTN (hypertension)  Chest pain      MEDICATIONS  (STANDING):  artificial  tears Solution 1 Drop(s) Both EYES two times a day  ascorbic acid 500 milliGRAM(s) Oral daily  aspirin  chewable 81 milliGRAM(s) Oral daily  atorvastatin 20 milliGRAM(s) Oral at bedtime  enoxaparin Injectable 60 milliGRAM(s) SubCutaneous every 12 hours  insulin lispro (HumaLOG) corrective regimen sliding scale   SubCutaneous three times a day before meals  latanoprost 0.005% Ophthalmic Solution 1 Drop(s) Both EYES at bedtime  metoprolol tartrate 25 milliGRAM(s) Oral two times a day  NIFEdipine XL 60 milliGRAM(s) Oral daily  QUEtiapine 25 milliGRAM(s) Oral two times a day  sodium chloride 0.9%. 1000 milliLiter(s) (75 mL/Hr) IV Continuous <Continuous>  timolol 0.25% Solution 1 Drop(s) Both EYES two times a day    MEDICATIONS  (PRN):  acetaminophen   Tablet .. 650 milliGRAM(s) Oral every 6 hours PRN Mild Pain (1 - 3), Moderate Pain (4 - 6)      Allergies    No Known Allergies    Intolerances      REVIEW OF SYSTEMS:  Unreliable due to poor mental status;       Vital Signs Last 24 Hrs  T(C): 36.7 (09 Oct 2020 16:20), Max: 36.8 (09 Oct 2020 09:02)  T(F): 98.1 (09 Oct 2020 16:20), Max: 98.2 (09 Oct 2020 09:02)  HR: 87 (09 Oct 2020 16:20) (66 - 87)  BP: 152/61 (09 Oct 2020 16:20) (106/44 - 156/60)  BP(mean): 72 (09 Oct 2020 05:14) (72 - 72)  RR: 18 (09 Oct 2020 16:20) (17 - 18)  SpO2: 98% (09 Oct 2020 16:20) (95% - 100%)    PHYSICAL EXAM:    Awake alert normal language. Disoriented in time and place. Difficulty naming and better at repetition. Memory and praxis (folding a rectangular paper in half)  poor. Abstract judging similarities abnormal.BRYCE VF full EOM full, face jaw tongue and palate normal; Moves all 4 extremities equally. Symmetrical reflexes and sensation. Gait unsteady assisted.        LABS:                        9.7    7.99  )-----------( 268      ( 09 Oct 2020 06:06 )             28.9     10-09    140  |  110<H>  |  23<H>  ----------------------------<  160<H>  3.7   |  24  |  0.71    Ca    8.5      09 Oct 2020 06:06  Phos  3.0     10-09  Mg     2.1     10-09  Total Nucleated Cell Count, CSF: <1 /uL (10.07.20 @ 15:25)   CSF Segmented Neutrophils: 0 % (10.07.20 @ 15:25)   RBC Count - Spinal Fluid: 0 /uL (10.07.20 @ 15:25)   Protein, CSF: 35 mg/dL (10.07.20 @ 15:24)   Glucose, CSF: 89 mg/dL (10.07.20 @ 15:24)   VDRL Titer, CSF: Negative: Test Performed by:   Cryptococcal Antigen - CSF: Negative (10.07.20 @ 19:20)   CSF PCR Result: NotDete: The meningitis/encephalitis (ME) panel (CSFPCR) is a PCR based assay that Cryptococcal Antigen - CSF: Negative (10.07.20 @ 19:20)   Acid Fast Bacilli Smear:   Less than 5 cc of CSF received,   unable to perform AFB smear. (10.07.20 @ 19:01)       RADIOLOGY & ADDITIONAL TESTS:  < from: MR Head No Cont (10.07.20 @ 10:28) >  EXAM:  MR BRAIN                            PROCEDURE DATE:  10/07/2020          INTERPRETATION:  Exam Date: 10/7/2020 10:28 AM    MR brain  without gadolinium    CLINICAL INFORMATION:  sudden onset dementia    TECHNIQUE:   Sagittal and axial T1-weighted images, axial FLAIR images, gradient echo, axial  T2-weighted images and axial diffusion weighted images of the brain were obtained.    FINDINGS: Comparison is made to CT head of 8/20/2020.    There is no evidence of acute infarct, hemorrhage, or mass lesion. There are patchy and scattered foci of FLAIR hyperintensity in the periventricular and subcortical white matter of the bilateral cerebri, compatible with moderate chronic microvascular ischemic changes. There is no midline shift or herniation pattern. No mass effect is found in the brain.    The ventricles, sulci and basal cisterns appear unremarkable.    The vertebral and internal carotid arteries demonstrate expected flow voids indicating their patency.    The paranasal sinuses are clear.    IMPRESSION:   No acute infarct. Moderate periventricular and subcortical white matter chronic microvascular ischemic changes.      LEXI PEREZ M.D., ATTENDING RADIOLOGIST  This document has been electronically signed. Oct  73202 10:35AM    < end of copied text >  EEG REPORT:   EEG Report:  · EEG Report	  ANNELIESE HERRERA [id number])F  Admitting MD: Dr. Betty Riddle    FINDINGS:  The background was continuous, spontaneously variable and reactive.  During wakefulness, the posteriorly dominant rhythm consisted of symmetric, well modulated 10Hz activity, with an amplitude to 30 uV, that attenuated to eye opening.  Low amplitude central beta was noted in wakefulness.    Background Slowing:  Generalized slowing: none was present.  Focal slowing: none was present.    Sleep Background:  Frequent drowsiness was characterized by fragmentation, attenuation, and slowing of the background activity.    Sleep was characterized by the presence of vertex waves, symmetric spindles, and K-complexes.    Epileptiform Activity:   No epileptiform discharges were present.    Events:  No clinical events were recorded.  No seizures were recorded.    Activation Procedures:   -Hyperventilation was not performed.    -Photic stimulation was performed and did not elicit any abnormalities.      Artifacts:  Intermittent myogenic and movement artifacts were noted.    ECG:  The heart rate on single channel ECG at baseline was predominantly near BPM = 70-80  -----------------------------------------------------------------------------------------------------    EEG Classification / Summary:  Normal EEG study, awake / drowsy / asleep    -Frequent drowsiness noted.  -----------------------------------------------------------------------------------------------------    Clinical Impression:  There were no epileptiform abnormalities recorded.      -------------------------------------------------------------------------------------------------------  Huy Jeronimo M.D.   of Neurology, [facility] [place]	        Electronic Signatures:  Huy Jeronimo)  (Signed 07-Oct-2020 15:55)  	Authored: EEG REPORT      Last Updated: 07-Oct-2020 15:55 by Huy Jeronimo)

## 2020-10-09 NOTE — PROGRESS NOTE ADULT - ATTENDING COMMENTS
Patient was seen and examined at bedside with medical student Ena interpreting this morning;  Agree with PGY1 A/P above with editing as needed. My independent assessment, findings on exam, diagnosis and plan of care as listed below. Discussed with Dr. Dhaliwal.    Patient is doing weel; was moved from  from [facility] to [facility] floor overnight. Patient was noted to be in restraints as it was placed overnight for agitation. , Patient noted to be awake, opening eyes and communicating well.  Removed restraints after MS3 counseled patient and she verbalized understanding. TOV failed. Patient had Urinary retention overnight and mckeon catheter was replaced.     Vital Signs Last 24 Hrs  T(C): 37 (08 Oct 2020 15:48), Max: 37.2 (08 Oct 2020 04:48)  T(F): 98.6 (08 Oct 2020 15:48), Max: 98.9 (08 Oct 2020 04:48)  HR: 89 (08 Oct 2020 15:48) (75 - 89)  BP: 113/53 (08 Oct 2020 15:48) (93/76 - 140/74)  RR: 18 (08 Oct 2020 15:48) (18 - 18)  SpO2: 97% (08 Oct 2020 15:48) (94% - 98%)    P/E:   elderly female, comfortable at rest, OOB to chair, NAD  Neuro: AAO x 2; No gross focal deficits  CVS: S1S2 present, regular  Resp: BLAE+, No wheeze or Rhonchi  GI: Soft, Obese, BS+, Non distended, Non tender  Extr: No edema or calf tenderness    Labs:                       11.9   10.80 )-----------( 357      ( 08 Oct 2020 06:26 )             36.3   10-08    136  |  106  |  24<H>  ----------------------------<  137<H>  4.0   |  23  |  0.88    Ca    9.1      08 Oct 2020 06:26  Phos  3.4     10-08  Mg     2.5     10-08    CT Abdomen and Pelvis w/ Oral Cont and w/ IV Cont (10.08.20 @ 16:42)   FINDINGS:  LOWER CHEST: There is a filling defect in a right lower lobe pulmonary arterial branch representing a new finding since the prior study of 10/4/2020 suspicious for a pulmonary embolus.  LIVER: Within normal limits.  BILE DUCTS: Normal caliber.  GALLBLADDER: Within normal limits.  SPLEEN: Within normal limits.  PANCREAS: Within normal limits.  ADRENALS: Within normal limits.  KIDNEYS/URETERS: Small left renal cyst. No evidence of hydronephrosis.  BLADDER: Predominantly decompressed however no focal abnormality seen.  REPRODUCTIVE ORGANS: Calcified uterine fibroid.  BOWEL: No bowel obstruction. The appendix is visualized and is normal. Colonic diverticulosis without evidence of diverticulitis. There is presacral soft tissue thickening and edema. Ill-defined fluid in the presacral region. This is a new finding since 10/4/2020.  PERITONEUM: No ascites.  VESSELS: Atherosclerotic changes.  RETROPERITONEUM/LYMPH NODES: No lymphadenopathy.  ABDOMINAL WALL: Within normal limits.  BONES: Degenerative changes and scoliosis.    IMPRESSION: Findings at the right lung base suspicious for a pulmonary embolus, new since 10/4/2020.  Presacral edema and fluid of uncertain etiology not seen on the prior study.  No evidence of ascites or intestinal obstruction.    A/P: Right lung base suspected PE  Subacute onset Dementia etiology unclear with Psychosis much improved post LP concern for Normal Pressure hydrocephalus  ??Ocular muscle weakness concern for Myasthenia Gravis less likely given clinical improvement  Eye glare possibly due to Glaucoma  Atypical Chest pain negative ACS  Pancreatic cyst likely benign  Thyroid nodule    Hx DM, HTN, HLD    Plan:   Significant clinical improvement in Mental status post LP yesterday; d/w Neuro Dr. Alfaro, agree concern for NPH   Neuro follow up in AM  CSF negative for any infectious etiology  CT Abdomen no acute abdominal pathology but suspicion for RLL filling defect concerning for PE  Will repeat dedicated CT Angio after 24 hrs as patient just had contrast to avoid GERRY  Placed on Lovenox therapeutic  MRI Brain with no structural pathology; EEG negative for epileptiform activity  Increased IOP concerning for Glaucoma; Continue Xalatan and Timolol;  follow up with ophthalmologist as outpatient on discharge   Endocrine follow up.   Pancreatic cyst previously reported but current CT reported normal  Indwelling Mckeon catheter removed; Trial of void  IV hydration as patient getting contrast  PT EVALUATION/ FOLLOW UP  Rest of the management as above    Discussed with PGY1 Dr. Hampton and PGY2 Dr. Blas  Discussed with Son at bedside and CHESTER Alexandre, Patient was seen and examined at bedside with medical student Ena interpreting this morning;  Agree with PGY1 A/P above with editing as needed. My independent assessment, findings on exam, diagnosis and plan of care as listed below. Discussed with Dr. Dhaliwal.    Patient is doing well; was moved from  from Tele to med-surg floor overnight. Patient was noted to be in restraints as it was placed overnight for agitation. , Patient noted to be awake, opening eyes and communicating well.  Removed restraints after MS3 counseled patient and she verbalized understanding. TOV failed. Patient had Urinary retention overnight and mckeon catheter was replaced. Patient had large BM yesterday.     Patient  denies fever, chills, SOB, palpitations, chest pain, nausea, vomiting, diarrhea, constipation, dizziness    Vital Signs Last 24 Hrs  T(C): 36.8 (09 Oct 2020 09:02), Max: 37.1 (08 Oct 2020 19:17)  T(F): 98.2 (09 Oct 2020 09:02), Max: 98.8 (08 Oct 2020 19:17)  HR: 66 (09 Oct 2020 09:02) (66 - 94)  BP: 106/44 (09 Oct 2020 09:02) (106/44 - 156/60)  BP(mean): 72 (09 Oct 2020 05:14) (72 - 72)  RR: 17 (09 Oct 2020 09:02) (17 - 18)  SpO2: 95% (09 Oct 2020 09:02) (95% - 100%)    P/E:   elderly female, comfortable at rest, pleasantly communicating with MS3 in her native language, NAD  Neuro: AAO x 2; No gross focal deficits  CVS: S1S2 present, regular  Resp: BLAE+, No wheeze or Rhonchi  GI: Soft, Obese, BS+, Non distended, Non tender  Extr: No edema or calf tenderness    Labs:                                  9.7    7.99  )-----------( 268      ( 09 Oct 2020 06:06 )             28.9   10-09    140  |  110<H>  |  23<H>  ----------------------------<  160<H>  3.7   |  24  |  0.71    Ca    8.5      09 Oct 2020 06:06  Phos  3.0     10-09  Mg     2.1     10-09    A/P: Right lung base suspected PE  Subacute onset Dementia etiology unclear with Psychosis much improved post LP concern for Normal Pressure hydrocephalus  ??Ocular muscle weakness concern for Myasthenia Gravis less likely given clinical improvement  Eye glare possibly due to Glaucoma resolved  Atypical Chest pain negative ACS  Pancreatic cyst likely benign  Thyroid nodule    Hx DM, HTN, HLD    Plan:   continues to have significant clinical improvement in Mental status post LP  Neuro follow; d/w Dr. Alfaro  CSF negative for any infectious etiology  d/w Radiologist, may proceed with repeat contrast CT (CT Angio) today if GFR is stable; renal fn stable   Continue on Lovenox therapeutic;   MRI Brain with no structural pathology; EEG negative for epileptiform activity  Increased IOP concerning for Glaucoma; Continue Xalatan and Timolol;  follow up with ophthalmologist as outpatient on discharge   Endocrine follow up for thyroid nodule  Pancreatic cyst previously reported but current CT reported normal  Failed TOV; CT no significant findings to cause urinary retention; Urology eval d/w Dr. De Leon/ Jayson Bautista  Will attempt another TOV in 24 hrs if OOB to chair and ambulatory; PT follow up  IV hydration as patient getting contrast for another   Rest of the management as above    Discussed with PGY1 Dr. Dhaliwal and PGY2 Dr. Blas  Discussed with Son at bedside and CHESTER Pizarro Patient was seen and examined at bedside with medical student Ena interpreting this morning;  Agree with PGY1 A/P above with editing as needed. My independent assessment, findings on exam, diagnosis and plan of care as listed below. Discussed with Dr. Dhaliwal.    Patient is doing well; was moved from [facility] to med-surg floor overnight. Patient was noted to be in restraints as it was placed overnight for agitation. Patient noted to be awake, opening eyes and communicating well.  Removed restraints after MS3 counseled patient and she verbalized understanding. TOV failed. Patient had Urinary retention overnight and mckeon catheter was replaced. Patient had large BM yesterday.     Patient  denies fever, chills, SOB, palpitations, chest pain, nausea, vomiting, diarrhea, constipation, dizziness    Vital Signs Last 24 Hrs  T(C): 36.8 (09 Oct 2020 09:02), Max: 37.1 (08 Oct 2020 19:17)  T(F): 98.2 (09 Oct 2020 09:02), Max: 98.8 (08 Oct 2020 19:17)  HR: 66 (09 Oct 2020 09:02) (66 - 94)  BP: 106/44 (09 Oct 2020 09:02) (106/44 - 156/60)  BP(mean): 72 (09 Oct 2020 05:14) (72 - 72)  RR: 17 (09 Oct 2020 09:02) (17 - 18)  SpO2: 95% (09 Oct 2020 09:02) (95% - 100%)    P/E:   elderly female, comfortable at rest, pleasantly communicating with MS3 in her native language, NAD  Neuro: AAO x 2; No gross focal deficits  CVS: S1S2 present, regular  Resp: BLAE+, No wheeze or Rhonchi  GI: Soft, Obese, BS+, Non distended, Non tender  Extr: No edema or calf tenderness    Labs:                                  9.7    7.99  )-----------( 268      ( 09 Oct 2020 06:06 )             28.9   10-09    140  |  110<H>  |  23<H>  ----------------------------<  160<H>  3.7   |  24  |  0.71    Ca    8.5      09 Oct 2020 06:06  Phos  3.0     10-09  Mg     2.1     10-09    CT Angio Chest w/ IV Cont (10.09.20 @ 14:45)     FINDINGS:  LUNGS AND AIRWAYS:  Right upper lobe calcified granuloma (4:117), unchanged compared with 10/4/2020.  Left upper and lower lobe linear atelectasis versus scarring. No pulmonary infarct.  PLEURA: No pleural effusion.  MEDIASTINUM AND ZAK: No lymphadenopathy.  VESSELS: There is acute pulmonary embolus involving lobar and segmental branches of the right upper, middle, and lower lobe pulmonary arteries. Acute pulmonary embolus is also seen within the left lower lobe segmental and subsegmental branches.  HEART: There is no evidence of right heart strain. The heart is enlarged, unchanged. No pericardial effusion.  CHEST WALL AND LOWER NECK: Unchanged goiter.  VISUALIZED UPPER ABDOMEN: Evaluation of the abdomen is limited due to the presence of oral contrast in the colon.  BONES: Degenerative change    IMPRESSION: Acute pulmonary emboli in the right upper, middle and lower and left lower lobes.  There is no evidence of right heart strain or pulmonary infarct.    A/P:  B/L P.E.  Subacute onset Dementia etiology unclear with Psychosis much improved post LP concern for Normal Pressure hydrocephalus  ??Ocular muscle weakness concern for Myasthenia Gravis less likely given clinical improvement  Eye glare possibly due to Glaucoma resolved  Atypical Chest pain negative ACS  Pancreatic cyst likely benign  Thyroid nodule    Hx DM, HTN, HLD    Plan:   d/w Radiologist, may proceed with repeat contrast CT (CT Angio) today if GFR is stable; renal fn stable   B/L PE on CTA; Lovenox therapeutic  Will check with outpatient pharmacy coverage for NOACs  continues to have significant clinical improvement in Mental status post LP  Neuro follow; d/w Dr. Alfaro; CSF negative for any infectious etiology; Protein WNL  MRI Brain with no structural pathology; EEG negative for epileptiform activity  Increased IOP concerning for Glaucoma; Continue Xalatan and Timolol;  follow up with ophthalmologist as outpatient on discharge   Endocrine follow up for thyroid nodule  Pancreatic cyst previously reported but current CT reported normal  Failed TOV; CT no significant findings to cause urinary retention; Urology eval d/w Dr. De Leon/ Jayson Bautista  Will attempt another TOV in 24 hrs if OOB to chair and ambulatory; PT follow up  IV hydration as patient got contrast again  Rest of the management as above    Discussed with PGY1 Dr. Dhaliwal and PGY2 Dr. Blas  Discussed with Son at bedside this evening happy with patient cliniczal progress and improved mentation  Discussed with CHESTER Pizarro

## 2020-10-09 NOTE — PROGRESS NOTE ADULT - SUBJECTIVE AND OBJECTIVE BOX
PGY-1 Progress Note discussed with attending    PAGER #: [[phone]] TILL 5:00 PM  PLEASE CONTACT ON CALL TEAM:  - On Call Team (Please refer to [place]) FROM 5:00 PM - 8:30PM  - Nightfloat Team FROM 8:30 -7:30 AM    CHIEF COMPLAINT & BRIEF HOSPITAL COURSE:  80 Cantonese-speaking F with PMH dementia, HTN, DM, HLD pw x3 days non-localized, non-exertional, non-radiating CP, admitted to [facility] for ACS workup, additionally found to have thyroid nodule.     INTERVAL HPI/OVERNIGHT EVENTS:   Pt has no complaints. Pt opening eyes appropriately. PGY 1 asked pt questions in mandarin, pt able to answer appropriately. Pt able to say she's in hospital, identified her chinese age 79, and name. Pt denies pain in eyes, , SOB.    REVIEW OF SYSTEMS:  CONSTITUTIONAL: No fever, weight loss, or fatigue  RESPIRATORY: No cough, wheezing, chills or hemoptysis; No shortness of breath  CARDIOVASCULAR: No chest pain, palpitations, dizziness, or leg swelling  GASTROINTESTINAL: No abdominal pain. No nausea, vomiting, or hematemesis; No diarrhea or constipation. No melena or hematochezia.  GENITOURINARY: No dysuria or hematuria, urinary frequency  NEUROLOGICAL: No headaches, memory loss, loss of strength, numbness, or tremors  SKIN: No itching, burning, rashes, or lesions     Vital Signs Last 24 Hrs  T(C): 36.8 (09 Oct 2020 09:02), Max: 37.1 (08 Oct 2020 19:17)  T(F): 98.2 (09 Oct 2020 09:02), Max: 98.8 (08 Oct 2020 19:17)  HR: 66 (09 Oct 2020 09:02) (66 - 94)  BP: 106/44 (09 Oct 2020 09:02) (106/44 - 156/60)  BP(mean): 72 (09 Oct 2020 05:14) (72 - 72)  RR: 17 (09 Oct 2020 09:02) (17 - 18)  SpO2: 95% (09 Oct 2020 09:02) (95% - 100%)    PHYSICAL EXAMINATION:  GENERAL: NAD, well built  HEAD:  Atraumatic, Normocephalic  EYES:  conjunctiva and sclera clear  NECK: Supple, No JVD, Normal thyroid  CHEST/LUNG: Clear to auscultation. Clear to percussion bilaterally; No rales, rhonchi, wheezing, or rubs  HEART: Regular rate and rhythm; No murmurs, rubs, or gallops  ABDOMEN: Soft, Nontender, Nondistended; Bowel sounds present  NERVOUS SYSTEM:  Alert & Oriented X2, following commands.    EXTREMITIES:  2+ Peripheral Pulses, No clubbing, cyanosis, or edema  SKIN: warm dry                          9.7    7.99  )-----------( 268      ( 09 Oct 2020 06:06 )             28.9     10-09    140  |  110<H>  |  23<H>  ----------------------------<  160<H>  3.7   |  24  |  0.71    Ca    8.5      09 Oct 2020 06:06  Phos  3.0     10-09  Mg     2.1     10-09                CAPILLARY BLOOD GLUCOSE      RADIOLOGY & ADDITIONAL TESTS:  < from: CT Abdomen and Pelvis w/ Oral Cont and w/ IV Cont (10.08.20 @ 16:42) >  IMPRESSION:  Findingsat the right lung base suspicious for a pulmonary embolus, new since 10/4/2020.    Presacral edema and fluid of uncertain etiology not seen on the prior study.    No evidence of ascites or intestinal obstruction.    These critical values were discussed by Dr. Ty Krishna with Dr. Chidi Manning on 10/8/2020 4:58 PM with read back.      < end of copied text >

## 2020-10-09 NOTE — PROGRESS NOTE ADULT - ASSESSMENT
Cerebral degenerative disease of unknown cause; more specific CSF tests (paraneoplastic and RT QuIC ) pending continue supportive care - discussed with son

## 2020-10-09 NOTE — PROGRESS NOTE ADULT - PROBLEM SELECTOR PLAN 9
son expresses difficulty caring for patient at home, family agrees to workup to assess for organic cause of cognitive decline --> would now like to take patient home given improvement of clinical status

## 2020-10-10 LAB
ALBUMIN SERPL ELPH-MCNC: 2.7 G/DL — LOW (ref 3.5–5)
ALP SERPL-CCNC: 25 U/L — LOW (ref 40–120)
ALT FLD-CCNC: 21 U/L DA — SIGNIFICANT CHANGE UP (ref 10–60)
ANION GAP SERPL CALC-SCNC: 5 MMOL/L — SIGNIFICANT CHANGE UP (ref 5–17)
ANION GAP SERPL CALC-SCNC: 6 MMOL/L — SIGNIFICANT CHANGE UP (ref 5–17)
ANISOCYTOSIS BLD QL: SLIGHT — SIGNIFICANT CHANGE UP
APTT BLD: 38.8 SEC — HIGH (ref 27.5–35.5)
AST SERPL-CCNC: 25 U/L — SIGNIFICANT CHANGE UP (ref 10–40)
BASOPHILS # BLD AUTO: 0.01 K/UL — SIGNIFICANT CHANGE UP (ref 0–0.2)
BASOPHILS NFR BLD AUTO: 0.2 % — SIGNIFICANT CHANGE UP (ref 0–2)
BILIRUB SERPL-MCNC: 0.3 MG/DL — SIGNIFICANT CHANGE UP (ref 0.2–1.2)
BLD GP AB SCN SERPL QL: SIGNIFICANT CHANGE UP
BUN SERPL-MCNC: 14 MG/DL — SIGNIFICANT CHANGE UP (ref 7–18)
BUN SERPL-MCNC: 69 MG/DL — HIGH (ref 7–18)
CALCIUM SERPL-MCNC: 7.9 MG/DL — LOW (ref 8.4–10.5)
CALCIUM SERPL-MCNC: 8.3 MG/DL — LOW (ref 8.4–10.5)
CHLORIDE SERPL-SCNC: 106 MMOL/L — SIGNIFICANT CHANGE UP (ref 96–108)
CHLORIDE SERPL-SCNC: 107 MMOL/L — SIGNIFICANT CHANGE UP (ref 96–108)
CO2 SERPL-SCNC: 26 MMOL/L — SIGNIFICANT CHANGE UP (ref 22–31)
CO2 SERPL-SCNC: 27 MMOL/L — SIGNIFICANT CHANGE UP (ref 22–31)
CREAT SERPL-MCNC: 0.65 MG/DL — SIGNIFICANT CHANGE UP (ref 0.5–1.3)
CREAT SERPL-MCNC: 4.97 MG/DL — HIGH (ref 0.5–1.3)
CULTURE RESULTS: SIGNIFICANT CHANGE UP
EOSINOPHIL # BLD AUTO: 0.15 K/UL — SIGNIFICANT CHANGE UP (ref 0–0.5)
EOSINOPHIL NFR BLD AUTO: 2.4 % — SIGNIFICANT CHANGE UP (ref 0–6)
GLUCOSE BLDC GLUCOMTR-MCNC: 129 MG/DL — HIGH (ref 70–99)
GLUCOSE BLDC GLUCOMTR-MCNC: 136 MG/DL — HIGH (ref 70–99)
GLUCOSE BLDC GLUCOMTR-MCNC: 144 MG/DL — HIGH (ref 70–99)
GLUCOSE BLDC GLUCOMTR-MCNC: 206 MG/DL — HIGH (ref 70–99)
GLUCOSE SERPL-MCNC: 198 MG/DL — HIGH (ref 70–99)
GLUCOSE SERPL-MCNC: 82 MG/DL — SIGNIFICANT CHANGE UP (ref 70–99)
HCT VFR BLD CALC: 20.2 % — CRITICAL LOW (ref 34.5–45)
HCT VFR BLD CALC: 32.9 % — LOW (ref 34.5–45)
HGB BLD-MCNC: 11 G/DL — LOW (ref 11.5–15.5)
HGB BLD-MCNC: 6.7 G/DL — CRITICAL LOW (ref 11.5–15.5)
IMM GRANULOCYTES NFR BLD AUTO: 0.5 % — SIGNIFICANT CHANGE UP (ref 0–1.5)
INR BLD: 1.03 RATIO — SIGNIFICANT CHANGE UP (ref 0.88–1.16)
IRON SATN MFR SERPL: 18 % — SIGNIFICANT CHANGE UP (ref 15–50)
IRON SATN MFR SERPL: 182 UG/DL — HIGH (ref 40–150)
IRON SATN MFR SERPL: 40 UG/DL — SIGNIFICANT CHANGE UP (ref 40–150)
IRON SATN MFR SERPL: 88 % — HIGH (ref 15–50)
LACTATE SERPL-SCNC: 1.3 MMOL/L — SIGNIFICANT CHANGE UP (ref 0.7–2)
LDH SERPL L TO P-CCNC: 179 U/L — SIGNIFICANT CHANGE UP (ref 120–225)
LDH SERPL L TO P-CCNC: 238 U/L — HIGH (ref 120–225)
LYMPHOCYTES # BLD AUTO: 1.69 K/UL — SIGNIFICANT CHANGE UP (ref 1–3.3)
LYMPHOCYTES # BLD AUTO: 27.3 % — SIGNIFICANT CHANGE UP (ref 13–44)
MACROCYTES BLD QL: SLIGHT — SIGNIFICANT CHANGE UP
MAGNESIUM SERPL-MCNC: 2 MG/DL — SIGNIFICANT CHANGE UP (ref 1.6–2.6)
MANUAL SMEAR VERIFICATION: SIGNIFICANT CHANGE UP
MCHC RBC-ENTMCNC: 28.2 PG — SIGNIFICANT CHANGE UP (ref 27–34)
MCHC RBC-ENTMCNC: 29.4 PG — SIGNIFICANT CHANGE UP (ref 27–34)
MCHC RBC-ENTMCNC: 33.2 GM/DL — SIGNIFICANT CHANGE UP (ref 32–36)
MCHC RBC-ENTMCNC: 33.4 GM/DL — SIGNIFICANT CHANGE UP (ref 32–36)
MCV RBC AUTO: 84.4 FL — SIGNIFICANT CHANGE UP (ref 80–100)
MCV RBC AUTO: 88.6 FL — SIGNIFICANT CHANGE UP (ref 80–100)
MONOCYTES # BLD AUTO: 0.44 K/UL — SIGNIFICANT CHANGE UP (ref 0–0.9)
MONOCYTES NFR BLD AUTO: 7.1 % — SIGNIFICANT CHANGE UP (ref 2–14)
NEUTROPHILS # BLD AUTO: 3.87 K/UL — SIGNIFICANT CHANGE UP (ref 1.8–7.4)
NEUTROPHILS NFR BLD AUTO: 62.5 % — SIGNIFICANT CHANGE UP (ref 43–77)
NRBC # BLD: 0 /100 WBCS — SIGNIFICANT CHANGE UP (ref 0–0)
NRBC # BLD: 0 /100 WBCS — SIGNIFICANT CHANGE UP (ref 0–0)
OB PNL STL: POSITIVE
PHOSPHATE SERPL-MCNC: 4 MG/DL — SIGNIFICANT CHANGE UP (ref 2.5–4.5)
PLAT MORPH BLD: NORMAL — SIGNIFICANT CHANGE UP
PLATELET # BLD AUTO: 177 K/UL — SIGNIFICANT CHANGE UP (ref 150–400)
PLATELET # BLD AUTO: 351 K/UL — SIGNIFICANT CHANGE UP (ref 150–400)
POIKILOCYTOSIS BLD QL AUTO: SLIGHT — SIGNIFICANT CHANGE UP
POLYCHROMASIA BLD QL SMEAR: SLIGHT — SIGNIFICANT CHANGE UP
POTASSIUM SERPL-MCNC: 3.6 MMOL/L — SIGNIFICANT CHANGE UP (ref 3.5–5.3)
POTASSIUM SERPL-MCNC: 4.2 MMOL/L — SIGNIFICANT CHANGE UP (ref 3.5–5.3)
POTASSIUM SERPL-SCNC: 3.6 MMOL/L — SIGNIFICANT CHANGE UP (ref 3.5–5.3)
POTASSIUM SERPL-SCNC: 4.2 MMOL/L — SIGNIFICANT CHANGE UP (ref 3.5–5.3)
PROT SERPL-MCNC: 5.6 G/DL — LOW (ref 6–8.3)
PROTHROM AB SERPL-ACNC: 12.2 SEC — SIGNIFICANT CHANGE UP (ref 10.6–13.6)
RBC # BLD: 2.28 M/UL — LOW (ref 3.8–5.2)
RBC # BLD: 3.9 M/UL — SIGNIFICANT CHANGE UP (ref 3.8–5.2)
RBC # FLD: 14.3 % — SIGNIFICANT CHANGE UP (ref 10.3–14.5)
RBC # FLD: 16.5 % — HIGH (ref 10.3–14.5)
RBC BLD AUTO: ABNORMAL
RETICS #: 43.5 K/UL — SIGNIFICANT CHANGE UP (ref 25–125)
RETICS/RBC NFR: 1.9 % — SIGNIFICANT CHANGE UP (ref 0.5–2.5)
SODIUM SERPL-SCNC: 138 MMOL/L — SIGNIFICANT CHANGE UP (ref 135–145)
SODIUM SERPL-SCNC: 139 MMOL/L — SIGNIFICANT CHANGE UP (ref 135–145)
SPECIMEN SOURCE: SIGNIFICANT CHANGE UP
TIBC SERPL-MCNC: 206 UG/DL — LOW (ref 250–450)
TIBC SERPL-MCNC: 226 UG/DL — LOW (ref 250–450)
TRANSFERRIN SERPL-MCNC: 199 MG/DL — LOW (ref 200–360)
UIBC SERPL-MCNC: 186 UG/DL — SIGNIFICANT CHANGE UP (ref 110–370)
UIBC SERPL-MCNC: 24 UG/DL — LOW (ref 110–370)
WBC # BLD: 6.19 K/UL — SIGNIFICANT CHANGE UP (ref 3.8–10.5)
WBC # BLD: 8.44 K/UL — SIGNIFICANT CHANGE UP (ref 3.8–10.5)
WBC # FLD AUTO: 6.19 K/UL — SIGNIFICANT CHANGE UP (ref 3.8–10.5)
WBC # FLD AUTO: 8.44 K/UL — SIGNIFICANT CHANGE UP (ref 3.8–10.5)

## 2020-10-10 PROCEDURE — 99232 SBSQ HOSP IP/OBS MODERATE 35: CPT

## 2020-10-10 RX ORDER — LANOLIN ALCOHOL/MO/W.PET/CERES
3 CREAM (GRAM) TOPICAL ONCE
Refills: 0 | Status: COMPLETED | OUTPATIENT
Start: 2020-10-10 | End: 2020-10-10

## 2020-10-10 RX ORDER — ENOXAPARIN SODIUM 100 MG/ML
60 INJECTION SUBCUTANEOUS
Refills: 0 | Status: DISCONTINUED | OUTPATIENT
Start: 2020-10-10 | End: 2020-10-12

## 2020-10-10 RX ORDER — ENOXAPARIN SODIUM 100 MG/ML
60 INJECTION SUBCUTANEOUS ONCE
Refills: 0 | Status: DISCONTINUED | OUTPATIENT
Start: 2020-10-10 | End: 2020-10-10

## 2020-10-10 RX ORDER — ASPIRIN/CALCIUM CARB/MAGNESIUM 324 MG
81 TABLET ORAL DAILY
Refills: 0 | Status: DISCONTINUED | OUTPATIENT
Start: 2020-10-10 | End: 2020-10-15

## 2020-10-10 RX ADMIN — Medication 1 DROP(S): at 05:35

## 2020-10-10 RX ADMIN — Medication 60 MILLIGRAM(S): at 05:35

## 2020-10-10 RX ADMIN — Medication 1 DROP(S): at 17:12

## 2020-10-10 RX ADMIN — Medication 1 DROP(S): at 05:34

## 2020-10-10 RX ADMIN — SODIUM CHLORIDE 75 MILLILITER(S): 9 INJECTION INTRAMUSCULAR; INTRAVENOUS; SUBCUTANEOUS at 00:51

## 2020-10-10 RX ADMIN — Medication 25 MILLIGRAM(S): at 05:35

## 2020-10-10 RX ADMIN — QUETIAPINE FUMARATE 25 MILLIGRAM(S): 200 TABLET, FILM COATED ORAL at 17:57

## 2020-10-10 RX ADMIN — Medication 500 MILLIGRAM(S): at 12:05

## 2020-10-10 RX ADMIN — Medication 25 MILLIGRAM(S): at 17:12

## 2020-10-10 RX ADMIN — ENOXAPARIN SODIUM 60 MILLIGRAM(S): 100 INJECTION SUBCUTANEOUS at 05:35

## 2020-10-10 RX ADMIN — LATANOPROST 1 DROP(S): 0.05 SOLUTION/ DROPS OPHTHALMIC; TOPICAL at 22:06

## 2020-10-10 RX ADMIN — ATORVASTATIN CALCIUM 20 MILLIGRAM(S): 80 TABLET, FILM COATED ORAL at 22:06

## 2020-10-10 RX ADMIN — Medication 3 MILLIGRAM(S): at 01:10

## 2020-10-10 RX ADMIN — ENOXAPARIN SODIUM 60 MILLIGRAM(S): 100 INJECTION SUBCUTANEOUS at 18:00

## 2020-10-10 RX ADMIN — Medication 81 MILLIGRAM(S): at 18:00

## 2020-10-10 RX ADMIN — QUETIAPINE FUMARATE 25 MILLIGRAM(S): 200 TABLET, FILM COATED ORAL at 05:35

## 2020-10-10 NOTE — PROGRESS NOTE ADULT - ASSESSMENT
B/L P.E.  Subacute onset Dementia etiology unclear with Psychosis much improved post LP concern for Normal Pressure hydrocephalus  ??Ocular muscle weakness concern for Myasthenia Gravis less likely given clinical improvement  Eye glare possibly due to Glaucoma resolved  Atypical Chest pain negative ACS  Pancreatic cyst likely benign  Thyroid nodule    Hx DM, HTN, HLD    Plan:   B/L PE on CTA; Lovenox therapeutic  Will check with outpatient pharmacy coverage for NOACs prior to discharge; maybe Monday  continues to have significant clinical improvement in Mental status   Neuro follow up; d/w Dr. Alfaro; CSF negative for any infectious etiology; Protein WNL; await specialized testing for   MRI Brain with no structural pathology; EEG negative for epileptiform activity  Increased IOP concerning for Glaucoma; Continue Xalatan and Timolol;  follow up with ophthalmologist as outpatient on discharge   Endocrine follow up for thyroid nodule  Pancreatic cyst previously reported but current CT reported normal  Failed TOV; CT no significant findings to cause urinary retention; Urology eval appreciated; d/w Dr. De Leon/ Jayson Bautista  Will attempt another TOV maybe today as patient has been ambulating better now and also OOB to chair; PT follow up  IV hydration as patient got contrast again; good urine output  Rest of the management as above         B/L P.E.  Subacute onset Dementia etiology unclear with Psychosis much improved post LP concern for Normal Pressure hydrocephalus  ??Ocular muscle weakness concern for Myasthenia Gravis less likely given clinical improvement  Eye glare possibly due to Glaucoma resolved  Atypical Chest pain negative ACS  Pancreatic cyst likely benign  Thyroid nodule    Hx DM, HTN, HLD    Plan:   B/L PE on CTA; Lovenox therapeutic  Will check with outpatient pharmacy coverage for NOACs prior to discharge; maybe Monday  continues to have significant clinical improvement in Mental status   Neuro follow up; d/w Dr. Alfaro; CSF negative for any infectious etiology; Protein WNL; await specialized testing send to [facility]  MRI Brain with no structural pathology; EEG negative for epileptiform activity  Increased IOP concerning for Glaucoma; Continue Xalatan and Timolol;  follow up with ophthalmologist as outpatient on discharge   Endocrine follow up for thyroid nodule  Pancreatic cyst previously reported but current CT reported normal  Failed TOV before; CT no significant findings to cause urinary retention; Urology eval appreciated; d/w Dr. De Leon/ Jayson Bautista  Will attempt another TOV maybe today as patient has been ambulating better now and also OOB to chair; PT follow up  IV hydration as patient got contrast again; good urine output  Discussed with RN; Avoid Restraints as much as possible; Use reinforcement with  face to face.  Patient was able to ambulate well with a walker today.

## 2020-10-10 NOTE — PROGRESS NOTE ADULT - SUBJECTIVE AND OBJECTIVE BOX
MEDICAL ATTENDING NOTE  Patient is a 80y old  Female who presents with a chief complaint of chest pain (09 Oct 2020 17:29)    Patient was seen and examined at bedside with medical student Ena collier ;      Patient is doing well; overnight placed on mittens. Patient noted to be awake, opening eyes and communicating well with student. says wants to walk. at home she walks around with a cane, can go up and down and even goes out to grocery store. Here nurses are not letting her get up, go to bathroom and hence she gets irritated.  Patient had Urinary retention for which indwelling mckeon in place.   With myself, MS3 and PCA assisying, she was able to get up from bed, stand up and hold walker and ambulated more than 150 feet, with fair gait balance.       INTERVAL HPI/OVERNIGHT EVENTS: Patient  denies fever, chills, SOB, palpitations, chest pain, nausea, vomiting, diarrhea, constipation, dizziness    MEDICATIONS  (STANDING):  artificial  tears Solution 1 Drop(s) Both EYES two times a day  ascorbic acid 500 milliGRAM(s) Oral daily  atorvastatin 20 milliGRAM(s) Oral at bedtime  insulin lispro (HumaLOG) corrective regimen sliding scale   SubCutaneous three times a day before meals  latanoprost 0.005% Ophthalmic Solution 1 Drop(s) Both EYES at bedtime  metoprolol tartrate 25 milliGRAM(s) Oral two times a day  NIFEdipine XL 60 milliGRAM(s) Oral daily  QUEtiapine 25 milliGRAM(s) Oral two times a day  sodium chloride 0.9%. 1000 milliLiter(s) (75 mL/Hr) IV Continuous <Continuous>  timolol 0.25% Solution 1 Drop(s) Both EYES two times a day    MEDICATIONS  (PRN):  acetaminophen   Tablet .. 650 milliGRAM(s) Oral every 6 hours PRN Mild Pain (1 - 3), Moderate Pain (4 - 6)      __________________________________________________  REVIEW OF SYSTEMS:    CONSTITUTIONAL: No fever,   EYES: no acute visual disturbances; eyes open, denies pain  RESPIRATORY: No cough; No shortness of breath  CARDIOVASCULAR: No chest pain, no palpitations  GASTROINTESTINAL: No pain. No nausea or vomiting; No diarrhea   NEUROLOGICAL: No headache or numbness, no tremors  MUSCULOSKELETAL: No joint pain, no muscle pain  GENITOURINARY: mckeon in place; good urine output.   PSYCHIATRY: no depression, no anxiety  ALL OTHER  ROS negative        Vital Signs Last 24 Hrs  T(C): 36.6 (10 Oct 2020 08:04), Max: 36.8 (09 Oct 2020 23:42)  T(F): 97.9 (10 Oct 2020 08:04), Max: 98.3 (09 Oct 2020 23:42)  HR: 65 (10 Oct 2020 08:04) (65 - 89)  BP: 160/59 (10 Oct 2020 08:04) (152/61 - 186/66)  RR: 18 (10 Oct 2020 08:04) (18 - 18)  SpO2: 100% (10 Oct 2020 08:04) (98% - 100%)    ________________________________________________  PHYSICAL EXAM:  GENERAL: elderly female, forgetful but much improved confusion and NAD  HEENT: conjunctivae and sclerae clear; moist mucous membranes;   CHEST/LUNG: Clear to auscultation bilaterally with good air entry   HEART: S1 S2  regular; no murmurs, gallops or rubs  ABDOMEN: Soft, Nontender, Nondistended; Bowel sounds present  EXTREMITIES: no cyanosis; no edema; no calf tenderness  SKIN: warm and dry; no rash  NERVOUS SYSTEM:  Awake and alert; Oriented  to place, person and time ; no new deficits    _________________________________________________  LABS: (labs drawn this morning was erroneous)  10-10    138  |  106  |  14  ----------------------------<  198<H>  3.6   |  26  |  0.65    Ca    8.3<L>      10 Oct 2020 09:22  Phos  4.0     10-10  Mg     2.0     10-10    TPro  5.6<L>  /  Alb  2.7<L>  /  TBili  0.3  /  DBili  x   /  AST  25  /  ALT  21  /  AlkPhos  25<L>  10-10    PT/INR - ( 10 Oct 2020 08:26 )   PT: 12.2 sec;   INR: 1.03 ratio    PTT - ( 10 Oct 2020 08:26 )  PTT:38.8 sec      POCT Blood Glucose.: 144 mg/dL (10 Oct 2020 11:32)  POCT Blood Glucose.: 206 mg/dL (10 Oct 2020 07:54)  POCT Blood Glucose.: 125 mg/dL (09 Oct 2020 21:22)  POCT Blood Glucose.: 170 mg/dL (09 Oct 2020 16:59)        RADIOLOGY & ADDITIONAL TESTS:    Consultant(s) Notes Reviewed:   YES    Care Discussed with Consultants : Neuro; Dr. Alfaro    Plan of care was discussed with patient and /or primary care giver; all questions and concerns were addressed and care was aligned with patient's wishes.

## 2020-10-10 NOTE — PROGRESS NOTE ADULT - SUBJECTIVE AND OBJECTIVE BOX
INTERVAL HPI/OVERNIGHT EVENTS: Patient  denies fever, chills, SOB, palpitations, chest pain, nausea, vomiting, diarrhea, constipation, dizziness  Continues to improve more active; walked with Dr Riddle; moved her bowels;   MEDICATIONS  (STANDING):  artificial  tears Solution 1 Drop(s) Both EYES two times a day  ascorbic acid 500 milliGRAM(s) Oral daily  atorvastatin 20 milliGRAM(s) Oral at bedtime  insulin lispro (HumaLOG) corrective regimen sliding scale   SubCutaneous three times a day before meals  latanoprost 0.005% Ophthalmic Solution 1 Drop(s) Both EYES at bedtime  metoprolol tartrate 25 milliGRAM(s) Oral two times a day  NIFEdipine XL 60 milliGRAM(s) Oral daily  QUEtiapine 25 milliGRAM(s) Oral two times a day  sodium chloride 0.9%. 1000 milliLiter(s) (75 mL/Hr) IV Continuous <Continuous>  timolol 0.25% Solution 1 Drop(s) Both EYES two times a day    MEDICATIONS  (PRN):  acetaminophen   Tablet .. 650 milliGRAM(s) Oral every 6 hours PRN Mild Pain (1 - 3), Moderate Pain (4 - 6)      __________________________________________________  REVIEW OF SYSTEMS:    CONSTITUTIONAL: No fever,   EYES: no acute visual disturbances; eyes open, denies pain  RESPIRATORY: No cough; No shortness of breath  CARDIOVASCULAR: No chest pain, no palpitations  GASTROINTESTINAL: No pain. No nausea or vomiting; No diarrhea   NEUROLOGICAL: No headache or numbness, no tremors  MUSCULOSKELETAL: No joint pain, no muscle pain  GENITOURINARY: mckeon in place; good urine output.   PSYCHIATRY: no depression, no anxiety  ALL OTHER  ROS negative        Vital Signs Last 24 Hrs  T(C): 36.6 (10 Oct 2020 08:04), Max: 36.8 (09 Oct 2020 23:42)  T(F): 97.9 (10 Oct 2020 08:04), Max: 98.3 (09 Oct 2020 23:42)  HR: 65 (10 Oct 2020 08:04) (65 - 89)  BP: 160/59 (10 Oct 2020 08:04) (152/61 - 186/66)  RR: 18 (10 Oct 2020 08:04) (18 - 18)  SpO2: 100% (10 Oct 2020 08:04) (98% - 100%)    ________________________________________________  PHYSICAL EXAM:  GENERAL: elderly female, forgetful but much improved confusion and NAD  HEENT: conjunctivae and sclerae clear; moist mucous membranes;   CHEST/LUNG: Clear to auscultation bilaterally with good air entry   HEART: S1 S2  regular; no murmurs, gallops or rubs  ABDOMEN: Soft, Nontender, Nondistended; Bowel sounds present  EXTREMITIES: no cyanosis; no edema; no calf tenderness  SKIN: warm and dry; no rash  NERVOUS SYSTEM:      Awake alert normal language. Disoriented in time ut today she is able tocorrectly identify the season (fall) Chinese helped by phlebotomist who speaks Chinese. oriented only to hospital. Difficulty naming but able to identify a camel today and better at repetition. Memory poor and praxis (folding a rectangular paper in half)  able to perform today. Abstract judging similarities abnormal.BRYCE VF full EOM full, face jaw tongue and palate normal; Moves all 4 extremities equally. Symmetrical reflexes and sensation. Gait unsteady assisted.  _________________________________________________  LABS: (labs drawn this morning was erroneous)  10-10    138  |  106  |  14  ----------------------------<  198<H>  3.6   |  26  |  0.65    Ca    8.3<L>      10 Oct 2020 09:22  Phos  4.0     10-10  Mg     2.0     10-10    TPro  5.6<L>  /  Alb  2.7<L>  /  TBili  0.3  /  DBili  x   /  AST  25  /  ALT  21  /  AlkPhos  25<L>  10-10    PT/INR - ( 10 Oct 2020 08:26 )   PT: 12.2 sec;   INR: 1.03 ratio    PTT - ( 10 Oct 2020 08:26 )  PTT:38.8 sec  Total Nucleated Cell Count, CSF: <1 /uL (10.07.20 @ 15:25)   CSF Segmented Neutrophils: 0 % (10.07.20 @ 15:25)   RBC Count - Spinal Fluid: 0 /uL (10.07.20 @ 15:25)   Protein, CSF: 35 mg/dL (10.07.20 @ 15:24)   Glucose, CSF: 89 mg/dL (10.07.20 @ 15:24)   VDRL Titer, CSF: Negative: Test Performed by:   Cryptococcal Antigen - CSF: Negative (10.07.20 @ 19:20)   CSF PCR Result: NotDetec: The meningitis/encephalitis (ME) panel (CSFPCR) is a PCR based assay that Cryptococcal Antigen - CSF: Negative (10.07.20 @ 19:20)   Acid Fast Bacilli Smear:   Less than 5 cc of CSF received,   unable to perform AFB smear. (10.07.20 @ 19:01)       RADIOLOGY & ADDITIONAL TESTS:  < from: MR Head No Cont (10.07.20 @ 10:28) >  EXAM:  MR BRAIN                            PROCEDURE DATE:  10/07/2020          INTERPRETATION:  Exam Date: 10/7/2020 10:28 AM    MR brain  without gadolinium    CLINICAL INFORMATION:  sudden onset dementia    TECHNIQUE:   Sagittal and axial T1-weighted images, axial FLAIR images, gradient echo, axial  T2-weighted images and axial diffusion weighted images of the brain were obtained.    FINDINGS: Comparison is made to CT head of 8/20/2020.    There is no evidence of acute infarct, hemorrhage, or mass lesion. There are patchy and scattered foci of FLAIR hyperintensity in the periventricular and subcortical white matter of the bilateral cerebri, compatible with moderate chronic microvascular ischemic changes. There is no midline shift or herniation pattern. No mass effect is found in the brain.    The ventricles, sulci and basal cisterns appear unremarkable.    The vertebral and internal carotid arteries demonstrate expected flow voids indicating their patency.    The paranasal sinuses are clear.    IMPRESSION:   No acute infarct. Moderate periventricular and subcortical white matter chronic microvascular ischemic changes.      LEXI PEREZ M.D., ATTENDING RADIOLOGIST  This document has been electronically signed. Oct  10486 10:35AM    < end of copied text >  EEG REPORT:   EEG Report:  · EEG Report	  ANNELIESE HERRERA [id number]  Admitting MD: Dr. eBtty Riddle    FINDINGS:  The background was continuous, spontaneously variable and reactive.  During wakefulness, the posteriorly dominant rhythm consisted of symmetric, well modulated 10Hz activity, with an amplitude to 30 uV, that attenuated to eye opening.  Low amplitude central beta was noted in wakefulness.    Background Slowing:  Generalized slowing: none was present.  Focal slowing: none was present.    Sleep Background:  Frequent drowsiness was characterized by fragmentation, attenuation, and slowing of the background activity.    Sleep was characterized by the presence of vertex waves, symmetric spindles, and K-complexes.    Epileptiform Activity:   No epileptiform discharges were present.    Events:  No clinical events were recorded.  No seizures were recorded.    Activation Procedures:   -Hyperventilation was not performed.    -Photic stimulation was performed and did not elicit any abnormalities.      Artifacts:  Intermittent myogenic and movement artifacts were noted.    ECG:  The heart rate on single channel ECG at baseline was predominantly near BPM = 70-80  -----------------------------------------------------------------------------------------------------    EEG Classification / Summary:  Normal EEG study, awake / drowsy / asleep    -Frequent drowsiness noted.  -----------------------------------------------------------------------------------------------------    Clinical Impression:  There were no epileptiform abnormalities recorded.      -------------------------------------------------------------------------------------------------------  Huy Jeronimo M.D.   of Neurology, [facility]	        Electronic Signatures:  Huy Jeronimo)  (Signed 07-Oct-2020 15:55)  	Authored: EEG REPORT

## 2020-10-10 NOTE — CHART NOTE - NSCHARTNOTEFT_GEN_A_CORE
Call team was signed out to follow TOV after Dorado catheter was removed at 2 pm today. Bladder scan was done at 8:30 pm, after patient voided ~100 cc, nurse reported that pt was retaining around 350 cc, hence Dorado was placed.

## 2020-10-10 NOTE — CHART NOTE - NSCHARTNOTEFT_GEN_A_CORE
I was informed about the morning Hb critical value of Hb 6.7 , patient was assessed bed side, no signs of  active bleeding . I ordered repeat CBC, FOBT, reticulocyte count, LDH,Lactate, PTT/INR, TIBC, transferrin ,type and screen . signed out the case to the morning team to give PRBCs if repeat  CBC confirmed the Hb level of 6.7 for further follow up per primary team .

## 2020-10-11 DIAGNOSIS — I26.99 OTHER PULMONARY EMBOLISM WITHOUT ACUTE COR PULMONALE: ICD-10-CM

## 2020-10-11 LAB
ACRM MODULATING ANTIBODY: 0 NMOL/L — SIGNIFICANT CHANGE UP
ALBUMIN SERPL ELPH-MCNC: 2.6 G/DL — LOW (ref 3.5–5)
ALP SERPL-CCNC: 96 U/L — SIGNIFICANT CHANGE UP (ref 40–120)
ALT FLD-CCNC: 30 U/L DA — SIGNIFICANT CHANGE UP (ref 10–60)
ANION GAP SERPL CALC-SCNC: 7 MMOL/L — SIGNIFICANT CHANGE UP (ref 5–17)
AST SERPL-CCNC: 24 U/L — SIGNIFICANT CHANGE UP (ref 10–40)
BASOPHILS # BLD AUTO: 0.03 K/UL — SIGNIFICANT CHANGE UP (ref 0–0.2)
BASOPHILS NFR BLD AUTO: 0.4 % — SIGNIFICANT CHANGE UP (ref 0–2)
BILIRUB SERPL-MCNC: 0.4 MG/DL — SIGNIFICANT CHANGE UP (ref 0.2–1.2)
BUN SERPL-MCNC: 15 MG/DL — SIGNIFICANT CHANGE UP (ref 7–18)
CALCIUM SERPL-MCNC: 8.4 MG/DL — SIGNIFICANT CHANGE UP (ref 8.4–10.5)
CHLORIDE SERPL-SCNC: 102 MMOL/L — SIGNIFICANT CHANGE UP (ref 96–108)
CO2 SERPL-SCNC: 26 MMOL/L — SIGNIFICANT CHANGE UP (ref 22–31)
CREAT SERPL-MCNC: 0.64 MG/DL — SIGNIFICANT CHANGE UP (ref 0.5–1.3)
EOSINOPHIL # BLD AUTO: 0.44 K/UL — SIGNIFICANT CHANGE UP (ref 0–0.5)
EOSINOPHIL NFR BLD AUTO: 5.4 % — SIGNIFICANT CHANGE UP (ref 0–6)
GLUCOSE BLDC GLUCOMTR-MCNC: 143 MG/DL — HIGH (ref 70–99)
GLUCOSE BLDC GLUCOMTR-MCNC: 145 MG/DL — HIGH (ref 70–99)
GLUCOSE BLDC GLUCOMTR-MCNC: 158 MG/DL — HIGH (ref 70–99)
GLUCOSE BLDC GLUCOMTR-MCNC: 228 MG/DL — HIGH (ref 70–99)
GLUCOSE SERPL-MCNC: 132 MG/DL — HIGH (ref 70–99)
HCT VFR BLD CALC: 29.5 % — LOW (ref 34.5–45)
HGB BLD-MCNC: 9.5 G/DL — LOW (ref 11.5–15.5)
IMM GRANULOCYTES NFR BLD AUTO: 0.4 % — SIGNIFICANT CHANGE UP (ref 0–1.5)
LYMPHOCYTES # BLD AUTO: 1.14 K/UL — SIGNIFICANT CHANGE UP (ref 1–3.3)
LYMPHOCYTES # BLD AUTO: 14 % — SIGNIFICANT CHANGE UP (ref 13–44)
MAGNESIUM SERPL-MCNC: 2.1 MG/DL — SIGNIFICANT CHANGE UP (ref 1.6–2.6)
MCHC RBC-ENTMCNC: 27.4 PG — SIGNIFICANT CHANGE UP (ref 27–34)
MCHC RBC-ENTMCNC: 32.2 GM/DL — SIGNIFICANT CHANGE UP (ref 32–36)
MCV RBC AUTO: 85 FL — SIGNIFICANT CHANGE UP (ref 80–100)
MONOCYTES # BLD AUTO: 0.65 K/UL — SIGNIFICANT CHANGE UP (ref 0–0.9)
MONOCYTES NFR BLD AUTO: 8 % — SIGNIFICANT CHANGE UP (ref 2–14)
NEUTROPHILS # BLD AUTO: 5.87 K/UL — SIGNIFICANT CHANGE UP (ref 1.8–7.4)
NEUTROPHILS NFR BLD AUTO: 71.8 % — SIGNIFICANT CHANGE UP (ref 43–77)
NRBC # BLD: 0 /100 WBCS — SIGNIFICANT CHANGE UP (ref 0–0)
PHOSPHATE SERPL-MCNC: 3.4 MG/DL — SIGNIFICANT CHANGE UP (ref 2.5–4.5)
PLATELET # BLD AUTO: 298 K/UL — SIGNIFICANT CHANGE UP (ref 150–400)
POTASSIUM SERPL-MCNC: 3.2 MMOL/L — LOW (ref 3.5–5.3)
POTASSIUM SERPL-SCNC: 3.2 MMOL/L — LOW (ref 3.5–5.3)
PROT SERPL-MCNC: 6.1 G/DL — SIGNIFICANT CHANGE UP (ref 6–8.3)
RBC # BLD: 3.47 M/UL — LOW (ref 3.8–5.2)
RBC # FLD: 14.3 % — SIGNIFICANT CHANGE UP (ref 10.3–14.5)
SODIUM SERPL-SCNC: 135 MMOL/L — SIGNIFICANT CHANGE UP (ref 135–145)
WBC # BLD: 8.16 K/UL — SIGNIFICANT CHANGE UP (ref 3.8–10.5)
WBC # FLD AUTO: 8.16 K/UL — SIGNIFICANT CHANGE UP (ref 3.8–10.5)

## 2020-10-11 PROCEDURE — 93970 EXTREMITY STUDY: CPT | Mod: 26

## 2020-10-11 PROCEDURE — 99233 SBSQ HOSP IP/OBS HIGH 50: CPT | Mod: GC

## 2020-10-11 PROCEDURE — 99232 SBSQ HOSP IP/OBS MODERATE 35: CPT

## 2020-10-11 RX ORDER — POTASSIUM CHLORIDE 20 MEQ
40 PACKET (EA) ORAL EVERY 4 HOURS
Refills: 0 | Status: COMPLETED | OUTPATIENT
Start: 2020-10-11 | End: 2020-10-11

## 2020-10-11 RX ORDER — QUETIAPINE FUMARATE 200 MG/1
25 TABLET, FILM COATED ORAL
Refills: 0 | Status: DISCONTINUED | OUTPATIENT
Start: 2020-10-12 | End: 2020-10-15

## 2020-10-11 RX ORDER — DEXTROSE MONOHYDRATE, SODIUM CHLORIDE, AND POTASSIUM CHLORIDE 50; .745; 4.5 G/1000ML; G/1000ML; G/1000ML
1000 INJECTION, SOLUTION INTRAVENOUS
Refills: 0 | Status: DISCONTINUED | OUTPATIENT
Start: 2020-10-11 | End: 2020-10-14

## 2020-10-11 RX ADMIN — Medication 60 MILLIGRAM(S): at 05:40

## 2020-10-11 RX ADMIN — Medication 500 MILLIGRAM(S): at 12:24

## 2020-10-11 RX ADMIN — Medication 1 DROP(S): at 17:26

## 2020-10-11 RX ADMIN — Medication 1 DROP(S): at 05:40

## 2020-10-11 RX ADMIN — Medication 40 MILLIEQUIVALENT(S): at 17:27

## 2020-10-11 RX ADMIN — Medication 40 MILLIEQUIVALENT(S): at 21:42

## 2020-10-11 RX ADMIN — Medication 2: at 12:22

## 2020-10-11 RX ADMIN — DEXTROSE MONOHYDRATE, SODIUM CHLORIDE, AND POTASSIUM CHLORIDE 75 MILLILITER(S): 50; .745; 4.5 INJECTION, SOLUTION INTRAVENOUS at 13:25

## 2020-10-11 RX ADMIN — QUETIAPINE FUMARATE 25 MILLIGRAM(S): 200 TABLET, FILM COATED ORAL at 05:40

## 2020-10-11 RX ADMIN — Medication 25 MILLIGRAM(S): at 17:26

## 2020-10-11 RX ADMIN — ENOXAPARIN SODIUM 60 MILLIGRAM(S): 100 INJECTION SUBCUTANEOUS at 05:41

## 2020-10-11 RX ADMIN — Medication 81 MILLIGRAM(S): at 12:24

## 2020-10-11 RX ADMIN — Medication 25 MILLIGRAM(S): at 05:40

## 2020-10-11 RX ADMIN — LATANOPROST 1 DROP(S): 0.05 SOLUTION/ DROPS OPHTHALMIC; TOPICAL at 21:43

## 2020-10-11 RX ADMIN — ATORVASTATIN CALCIUM 20 MILLIGRAM(S): 80 TABLET, FILM COATED ORAL at 21:43

## 2020-10-11 RX ADMIN — ENOXAPARIN SODIUM 60 MILLIGRAM(S): 100 INJECTION SUBCUTANEOUS at 17:23

## 2020-10-11 NOTE — PROGRESS NOTE ADULT - PROBLEM SELECTOR PROBLEM 4
Glaucoma Thyroid nodule Bexarotene Counseling:  I discussed with the patient the risks of bexarotene including but not limited to hair loss, dry lips/skin/eyes, liver abnormalities, hyperlipidemia, pancreatitis, depression/suicidal ideation, photosensitivity, drug rash/allergic reactions, hypothyroidism, anemia, leukopenia, infection, cataracts, and teratogenicity.  Patient understands that they will need regular blood tests to check lipid profile, liver function tests, white blood cell count, thyroid function tests and pregnancy test if applicable.

## 2020-10-11 NOTE — PROGRESS NOTE ADULT - PROBLEM SELECTOR PLAN 4
noted to be closing eyes continually yesterday, endorsed "a glare" --> improved today  -IOP found to be 39mmHg left eye and 26mmHg right eye (nl >20 mmHg)  -c/w timolol and latanoprost drops thyroid nodules noted on CTA  -TSH, T3, T4 wnl  -thyroid US shows 3 right-sided nodules, largest likely benign spongiform lesion, s/p left thyroid lobectomy  -endo Dr. Ramos following

## 2020-10-11 NOTE — PROGRESS NOTE ADULT - PROBLEM SELECTOR PLAN 6
-cw home BB and CCB as above pw x3 days non-radiating, non-exertional, non-localized CP  -EKG NSR, trops negative x2, CTA negative for aortic dissection and PE  -cw home statin, asa, BB, CCB  -TTE showed LVH and G1DD  -cardiology Dr. Walton consulted  -remains off tele 2/2 patient agitation

## 2020-10-11 NOTE — PROGRESS NOTE ADULT - PROBLEM SELECTOR PLAN 3
thyroid nodules noted on CTA  -TSH, T3, T4 wnl  -thyroid US shows 3 right-sided nodules, largest likely benign spongiform lesion, s/p left thyroid lobectomy  -endo Dr. Ramos following patient noted to have firm, distended bowel on exam today  -possible constipation 2/2 urinary retention, FC placed

## 2020-10-11 NOTE — PROGRESS NOTE ADULT - PROBLEM SELECTOR PLAN 1
per family, patient able to perform ADLs as recently as August, has become abruptly more forgetful, disoriented, and paranoid over past 1-2 months  -cw home seroquel, aricept added  -LP performed 3pm today csf wnl to date  -MR head no acute pathologies, chronic ischemic changes noted  -EEG obtained today, awaiting official read  -discuss placement with CM, as family now having difficulties caring for her   -MARIAA per PT, family now wants to take pt home given clinical improvement  -neuro Dr. Alfaro   -B12 and folate wnl CTA Chest shows PE in upper/mid/lower R lobes, lower L lobe  c/w lovenox full dose 60 q12  Xarelto covered by insurance  F/U US Dopple LE r/o DVT

## 2020-10-11 NOTE — PROGRESS NOTE ADULT - ATTENDING COMMENTS
Patient was seen and examined earlier this morning around 11 AM. Agree with PGY1 A/P above with editing as needed. My independent assessment, findings on exam, diagnosis and plan of care as listed below. Discussed with Dr. Dhaliwal.    Patient is doing much better. not in restraints. Patient had Urinary retention overnight and mckeon catheter was replaced again last night. Patient has BM this afternoon as per RN. No new complaints    Vital Signs Last 24 Hrs  T(C): 36.2 (11 Oct 2020 08:11), Max: 36.9 (10 Oct 2020 23:36)  T(F): 97.1 (11 Oct 2020 08:11), Max: 98.5 (10 Oct 2020 23:36)  HR: 65 (11 Oct 2020 08:11) (65 - 86)  BP: 144/53 (11 Oct 2020 08:11) (135/68 - 161/82)  RR: 18 (11 Oct 2020 08:11) (18 - 18)  SpO2: 97% (11 Oct 2020 08:11) (97% - 100%)    P/E:   elderly female, comfortable at rest, NAD  Neuro: AAO x 2; No gross focal deficits; able to ambulate with assistance  CVS: S1S2 present, regular  Resp: BLAE+, No wheeze or Rhonchi  GI: Soft, Obese, BS+, Non distended, Non tender  Extr: No edema or calf tenderness    Labs:                                  9.5    8.16  )-----------( 298      ( 11 Oct 2020 07:04 )             29.5   10-11    135  |  102  |  15  ----------------------------<  132<H>  3.2<L>   |  26  |  0.64    Ca    8.4      11 Oct 2020 07:04  Phos  3.4     10-11  Mg     2.1     10-11    TPro  6.1  /  Alb  2.6<L>  /  TBili  0.4  /  DBili  x   /  AST  24  /  ALT  30  /  AlkPhos  96  10-11       A/P:  B/L P.E.  Urinary retention multifactorial from Dementia with possible NPH and  Fecal impaction with constipation and immobility  Subacute onset Dementia etiology unclear with Psychosis much improved post LP concern for Normal Pressure hydrocephalus  ??Ocular muscle weakness concern for Myasthenia Gravis less likely given clinical improvement  Eye glare possibly due to Glaucoma resolved  Atypical Chest pain negative ACS  Pancreatic cyst likely benign  Thyroid nodule    Hx DM, HTN, HLD    Plan:   Continue Lovenox therapeutic; covered for Xarelto as per PGY1 d/w Pharmacy  Will place on Xarelto once stable and no further intervention like repeat LP needed  Neuro follow up; d/w Dr. Alfaro;   MRI Brain with no structural pathology; EEG negative for epileptiform activity  Specialized lab test awaited;   Increased IOP concerning for Glaucoma; Continue Xalatan and Timolol;  follow up with ophthalmologist as outpatient on discharge   Endocrine follow up outpatient for thyroid nodule  Will discontinue Mckeon again and avoid reinsertion as patient is much more ambulatory   Will attempt another TOV in 24 hrs if OOB to chair and ambulatory; PT follow up  IV hydration as patient got contrast again  Rest of the management as above    Discussed with PGY1 Dr. Dhaliwal and PGY2 Dr. Blas  Discussed with Son at bedside this evening happy with patient cliniczal progress and improved mentation  Discussed with CHESTER Pizarro Patient was seen and examined earlier this morning around 11 AM. Agree with PGY1 A/P above with editing as needed. My independent assessment, findings on exam, diagnosis and plan of care as listed below. Discussed with Dr. Dhaliwal.    Patient is doing much better. not in restraints. Patient had Urinary retention overnight and mckeon catheter was replaced again last night. Patient has BM this afternoon as per RN. No new complaints    Vital Signs Last 24 Hrs  T(C): 36.2 (11 Oct 2020 08:11), Max: 36.9 (10 Oct 2020 23:36)  T(F): 97.1 (11 Oct 2020 08:11), Max: 98.5 (10 Oct 2020 23:36)  HR: 65 (11 Oct 2020 08:11) (65 - 86)  BP: 144/53 (11 Oct 2020 08:11) (135/68 - 161/82)  RR: 18 (11 Oct 2020 08:11) (18 - 18)  SpO2: 97% (11 Oct 2020 08:11) (97% - 100%)    P/E:   elderly female, comfortable at rest, NAD  Neuro: AAO x 2; No gross focal deficits; able to ambulate with assistance  CVS: S1S2 present, regular  Resp: BLAE+, No wheeze or Rhonchi  GI: Soft, Obese, BS+, Non distended, Non tender  Extr: No edema or calf tenderness    Labs:                                  9.5    8.16  )-----------( 298      ( 11 Oct 2020 07:04 )             29.5   10-11    135  |  102  |  15  ----------------------------<  132<H>  3.2<L>   |  26  |  0.64    Ca    8.4      11 Oct 2020 07:04  Phos  3.4     10-11  Mg     2.1     10-11    TPro  6.1  /  Alb  2.6<L>  /  TBili  0.4  /  DBili  x   /  AST  24  /  ALT  30  /  AlkPhos  96  10-11       A/P:  B/L P.E.  Urinary retention multifactorial from Dementia with possible NPH and  Fecal impaction with constipation and immobility  Subacute onset Dementia etiology unclear with Psychosis much improved post LP concern for Normal Pressure hydrocephalus/ Cerebral degenerative disease  ??Ocular muscle weakness concern for Myasthenia Gravis less likely given clinical improvement  Eye glare possibly due to Glaucoma resolved  Atypical Chest pain negative ACS  Pancreatic cyst likely benign  Thyroid nodule    Hx DM, HTN, HLD    Plan:   Continue Lovenox therapeutic; covered for Xarelto as per PGY1 d/w Pharmacy  Will place on Xarelto once stable and no further intervention like repeat LP needed  Neuro follow up; d/w Dr. Alfaro;   MRI Brain with no structural pathology; EEG negative for epileptiform activity  Specialized lab test awaited for potential cerebral degenerative disease  Increased IOP concerning for Glaucoma; Continue Xalatan and Timolol;  follow up with ophthalmologist as outpatient on discharge   Endocrine follow up outpatient for thyroid nodule  Will discontinue Mckeon again and avoid reinsertion as patient is much more ambulatory   Will attempt another TOV today and avoid putting mckeon and if need be straight cath only if significant retention and patoient in distress   PT follow up  IV hydration as patient got contrast again  Rest of the management as above    Discussed with PGY1 Dr. Dhaliwal   Discussed with Son at bedside this afternoon  Discussed with CHESTER Adkins  D/C Plan next 24 to 48 hrs if remain stable, able to urinate wihtout retention and outpatient follow up with Dr. Alfaro in the office

## 2020-10-11 NOTE — PROGRESS NOTE ADULT - PROBLEM SELECTOR PLAN 2
patient noted to have firm, distended bowel on exam today  -possible constipation 2/2 urinary retention, FC placed  -Will repeat CTA Chest in PM 10/9 due to ?PE on CT ab/pelvis w/ contrast.   Pt w/o symptoms suspicious for PE at this time. 5 per family, patient able to perform ADLs as recently as August, has become abruptly more forgetful, disoriented, and paranoid over past 1-2 months  -cw home seroquel, aricept added  -LP performed 3pm today csf wnl to date  -MR head no acute pathologies, chronic ischemic changes noted  -EEG obtained today, awaiting official read  -discuss placement with CM, as family now having difficulties caring for her   -MARIAA per PT, family now wants to take pt home given clinical improvement  -neuro Dr. Alfaro   -B12 and folate wnl

## 2020-10-11 NOTE — PROGRESS NOTE ADULT - ASSESSMENT
80F Cantonese-speaking, PMH dementia (recent onset), HTN, DM, HLD pw x3 days constant, non-localized, non-radiating, non-exertional chest pain, admitted to [facility] for cardiac monitoring and management/placement of recent-onset dementia.

## 2020-10-11 NOTE — PROGRESS NOTE ADULT - SUBJECTIVE AND OBJECTIVE BOX
PGY-1 Progress Note discussed with attending    PAGER #: [[phone]] TILL 5:00 PM  PLEASE CONTACT ON CALL TEAM:  - On Call Team (Please refer to [place]) FROM 5:00 PM - 8:30PM  - Nightfloat Team FROM 8:30 -7:30 AM    INTERVAL HPI/OVERNIGHT EVENTS:   Pt failed TOV 10/10, mckeon replaced.     REVIEW OF SYSTEMS:  CONSTITUTIONAL: No fever, weight loss, or fatigue  RESPIRATORY: No cough, wheezing, chills or hemoptysis; No shortness of breath  CARDIOVASCULAR: No chest pain, palpitations, dizziness, or leg swelling  GASTROINTESTINAL: No abdominal pain. No nausea, vomiting, or hematemesis; No diarrhea or constipation. No melena or hematochezia.  GENITOURINARY: No dysuria or hematuria, urinary frequency  NEUROLOGICAL: No headaches, memory loss, loss of strength, numbness, or tremors  SKIN: No itching, burning, rashes, or lesions     Vital Signs Last 24 Hrs  T(C): 36.9 (10 Oct 2020 23:36), Max: 36.9 (10 Oct 2020 23:36)  T(F): 98.5 (10 Oct 2020 23:36), Max: 98.5 (10 Oct 2020 23:36)  HR: 76 (11 Oct 2020 05:24) (65 - 86)  BP: 145/67 (11 Oct 2020 05:24) (135/68 - 161/82)  BP(mean): --  RR: 18 (10 Oct 2020 23:36) (18 - 18)  SpO2: 100% (10 Oct 2020 23:36) (99% - 100%)    PHYSICAL EXAMINATION:  GENERAL: NAD, well built  HEAD:  Atraumatic, Normocephalic  EYES:  conjunctiva and sclera clear  NECK: Supple, No JVD, Normal thyroid  CHEST/LUNG: Clear to auscultation. Clear to percussion bilaterally; No rales, rhonchi, wheezing, or rubs  HEART: Regular rate and rhythm; No murmurs, rubs, or gallops  ABDOMEN: Soft, Nontender, Nondistended; Bowel sounds present (+) mckeon in place  NERVOUS SYSTEM:  Alert & Oriented X3,    EXTREMITIES:  2+ Peripheral Pulses, No clubbing, cyanosis, or edema  SKIN: warm dry                          9.5    8.16  )-----------( x        ( 11 Oct 2020 07:04 )             29.5     10-10    138  |  106  |  14  ----------------------------<  198<H>  3.6   |  26  |  0.65    Ca    8.3<L>      10 Oct 2020 09:22  Phos  4.0     10-10  Mg     2.0     10-10    TPro  5.6<L>  /  Alb  2.7<L>  /  TBili  0.3  /  DBili  x   /  AST  25  /  ALT  21  /  AlkPhos  25<L>  10-10    LIVER FUNCTIONS - ( 10 Oct 2020 06:26 )  Alb: 2.7 g/dL / Pro: 5.6 g/dL / ALK PHOS: 25 U/L / ALT: 21 U/L DA / AST: 25 U/L / GGT: x               PT/INR - ( 10 Oct 2020 08:26 )   PT: 12.2 sec;   INR: 1.03 ratio         PTT - ( 10 Oct 2020 08:26 )  PTT:38.8 sec    CAPILLARY BLOOD GLUCOSE      RADIOLOGY & ADDITIONAL TESTS:                   PGY-1 Progress Note discussed with attending    PAGER #: [[phone]] TILL 5:00 PM  PLEASE CONTACT ON CALL TEAM:  - On Call Team (Please refer to [place]) FROM 5:00 PM - 8:30PM  - Nightfloat Team FROM 8:30 -7:30 AM    INTERVAL HPI/OVERNIGHT EVENTS:   Pt failed TOV 10/10, mckeon replaced. Pt AAOx2 this am, knows name, , year, believes she is at home.    REVIEW OF SYSTEMS:  CONSTITUTIONAL: No fever, weight loss, or fatigue  RESPIRATORY: No cough, wheezing, chills or hemoptysis; No shortness of breath  CARDIOVASCULAR: No chest pain, palpitations, dizziness, or leg swelling  GASTROINTESTINAL: No abdominal pain. No nausea, vomiting, or hematemesis; No diarrhea or constipation. No melena or hematochezia.  GENITOURINARY: No dysuria or hematuria, urinary frequency  NEUROLOGICAL: No headaches, memory loss, loss of strength, numbness, or tremors  SKIN: No itching, burning, rashes, or lesions     Vital Signs Last 24 Hrs  T(C): 36.9 (10 Oct 2020 23:36), Max: 36.9 (10 Oct 2020 23:36)  T(F): 98.5 (10 Oct 2020 23:36), Max: 98.5 (10 Oct 2020 23:36)  HR: 76 (11 Oct 2020 05:24) (65 - 86)  BP: 145/67 (11 Oct 2020 05:24) (135/68 - 161/82)  BP(mean): --  RR: 18 (10 Oct 2020 23:36) (18 - 18)  SpO2: 100% (10 Oct 2020 23:36) (99% - 100%)    PHYSICAL EXAMINATION:  GENERAL: NAD, well built  HEAD:  Atraumatic, Normocephalic  EYES:  conjunctiva and sclera clear  NECK: Supple, No JVD, Normal thyroid  CHEST/LUNG: Clear to auscultation. Clear to percussion bilaterally; No rales, rhonchi, wheezing, or rubs  HEART: Regular rate and rhythm; No murmurs, rubs, or gallops  ABDOMEN: Soft, Nontender, Nondistended; Bowel sounds present (+) mckeon in place  NERVOUS SYSTEM:  Alert & Oriented X2,    EXTREMITIES:  2+ Peripheral Pulses, No clubbing, cyanosis, or edema  SKIN: warm dry                          9.5    8.16  )-----------( x        ( 11 Oct 2020 07:04 )             29.5     10-10    138  |  106  |  14  ----------------------------<  198<H>  3.6   |  26  |  0.65    Ca    8.3<L>      10 Oct 2020 09:22  Phos  4.0     10-10  Mg     2.0     10-10    TPro  5.6<L>  /  Alb  2.7<L>  /  TBili  0.3  /  DBili  x   /  AST  25  /  ALT  21  /  AlkPhos  25<L>  1010    LIVER FUNCTIONS - ( 10 Oct 2020 06:26 )  Alb: 2.7 g/dL / Pro: 5.6 g/dL / ALK PHOS: 25 U/L / ALT: 21 U/L DA / AST: 25 U/L / GGT: x               PT/INR - ( 10 Oct 2020 08:26 )   PT: 12.2 sec;   INR: 1.03 ratio         PTT - ( 10 Oct 2020 08:26 )  PTT:38.8 sec    CAPILLARY BLOOD GLUCOSE      RADIOLOGY & ADDITIONAL TESTS:      < from: CT Angio Chest w/ IV Cont (10.09.20 @ 14:45) >  IMPRESSION:  Acute pulmonary emboli in the right upper, middle and lower and left lower lobes.    There is no evidence of right heart strain or pulmonary infarct.    Findings were discussed by Dr. Hu with Dr. Dhaliwal at 4:30 PM on 10/9/2020, with read back.      < end of copied text >

## 2020-10-11 NOTE — PROGRESS NOTE ADULT - PROBLEM SELECTOR PLAN 9
son expresses difficulty caring for patient at home, family agrees to workup to assess for organic cause of cognitive decline --> would now like to take patient home given improvement of clinical status -cw home statin  -TG elevated 210

## 2020-10-11 NOTE — PROGRESS NOTE ADULT - PROBLEM SELECTOR PLAN 5
pw x3 days non-radiating, non-exertional, non-localized CP  -EKG NSR, trops negative x2, CTA negative for aortic dissection and PE  -cw home statin, asa, BB, CCB  -TTE showed LVH and G1DD  -cardiology Dr. Walton consulted  -remains off tele 2/2 patient agitation noted to be closing eyes continually yesterday, endorsed "a glare" --> improved today  -IOP found to be 39mmHg left eye and 26mmHg right eye (nl >20 mmHg)  -c/w timolol and latanoprost drops

## 2020-10-11 NOTE — PROGRESS NOTE ADULT - PROBLEM SELECTOR PLAN 8
-cw home statin  -TG elevated 210 -hold home Janumet  -divine SS  -A1c 7.1  -Endo Dr. Ramos following

## 2020-10-12 LAB
ALBUMIN SERPL ELPH-MCNC: 2.8 G/DL — LOW (ref 3.5–5)
ALP SERPL-CCNC: 114 U/L — SIGNIFICANT CHANGE UP (ref 40–120)
ALT FLD-CCNC: 94 U/L DA — HIGH (ref 10–60)
ANION GAP SERPL CALC-SCNC: 6 MMOL/L — SIGNIFICANT CHANGE UP (ref 5–17)
APPEARANCE UR: CLEAR — SIGNIFICANT CHANGE UP
AST SERPL-CCNC: 85 U/L — HIGH (ref 10–40)
BACTERIA # UR AUTO: ABNORMAL /HPF
BASOPHILS # BLD AUTO: 0.03 K/UL — SIGNIFICANT CHANGE UP (ref 0–0.2)
BASOPHILS NFR BLD AUTO: 0.2 % — SIGNIFICANT CHANGE UP (ref 0–2)
BILIRUB SERPL-MCNC: 0.3 MG/DL — SIGNIFICANT CHANGE UP (ref 0.2–1.2)
BILIRUB UR-MCNC: NEGATIVE — SIGNIFICANT CHANGE UP
BUN SERPL-MCNC: 14 MG/DL — SIGNIFICANT CHANGE UP (ref 7–18)
CALCIUM SERPL-MCNC: 8.8 MG/DL — SIGNIFICANT CHANGE UP (ref 8.4–10.5)
CHLORIDE SERPL-SCNC: 101 MMOL/L — SIGNIFICANT CHANGE UP (ref 96–108)
CO2 SERPL-SCNC: 26 MMOL/L — SIGNIFICANT CHANGE UP (ref 22–31)
COLOR SPEC: YELLOW — SIGNIFICANT CHANGE UP
CREAT SERPL-MCNC: 0.71 MG/DL — SIGNIFICANT CHANGE UP (ref 0.5–1.3)
DIFF PNL FLD: ABNORMAL
EOSINOPHIL # BLD AUTO: 0.44 K/UL — SIGNIFICANT CHANGE UP (ref 0–0.5)
EOSINOPHIL NFR BLD AUTO: 3.6 % — SIGNIFICANT CHANGE UP (ref 0–6)
EPI CELLS # UR: SIGNIFICANT CHANGE UP /HPF
GLUCOSE BLDC GLUCOMTR-MCNC: 139 MG/DL — HIGH (ref 70–99)
GLUCOSE BLDC GLUCOMTR-MCNC: 145 MG/DL — HIGH (ref 70–99)
GLUCOSE BLDC GLUCOMTR-MCNC: 153 MG/DL — HIGH (ref 70–99)
GLUCOSE BLDC GLUCOMTR-MCNC: 329 MG/DL — HIGH (ref 70–99)
GLUCOSE SERPL-MCNC: 142 MG/DL — HIGH (ref 70–99)
GLUCOSE UR QL: NEGATIVE — SIGNIFICANT CHANGE UP
HCT VFR BLD CALC: 28.2 % — LOW (ref 34.5–45)
HGB BLD-MCNC: 9.5 G/DL — LOW (ref 11.5–15.5)
IMM GRANULOCYTES NFR BLD AUTO: 0.6 % — SIGNIFICANT CHANGE UP (ref 0–1.5)
KETONES UR-MCNC: NEGATIVE — SIGNIFICANT CHANGE UP
LEUKOCYTE ESTERASE UR-ACNC: ABNORMAL
LYMPHOCYTES # BLD AUTO: 1.36 K/UL — SIGNIFICANT CHANGE UP (ref 1–3.3)
LYMPHOCYTES # BLD AUTO: 11.2 % — LOW (ref 13–44)
MAGNESIUM SERPL-MCNC: 2.1 MG/DL — SIGNIFICANT CHANGE UP (ref 1.6–2.6)
MCHC RBC-ENTMCNC: 28.3 PG — SIGNIFICANT CHANGE UP (ref 27–34)
MCHC RBC-ENTMCNC: 33.7 GM/DL — SIGNIFICANT CHANGE UP (ref 32–36)
MCV RBC AUTO: 83.9 FL — SIGNIFICANT CHANGE UP (ref 80–100)
MONOCYTES # BLD AUTO: 0.94 K/UL — HIGH (ref 0–0.9)
MONOCYTES NFR BLD AUTO: 7.7 % — SIGNIFICANT CHANGE UP (ref 2–14)
NEUTROPHILS # BLD AUTO: 9.3 K/UL — HIGH (ref 1.8–7.4)
NEUTROPHILS NFR BLD AUTO: 76.7 % — SIGNIFICANT CHANGE UP (ref 43–77)
NITRITE UR-MCNC: POSITIVE
NRBC # BLD: 0 /100 WBCS — SIGNIFICANT CHANGE UP (ref 0–0)
PH UR: 7 — SIGNIFICANT CHANGE UP (ref 5–8)
PHOSPHATE SERPL-MCNC: 3.1 MG/DL — SIGNIFICANT CHANGE UP (ref 2.5–4.5)
PLATELET # BLD AUTO: 301 K/UL — SIGNIFICANT CHANGE UP (ref 150–400)
POTASSIUM SERPL-MCNC: 4.1 MMOL/L — SIGNIFICANT CHANGE UP (ref 3.5–5.3)
POTASSIUM SERPL-SCNC: 4.1 MMOL/L — SIGNIFICANT CHANGE UP (ref 3.5–5.3)
PROT SERPL-MCNC: 6.4 G/DL — SIGNIFICANT CHANGE UP (ref 6–8.3)
PROT UR-MCNC: 30 MG/DL
RBC # BLD: 3.36 M/UL — LOW (ref 3.8–5.2)
RBC # FLD: 14 % — SIGNIFICANT CHANGE UP (ref 10.3–14.5)
RBC CASTS # UR COMP ASSIST: >50 /HPF (ref 0–2)
SODIUM SERPL-SCNC: 133 MMOL/L — LOW (ref 135–145)
SP GR SPEC: 1 — LOW (ref 1.01–1.02)
UROBILINOGEN FLD QL: NEGATIVE — SIGNIFICANT CHANGE UP
WBC # BLD: 12.14 K/UL — HIGH (ref 3.8–10.5)
WBC # FLD AUTO: 12.14 K/UL — HIGH (ref 3.8–10.5)
WBC UR QL: ABNORMAL /HPF (ref 0–5)

## 2020-10-12 PROCEDURE — 99232 SBSQ HOSP IP/OBS MODERATE 35: CPT | Mod: GC

## 2020-10-12 RX ORDER — CEFTRIAXONE 500 MG/1
1000 INJECTION, POWDER, FOR SOLUTION INTRAMUSCULAR; INTRAVENOUS EVERY 24 HOURS
Refills: 0 | Status: DISCONTINUED | OUTPATIENT
Start: 2020-10-12 | End: 2020-10-15

## 2020-10-12 RX ORDER — POLYETHYLENE GLYCOL 3350 17 G/17G
17 POWDER, FOR SOLUTION ORAL DAILY
Refills: 0 | Status: DISCONTINUED | OUTPATIENT
Start: 2020-10-12 | End: 2020-10-15

## 2020-10-12 RX ORDER — RIVAROXABAN 15 MG-20MG
15 KIT ORAL
Refills: 0 | Status: DISCONTINUED | OUTPATIENT
Start: 2020-10-12 | End: 2020-10-15

## 2020-10-12 RX ADMIN — Medication 25 MILLIGRAM(S): at 17:00

## 2020-10-12 RX ADMIN — POLYETHYLENE GLYCOL 3350 17 GRAM(S): 17 POWDER, FOR SOLUTION ORAL at 16:59

## 2020-10-12 RX ADMIN — RIVAROXABAN 15 MILLIGRAM(S): KIT at 17:00

## 2020-10-12 RX ADMIN — Medication 1 DROP(S): at 17:00

## 2020-10-12 RX ADMIN — Medication 1 DROP(S): at 05:55

## 2020-10-12 RX ADMIN — QUETIAPINE FUMARATE 25 MILLIGRAM(S): 200 TABLET, FILM COATED ORAL at 20:46

## 2020-10-12 RX ADMIN — Medication 60 MILLIGRAM(S): at 05:54

## 2020-10-12 RX ADMIN — Medication 81 MILLIGRAM(S): at 11:32

## 2020-10-12 RX ADMIN — ENOXAPARIN SODIUM 60 MILLIGRAM(S): 100 INJECTION SUBCUTANEOUS at 05:55

## 2020-10-12 RX ADMIN — ATORVASTATIN CALCIUM 20 MILLIGRAM(S): 80 TABLET, FILM COATED ORAL at 21:01

## 2020-10-12 RX ADMIN — Medication 25 MILLIGRAM(S): at 05:54

## 2020-10-12 RX ADMIN — Medication 4: at 17:15

## 2020-10-12 RX ADMIN — CEFTRIAXONE 100 MILLIGRAM(S): 500 INJECTION, POWDER, FOR SOLUTION INTRAMUSCULAR; INTRAVENOUS at 17:00

## 2020-10-12 RX ADMIN — LATANOPROST 1 DROP(S): 0.05 SOLUTION/ DROPS OPHTHALMIC; TOPICAL at 21:01

## 2020-10-12 RX ADMIN — Medication 500 MILLIGRAM(S): at 11:32

## 2020-10-12 RX ADMIN — Medication 1 DROP(S): at 05:54

## 2020-10-12 NOTE — PROGRESS NOTE ADULT - ATTENDING COMMENTS
Patient was seen and examined earlier this morning around 11 AM. Agree with PGY1 A/P above with editing as needed. My independent assessment, findings on exam, diagnosis and plan of care as listed below. Discussed with Dr. Dhaliwal.    Patient is doing much better. not in restraints. Patient had Urinary retention overnight and mckeon catheter was replaced again last night. Patient has BM this afternoon as per RN. No new complaints    Vital Signs Last 24 Hrs  T(C): 36.9 (12 Oct 2020 16:07), Max: 36.9 (12 Oct 2020 16:07)  T(F): 98.4 (12 Oct 2020 16:07), Max: 98.4 (12 Oct 2020 16:07)  HR: 79 (12 Oct 2020 16:07) (65 - 90)  BP: 136/85 (12 Oct 2020 16:07) (129/66 - 151/67)  RR: 16 (12 Oct 2020 16:07) (16 - 16)  SpO2: 100% (12 Oct 2020 16:07) (99% - 100%)    P/E:   elderly female, comfortable at rest, NAD  Neuro: AAO x 2; No gross focal deficits; able to ambulate with assistance  CVS: S1S2 present, regular  Resp: BLAE+, No wheeze or Rhonchi  GI: Soft, Obese, BS+, Non distended, Non tender  Extr: No edema or calf tenderness    Labs:                               9.5    12.14 )-----------( 301      ( 12 Oct 2020 06:37 )             28.2   10-12    133<L>  |  101  |  14  ----------------------------<  142<H>  4.1   |  26  |  0.71    Ca    8.8      12 Oct 2020 06:37  Phos  3.1     10-12  Mg     2.1     10-12    TPro  6.4  /  Alb  2.8<L>  /  TBili  0.3  /  DBili  x   /  AST  85<H>  /  ALT  94<H>  /  AlkPhos  114  10-12     A/P:  B/L P.E.  Urinary retention multifactorial from Dementia with possible NPH and  Fecal impaction with constipation and immobility  Subacute onset Dementia etiology unclear with Psychosis much improved post LP concern for Normal Pressure hydrocephalus/ Cerebral degenerative disease  ??Ocular muscle weakness concern for Myasthenia Gravis less likely given clinical improvement  Eye glare possibly due to Glaucoma resolved  Atypical Chest pain negative ACS  Pancreatic cyst likely benign  Thyroid nodule    Hx DM, HTN, HLD    Plan:   Continue Lovenox therapeutic; covered for Xarelto as per PGY1 d/w Pharmacy; will start this evening  Neuro follow up with Dr. Alfaro outpatient  MRI Brain with no structural pathology; EEG negative for epileptiform activity  Specialized lab test awaited for potential cerebral degenerative disease  Increased IOP concerning for Glaucoma; Continue Xalatan and Timolol;  follow up with ophthalmologist as outpatient on discharge   Endocrine follow up outpatient for thyroid nodule  Will discontinue Mckeon again and avoid reinsertion as patient is much more ambulatory   Will attempt another TOV today and avoid putting mckeon and if need be straight cath only if significant retention and patoient in distress   PT follow up  IV hydration as patient got contrast again  Rest of the management as above    Discussed with PGY1 Dr. Dhaliwal   Discussed with Son at bedside this afternoon; very happy with patient outcome and would like to take home  Discussed with RN   D/C Plan next 24 to 48 hrs if remain stable, urine Cx negative; empirical rocephin after Cx; if negative D/C ABX after total 3 days;

## 2020-10-12 NOTE — PROGRESS NOTE ADULT - PROBLEM SELECTOR PLAN 1
CTA Chest shows PE in upper/mid/lower R lobes, lower L lobe  c/w lovenox full dose 60 q12  Xarelto covered by insurance  US Dopple LE shows no DVT CTA Chest shows PE in upper/mid/lower R lobes, lower L lobe  Switch lovenox to xarelto 15mg q12 x21 days  Xarelto covered by insurance (2.73$ copay)  US Dopple LE shows no DVT  F/U hematology as outpt

## 2020-10-12 NOTE — PROGRESS NOTE ADULT - SUBJECTIVE AND OBJECTIVE BOX
PGY-1 Progress Note discussed with attending    PAGER #: [[phone]] TILL 5:00 PM  PLEASE CONTACT ON CALL TEAM:  - On Call Team (Please refer to [place]) FROM 5:00 PM - 8:30PM  - Nightfloat Team FROM 8:30 -7:30 AM    INTERVAL HPI/OVERNIGHT EVENTS:   Pt notes urinary frequency. Denies dysuria.    REVIEW OF SYSTEMS:  CONSTITUTIONAL: No fever, weight loss, or fatigue  RESPIRATORY: No cough, wheezing, chills or hemoptysis; No shortness of breath  CARDIOVASCULAR: No chest pain, palpitations, dizziness, or leg swelling  GASTROINTESTINAL: No abdominal pain. No nausea, vomiting, or hematemesis; No diarrhea or constipation. No melena or hematochezia.  GENITOURINARY: No dysuria or hematuria, urinary frequency  NEUROLOGICAL: No headaches, memory loss, loss of strength, numbness, or tremors  SKIN: No itching, burning, rashes, or lesions     Vital Signs Last 24 Hrs  T(C): 36.3 (12 Oct 2020 08:14), Max: 36.8 (11 Oct 2020 23:51)  T(F): 97.3 (12 Oct 2020 08:14), Max: 98.3 (11 Oct 2020 23:51)  HR: 65 (12 Oct 2020 08:14) (65 - 90)  BP: 139/51 (12 Oct 2020 08:14) (129/66 - 151/67)  BP(mean): 84 (11 Oct 2020 17:03) (84 - 84)  RR: 16 (12 Oct 2020 08:14) (16 - 18)  SpO2: 100% (12 Oct 2020 08:14) (98% - 100%)    PHYSICAL EXAMINATION:  GENERAL: NAD, well built  HEAD:  Atraumatic, Normocephalic  EYES:  conjunctiva and sclera clear  NECK: Supple, No JVD, Normal thyroid  CHEST/LUNG: Clear to auscultation. Clear to percussion bilaterally; No rales, rhonchi, wheezing, or rubs  HEART: Regular rate and rhythm; No murmurs, rubs, or gallops  ABDOMEN: Soft, Nontender, Nondistended; Bowel sounds present  NERVOUS SYSTEM:  Alert & Oriented X2,    EXTREMITIES:  2+ Peripheral Pulses, No clubbing, cyanosis, or edema  SKIN: warm dry                          9.5    12.14 )-----------( 301      ( 12 Oct 2020 06:37 )             28.2     10-12    133<L>  |  101  |  14  ----------------------------<  142<H>  4.1   |  26  |  0.71    Ca    8.8      12 Oct 2020 06:37  Phos  3.1     10-12  Mg     2.1     10-12    TPro  6.4  /  Alb  2.8<L>  /  TBili  0.3  /  DBili  x   /  AST  85<H>  /  ALT  94<H>  /  AlkPhos  114  10-12    LIVER FUNCTIONS - ( 12 Oct 2020 06:37 )  Alb: 2.8 g/dL / Pro: 6.4 g/dL / ALK PHOS: 114 U/L / ALT: 94 U/L DA / AST: 85 U/L / GGT: x                   CAPILLARY BLOOD GLUCOSE      RADIOLOGY & ADDITIONAL TESTS:      Urinalysis (10.12.20 @ 05:06)    pH Urine: 7.0    Glucose Qualitative, Urine: Negative    Blood, Urine: Large    Color: Yellow    Urine Appearance: Clear    Bilirubin: Negative    Ketone - Urine: Negative    Specific Gravity: 1.005    Protein, Urine: 30 mg/dL    Urobilinogen: Negative    Nitrite: Positive    Leukocyte Esterase Concentration: Moderate    < from: US Duplex Venous Lower Ext Complete, Bilateral (10.11.20 @ 12:29) >    IMPRESSION:  No evidence of deep venous thrombosis in either lower extremity.    < end of copied text >               PGY-1 Progress Note discussed with attending    PAGER #: [[phone]] TILL 5:00 PM  PLEASE CONTACT ON CALL TEAM:  - On Call Team (Please refer to [place]) FROM 5:00 PM - 8:30PM  - Nightfloat Team FROM 8:30 -7:30 AM    INTERVAL HPI/OVERNIGHT EVENTS:   Pt notes urinary frequency. Denies dysuria.    REVIEW OF SYSTEMS:  CONSTITUTIONAL: No fever, weight loss, or fatigue  RESPIRATORY: No cough, wheezing, chills or hemoptysis; No shortness of breath  CARDIOVASCULAR: No chest pain, palpitations, dizziness, or leg swelling  GASTROINTESTINAL: No abdominal pain. No nausea, vomiting, or hematemesis; No diarrhea or constipation. No melena or hematochezia.  GENITOURINARY: No dysuria or hematuria, (+) urinary frequency  NEUROLOGICAL: No headaches, memory loss, loss of strength, numbness, or tremors  SKIN: No itching, burning, rashes, or lesions     Vital Signs Last 24 Hrs  T(C): 36.3 (12 Oct 2020 08:14), Max: 36.8 (11 Oct 2020 23:51)  T(F): 97.3 (12 Oct 2020 08:14), Max: 98.3 (11 Oct 2020 23:51)  HR: 65 (12 Oct 2020 08:14) (65 - 90)  BP: 139/51 (12 Oct 2020 08:14) (129/66 - 151/67)  BP(mean): 84 (11 Oct 2020 17:03) (84 - 84)  RR: 16 (12 Oct 2020 08:14) (16 - 18)  SpO2: 100% (12 Oct 2020 08:14) (98% - 100%)    PHYSICAL EXAMINATION:  GENERAL: NAD, well built  HEAD:  Atraumatic, Normocephalic  EYES:  conjunctiva and sclera clear  NECK: Supple, No JVD, Normal thyroid  CHEST/LUNG: Clear to auscultation. Clear to percussion bilaterally; No rales, rhonchi, wheezing, or rubs  HEART: Regular rate and rhythm; No murmurs, rubs, or gallops  ABDOMEN: Soft, Nontender, Nondistended; Bowel sounds present  NERVOUS SYSTEM:  Alert & Oriented X2,    EXTREMITIES:  2+ Peripheral Pulses, No clubbing, cyanosis, or edema  SKIN: warm dry                          9.5    12.14 )-----------( 301      ( 12 Oct 2020 06:37 )             28.2     10-12    133<L>  |  101  |  14  ----------------------------<  142<H>  4.1   |  26  |  0.71    Ca    8.8      12 Oct 2020 06:37  Phos  3.1     10-12  Mg     2.1     10-12    TPro  6.4  /  Alb  2.8<L>  /  TBili  0.3  /  DBili  x   /  AST  85<H>  /  ALT  94<H>  /  AlkPhos  114  10-12    LIVER FUNCTIONS - ( 12 Oct 2020 06:37 )  Alb: 2.8 g/dL / Pro: 6.4 g/dL / ALK PHOS: 114 U/L / ALT: 94 U/L DA / AST: 85 U/L / GGT: x                   CAPILLARY BLOOD GLUCOSE      RADIOLOGY & ADDITIONAL TESTS:      Urinalysis (10.12.20 @ 05:06)    pH Urine: 7.0    Glucose Qualitative, Urine: Negative    Blood, Urine: Large    Color: Yellow    Urine Appearance: Clear    Bilirubin: Negative    Ketone - Urine: Negative    Specific Gravity: 1.005    Protein, Urine: 30 mg/dL    Urobilinogen: Negative    Nitrite: Positive    Leukocyte Esterase Concentration: Moderate    < from: US Duplex Venous Lower Ext Complete, Bilateral (10.11.20 @ 12:29) >    IMPRESSION:  No evidence of deep venous thrombosis in either lower extremity.    < end of copied text >

## 2020-10-12 NOTE — PROGRESS NOTE ADULT - ASSESSMENT
80F Cantonese-speaking, PMH dementia (recent onset), HTN, DM, HLD pw x3 days constant, non-localized, non-radiating, non-exertional chest pain, admitted to [facility] for cardiac monitoring and management/placement of recent-onset dementia.

## 2020-10-12 NOTE — CHART NOTE - NSCHARTNOTEFT_GEN_A_CORE
bladder scan post voidal was done at night and showed 430 ml . on revising the chart found that primary team is trying to give her the chance to void . on assessing the patient ,she was found to be frequently voiding so straight cath insertion  was deferred . bladder scan post voidal was done at night and showed 430 ml . on revising the chart found that primary team is trying to give her the chance to void . on assessing the patient ,she was found to be frequently voiding so straight cath insertion  was deferred . I was also informed that the patient had frequent urination during the night so ordered urine analysis for further follow up per primary team

## 2020-10-12 NOTE — PROGRESS NOTE ADULT - PROBLEM SELECTOR PLAN 3
patient noted to have firm, distended bowel on exam today  -possible constipation 2/2 urinary retention, FC placed  Mckeon discontinued 10/11 noon.   - Pt not currently on mckeon. Voiding with some retention  - UA +, send Urine Cx,  - Start rocephin 1g q24 x5-7d patient noted to have firm, distended bowel on exam today  -possible constipation 2/2 urinary retention, FC placed  Mckeon discontinued 10/11 noon.   - Pt not currently on mckeon. Voiding with some retention  - UA +, send Urine Cx  - Start rocephin 1g q24 x3 days

## 2020-10-13 LAB
ALBUMIN SERPL ELPH-MCNC: 2.8 G/DL — LOW (ref 3.5–5)
ALP SERPL-CCNC: 117 U/L — SIGNIFICANT CHANGE UP (ref 40–120)
ALT FLD-CCNC: 93 U/L DA — HIGH (ref 10–60)
ANION GAP SERPL CALC-SCNC: 7 MMOL/L — SIGNIFICANT CHANGE UP (ref 5–17)
AST SERPL-CCNC: 60 U/L — HIGH (ref 10–40)
BASOPHILS # BLD AUTO: 0.04 K/UL — SIGNIFICANT CHANGE UP (ref 0–0.2)
BASOPHILS NFR BLD AUTO: 0.3 % — SIGNIFICANT CHANGE UP (ref 0–2)
BILIRUB SERPL-MCNC: 0.3 MG/DL — SIGNIFICANT CHANGE UP (ref 0.2–1.2)
BUN SERPL-MCNC: 14 MG/DL — SIGNIFICANT CHANGE UP (ref 7–18)
CALCIUM SERPL-MCNC: 8.9 MG/DL — SIGNIFICANT CHANGE UP (ref 8.4–10.5)
CHLORIDE SERPL-SCNC: 101 MMOL/L — SIGNIFICANT CHANGE UP (ref 96–108)
CO2 SERPL-SCNC: 26 MMOL/L — SIGNIFICANT CHANGE UP (ref 22–31)
CREAT SERPL-MCNC: 0.73 MG/DL — SIGNIFICANT CHANGE UP (ref 0.5–1.3)
EOSINOPHIL # BLD AUTO: 0.42 K/UL — SIGNIFICANT CHANGE UP (ref 0–0.5)
EOSINOPHIL NFR BLD AUTO: 3.5 % — SIGNIFICANT CHANGE UP (ref 0–6)
GLUCOSE BLDC GLUCOMTR-MCNC: 125 MG/DL — HIGH (ref 70–99)
GLUCOSE BLDC GLUCOMTR-MCNC: 163 MG/DL — HIGH (ref 70–99)
GLUCOSE BLDC GLUCOMTR-MCNC: 203 MG/DL — HIGH (ref 70–99)
GLUCOSE BLDC GLUCOMTR-MCNC: 231 MG/DL — HIGH (ref 70–99)
GLUCOSE SERPL-MCNC: 153 MG/DL — HIGH (ref 70–99)
HCT VFR BLD CALC: 27.4 % — LOW (ref 34.5–45)
HGB BLD-MCNC: 9.3 G/DL — LOW (ref 11.5–15.5)
IMM GRANULOCYTES NFR BLD AUTO: 0.5 % — SIGNIFICANT CHANGE UP (ref 0–1.5)
LYMPHOCYTES # BLD AUTO: 1.04 K/UL — SIGNIFICANT CHANGE UP (ref 1–3.3)
LYMPHOCYTES # BLD AUTO: 8.7 % — LOW (ref 13–44)
MAGNESIUM SERPL-MCNC: 2.9 MG/DL — HIGH (ref 1.6–2.6)
MCHC RBC-ENTMCNC: 28.6 PG — SIGNIFICANT CHANGE UP (ref 27–34)
MCHC RBC-ENTMCNC: 33.9 GM/DL — SIGNIFICANT CHANGE UP (ref 32–36)
MCV RBC AUTO: 84.3 FL — SIGNIFICANT CHANGE UP (ref 80–100)
MONOCYTES # BLD AUTO: 0.79 K/UL — SIGNIFICANT CHANGE UP (ref 0–0.9)
MONOCYTES NFR BLD AUTO: 6.6 % — SIGNIFICANT CHANGE UP (ref 2–14)
NEUTROPHILS # BLD AUTO: 9.54 K/UL — HIGH (ref 1.8–7.4)
NEUTROPHILS NFR BLD AUTO: 80.4 % — HIGH (ref 43–77)
NRBC # BLD: 0 /100 WBCS — SIGNIFICANT CHANGE UP (ref 0–0)
PHOSPHATE SERPL-MCNC: 3.7 MG/DL — SIGNIFICANT CHANGE UP (ref 2.5–4.5)
PLATELET # BLD AUTO: 313 K/UL — SIGNIFICANT CHANGE UP (ref 150–400)
POTASSIUM SERPL-MCNC: 4 MMOL/L — SIGNIFICANT CHANGE UP (ref 3.5–5.3)
POTASSIUM SERPL-SCNC: 4 MMOL/L — SIGNIFICANT CHANGE UP (ref 3.5–5.3)
PROT SERPL-MCNC: 6.5 G/DL — SIGNIFICANT CHANGE UP (ref 6–8.3)
RBC # BLD: 3.25 M/UL — LOW (ref 3.8–5.2)
RBC # FLD: 14.1 % — SIGNIFICANT CHANGE UP (ref 10.3–14.5)
SODIUM SERPL-SCNC: 134 MMOL/L — LOW (ref 135–145)
WBC # BLD: 11.89 K/UL — HIGH (ref 3.8–10.5)
WBC # FLD AUTO: 11.89 K/UL — HIGH (ref 3.8–10.5)

## 2020-10-13 PROCEDURE — 99232 SBSQ HOSP IP/OBS MODERATE 35: CPT | Mod: GC

## 2020-10-13 RX ADMIN — Medication 81 MILLIGRAM(S): at 11:45

## 2020-10-13 RX ADMIN — Medication 25 MILLIGRAM(S): at 05:27

## 2020-10-13 RX ADMIN — Medication 1: at 08:13

## 2020-10-13 RX ADMIN — Medication 1 DROP(S): at 05:27

## 2020-10-13 RX ADMIN — Medication 60 MILLIGRAM(S): at 05:27

## 2020-10-13 RX ADMIN — RIVAROXABAN 15 MILLIGRAM(S): KIT at 17:21

## 2020-10-13 RX ADMIN — Medication 1 DROP(S): at 17:21

## 2020-10-13 RX ADMIN — Medication 25 MILLIGRAM(S): at 17:21

## 2020-10-13 RX ADMIN — CEFTRIAXONE 100 MILLIGRAM(S): 500 INJECTION, POWDER, FOR SOLUTION INTRAMUSCULAR; INTRAVENOUS at 17:21

## 2020-10-13 RX ADMIN — LATANOPROST 1 DROP(S): 0.05 SOLUTION/ DROPS OPHTHALMIC; TOPICAL at 21:07

## 2020-10-13 RX ADMIN — Medication 500 MILLIGRAM(S): at 11:45

## 2020-10-13 RX ADMIN — Medication 2: at 17:18

## 2020-10-13 RX ADMIN — ATORVASTATIN CALCIUM 20 MILLIGRAM(S): 80 TABLET, FILM COATED ORAL at 21:07

## 2020-10-13 RX ADMIN — QUETIAPINE FUMARATE 25 MILLIGRAM(S): 200 TABLET, FILM COATED ORAL at 21:07

## 2020-10-13 RX ADMIN — POLYETHYLENE GLYCOL 3350 17 GRAM(S): 17 POWDER, FOR SOLUTION ORAL at 11:45

## 2020-10-13 RX ADMIN — RIVAROXABAN 15 MILLIGRAM(S): KIT at 05:27

## 2020-10-13 NOTE — PROGRESS NOTE ADULT - ATTENDING COMMENTS
Patient seen and examined and plan of care discussed this morning with the residents.    A/P  B/L P.E.  Urinary retention multifactorial from Dementia with possible NPH and  Fecal impaction with constipation and immobility  Subacute onset Dementia etiology unclear with Psychosis much improved post LP concern for Normal Pressure hydrocephalus/ Cerebral degenerative disease  ??Ocular muscle weakness concern for Myasthenia Gravis less likely given clinical improvement  Increased IOP concerning for Glaucoma;  Atypical Chest pain negative ACS  Pancreatic cyst likely benign  Thyroid nodule    Hx DM, HTN, HLD    Plan:   Continue Xarelto   Neuro follow up with Dr. Alfaro outpatient  Specialized lab test awaited for potential cerebral degenerative disease  Continue Xalatan and Timolol;  follow up with ophthalmologist as outpatient on discharge   Endocrine follow up outpatient for thyroid nodule  Will continue to monitor UOP and bladder scan,  avoid putting mckeon and if need be straight cath only if significant retention and patient in distress  IV hydration as patient got contrast again  Rest of the management as above    Discussed with PGY1 Dr. Dhaliwal   Discussed with Son at bedside this afternoon using Cantonese    DC planing next 24-48hrs pending clinical stability

## 2020-10-13 NOTE — PROGRESS NOTE ADULT - SUBJECTIVE AND OBJECTIVE BOX
PGY-1 Progress Note discussed with attending    PAGER #: [[phone]] TILL 5:00 PM  PLEASE CONTACT ON CALL TEAM:  - On Call Team (Please refer to [place]) FROM 5:00 PM - 8:30PM  - Nightfloat Team FROM 8:30 -7:30 AM    CHIEF COMPLAINT & BRIEF HOSPITAL COURSE:  80 Cantonese-speaking F with PMH dementia, HTN, DM, HLD pw x3 days non-localized, non-exertional, non-radiating CP, admitted to [facility] for ACS workup, additionally found to have thyroid nodule.     INTERVAL HPI/OVERNIGHT EVENTS:   Pt had bladder scan >600cc this AM. straight cath used. Pt is complaining of head heaviness without headache. Pt able to ambulate with 1 person assist    REVIEW OF SYSTEMS:  CONSTITUTIONAL: No fever, weight loss, or fatigue  RESPIRATORY: No cough, wheezing, chills or hemoptysis; No shortness of breath  CARDIOVASCULAR: No chest pain, palpitations, dizziness, or leg swelling  GASTROINTESTINAL: No abdominal pain. No nausea, vomiting, or hematemesis; No diarrhea or constipation. No melena or hematochezia.  GENITOURINARY: No dysuria or hematuria, urinary frequency  NEUROLOGICAL: No headaches, memory loss, loss of strength, numbness, or tremors  SKIN: No itching, burning, rashes, or lesions     Vital Signs Last 24 Hrs  T(C): 36.9 (13 Oct 2020 07:45), Max: 36.9 (12 Oct 2020 16:07)  T(F): 98.4 (13 Oct 2020 07:45), Max: 98.4 (12 Oct 2020 16:07)  HR: 66 (13 Oct 2020 07:45) (66 - 93)  BP: 131/54 (13 Oct 2020 07:45) (131/54 - 151/87)  BP(mean): --  RR: 17 (13 Oct 2020 07:45) (15 - 17)  SpO2: 100% (13 Oct 2020 07:45) (100% - 100%)    PHYSICAL EXAMINATION:  GENERAL: NAD, well built  HEAD:  Atraumatic, Normocephalic  EYES:  conjunctiva and sclera clear  NECK: Supple, No JVD, Normal thyroid  CHEST/LUNG: Clear to auscultation. Clear to percussion bilaterally; No rales, rhonchi, wheezing, or rubs  HEART: Regular rate and rhythm; No murmurs, rubs, or gallops  ABDOMEN: Soft, Nontender, Nondistended; Bowel sounds present  NERVOUS SYSTEM:  Alert & Oriented X3 this AM,    EXTREMITIES:  2+ Peripheral Pulses, No clubbing, cyanosis, or edema  SKIN: warm dry                          9.3    11.89 )-----------( 313      ( 13 Oct 2020 07:16 )             27.4     10-13    134<L>  |  101  |  14  ----------------------------<  153<H>  4.0   |  26  |  0.73    Ca    8.9      13 Oct 2020 07:16  Phos  3.7     10-13  Mg     2.9     10-13    TPro  6.5  /  Alb  2.8<L>  /  TBili  0.3  /  DBili  x   /  AST  60<H>  /  ALT  93<H>  /  AlkPhos  117  10-13    LIVER FUNCTIONS - ( 13 Oct 2020 07:16 )  Alb: 2.8 g/dL / Pro: 6.5 g/dL / ALK PHOS: 117 U/L / ALT: 93 U/L DA / AST: 60 U/L / GGT: x                   CAPILLARY BLOOD GLUCOSE      RADIOLOGY & ADDITIONAL TESTS:

## 2020-10-13 NOTE — CHART NOTE - NSCHARTNOTEFT_GEN_A_CORE
Received signout to follow up on post void residual. Pre void scan showed 547cc, post void showed 572 cc. Nurse informed to straight cath patient as team is avoiding mckeon.

## 2020-10-13 NOTE — PROGRESS NOTE ADULT - PROBLEM SELECTOR PLAN 1
CTA Chest shows PE in upper/mid/lower R lobes, lower L lobe  On xarelto 15mg q12 2 of 21 days  Xarelto covered by insurance (2.73$ copay)  US Dopple LE shows no DVT  F/U hematology as outpt

## 2020-10-13 NOTE — PROGRESS NOTE ADULT - ASSESSMENT
80F Cantonese-speaking, PMH dementia (recent onset), HTN, DM, HLD pw x3 days constant, non-localized, non-radiating, non-exertional chest pain, admitted to [facility] for cardiac monitoring and management/placement of recent-onset dementia.

## 2020-10-13 NOTE — PROGRESS NOTE ADULT - PROBLEM SELECTOR PLAN 3
patient noted to have firm, distended bowel on exam today  -possible constipation 2/2 urinary retention, FC placed  Mckeon discontinued 10/11 noon.   - Pt not currently on mckeon. Voiding with some retention  - UA +, send Urine Cx  - Start rocephin 1g q24 x3 days patient noted to have firm, distended bowel on exam today  -possible constipation 2/2 urinary retention, FC placed  Mckeon discontinued 10/11 noon.   - Pt not currently on mckeon. Voiding with some retention  - UA +, send Urine Cx  - Rocephin 1g q24 day 2 of 3

## 2020-10-14 ENCOUNTER — TRANSCRIPTION ENCOUNTER (OUTPATIENT)
Age: 80
End: 2020-10-14

## 2020-10-14 LAB
-  AMIKACIN: SIGNIFICANT CHANGE UP
-  AMOXICILLIN/CLAVULANIC ACID: SIGNIFICANT CHANGE UP
-  AMPICILLIN/SULBACTAM: SIGNIFICANT CHANGE UP
-  AMPICILLIN: SIGNIFICANT CHANGE UP
-  AZTREONAM: SIGNIFICANT CHANGE UP
-  CEFAZOLIN: SIGNIFICANT CHANGE UP
-  CEFEPIME: SIGNIFICANT CHANGE UP
-  CEFOXITIN: SIGNIFICANT CHANGE UP
-  CEFTRIAXONE: SIGNIFICANT CHANGE UP
-  CIPROFLOXACIN: SIGNIFICANT CHANGE UP
-  ERTAPENEM: SIGNIFICANT CHANGE UP
-  GENTAMICIN: SIGNIFICANT CHANGE UP
-  IMIPENEM: SIGNIFICANT CHANGE UP
-  LEVOFLOXACIN: SIGNIFICANT CHANGE UP
-  MEROPENEM: SIGNIFICANT CHANGE UP
-  NITROFURANTOIN: SIGNIFICANT CHANGE UP
-  PIPERACILLIN/TAZOBACTAM: SIGNIFICANT CHANGE UP
-  TIGECYCLINE: SIGNIFICANT CHANGE UP
-  TOBRAMYCIN: SIGNIFICANT CHANGE UP
-  TRIMETHOPRIM/SULFAMETHOXAZOLE: SIGNIFICANT CHANGE UP
ALBUMIN SERPL ELPH-MCNC: 2.7 G/DL — LOW (ref 3.5–5)
ALP SERPL-CCNC: 110 U/L — SIGNIFICANT CHANGE UP (ref 40–120)
ALT FLD-CCNC: 103 U/L DA — HIGH (ref 10–60)
ANION GAP SERPL CALC-SCNC: 6 MMOL/L — SIGNIFICANT CHANGE UP (ref 5–17)
AST SERPL-CCNC: 61 U/L — HIGH (ref 10–40)
BASOPHILS # BLD AUTO: 0.03 K/UL — SIGNIFICANT CHANGE UP (ref 0–0.2)
BASOPHILS NFR BLD AUTO: 0.4 % — SIGNIFICANT CHANGE UP (ref 0–2)
BILIRUB SERPL-MCNC: 0.3 MG/DL — SIGNIFICANT CHANGE UP (ref 0.2–1.2)
BUN SERPL-MCNC: 18 MG/DL — SIGNIFICANT CHANGE UP (ref 7–18)
CALCIUM SERPL-MCNC: 8.9 MG/DL — SIGNIFICANT CHANGE UP (ref 8.4–10.5)
CHLORIDE SERPL-SCNC: 104 MMOL/L — SIGNIFICANT CHANGE UP (ref 96–108)
CO2 SERPL-SCNC: 26 MMOL/L — SIGNIFICANT CHANGE UP (ref 22–31)
CREAT SERPL-MCNC: 0.71 MG/DL — SIGNIFICANT CHANGE UP (ref 0.5–1.3)
CULTURE RESULTS: SIGNIFICANT CHANGE UP
EOSINOPHIL # BLD AUTO: 0.6 K/UL — HIGH (ref 0–0.5)
EOSINOPHIL NFR BLD AUTO: 7.1 % — HIGH (ref 0–6)
GLUCOSE BLDC GLUCOMTR-MCNC: 143 MG/DL — HIGH (ref 70–99)
GLUCOSE BLDC GLUCOMTR-MCNC: 173 MG/DL — HIGH (ref 70–99)
GLUCOSE BLDC GLUCOMTR-MCNC: 195 MG/DL — HIGH (ref 70–99)
GLUCOSE BLDC GLUCOMTR-MCNC: 242 MG/DL — HIGH (ref 70–99)
GLUCOSE SERPL-MCNC: 156 MG/DL — HIGH (ref 70–99)
HCT VFR BLD CALC: 27.5 % — LOW (ref 34.5–45)
HGB BLD-MCNC: 8.7 G/DL — LOW (ref 11.5–15.5)
IMM GRANULOCYTES NFR BLD AUTO: 0.7 % — SIGNIFICANT CHANGE UP (ref 0–1.5)
LYMPHOCYTES # BLD AUTO: 0.98 K/UL — LOW (ref 1–3.3)
LYMPHOCYTES # BLD AUTO: 11.6 % — LOW (ref 13–44)
MAGNESIUM SERPL-MCNC: 2.5 MG/DL — SIGNIFICANT CHANGE UP (ref 1.6–2.6)
MCHC RBC-ENTMCNC: 27.3 PG — SIGNIFICANT CHANGE UP (ref 27–34)
MCHC RBC-ENTMCNC: 31.6 GM/DL — LOW (ref 32–36)
MCV RBC AUTO: 86.2 FL — SIGNIFICANT CHANGE UP (ref 80–100)
METHOD TYPE: SIGNIFICANT CHANGE UP
MONOCYTES # BLD AUTO: 0.78 K/UL — SIGNIFICANT CHANGE UP (ref 0–0.9)
MONOCYTES NFR BLD AUTO: 9.2 % — SIGNIFICANT CHANGE UP (ref 2–14)
NEUTROPHILS # BLD AUTO: 6 K/UL — SIGNIFICANT CHANGE UP (ref 1.8–7.4)
NEUTROPHILS NFR BLD AUTO: 71 % — SIGNIFICANT CHANGE UP (ref 43–77)
NRBC # BLD: 0 /100 WBCS — SIGNIFICANT CHANGE UP (ref 0–0)
ORGANISM # SPEC MICROSCOPIC CNT: SIGNIFICANT CHANGE UP
ORGANISM # SPEC MICROSCOPIC CNT: SIGNIFICANT CHANGE UP
PHOSPHATE SERPL-MCNC: 3.8 MG/DL — SIGNIFICANT CHANGE UP (ref 2.5–4.5)
PLATELET # BLD AUTO: 346 K/UL — SIGNIFICANT CHANGE UP (ref 150–400)
POTASSIUM SERPL-MCNC: 3.9 MMOL/L — SIGNIFICANT CHANGE UP (ref 3.5–5.3)
POTASSIUM SERPL-SCNC: 3.9 MMOL/L — SIGNIFICANT CHANGE UP (ref 3.5–5.3)
PROT SERPL-MCNC: 6.3 G/DL — SIGNIFICANT CHANGE UP (ref 6–8.3)
RBC # BLD: 3.19 M/UL — LOW (ref 3.8–5.2)
RBC # FLD: 14.3 % — SIGNIFICANT CHANGE UP (ref 10.3–14.5)
SODIUM SERPL-SCNC: 136 MMOL/L — SIGNIFICANT CHANGE UP (ref 135–145)
SPECIMEN SOURCE: SIGNIFICANT CHANGE UP
WBC # BLD: 8.45 K/UL — SIGNIFICANT CHANGE UP (ref 3.8–10.5)
WBC # FLD AUTO: 8.45 K/UL — SIGNIFICANT CHANGE UP (ref 3.8–10.5)

## 2020-10-14 PROCEDURE — 99232 SBSQ HOSP IP/OBS MODERATE 35: CPT | Mod: GC

## 2020-10-14 RX ORDER — LANOLIN ALCOHOL/MO/W.PET/CERES
3 CREAM (GRAM) TOPICAL ONCE
Refills: 0 | Status: COMPLETED | OUTPATIENT
Start: 2020-10-14 | End: 2020-10-14

## 2020-10-14 RX ORDER — NYSTATIN CREAM 100000 [USP'U]/G
1 CREAM TOPICAL
Refills: 0 | Status: DISCONTINUED | OUTPATIENT
Start: 2020-10-14 | End: 2020-10-15

## 2020-10-14 RX ADMIN — NYSTATIN CREAM 1 APPLICATION(S): 100000 CREAM TOPICAL at 05:12

## 2020-10-14 RX ADMIN — QUETIAPINE FUMARATE 25 MILLIGRAM(S): 200 TABLET, FILM COATED ORAL at 21:16

## 2020-10-14 RX ADMIN — ATORVASTATIN CALCIUM 20 MILLIGRAM(S): 80 TABLET, FILM COATED ORAL at 21:16

## 2020-10-14 RX ADMIN — Medication 1: at 17:27

## 2020-10-14 RX ADMIN — POLYETHYLENE GLYCOL 3350 17 GRAM(S): 17 POWDER, FOR SOLUTION ORAL at 12:16

## 2020-10-14 RX ADMIN — RIVAROXABAN 15 MILLIGRAM(S): KIT at 17:33

## 2020-10-14 RX ADMIN — RIVAROXABAN 15 MILLIGRAM(S): KIT at 05:12

## 2020-10-14 RX ADMIN — LATANOPROST 1 DROP(S): 0.05 SOLUTION/ DROPS OPHTHALMIC; TOPICAL at 21:17

## 2020-10-14 RX ADMIN — Medication 1: at 07:46

## 2020-10-14 RX ADMIN — Medication 1 DROP(S): at 17:29

## 2020-10-14 RX ADMIN — Medication 3 MILLIGRAM(S): at 23:29

## 2020-10-14 RX ADMIN — Medication 60 MILLIGRAM(S): at 05:12

## 2020-10-14 RX ADMIN — Medication 1 DROP(S): at 05:12

## 2020-10-14 RX ADMIN — Medication 1 DROP(S): at 05:10

## 2020-10-14 RX ADMIN — Medication 25 MILLIGRAM(S): at 05:12

## 2020-10-14 RX ADMIN — Medication 25 MILLIGRAM(S): at 17:29

## 2020-10-14 RX ADMIN — Medication 81 MILLIGRAM(S): at 12:16

## 2020-10-14 RX ADMIN — CEFTRIAXONE 100 MILLIGRAM(S): 500 INJECTION, POWDER, FOR SOLUTION INTRAMUSCULAR; INTRAVENOUS at 17:29

## 2020-10-14 RX ADMIN — Medication 1 DROP(S): at 17:33

## 2020-10-14 RX ADMIN — NYSTATIN CREAM 1 APPLICATION(S): 100000 CREAM TOPICAL at 19:01

## 2020-10-14 RX ADMIN — Medication 500 MILLIGRAM(S): at 12:16

## 2020-10-14 NOTE — DISCHARGE NOTE PROVIDER - CARE PROVIDERS DIRECT ADDRESSES
[email or web address]

## 2020-10-14 NOTE — DISCHARGE NOTE PROVIDER - HOSPITAL COURSE
80F Cantonese-speaking from home living with her son, PMH dementia (recent onset), HTN, DM, HLD pw x3 days constant, non-localized, non-radiating, non-exertional chest pain, admitted to tele r/o ACS and evaluation of dementia.    Pulmonary embolism  Pt w/o SOB, chest pain upon incidental finding of PE in upper/mid/lower R lobes, lower L lobe on CT abdomen then later confirmed on repeat Chest CTA. Pt placed on full dose lovenox then transitioned to xarelto after confirmation of insurance coverage. Pt to be discharged on initial dose of xarelto. Pt to follow up with hematology as outpatient.    Dementia  As per family, on admission, pt became more forgetful, disoriented, and paranoid over last 1-2 months. Patient was able to perform ADLs in August. Pt continued on home seroquel and aricept. Aricept discontinued during hospital stay as it may contribute to worsening confusion. Pt seen by neurologist during hospital stay. LP performed in light of rapidly worsening demential. Pt with improvement after LP, suspectiing NPH, but will require further out pt follow up with neurology. CSF wnl. EEG shows normal EEG with frequent drowsiness. Given patient improvement, family would instead take patient home. MR head shows no acute infarct, monderate periventricular and subcortical white matter chronic microvascular ischemic changes.     Abdominal Distention, Urinary retention, UTI  Pt noted to have firm and distended abdomen. CT Abdomen shows diverticulosis. Pt with urinary retention, placed mckeon catheter. Pt given multiple opportunities to void using encouragement, bladder training, increased ambulation. Urology consulted for urinary retention recommending out patient urology follow up. Urinary output and retention monitored daily. 10/13 mckeon placed after pt continued to retain despite all interventions attempts.  Pt began complaining of urinary frequency and urgency during hospital stay. No febrile episodes noted. Pt with positive UA, urine cultures growing klebsiella variicola sensitive to ceftriaxone. Pt treated with IV Rocephin without improvement in urinary retention x4 days prior to switch to PO Abx. Pt to be discharged on mckeon catheter with urology follow up.    Thyroid nodule    Thyroid nodules noted on CTA. TSH, T3, T4 wnl. Thyroid US shows 3 right-sided nodules, largest likely benign spongiform lesion, s/p left thyroid lobectomy. Endocrinology consulted during hospital stay.    Glaucoma  Pt noted to be closing eyes continuously while on telemetry floor. Pt endorsed "a glare"  which improved. Pt started on timolol and latanoprose drops. Pt with elevated IOP noted at  39mmHg left eye and 26mmHg right eye (nl >20 mmHg)    Chest pain.  Pt pw x3 days non-radiating, non-exertional, non-localized CP. EKG NSR, trops negative x2, CTA negative for aortic dissection and PE initially. Pt continued on  home statin, asa, BB, CCB. TTE showed LVH and G1DD Pt deemed stable to move off telemetry floor. Chest pain resolved during hospital stay.    Patient is stable for discharge per attending and is advised to follow up with PCP as outpatient  Please refer to patient's complete medical chart with documents for a full hospital course, for this is only a brief summary.   80F Cantonese-speaking from home living with her son, PMH dementia (recent onset), HTN, DM, HLD pw x3 days constant, non-localized, non-radiating, non-exertional chest pain, admitted to tele r/o ACS and evaluation of dementia.    Pulmonary embolism  Pt w/o SOB, chest pain upon incidental finding of PE in upper/mid/lower R lobes, lower L lobe on CT abdomen then later confirmed on repeat Chest CTA. Pt placed on full dose lovenox then transitioned to xarelto after confirmation of insurance coverage. Pt to be discharged on initial dose of xarelto. Pt to follow up with hematology as outpatient.    Dementia  As per family, on admission, pt became more forgetful, disoriented, and paranoid over last 1-2 months. Patient was able to perform ADLs in August. Pt continued on home seroquel and aricept. Aricept discontinued during hospital stay as it may contribute to worsening confusion. Pt seen by neurologist during hospital stay. LP performed in light of rapidly worsening demential. Pt with improvement after LP, suspectiing NPH, but will require further out pt follow up with neurology. CSF wnl. EEG shows normal EEG with frequent drowsiness. Given patient improvement, family would instead take patient home. MR head shows no acute infarct, monderate periventricular and subcortical white matter chronic microvascular ischemic changes.     Abdominal Distention, Urinary retention, UTI  Pt noted to have firm and distended abdomen. CT Abdomen shows diverticulosis. Pt with urinary retention, placed mckeon catheter. Pt given multiple opportunities to void using encouragement, bladder training, increased ambulation. Urology consulted for urinary retention recommending out patient urology follow up. Urinary output and retention monitored daily. 10/13 mckeon placed after pt continued to retain despite all interventions attempts.  Pt began complaining of urinary frequency and urgency during hospital stay. No febrile episodes noted. Pt with positive UA, urine cultures growing klebsiella variicola sensitive to ceftriaxone. Pt treated with IV Rocephin with  improvement prior to switch to PO Abx. Pt to be discharged on mckeon catheter with urology follow up for TOV.    Thyroid nodule    Thyroid nodules noted on CTA. TSH, T3, T4 wnl. Thyroid US shows 3 right-sided nodules, largest likely benign spongiform lesion, s/p left thyroid lobectomy. Endocrinology consulted during hospital stay.    Glaucoma  Pt noted to be closing eyes continuously while on telemetry floor. Pt endorsed "a glare"  which improved. Pt started on timolol and latanoprose drops. Pt with elevated IOP noted at  39mmHg left eye and 26mmHg right eye (nl >20 mmHg). out patient Ophthalmology.    Chest pain.  Pt pw x3 days non-radiating, non-exertional, non-localized CP. EKG NSR, trops negative x2, CTA negative for aortic dissection and PE initially. Pt continued on  home statin, asa, BB, CCB. TTE showed LVH and G1DD Pt deemed stable to move off telemetry floor. Chest pain resolved during hospital stay.    Patient is stable for discharge per attending and is advised to follow up with PCP as outpatient  Please refer to patient's complete medical chart with documents for a full hospital course, for this is only a brief summary.

## 2020-10-14 NOTE — DISCHARGE NOTE PROVIDER - PROVIDER TOKENS
PROVIDER:[TOKEN:[[id number]],FOLLOWUP:[2 weeks]],PROVIDER:[TOKEN:[[id number]],FOLLOWUP:[1 week]],PROVIDER:[TOKEN:[[id number]],FOLLOWUP:[1 week]] PROVIDER:[TOKEN:[[id number]],FOLLOWUP:[2 weeks]],PROVIDER:[TOKEN:[[id number]],FOLLOWUP:[1 week]],PROVIDER:[TOKEN:[[id number]],FOLLOWUP:[1-3 days]]

## 2020-10-14 NOTE — PROGRESS NOTE ADULT - SUBJECTIVE AND OBJECTIVE BOX
PGY-1 Progress Note discussed with attending    PAGER #: [[phone]] TILL 5:00 PM  PLEASE CONTACT ON CALL TEAM:  - On Call Team (Please refer to [place]) FROM 5:00 PM - 8:30PM  - Nightfloat Team FROM 8:30 -7:30 AM    INTERVAL HPI/OVERNIGHT EVENTS:   Pt retaining 570+ cc in PM, straight cath done. Will attempt to monitor again today if retaining as there is delay in post void bladder scan.    REVIEW OF SYSTEMS:  CONSTITUTIONAL: No fever, weight loss, or fatigue  RESPIRATORY: No cough, wheezing, chills or hemoptysis; No shortness of breath  CARDIOVASCULAR: No chest pain, palpitations, dizziness, or leg swelling  GASTROINTESTINAL: No abdominal pain. No nausea, vomiting, or hematemesis; No diarrhea or constipation. No melena or hematochezia.  GENITOURINARY: No dysuria or hematuria, (+) urgency  NEUROLOGICAL: No headaches, memory loss, loss of strength, numbness, or tremors  SKIN: No itching, burning, rashes, or lesions     Vital Signs Last 24 Hrs  T(C): 36.8 (14 Oct 2020 07:27), Max: 37 (13 Oct 2020 17:15)  T(F): 98.2 (14 Oct 2020 07:27), Max: 98.6 (13 Oct 2020 17:15)  HR: 62 (14 Oct 2020 07:27) (62 - 79)  BP: 148/50 (14 Oct 2020 07:27) (123/78 - 148/50)  BP(mean): --  RR: 18 (14 Oct 2020 07:27) (16 - 18)  SpO2: 100% (14 Oct 2020 07:27) (98% - 100%)    PHYSICAL EXAMINATION:  GENERAL: NAD, well built  HEAD:  Atraumatic, Normocephalic  EYES:  conjunctiva and sclera clear  NECK: Supple, No JVD, Normal thyroid  CHEST/LUNG: Clear to auscultation. Clear to percussion bilaterally; No rales, rhonchi, wheezing, or rubs  HEART: Regular rate and rhythm; No murmurs, rubs, or gallops  ABDOMEN: Soft, Nontender, Nondistended; Bowel sounds present  NERVOUS SYSTEM:  Alert & Oriented X2-3,    EXTREMITIES:  2+ Peripheral Pulses, No clubbing, cyanosis, or edema  SKIN: warm dry                          8.7    8.45  )-----------( 346      ( 14 Oct 2020 07:44 )             27.5     10-14    136  |  104  |  18  ----------------------------<  156<H>  3.9   |  26  |  0.71    Ca    8.9      14 Oct 2020 07:44  Phos  3.8     10-14  Mg     2.5     10-14    TPro  6.3  /  Alb  2.7<L>  /  TBili  0.3  /  DBili  x   /  AST  61<H>  /  ALT  103<H>  /  AlkPhos  110  10-14    LIVER FUNCTIONS - ( 14 Oct 2020 07:44 )  Alb: 2.7 g/dL / Pro: 6.3 g/dL / ALK PHOS: 110 U/L / ALT: 103 U/L DA / AST: 61 U/L / GGT: x                   CAPILLARY BLOOD GLUCOSE      RADIOLOGY & ADDITIONAL TESTS:    Culture - Urine (10.12.20 @ 21:55)    Specimen Source: .Urine Clean Catch (Midstream)    Culture Results:   >100,000 CFU/ml Klebsiella variicola

## 2020-10-14 NOTE — DISCHARGE NOTE PROVIDER - ATTENDING COMMENTS
Seen and examined this morning. Case discussed with resident team and CM. Medically stable to discharge with the Dorado and out patient TOV.    A/P  B/L P.E.  Klebsiella UTI  Urinary retention multifactorial from Dementia with possible NPH and  Fecal impaction with constipation and immobility  Subacute onset Dementia etiology unclear with Psychosis much improved post LP concern for Normal Pressure hydrocephalus/ Cerebral degenerative disease  ??Ocular muscle weakness concern for Myasthenia Gravis less likely given clinical improvement  Increased IOP concerning for Glaucoma  Atypical Chest pain negative ACS  Pancreatic cyst likely benign  Thyroid nodule   Hx DM, HTN, HLD    Plan  Neuro follow up with Dr. Alfaro  and urology follow up outpatient for TOV  Specialized lab test awaited for potential cerebral degenerative disease  follow up with ophthalmologist as outpatient on discharge   Endocrine follow up outpatient for thyroid nodule

## 2020-10-14 NOTE — PROGRESS NOTE ADULT - ATTENDING COMMENTS
Patient seen and examined this afternoon with resident team.    Patient retaining urine despite continuous attempts of encouragement of ambulation and voiding.    A/P  B/L P.E.  UTI  Urinary retention multifactorial from Dementia with possible NPH and  Fecal impaction with constipation and immobility  Subacute onset Dementia etiology unclear with Psychosis much improved post LP concern for Normal Pressure hydrocephalus/ Cerebral degenerative disease  ??Ocular muscle weakness concern for Myasthenia Gravis less likely given clinical improvement  Increased IOP concerning for Glaucoma  Atypical Chest pain negative ACS  Pancreatic cyst likely benign  Thyroid nodule   Hx DM, HTN, HLD    Plan:   Insert Dorado catheter  F/u urine culture sensitivity  Continue Xarelto   Neuro follow up with Dr. Alfaro  and urology follow up outpatient for TOV  Specialized lab test awaited for potential cerebral degenerative disease  Continue Xalatan and Timolol;  follow up with ophthalmologist as outpatient on discharge   Endocrine follow up outpatient for thyroid nodule  Rest of the management as above    Discussed with PGY1 Dr. Dhaliwal   Discussed with Son at bedside this afternoon using Cantonese    DC planing next 24-48hrs pending clinical stability .

## 2020-10-14 NOTE — CHART NOTE - NSCHARTNOTEFT_GEN_A_CORE
Patient was initially retaining urine , morning bladder scan showed more than 500 ml urine , later in afternoon patient urinated and , Post void residual volume checked it was again noted to be 640 ml. Placing in the Dorado catheter.

## 2020-10-14 NOTE — DISCHARGE NOTE PROVIDER - NSDCFUADDINST_GEN_ALL_CORE_FT
Please inform Dr. Alfaro's clinic when scheduling appointment that you were seen by Dr Alfaro during your hospital stay and he has instructed you to follow up with him upon discharge.

## 2020-10-14 NOTE — PROGRESS NOTE ADULT - PROBLEM SELECTOR PLAN 3
patient noted to have firm, distended bowel on exam today  -possible constipation 2/2 urinary retention, FC placed  Mckeon discontinued 10/11 noon.   - Pt not currently on mckeon. Voiding with some retention  - Post void bladder scan today, if retaining, will place mckeon and discharge on mckeon to follow up with urology as outpt  - UA +  - Urine culture growing klebsiella, pending sensitivities, will extend antibiotic course of treatment  - Rocephin 1g q24 day 3

## 2020-10-14 NOTE — CHART NOTE - NSCHARTNOTEFT_GEN_A_CORE
Assessment:   80yFemalePatient is a 80y old  Female who presents with a chief complaint of chest pain (14 Oct 2020 12:21).  Pt visited.  Pt is on 1: 1 observation.  Pt is Alert speaks Cantonese  Speaking . Son  at bedside. . D/W PCA pt eats Good. Po tolerated. Also Drank ~75 % of Glucerna shakes. Pt is able to feed self labs Noted       Factors impacting intake: [ ] none [ ] nausea  [ ] vomiting [ ] diarrhea [ ] constipation  [ ]chewing problems [ ] swallowing issues  [ ] other:     Diet Prescription: Diet, Soft:   Consistent Carbohydrate {Evening Snacks}  DASH/ TLC {Sodium & Cholesterol Restricted}  Supplement Feeding Modality:  Oral  Glucerna Shake Cans or Servings Per Day:  1       Frequency:  Two Times a day (10-08-20 @ 14:08)    Intake: > 75 %     Current Weight: Weight (kg): 59 (10-08 @ 17:11)  % Weight Change    Pertinent Medications: MEDICATIONS  (STANDING):  artificial  tears Solution 1 Drop(s) Both EYES two times a day  ascorbic acid 500 milliGRAM(s) Oral daily  aspirin  chewable 81 milliGRAM(s) Oral daily  atorvastatin 20 milliGRAM(s) Oral at bedtime  cefTRIAXone   IVPB 1000 milliGRAM(s) IV Intermittent every 24 hours  insulin lispro (HumaLOG) corrective regimen sliding scale   SubCutaneous three times a day before meals  latanoprost 0.005% Ophthalmic Solution 1 Drop(s) Both EYES at bedtime  metoprolol tartrate 25 milliGRAM(s) Oral two times a day  NIFEdipine XL 60 milliGRAM(s) Oral daily  nystatin Cream 1 Application(s) Topical two times a day  polyethylene glycol 3350 17 Gram(s) Oral daily  QUEtiapine 25 milliGRAM(s) Oral <User Schedule>  rivaroxaban 15 milliGRAM(s) Oral two times a day  timolol 0.25% Solution 1 Drop(s) Both EYES two times a day    MEDICATIONS  (PRN):  acetaminophen   Tablet .. 650 milliGRAM(s) Oral every 6 hours PRN Mild Pain (1 - 3), Moderate Pain (4 - 6)    Pertinent Labs: 10-14 Na136 mmol/L Glu 156 mg/dL<H> K+ 3.9 mmol/L Cr  0.71 mg/dL BUN 18 mg/dL 10-14 Phos 3.8 mg/dL 10-14 Alb 2.7 g/dL<L> 10-05 Chol 165 mg/dL LDL 70 mg/dL HDL 53 mg/dL Trig 210 mg/dL<H>     CAPILLARY BLOOD GLUCOSE      POCT Blood Glucose.: 143 mg/dL (14 Oct 2020 11:27)  POCT Blood Glucose.: 173 mg/dL (14 Oct 2020 07:37)  POCT Blood Glucose.: 203 mg/dL (13 Oct 2020 21:10)  POCT Blood Glucose.: 231 mg/dL (13 Oct 2020 16:58)    Skin: No pressure Injury     Estimated Needs:   [ ] no change since previous assessment  [ ] recalculated:     Previous Nutrition Diagnosis:   [ ] Inadequate Energy Intake [xx ]Inadequate Oral Intake [ ] Excessive Energy Intake   [ ] Underweight [ ] Increased Nutrient Needs [ ] Overweight/Obesity   [ ] Altered GI Function [ ] Unintended Weight Loss [ ] Food & Nutrition Related Knowledge Deficit [ ] Malnutrition     Nutrition Diagnosis is [ ] ongoing  [ x] resolved [ ] not applicable     New Nutrition Diagnosis: [ ] not applicable       Interventions:   Recommend  [ ] Change Diet To:  [ x] Nutrition Supplement Continue with  Glucerna shakes BID.  ((x)   [ ] Nutrition Support  [ ] Other:     Monitoring and Evaluation:   [ ] PO intake [ x ] Tolerance to diet prescription [ x ] weights [ x ] labs[ x ] follow up per protocol  [ ] other:

## 2020-10-14 NOTE — DISCHARGE NOTE PROVIDER - NSDCMRMEDTOKEN_GEN_ALL_CORE_FT
atorvastatin 20 mg oral tablet: 1 tab(s) orally once a day  ferrous sulfate 325 mg (65 mg elemental iron) oral tablet: 1 tab(s) orally once a day  Janumet  mg-1000 mg oral tablet, extended release: 1 tab(s) orally once a day (in the evening)  Metoprolol Tartrate 25 mg oral tablet: 0.5 tab(s) orally 2 times a day  Nifedical XL 60 mg oral tablet, extended release: 1 tab(s) orally once a day  QUEtiapine 25 mg oral tablet: 1 tab(s) orally 2 times a day  rivaroxaban 15 mg oral tablet: 1 tab(s) orally every 12 hours   Vitamin C 500 mg oral capsule: 1 cap(s) orally once a day with food   aspirin 81 mg oral tablet, chewable: 1 tab(s) orally once a day  atorvastatin 20 mg oral tablet: 1 tab(s) orally once a day (at bedtime)  cefuroxime 500 mg oral tablet: 1 tab(s) orally every 12 hours starting 10/16  ferrous sulfate 325 mg (65 mg elemental iron) oral tablet: 1 tab(s) orally once a day  Janumet  mg-1000 mg oral tablet, extended release: 1 tab(s) orally once a day (in the evening)  latanoprost 0.005% ophthalmic solution: 1 drop(s) to each affected eye once a day (at bedtime)  Metoprolol Tartrate 25 mg oral tablet: 0.5 tab(s) orally 2 times a day  Nifedical XL 60 mg oral tablet, extended release: 1 tab(s) orally once a day  ocular lubricant ophthalmic solution: 1 drop(s) to each affected eye 2 times a day  QUEtiapine 25 mg oral tablet: 1 tab(s) orally 2 times a day  rivaroxaban 15 mg oral tablet: 1 tab(s) orally 2 times a day until 11/3  rivaroxaban 20 mg oral tablet: 1 tab(s) orally once a day starting 11/4  timolol maleate 0.25% ophthalmic solution: 1 drop(s) to each affected eye 2 times a day  Vitamin C 500 mg oral capsule: 1 cap(s) orally once a day with food

## 2020-10-14 NOTE — PROGRESS NOTE ADULT - PROBLEM SELECTOR PLAN 2
per family, patient able to perform ADLs as recently as August, has become abruptly more forgetful, disoriented, and paranoid over past 1-2 months  -cw home seroquel, aricept added  -LP performed 3pm today csf wnl to date  -MR head no acute pathologies, chronic ischemic changes noted  -EEG obtained today, awaiting official read  -discuss placement with CM, as family now having difficulties caring for her   -MARIAA per PT, family now wants to take pt home given clinical improvement  -neuro Dr. Alfaro, will follow up as outpatient  -B12 and folate wnl

## 2020-10-14 NOTE — DISCHARGE NOTE PROVIDER - NSDCCPCAREPLAN_GEN_ALL_CORE_FT
PRINCIPAL DISCHARGE DIAGNOSIS  Diagnosis: Pulmonary embolism  Assessment and Plan of Treatment: You were found to have pulmonary embolism in both lungs. You have to take Xarelto 15mg  for 21 days every 12 hours then Xarelto 20mg once a day from day 22 onwards. You will be taking this medication for at least 3 months. Please follow up with your primary care physician by tomorrow (friday) or saturday for further management of your medical conditions as per your PCP. Your PCP is aware of your recent hospitalization.      SECONDARY DISCHARGE DIAGNOSES  Diagnosis: UTI due to Klebsiella species  Assessment and Plan of Treatment: You were complaining of urinary frequency. Since you were also having urinary retention and your urinalysis shows presence of infection, Urine cultures were sent. Urine cultures were growing Klebsiella variicola. You were treated with IV Ceftriaxone for 4 days prior to switcing to Cefuroxime for 3 additional days. Please take your antibiotics starting morning of 10/16 every 12 hours. Please follow up with your PCP within 2 days as per conversation with your PCP.    Diagnosis: NPH (normal pressure hydrocephalus)  Assessment and Plan of Treatment: You came in with confusion and rapidly progressing dementia. After your Lumbar puncture, you were noted to improve which is consistent with Normal Pressure hydrocephalus. Please follow up with Dr. Alfaro (Neurology) within 2 weeks of your discharge. When making the appointment with Dr. Alfaro's office, please inform their staff that Dr. Alfaro has seen you in the hospital and has instructed to follow up with him. Do not continue taking Aricept as it has been discontinued during your hospitalization.    Diagnosis: Urinary retention with incomplete bladder emptying  Assessment and Plan of Treatment: You were found to have abdominal distention during your hospitalization. Upon CT scan, diverticulosis is found without infection. A mckeon catheter has been placed to prevent bladder retention. You were consistently retaining about 600mL of urine in the baldder despite multiple attempts at bladder training and so the decision to send you home on a mckeon catheter was made. You were evaluated by a urologist during your hospital stay (Dr. De Leon). You may follow up with Dr. De Leon as outpatient. Please follow up with your urologist within 1 week of your discharge for further management of your urinary retention and mckeon catheter.    Diagnosis: Thyroid nodule  Assessment and Plan of Treatment: Thyroid nodules found on on CTA of neck. TSH, T3, T4 are within normal levels. Thyroid Ultrasound shows 3 right-sided nodules, largest likely benign spongiform lesion. Please follow up with your PCP. You have been instructed to follow up with your PCP on Friday or Saturday following your discharge.      Diagnosis: Glaucoma  Assessment and Plan of Treatment: During your hospital stay, you were noted to be closing your eyes and complaining of a glare. Your intraocular pressures were measured on both eyes and was noted to be elevated and diagnosed with glaucoma. You were started on timolol and latanoprost drops. Please continue using these eye drops upon discharge. Please see an ophthalmologist within 2 weeks of your discharge for further evaluation of Glaucoma.    Diagnosis: Chest pain  Assessment and Plan of Treatment: You came with with chest pain and was admitted to telemetry during your hospital stay. Heart work up was negative for heart attack and pulmonary embolism initially but on repeat CT angiogram of the chest, a pulmonary embolism was discovered. However, at this time you were no longer having chest pain.

## 2020-10-14 NOTE — DISCHARGE NOTE PROVIDER - CARE PROVIDER_API CALL
Yuliana lAfaro)  Clinical Neurophysiology; Neurology  [address], [address] [place] [address]  Phone: [phone]  Fax: [phone]  Follow Up Time: 2 weeks    Karoline De Leon  UROLOGY  [address] [place] [address]  Phone: [phone]  Fax: [phone]  Follow Up Time: 1 week    DUNCAN KENNEDY DO  [facility]  [address] [place] [address]  Phone: [phone]  Fax: [phone]  Follow Up Time: 1 week   Yuliana Alfaro)  Clinical Neurophysiology; Neurology  [address] [facility] [facility] [facility], [address] [place] [address]  Phone: [phone]  Fax: [phone]  Follow Up Time: 2 weeks    Karoline De Leon  UROLOGY  [address] [facility] [address] [place] [address]  Phone: [phone]  Fax: [phone]  Follow Up Time: 1 week    DUNCAN KENNEDY DO  [facility]  [address] [place] [address]  Phone: [phone]  Fax: [phone]  Follow Up Time: 1-3 days

## 2020-10-15 ENCOUNTER — TRANSCRIPTION ENCOUNTER (OUTPATIENT)
Age: 80
End: 2020-10-15

## 2020-10-15 VITALS
SYSTOLIC BLOOD PRESSURE: 133 MMHG | RESPIRATION RATE: 18 BRPM | TEMPERATURE: 98 F | HEART RATE: 83 BPM | DIASTOLIC BLOOD PRESSURE: 50 MMHG

## 2020-10-15 LAB
ALBUMIN SERPL ELPH-MCNC: 2.8 G/DL — LOW (ref 3.5–5)
ALP SERPL-CCNC: 112 U/L — SIGNIFICANT CHANGE UP (ref 40–120)
ALT FLD-CCNC: 98 U/L DA — HIGH (ref 10–60)
ANION GAP SERPL CALC-SCNC: 7 MMOL/L — SIGNIFICANT CHANGE UP (ref 5–17)
AST SERPL-CCNC: 46 U/L — HIGH (ref 10–40)
BASOPHILS # BLD AUTO: 0.03 K/UL — SIGNIFICANT CHANGE UP (ref 0–0.2)
BASOPHILS NFR BLD AUTO: 0.4 % — SIGNIFICANT CHANGE UP (ref 0–2)
BILIRUB SERPL-MCNC: 0.2 MG/DL — SIGNIFICANT CHANGE UP (ref 0.2–1.2)
BUN SERPL-MCNC: 18 MG/DL — SIGNIFICANT CHANGE UP (ref 7–18)
CALCIUM SERPL-MCNC: 8.9 MG/DL — SIGNIFICANT CHANGE UP (ref 8.4–10.5)
CHLORIDE SERPL-SCNC: 100 MMOL/L — SIGNIFICANT CHANGE UP (ref 96–108)
CO2 SERPL-SCNC: 26 MMOL/L — SIGNIFICANT CHANGE UP (ref 22–31)
CREAT SERPL-MCNC: 0.76 MG/DL — SIGNIFICANT CHANGE UP (ref 0.5–1.3)
EOSINOPHIL # BLD AUTO: 0.64 K/UL — HIGH (ref 0–0.5)
EOSINOPHIL NFR BLD AUTO: 7.6 % — HIGH (ref 0–6)
GLUCOSE BLDC GLUCOMTR-MCNC: 186 MG/DL — HIGH (ref 70–99)
GLUCOSE BLDC GLUCOMTR-MCNC: 193 MG/DL — HIGH (ref 70–99)
GLUCOSE SERPL-MCNC: 172 MG/DL — HIGH (ref 70–99)
HCT VFR BLD CALC: 27.5 % — LOW (ref 34.5–45)
HGB BLD-MCNC: 8.7 G/DL — LOW (ref 11.5–15.5)
IMM GRANULOCYTES NFR BLD AUTO: 1.1 % — SIGNIFICANT CHANGE UP (ref 0–1.5)
LYMPHOCYTES # BLD AUTO: 1.15 K/UL — SIGNIFICANT CHANGE UP (ref 1–3.3)
LYMPHOCYTES # BLD AUTO: 13.7 % — SIGNIFICANT CHANGE UP (ref 13–44)
MAGNESIUM SERPL-MCNC: 2.3 MG/DL — SIGNIFICANT CHANGE UP (ref 1.6–2.6)
MCHC RBC-ENTMCNC: 27.4 PG — SIGNIFICANT CHANGE UP (ref 27–34)
MCHC RBC-ENTMCNC: 31.6 GM/DL — LOW (ref 32–36)
MCV RBC AUTO: 86.5 FL — SIGNIFICANT CHANGE UP (ref 80–100)
MONOCYTES # BLD AUTO: 0.81 K/UL — SIGNIFICANT CHANGE UP (ref 0–0.9)
MONOCYTES NFR BLD AUTO: 9.6 % — SIGNIFICANT CHANGE UP (ref 2–14)
NEUTROPHILS # BLD AUTO: 5.69 K/UL — SIGNIFICANT CHANGE UP (ref 1.8–7.4)
NEUTROPHILS NFR BLD AUTO: 67.6 % — SIGNIFICANT CHANGE UP (ref 43–77)
NRBC # BLD: 0 /100 WBCS — SIGNIFICANT CHANGE UP (ref 0–0)
PHOSPHATE SERPL-MCNC: 4.2 MG/DL — SIGNIFICANT CHANGE UP (ref 2.5–4.5)
PLATELET # BLD AUTO: 404 K/UL — HIGH (ref 150–400)
POTASSIUM SERPL-MCNC: 4.3 MMOL/L — SIGNIFICANT CHANGE UP (ref 3.5–5.3)
POTASSIUM SERPL-SCNC: 4.3 MMOL/L — SIGNIFICANT CHANGE UP (ref 3.5–5.3)
PROT SERPL-MCNC: 6.4 G/DL — SIGNIFICANT CHANGE UP (ref 6–8.3)
RBC # BLD: 3.18 M/UL — LOW (ref 3.8–5.2)
RBC # FLD: 14.6 % — HIGH (ref 10.3–14.5)
SODIUM SERPL-SCNC: 133 MMOL/L — LOW (ref 135–145)
WBC # BLD: 8.41 K/UL — SIGNIFICANT CHANGE UP (ref 3.8–10.5)
WBC # FLD AUTO: 8.41 K/UL — SIGNIFICANT CHANGE UP (ref 3.8–10.5)

## 2020-10-15 PROCEDURE — 87635 SARS-COV-2 COVID-19 AMP PRB: CPT

## 2020-10-15 PROCEDURE — 74174 CTA ABD&PLVS W/CONTRAST: CPT

## 2020-10-15 PROCEDURE — 74177 CT ABD & PELVIS W/CONTRAST: CPT

## 2020-10-15 PROCEDURE — 80048 BASIC METABOLIC PNL TOTAL CA: CPT

## 2020-10-15 PROCEDURE — 71275 CT ANGIOGRAPHY CHEST: CPT

## 2020-10-15 PROCEDURE — 76536 US EXAM OF HEAD AND NECK: CPT

## 2020-10-15 PROCEDURE — 86900 BLOOD TYPING SEROLOGIC ABO: CPT

## 2020-10-15 PROCEDURE — 95819 EEG AWAKE AND ASLEEP: CPT

## 2020-10-15 PROCEDURE — 87483 CNS DNA AMP PROBE TYPE 12-25: CPT

## 2020-10-15 PROCEDURE — 89051 BODY FLUID CELL COUNT: CPT

## 2020-10-15 PROCEDURE — 82652 VIT D 1 25-DIHYDROXY: CPT

## 2020-10-15 PROCEDURE — 70551 MRI BRAIN STEM W/O DYE: CPT

## 2020-10-15 PROCEDURE — 87205 SMEAR GRAM STAIN: CPT

## 2020-10-15 PROCEDURE — 88108 CYTOPATH CONCENTRATE TECH: CPT

## 2020-10-15 PROCEDURE — 99285 EMERGENCY DEPT VISIT HI MDM: CPT | Mod: 25

## 2020-10-15 PROCEDURE — 97162 PT EVAL MOD COMPLEX 30 MIN: CPT

## 2020-10-15 PROCEDURE — 87529 HSV DNA AMP PROBE: CPT

## 2020-10-15 PROCEDURE — 87086 URINE CULTURE/COLONY COUNT: CPT

## 2020-10-15 PROCEDURE — 83880 ASSAY OF NATRIURETIC PEPTIDE: CPT

## 2020-10-15 PROCEDURE — 85045 AUTOMATED RETICULOCYTE COUNT: CPT

## 2020-10-15 PROCEDURE — 83550 IRON BINDING TEST: CPT

## 2020-10-15 PROCEDURE — 85610 PROTHROMBIN TIME: CPT

## 2020-10-15 PROCEDURE — 84100 ASSAY OF PHOSPHORUS: CPT

## 2020-10-15 PROCEDURE — 71045 X-RAY EXAM CHEST 1 VIEW: CPT

## 2020-10-15 PROCEDURE — 83605 ASSAY OF LACTIC ACID: CPT

## 2020-10-15 PROCEDURE — 83735 ASSAY OF MAGNESIUM: CPT

## 2020-10-15 PROCEDURE — 87116 MYCOBACTERIA CULTURE: CPT

## 2020-10-15 PROCEDURE — 93970 EXTREMITY STUDY: CPT

## 2020-10-15 PROCEDURE — 93005 ELECTROCARDIOGRAM TRACING: CPT

## 2020-10-15 PROCEDURE — 86592 SYPHILIS TEST NON-TREP QUAL: CPT

## 2020-10-15 PROCEDURE — 84157 ASSAY OF PROTEIN OTHER: CPT

## 2020-10-15 PROCEDURE — 84466 ASSAY OF TRANSFERRIN: CPT

## 2020-10-15 PROCEDURE — 82272 OCCULT BLD FECES 1-3 TESTS: CPT

## 2020-10-15 PROCEDURE — 82962 GLUCOSE BLOOD TEST: CPT

## 2020-10-15 PROCEDURE — 93306 TTE W/DOPPLER COMPLETE: CPT

## 2020-10-15 PROCEDURE — 86041 ACETYLCHOLN RCPTR BNDNG ANTB: CPT

## 2020-10-15 PROCEDURE — 87186 SC STD MICRODIL/AGAR DIL: CPT

## 2020-10-15 PROCEDURE — 84443 ASSAY THYROID STIM HORMONE: CPT

## 2020-10-15 PROCEDURE — 87102 FUNGUS ISOLATION CULTURE: CPT

## 2020-10-15 PROCEDURE — 85027 COMPLETE CBC AUTOMATED: CPT

## 2020-10-15 PROCEDURE — 83690 ASSAY OF LIPASE: CPT

## 2020-10-15 PROCEDURE — 36415 COLL VENOUS BLD VENIPUNCTURE: CPT

## 2020-10-15 PROCEDURE — 86403 PARTICLE AGGLUT ANTBDY SCRN: CPT

## 2020-10-15 PROCEDURE — 83615 LACTATE (LD) (LDH) ENZYME: CPT

## 2020-10-15 PROCEDURE — 86850 RBC ANTIBODY SCREEN: CPT

## 2020-10-15 PROCEDURE — 81001 URINALYSIS AUTO W/SCOPE: CPT

## 2020-10-15 PROCEDURE — 95957 EEG DIGITAL ANALYSIS: CPT

## 2020-10-15 PROCEDURE — 86769 SARS-COV-2 COVID-19 ANTIBODY: CPT

## 2020-10-15 PROCEDURE — 86901 BLOOD TYPING SEROLOGIC RH(D): CPT

## 2020-10-15 PROCEDURE — 82607 VITAMIN B-12: CPT

## 2020-10-15 PROCEDURE — 83036 HEMOGLOBIN GLYCOSYLATED A1C: CPT

## 2020-10-15 PROCEDURE — 80061 LIPID PANEL: CPT

## 2020-10-15 PROCEDURE — 99239 HOSP IP/OBS DSCHRG MGMT >30: CPT

## 2020-10-15 PROCEDURE — 84484 ASSAY OF TROPONIN QUANT: CPT

## 2020-10-15 PROCEDURE — 85730 THROMBOPLASTIN TIME PARTIAL: CPT

## 2020-10-15 PROCEDURE — 87070 CULTURE OTHR SPECIMN AEROBIC: CPT

## 2020-10-15 PROCEDURE — 83540 ASSAY OF IRON: CPT

## 2020-10-15 PROCEDURE — 80053 COMPREHEN METABOLIC PANEL: CPT

## 2020-10-15 PROCEDURE — 82945 GLUCOSE OTHER FLUID: CPT

## 2020-10-15 PROCEDURE — 85025 COMPLETE CBC W/AUTO DIFF WBC: CPT

## 2020-10-15 RX ORDER — TIMOLOL 0.5 %
1 DROPS OPHTHALMIC (EYE)
Qty: 15 | Refills: 0
Start: 2020-10-15 | End: 2020-11-13

## 2020-10-15 RX ORDER — CEFUROXIME AXETIL 250 MG
1 TABLET ORAL
Qty: 6 | Refills: 0
Start: 2020-10-15 | End: 2020-10-17

## 2020-10-15 RX ORDER — LATANOPROST 0.05 MG/ML
1 SOLUTION/ DROPS OPHTHALMIC; TOPICAL
Qty: 15 | Refills: 0
Start: 2020-10-15 | End: 2020-11-13

## 2020-10-15 RX ORDER — ASPIRIN/CALCIUM CARB/MAGNESIUM 324 MG
1 TABLET ORAL
Qty: 0 | Refills: 0 | DISCHARGE
Start: 2020-10-15

## 2020-10-15 RX ORDER — RIVAROXABAN 15 MG-20MG
1 KIT ORAL
Qty: 69 | Refills: 0
Start: 2020-10-15 | End: 2020-12-22

## 2020-10-15 RX ORDER — ATORVASTATIN CALCIUM 80 MG/1
1 TABLET, FILM COATED ORAL
Qty: 0 | Refills: 0 | DISCHARGE
Start: 2020-10-15

## 2020-10-15 RX ORDER — RIVAROXABAN 15 MG-20MG
1 KIT ORAL
Qty: 0 | Refills: 0 | DISCHARGE
Start: 2020-10-15

## 2020-10-15 RX ORDER — ATORVASTATIN CALCIUM 80 MG/1
1 TABLET, FILM COATED ORAL
Qty: 0 | Refills: 0 | DISCHARGE

## 2020-10-15 RX ADMIN — Medication 1 DROP(S): at 05:29

## 2020-10-15 RX ADMIN — Medication 25 MILLIGRAM(S): at 05:28

## 2020-10-15 RX ADMIN — NYSTATIN CREAM 1 APPLICATION(S): 100000 CREAM TOPICAL at 05:33

## 2020-10-15 RX ADMIN — CEFTRIAXONE 100 MILLIGRAM(S): 500 INJECTION, POWDER, FOR SOLUTION INTRAMUSCULAR; INTRAVENOUS at 15:46

## 2020-10-15 RX ADMIN — POLYETHYLENE GLYCOL 3350 17 GRAM(S): 17 POWDER, FOR SOLUTION ORAL at 12:05

## 2020-10-15 RX ADMIN — Medication 1: at 12:03

## 2020-10-15 RX ADMIN — RIVAROXABAN 15 MILLIGRAM(S): KIT at 05:28

## 2020-10-15 RX ADMIN — Medication 1 DROP(S): at 05:27

## 2020-10-15 RX ADMIN — Medication 1: at 08:03

## 2020-10-15 RX ADMIN — Medication 500 MILLIGRAM(S): at 12:05

## 2020-10-15 RX ADMIN — Medication 60 MILLIGRAM(S): at 05:27

## 2020-10-15 RX ADMIN — Medication 81 MILLIGRAM(S): at 12:05

## 2020-10-15 NOTE — DISCHARGE NOTE NURSING/CASE MANAGEMENT/SOCIAL WORK - PATIENT PORTAL LINK FT
You can access the FollowMyHealth Patient Portal offered by [facility] by registering at the following website: [email or web address]. By joining [facility]’s FollowMyHealth portal, you will also be able to view your health information using other applications (apps) compatible with our system.

## 2020-10-19 ENCOUNTER — INPATIENT (INPATIENT)
Facility: HOSPITAL | Age: 80
LOS: 3 days | Discharge: HOME | DRG: 640 | End: 2020-10-23
Attending: INTERNAL MEDICINE | Admitting: INTERNAL MEDICINE
Payer: MEDICARE

## 2020-10-19 VITALS
HEIGHT: 60 IN | RESPIRATION RATE: 16 BRPM | TEMPERATURE: 98 F | HEART RATE: 85 BPM | DIASTOLIC BLOOD PRESSURE: 60 MMHG | OXYGEN SATURATION: 98 % | SYSTOLIC BLOOD PRESSURE: 114 MMHG

## 2020-10-19 DIAGNOSIS — Z78.9 OTHER SPECIFIED HEALTH STATUS: Chronic | ICD-10-CM

## 2020-10-19 DIAGNOSIS — E87.1 HYPO-OSMOLALITY AND HYPONATREMIA: ICD-10-CM

## 2020-10-19 LAB
ACETONE SERPL-MCNC: NEGATIVE — SIGNIFICANT CHANGE UP
ALBUMIN SERPL ELPH-MCNC: 3 G/DL — LOW (ref 3.5–5)
ALP SERPL-CCNC: 100 U/L — SIGNIFICANT CHANGE UP (ref 40–120)
ALT FLD-CCNC: 45 U/L DA — SIGNIFICANT CHANGE UP (ref 10–60)
ANION GAP SERPL CALC-SCNC: 10 MMOL/L — SIGNIFICANT CHANGE UP (ref 5–17)
APTT BLD: 33.7 SEC — SIGNIFICANT CHANGE UP (ref 27.5–35.5)
AST SERPL-CCNC: 24 U/L — SIGNIFICANT CHANGE UP (ref 10–40)
BASOPHILS # BLD AUTO: 0.02 K/UL — SIGNIFICANT CHANGE UP (ref 0–0.2)
BASOPHILS NFR BLD AUTO: 0.2 % — SIGNIFICANT CHANGE UP (ref 0–2)
BILIRUB SERPL-MCNC: 0.4 MG/DL — SIGNIFICANT CHANGE UP (ref 0.2–1.2)
BUN SERPL-MCNC: 16 MG/DL — SIGNIFICANT CHANGE UP (ref 7–18)
CALCIUM SERPL-MCNC: 7.9 MG/DL — LOW (ref 8.4–10.5)
CHLORIDE SERPL-SCNC: 84 MMOL/L — LOW (ref 96–108)
CK SERPL-CCNC: 72 U/L — SIGNIFICANT CHANGE UP (ref 21–215)
CO2 SERPL-SCNC: 23 MMOL/L — SIGNIFICANT CHANGE UP (ref 22–31)
CREAT SERPL-MCNC: 0.51 MG/DL — SIGNIFICANT CHANGE UP (ref 0.5–1.3)
EOSINOPHIL # BLD AUTO: 0.4 K/UL — SIGNIFICANT CHANGE UP (ref 0–0.5)
EOSINOPHIL NFR BLD AUTO: 3.9 % — SIGNIFICANT CHANGE UP (ref 0–6)
GLUCOSE SERPL-MCNC: 141 MG/DL — HIGH (ref 70–99)
HCT VFR BLD CALC: 19.5 % — CRITICAL LOW (ref 34.5–45)
HGB BLD-MCNC: 6.9 G/DL — CRITICAL LOW (ref 11.5–15.5)
IMM GRANULOCYTES NFR BLD AUTO: 1.1 % — SIGNIFICANT CHANGE UP (ref 0–1.5)
INR BLD: 1.16 RATIO — SIGNIFICANT CHANGE UP (ref 0.88–1.16)
LYMPHOCYTES # BLD AUTO: 0.78 K/UL — LOW (ref 1–3.3)
LYMPHOCYTES # BLD AUTO: 7.6 % — LOW (ref 13–44)
MAGNESIUM SERPL-MCNC: 1.8 MG/DL — SIGNIFICANT CHANGE UP (ref 1.6–2.6)
MCHC RBC-ENTMCNC: 28.5 PG — SIGNIFICANT CHANGE UP (ref 27–34)
MCHC RBC-ENTMCNC: 35.4 GM/DL — SIGNIFICANT CHANGE UP (ref 32–36)
MCV RBC AUTO: 80.6 FL — SIGNIFICANT CHANGE UP (ref 80–100)
MONOCYTES # BLD AUTO: 0.66 K/UL — SIGNIFICANT CHANGE UP (ref 0–0.9)
MONOCYTES NFR BLD AUTO: 6.4 % — SIGNIFICANT CHANGE UP (ref 2–14)
NEUTROPHILS # BLD AUTO: 8.32 K/UL — HIGH (ref 1.8–7.4)
NEUTROPHILS NFR BLD AUTO: 80.8 % — HIGH (ref 43–77)
NRBC # BLD: 0 /100 WBCS — SIGNIFICANT CHANGE UP (ref 0–0)
OB PNL STL: NEGATIVE — SIGNIFICANT CHANGE UP
PLATELET # BLD AUTO: 394 K/UL — SIGNIFICANT CHANGE UP (ref 150–400)
POTASSIUM SERPL-MCNC: 4.1 MMOL/L — SIGNIFICANT CHANGE UP (ref 3.5–5.3)
POTASSIUM SERPL-SCNC: 4.1 MMOL/L — SIGNIFICANT CHANGE UP (ref 3.5–5.3)
PROT SERPL-MCNC: 6.2 G/DL — SIGNIFICANT CHANGE UP (ref 6–8.3)
PROTHROM AB SERPL-ACNC: 13.7 SEC — HIGH (ref 10.6–13.6)
RBC # BLD: 2.42 M/UL — LOW (ref 3.8–5.2)
RBC # FLD: 14.3 % — SIGNIFICANT CHANGE UP (ref 10.3–14.5)
SODIUM SERPL-SCNC: 117 MMOL/L — CRITICAL LOW (ref 135–145)
TROPONIN I SERPL-MCNC: <0.015 NG/ML — SIGNIFICANT CHANGE UP (ref 0–0.04)
WBC # BLD: 10.29 K/UL — SIGNIFICANT CHANGE UP (ref 3.8–10.5)
WBC # FLD AUTO: 10.29 K/UL — SIGNIFICANT CHANGE UP (ref 3.8–10.5)

## 2020-10-19 PROCEDURE — 99285 EMERGENCY DEPT VISIT HI MDM: CPT

## 2020-10-19 PROCEDURE — 73060 X-RAY EXAM OF HUMERUS: CPT | Mod: 26,LT

## 2020-10-19 PROCEDURE — 73030 X-RAY EXAM OF SHOULDER: CPT | Mod: 26,LT

## 2020-10-19 PROCEDURE — 71045 X-RAY EXAM CHEST 1 VIEW: CPT | Mod: 26

## 2020-10-19 RX ORDER — ACETAMINOPHEN 500 MG
650 TABLET ORAL ONCE
Refills: 0 | Status: COMPLETED | OUTPATIENT
Start: 2020-10-19 | End: 2020-10-19

## 2020-10-19 RX ORDER — FUROSEMIDE 40 MG
40 TABLET ORAL ONCE
Refills: 0 | Status: COMPLETED | OUTPATIENT
Start: 2020-10-19 | End: 2020-10-19

## 2020-10-19 RX ORDER — MAGNESIUM SULFATE 500 MG/ML
1 VIAL (ML) INJECTION ONCE
Refills: 0 | Status: COMPLETED | OUTPATIENT
Start: 2020-10-19 | End: 2020-10-19

## 2020-10-19 RX ADMIN — Medication 650 MILLIGRAM(S): at 21:57

## 2020-10-19 RX ADMIN — Medication 40 MILLIGRAM(S): at 21:57

## 2020-10-19 NOTE — ED PROVIDER NOTE - NEUROLOGICAL, MLM
Alert and oriented, no focal deficits, no motor or sensory deficits. Alert and orientedx1, no focal deficits, no motor or sensory deficits.

## 2020-10-19 NOTE — ED ADULT NURSE NOTE - OBJECTIVE STATEMENT
c/o shoulder pain , chest pain, headache s/p tripped and fall while walking with walker to bathroom yesterday, witness by family  as per EMS

## 2020-10-19 NOTE — ED PROVIDER NOTE - MUSCULOSKELETAL, MLM
Spine appears normal, range of motion is not limited, no muscle or joint tenderness Spine appears normal, range of motion is not limited, Lt upper arm- sl. tenderness to palp., no deformity, no swelling, FROM, pulses intact

## 2020-10-19 NOTE — ED PROVIDER NOTE - CLINICAL SUMMARY MEDICAL DECISION MAKING FREE TEXT BOX
Patient s/p fall complaining of left shoulder pain, left chest pain, as well as vomiting x3. Get CT head, x-ray, labs, and reassess.

## 2020-10-19 NOTE — ED PROVIDER NOTE - PMH
Dementia    DM (diabetes mellitus)    HTN (hypertension)    HTN (hypertension)    Hyperlipidemia

## 2020-10-19 NOTE — ED ADULT NURSE NOTE - NSIMPLEMENTINTERV_GEN_ALL_ED
Implemented All Universal Safety Interventions:  [place] to call system. Call bell, personal items and telephone within reach. Instruct patient to call for assistance. Room bathroom lighting operational. Non-slip footwear when patient is off stretcher. Physically safe environment: no spills, clutter or unnecessary equipment. Stretcher in lowest position, wheels locked, appropriate side rails in place. Implemented All Fall Risk Interventions:  [place] to call system. Call bell, personal items and telephone within reach. Instruct patient to call for assistance. Room bathroom lighting operational. Non-slip footwear when patient is off stretcher. Physically safe environment: no spills, clutter or unnecessary equipment. Stretcher in lowest position, wheels locked, appropriate side rails in place. Provide visual cue, wrist band, yellow gown, etc. Monitor gait and stability. Monitor for mental status changes and reorient to person, place, and time. Review medications for side effects contributing to fall risk. Reinforce activity limits and safety measures with patient and family.

## 2020-10-19 NOTE — ED PROVIDER NOTE - PROGRESS NOTE DETAILS
Pt vomited while getting x-ray Pt with hyponatremia, anemia, Lt humeral head fracture, sling applied, will admit pt case d/w ICU, will admit

## 2020-10-19 NOTE — ED PROVIDER NOTE - OBJECTIVE STATEMENT
80 year old female with PMHx of HTN, DM, HLD, dementia with son at bedside presenting s/p fall last night with left sided shoulder pain and left sided chest pain. Per son, patient was walking with his walker to the bathroom when he lost his  of the walker and fell down onto his right side last night. EMS called due to patient unable to move left shoulder secondary to pain, however, while being transferred, patient also complained of left sided chest pain. Denies LOC, head trauma, diaphoresis, dizziness, or any other complaints. Per son, last dose of DM medication was this morning. Non smoker, no recent surgeries, NKDA. 80 year old female with PMHx of HTN, DM, HLD, dementia with son at bedside presenting s/p fall last night with left sided shoulder pain and left sided chest pain. Per son, patient was walking with his walker to the bathroom when he lost his  of the walker and fell down onto his right side last night. EMS called due to patient unable to move left shoulder secondary to pain, however, while being transferred, patient also complained of left sided chest pain. As per son, patient vomited 3 times today after lunch. Since then, patient has had no appetite. Patient was recently discharge 4 days ago after chest pain evaluation. Denies LOC, head trauma, diaphoresis, dizziness, or any other complaints. Per son, last dose of DM medication was this morning. Non smoker, no recent surgeries, NKDA.

## 2020-10-19 NOTE — ED ADULT TRIAGE NOTE - CHIEF COMPLAINT QUOTE
left shoulder pain , chest pain, headache s/p tripped and fall while walking with walker to bathroom yesterday, witness by family  as per EMS , pt on xeralto and ASA

## 2020-10-20 DIAGNOSIS — I26.99 OTHER PULMONARY EMBOLISM WITHOUT ACUTE COR PULMONALE: ICD-10-CM

## 2020-10-20 LAB
ANION GAP SERPL CALC-SCNC: 10 MMOL/L — SIGNIFICANT CHANGE UP (ref 5–17)
ANION GAP SERPL CALC-SCNC: 7 MMOL/L — SIGNIFICANT CHANGE UP (ref 5–17)
ANION GAP SERPL CALC-SCNC: 7 MMOL/L — SIGNIFICANT CHANGE UP (ref 5–17)
ANION GAP SERPL CALC-SCNC: 9 MMOL/L — SIGNIFICANT CHANGE UP (ref 5–17)
ANION GAP SERPL CALC-SCNC: 9 MMOL/L — SIGNIFICANT CHANGE UP (ref 5–17)
ANISOCYTOSIS BLD QL: SLIGHT — SIGNIFICANT CHANGE UP
APPEARANCE UR: CLEAR — SIGNIFICANT CHANGE UP
BACTERIA # UR AUTO: ABNORMAL /HPF
BASO STIPL BLD QL SMEAR: PRESENT — SIGNIFICANT CHANGE UP
BILIRUB UR-MCNC: NEGATIVE — SIGNIFICANT CHANGE UP
BLD GP AB SCN SERPL QL: SIGNIFICANT CHANGE UP
BUN SERPL-MCNC: 11 MG/DL — SIGNIFICANT CHANGE UP (ref 7–18)
BUN SERPL-MCNC: 12 MG/DL — SIGNIFICANT CHANGE UP (ref 7–18)
BUN SERPL-MCNC: 12 MG/DL — SIGNIFICANT CHANGE UP (ref 7–18)
BUN SERPL-MCNC: 15 MG/DL — SIGNIFICANT CHANGE UP (ref 7–18)
BUN SERPL-MCNC: 9 MG/DL — SIGNIFICANT CHANGE UP (ref 7–18)
CALCIUM SERPL-MCNC: 7.7 MG/DL — LOW (ref 8.4–10.5)
CALCIUM SERPL-MCNC: 7.8 MG/DL — LOW (ref 8.4–10.5)
CALCIUM SERPL-MCNC: 7.9 MG/DL — LOW (ref 8.4–10.5)
CALCIUM SERPL-MCNC: 7.9 MG/DL — LOW (ref 8.4–10.5)
CALCIUM SERPL-MCNC: 8.4 MG/DL — SIGNIFICANT CHANGE UP (ref 8.4–10.5)
CHLORIDE SERPL-SCNC: 81 MMOL/L — LOW (ref 96–108)
CHLORIDE SERPL-SCNC: 88 MMOL/L — LOW (ref 96–108)
CHLORIDE SERPL-SCNC: 88 MMOL/L — LOW (ref 96–108)
CHLORIDE SERPL-SCNC: 90 MMOL/L — LOW (ref 96–108)
CHLORIDE SERPL-SCNC: 90 MMOL/L — LOW (ref 96–108)
CK SERPL-CCNC: 118 U/L — SIGNIFICANT CHANGE UP (ref 21–215)
CO2 SERPL-SCNC: 24 MMOL/L — SIGNIFICANT CHANGE UP (ref 22–31)
CO2 SERPL-SCNC: 25 MMOL/L — SIGNIFICANT CHANGE UP (ref 22–31)
CO2 SERPL-SCNC: 25 MMOL/L — SIGNIFICANT CHANGE UP (ref 22–31)
CO2 SERPL-SCNC: 26 MMOL/L — SIGNIFICANT CHANGE UP (ref 22–31)
CO2 SERPL-SCNC: 28 MMOL/L — SIGNIFICANT CHANGE UP (ref 22–31)
COLOR SPEC: YELLOW — SIGNIFICANT CHANGE UP
CREAT SERPL-MCNC: 0.6 MG/DL — SIGNIFICANT CHANGE UP (ref 0.5–1.3)
CREAT SERPL-MCNC: 0.61 MG/DL — SIGNIFICANT CHANGE UP (ref 0.5–1.3)
CREAT SERPL-MCNC: 0.62 MG/DL — SIGNIFICANT CHANGE UP (ref 0.5–1.3)
CREAT SERPL-MCNC: 0.64 MG/DL — SIGNIFICANT CHANGE UP (ref 0.5–1.3)
CREAT SERPL-MCNC: 0.77 MG/DL — SIGNIFICANT CHANGE UP (ref 0.5–1.3)
DIFF PNL FLD: ABNORMAL
EPI CELLS # UR: ABNORMAL /HPF
FERRITIN SERPL-MCNC: 200 NG/ML — HIGH (ref 15–150)
GLUCOSE SERPL-MCNC: 113 MG/DL — HIGH (ref 70–99)
GLUCOSE SERPL-MCNC: 122 MG/DL — HIGH (ref 70–99)
GLUCOSE SERPL-MCNC: 132 MG/DL — HIGH (ref 70–99)
GLUCOSE SERPL-MCNC: 154 MG/DL — HIGH (ref 70–99)
GLUCOSE SERPL-MCNC: 155 MG/DL — HIGH (ref 70–99)
GLUCOSE UR QL: NEGATIVE — SIGNIFICANT CHANGE UP
HCT VFR BLD CALC: 18.3 % — CRITICAL LOW (ref 34.5–45)
HCT VFR BLD CALC: 23.2 % — LOW (ref 34.5–45)
HCT VFR BLD CALC: 23.6 % — LOW (ref 34.5–45)
HGB BLD-MCNC: 6.4 G/DL — CRITICAL LOW (ref 11.5–15.5)
HGB BLD-MCNC: 7.9 G/DL — LOW (ref 11.5–15.5)
HGB BLD-MCNC: 8.4 G/DL — LOW (ref 11.5–15.5)
HYPOCHROMIA BLD QL: SIGNIFICANT CHANGE UP
IRON SATN MFR SERPL: 29 UG/DL — LOW (ref 40–150)
IRON SATN MFR SERPL: 9 % — LOW (ref 15–50)
KETONES UR-MCNC: NEGATIVE — SIGNIFICANT CHANGE UP
LEUKOCYTE ESTERASE UR-ACNC: NEGATIVE — SIGNIFICANT CHANGE UP
MAGNESIUM SERPL-MCNC: 1.9 MG/DL — SIGNIFICANT CHANGE UP (ref 1.6–2.6)
MAGNESIUM SERPL-MCNC: 2.2 MG/DL — SIGNIFICANT CHANGE UP (ref 1.6–2.6)
MANUAL SMEAR VERIFICATION: SIGNIFICANT CHANGE UP
MCHC RBC-ENTMCNC: 27.6 PG — SIGNIFICANT CHANGE UP (ref 27–34)
MCHC RBC-ENTMCNC: 28.1 PG — SIGNIFICANT CHANGE UP (ref 27–34)
MCHC RBC-ENTMCNC: 28.5 PG — SIGNIFICANT CHANGE UP (ref 27–34)
MCHC RBC-ENTMCNC: 34.1 GM/DL — SIGNIFICANT CHANGE UP (ref 32–36)
MCHC RBC-ENTMCNC: 35 GM/DL — SIGNIFICANT CHANGE UP (ref 32–36)
MCHC RBC-ENTMCNC: 35.6 GM/DL — SIGNIFICANT CHANGE UP (ref 32–36)
MCV RBC AUTO: 80 FL — SIGNIFICANT CHANGE UP (ref 80–100)
MCV RBC AUTO: 80.3 FL — SIGNIFICANT CHANGE UP (ref 80–100)
MCV RBC AUTO: 81.1 FL — SIGNIFICANT CHANGE UP (ref 80–100)
MICROCYTES BLD QL: SLIGHT — SIGNIFICANT CHANGE UP
MRSA PCR RESULT.: SIGNIFICANT CHANGE UP
NITRITE UR-MCNC: NEGATIVE — SIGNIFICANT CHANGE UP
NRBC # BLD: 0 /100 WBCS — SIGNIFICANT CHANGE UP (ref 0–0)
OSMOLALITY SERPL: 238 MOSMOL/KG — LOW (ref 280–301)
OSMOLALITY UR: 255 MOS/KG — SIGNIFICANT CHANGE UP (ref 50–1200)
OVALOCYTES BLD QL SMEAR: SLIGHT — SIGNIFICANT CHANGE UP
PH UR: 5 — SIGNIFICANT CHANGE UP (ref 5–8)
PHOSPHATE SERPL-MCNC: 2.9 MG/DL — SIGNIFICANT CHANGE UP (ref 2.5–4.5)
PHOSPHATE SERPL-MCNC: 3.1 MG/DL — SIGNIFICANT CHANGE UP (ref 2.5–4.5)
PLAT MORPH BLD: NORMAL — SIGNIFICANT CHANGE UP
PLATELET # BLD AUTO: 399 K/UL — SIGNIFICANT CHANGE UP (ref 150–400)
PLATELET # BLD AUTO: 401 K/UL — HIGH (ref 150–400)
PLATELET # BLD AUTO: 401 K/UL — HIGH (ref 150–400)
POIKILOCYTOSIS BLD QL AUTO: SLIGHT — SIGNIFICANT CHANGE UP
POLYCHROMASIA BLD QL SMEAR: SLIGHT — SIGNIFICANT CHANGE UP
POTASSIUM SERPL-MCNC: 2.8 MMOL/L — CRITICAL LOW (ref 3.5–5.3)
POTASSIUM SERPL-MCNC: 3.5 MMOL/L — SIGNIFICANT CHANGE UP (ref 3.5–5.3)
POTASSIUM SERPL-MCNC: 3.5 MMOL/L — SIGNIFICANT CHANGE UP (ref 3.5–5.3)
POTASSIUM SERPL-MCNC: 3.6 MMOL/L — SIGNIFICANT CHANGE UP (ref 3.5–5.3)
POTASSIUM SERPL-MCNC: 3.7 MMOL/L — SIGNIFICANT CHANGE UP (ref 3.5–5.3)
POTASSIUM SERPL-SCNC: 2.8 MMOL/L — CRITICAL LOW (ref 3.5–5.3)
POTASSIUM SERPL-SCNC: 3.5 MMOL/L — SIGNIFICANT CHANGE UP (ref 3.5–5.3)
POTASSIUM SERPL-SCNC: 3.5 MMOL/L — SIGNIFICANT CHANGE UP (ref 3.5–5.3)
POTASSIUM SERPL-SCNC: 3.6 MMOL/L — SIGNIFICANT CHANGE UP (ref 3.5–5.3)
POTASSIUM SERPL-SCNC: 3.7 MMOL/L — SIGNIFICANT CHANGE UP (ref 3.5–5.3)
PROT UR-MCNC: 15
RBC # BLD: 2.28 M/UL — LOW (ref 3.8–5.2)
RBC # BLD: 2.86 M/UL — LOW (ref 3.8–5.2)
RBC # BLD: 2.95 M/UL — LOW (ref 3.8–5.2)
RBC # FLD: 14.1 % — SIGNIFICANT CHANGE UP (ref 10.3–14.5)
RBC # FLD: 14.2 % — SIGNIFICANT CHANGE UP (ref 10.3–14.5)
RBC # FLD: 14.3 % — SIGNIFICANT CHANGE UP (ref 10.3–14.5)
RBC BLD AUTO: ABNORMAL
RBC CASTS # UR COMP ASSIST: ABNORMAL /HPF (ref 0–2)
S AUREUS DNA NOSE QL NAA+PROBE: SIGNIFICANT CHANGE UP
SARS-COV-2 RNA SPEC QL NAA+PROBE: SIGNIFICANT CHANGE UP
SODIUM SERPL-SCNC: 115 MMOL/L — CRITICAL LOW (ref 135–145)
SODIUM SERPL-SCNC: 122 MMOL/L — LOW (ref 135–145)
SODIUM SERPL-SCNC: 123 MMOL/L — LOW (ref 135–145)
SODIUM SERPL-SCNC: 123 MMOL/L — LOW (ref 135–145)
SODIUM SERPL-SCNC: 124 MMOL/L — LOW (ref 135–145)
SODIUM UR-SCNC: 63 MMOL/L — SIGNIFICANT CHANGE UP
SP GR SPEC: 1.01 — SIGNIFICANT CHANGE UP (ref 1.01–1.02)
TIBC SERPL-MCNC: 318 UG/DL — SIGNIFICANT CHANGE UP (ref 250–450)
TRANSFERRIN SERPL-MCNC: 250 MG/DL — SIGNIFICANT CHANGE UP (ref 200–360)
UIBC SERPL-MCNC: 289 UG/DL — SIGNIFICANT CHANGE UP (ref 110–370)
UROBILINOGEN FLD QL: NEGATIVE — SIGNIFICANT CHANGE UP
WBC # BLD: 11.5 K/UL — HIGH (ref 3.8–10.5)
WBC # BLD: 13.41 K/UL — HIGH (ref 3.8–10.5)
WBC # BLD: 14.48 K/UL — HIGH (ref 3.8–10.5)
WBC # FLD AUTO: 11.5 K/UL — HIGH (ref 3.8–10.5)
WBC # FLD AUTO: 13.41 K/UL — HIGH (ref 3.8–10.5)
WBC # FLD AUTO: 14.48 K/UL — HIGH (ref 3.8–10.5)
WBC UR QL: SIGNIFICANT CHANGE UP /HPF (ref 0–5)

## 2020-10-20 PROCEDURE — 70450 CT HEAD/BRAIN W/O DYE: CPT | Mod: 26

## 2020-10-20 PROCEDURE — 99291 CRITICAL CARE FIRST HOUR: CPT

## 2020-10-20 RX ORDER — QUETIAPINE FUMARATE 200 MG/1
25 TABLET, FILM COATED ORAL DAILY
Refills: 0 | Status: DISCONTINUED | OUTPATIENT
Start: 2020-10-20 | End: 2020-10-20

## 2020-10-20 RX ORDER — POTASSIUM CHLORIDE 20 MEQ
40 PACKET (EA) ORAL EVERY 4 HOURS
Refills: 0 | Status: COMPLETED | OUTPATIENT
Start: 2020-10-20 | End: 2020-10-20

## 2020-10-20 RX ORDER — LATANOPROST 0.05 MG/ML
1 SOLUTION/ DROPS OPHTHALMIC; TOPICAL AT BEDTIME
Refills: 0 | Status: DISCONTINUED | OUTPATIENT
Start: 2020-10-20 | End: 2020-10-23

## 2020-10-20 RX ORDER — QUETIAPINE FUMARATE 200 MG/1
25 TABLET, FILM COATED ORAL
Refills: 0 | Status: DISCONTINUED | OUTPATIENT
Start: 2020-10-20 | End: 2020-10-23

## 2020-10-20 RX ORDER — ACETAMINOPHEN 500 MG
650 TABLET ORAL EVERY 6 HOURS
Refills: 0 | Status: DISCONTINUED | OUTPATIENT
Start: 2020-10-20 | End: 2020-10-23

## 2020-10-20 RX ORDER — INSULIN LISPRO 100/ML
VIAL (ML) SUBCUTANEOUS
Refills: 0 | Status: DISCONTINUED | OUTPATIENT
Start: 2020-10-20 | End: 2020-10-20

## 2020-10-20 RX ORDER — ONDANSETRON 8 MG/1
4 TABLET, FILM COATED ORAL EVERY 4 HOURS
Refills: 0 | Status: DISCONTINUED | OUTPATIENT
Start: 2020-10-20 | End: 2020-10-22

## 2020-10-20 RX ORDER — SODIUM CHLORIDE 9 MG/ML
500 INJECTION INTRAMUSCULAR; INTRAVENOUS; SUBCUTANEOUS ONCE
Refills: 0 | Status: COMPLETED | OUTPATIENT
Start: 2020-10-20 | End: 2020-10-20

## 2020-10-20 RX ORDER — POTASSIUM CHLORIDE 20 MEQ
10 PACKET (EA) ORAL
Refills: 0 | Status: COMPLETED | OUTPATIENT
Start: 2020-10-20 | End: 2020-10-21

## 2020-10-20 RX ORDER — OXYCODONE AND ACETAMINOPHEN 5; 325 MG/1; MG/1
1 TABLET ORAL ONCE
Refills: 0 | Status: DISCONTINUED | OUTPATIENT
Start: 2020-10-20 | End: 2020-10-20

## 2020-10-20 RX ORDER — ASCORBIC ACID 60 MG
500 TABLET,CHEWABLE ORAL DAILY
Refills: 0 | Status: DISCONTINUED | OUTPATIENT
Start: 2020-10-20 | End: 2020-10-23

## 2020-10-20 RX ORDER — ACETAMINOPHEN 500 MG
1000 TABLET ORAL ONCE
Refills: 0 | Status: COMPLETED | OUTPATIENT
Start: 2020-10-20 | End: 2020-10-20

## 2020-10-20 RX ORDER — FERROUS SULFATE 325(65) MG
325 TABLET ORAL DAILY
Refills: 0 | Status: DISCONTINUED | OUTPATIENT
Start: 2020-10-20 | End: 2020-10-23

## 2020-10-20 RX ORDER — INSULIN LISPRO 100/ML
VIAL (ML) SUBCUTANEOUS
Refills: 0 | Status: DISCONTINUED | OUTPATIENT
Start: 2020-10-20 | End: 2020-10-23

## 2020-10-20 RX ORDER — BENZOCAINE AND MENTHOL 5; 1 G/100ML; G/100ML
1 LIQUID ORAL EVERY 4 HOURS
Refills: 0 | Status: DISCONTINUED | OUTPATIENT
Start: 2020-10-20 | End: 2020-10-23

## 2020-10-20 RX ORDER — ATORVASTATIN CALCIUM 80 MG/1
20 TABLET, FILM COATED ORAL AT BEDTIME
Refills: 0 | Status: DISCONTINUED | OUTPATIENT
Start: 2020-10-20 | End: 2020-10-23

## 2020-10-20 RX ORDER — TIMOLOL 0.5 %
1 DROPS OPHTHALMIC (EYE)
Refills: 0 | Status: DISCONTINUED | OUTPATIENT
Start: 2020-10-20 | End: 2020-10-23

## 2020-10-20 RX ORDER — LIDOCAINE 4 G/100G
1 CREAM TOPICAL ONCE
Refills: 0 | Status: COMPLETED | OUTPATIENT
Start: 2020-10-20 | End: 2020-10-20

## 2020-10-20 RX ORDER — DESMOPRESSIN ACETATE 0.1 MG/1
1 TABLET ORAL ONCE
Refills: 0 | Status: COMPLETED | OUTPATIENT
Start: 2020-10-20 | End: 2020-10-20

## 2020-10-20 RX ADMIN — LIDOCAINE 1 PATCH: 4 CREAM TOPICAL at 05:26

## 2020-10-20 RX ADMIN — ONDANSETRON 4 MILLIGRAM(S): 8 TABLET, FILM COATED ORAL at 21:07

## 2020-10-20 RX ADMIN — Medication 500 MILLIGRAM(S): at 11:18

## 2020-10-20 RX ADMIN — LATANOPROST 1 DROP(S): 0.05 SOLUTION/ DROPS OPHTHALMIC; TOPICAL at 21:17

## 2020-10-20 RX ADMIN — Medication 1 DROP(S): at 17:27

## 2020-10-20 RX ADMIN — LIDOCAINE 1 PATCH: 4 CREAM TOPICAL at 08:22

## 2020-10-20 RX ADMIN — Medication 1: at 16:41

## 2020-10-20 RX ADMIN — ONDANSETRON 4 MILLIGRAM(S): 8 TABLET, FILM COATED ORAL at 17:27

## 2020-10-20 RX ADMIN — SODIUM CHLORIDE 500 MILLILITER(S): 9 INJECTION INTRAMUSCULAR; INTRAVENOUS; SUBCUTANEOUS at 02:13

## 2020-10-20 RX ADMIN — Medication 100 MILLIEQUIVALENT(S): at 22:19

## 2020-10-20 RX ADMIN — Medication 1: at 09:59

## 2020-10-20 RX ADMIN — QUETIAPINE FUMARATE 25 MILLIGRAM(S): 200 TABLET, FILM COATED ORAL at 11:18

## 2020-10-20 RX ADMIN — Medication 325 MILLIGRAM(S): at 11:18

## 2020-10-20 RX ADMIN — Medication 1000 MILLIGRAM(S): at 03:42

## 2020-10-20 RX ADMIN — Medication 100 GRAM(S): at 00:42

## 2020-10-20 RX ADMIN — Medication 1 DROP(S): at 05:25

## 2020-10-20 RX ADMIN — Medication 650 MILLIGRAM(S): at 00:40

## 2020-10-20 RX ADMIN — DESMOPRESSIN ACETATE 1 MICROGRAM(S): 0.1 TABLET ORAL at 09:01

## 2020-10-20 RX ADMIN — Medication 1: at 06:11

## 2020-10-20 RX ADMIN — ONDANSETRON 4 MILLIGRAM(S): 8 TABLET, FILM COATED ORAL at 02:13

## 2020-10-20 RX ADMIN — ONDANSETRON 4 MILLIGRAM(S): 8 TABLET, FILM COATED ORAL at 09:05

## 2020-10-20 RX ADMIN — Medication 100 MILLIEQUIVALENT(S): at 21:17

## 2020-10-20 RX ADMIN — ONDANSETRON 4 MILLIGRAM(S): 8 TABLET, FILM COATED ORAL at 13:10

## 2020-10-20 RX ADMIN — Medication 400 MILLIGRAM(S): at 02:49

## 2020-10-20 RX ADMIN — ONDANSETRON 4 MILLIGRAM(S): 8 TABLET, FILM COATED ORAL at 05:26

## 2020-10-20 RX ADMIN — ATORVASTATIN CALCIUM 20 MILLIGRAM(S): 80 TABLET, FILM COATED ORAL at 21:07

## 2020-10-20 RX ADMIN — QUETIAPINE FUMARATE 25 MILLIGRAM(S): 200 TABLET, FILM COATED ORAL at 19:35

## 2020-10-20 RX ADMIN — LIDOCAINE 1 PATCH: 4 CREAM TOPICAL at 19:00

## 2020-10-20 NOTE — PHYSICAL THERAPY INITIAL EVALUATION ADULT - GENERAL OBSERVATIONS, REHAB EVAL
Patient received in supine; awake however confused. Patient w/ B/L wrist restraints, mckeon catheter.

## 2020-10-20 NOTE — H&P ADULT - NSHPPHYSICALEXAM_GEN_ALL_CORE
PHYSICAL EXAM:  GENERAL: NAD  HEAD:  Atraumatic, Normocephalic  EYES:  conjunctiva and sclera clear  NECK: Supple, No JVD, Normal thyroid  CHEST/LUNG: Clear to auscultation. Clear to percussion bilaterally; No rales, rhonchi, wheezing, or rubs  HEART: Regular rate and rhythm; No murmurs, rubs, or gallops  ABDOMEN: Soft, Nontender, Nondistended; Bowel sounds present  NERVOUS SYSTEM:  Alert & Oriented X 1    EXTREMITIES:  2+ Peripheral Pulses, No clubbing, cyanosis, Bilateral pitting edema is present  SKIN: warm dry

## 2020-10-20 NOTE — H&P ADULT - NSHPLABSRESULTS_GEN_ALL_CORE
LABS:                        6.9    10.29 )-----------( 394      ( 19 Oct 2020 22:23 )             19.5     10-    117<LL>  |  84<L>  |  16  ----------------------------<  141<H>  4.1   |  23  |  0.51    Ca    7.9<L>      19 Oct 2020 22:23  Mg     1.8     10-19    TPro  6.2  /  Alb  3.0<L>  /  TBili  0.4  /  DBili  x   /  AST  24  /  ALT  45  /  AlkPhos  100  10-19    PT/INR - ( 19 Oct 2020 22:23 )   PT: 13.7 sec;   INR: 1.16 ratio         PTT - ( 19 Oct 2020 22:23 )  PTT:33.7 sec  Urinalysis Basic - ( 19 Oct 2020 23:52 )    Color: Yellow / Appearance: Clear / S.015 / pH: x  Gluc: x / Ketone: Negative  / Bili: Negative / Urobili: Negative   Blood: x / Protein: 15 / Nitrite: Negative   Leuk Esterase: Negative / RBC: 5-10 /HPF / WBC 0-2 /HPF   Sq Epi: x / Non Sq Epi: Occasional /HPF / Bacteria: Trace /HPF      LIVER FUNCTIONS - ( 19 Oct 2020 22:23 )  Alb: 3.0 g/dL / Pro: 6.2 g/dL / ALK PHOS: 100 U/L / ALT: 45 U/L DA / AST: 24 U/L / GGT: x LABS:                        6.9    10.29 )-----------( 394      ( 19 Oct 2020 22:23 )             19.5     10-    117<LL>  |  84<L>  |  16  ----------------------------<  141<H>  4.1   |  23  |  0.51    Ca    7.9<L>      19 Oct 2020 22:23  Mg     1.8     10-19    TPro  6.2  /  Alb  3.0<L>  /  TBili  0.4  /  DBili  x   /  AST  24  /  ALT  45  /  AlkPhos  100  10    PT/INR - ( 19 Oct 2020 22:23 )   PT: 13.7 sec;   INR: 1.16 ratio         PTT - ( 19 Oct 2020 22:23 )  PTT:33.7 sec  Urinalysis Basic - ( 19 Oct 2020 23:52 )    Color: Yellow / Appearance: Clear / S.015 / pH: x  Gluc: x / Ketone: Negative  / Bili: Negative / Urobili: Negative   Blood: x / Protein: 15 / Nitrite: Negative   Leuk Esterase: Negative / RBC: 5-10 /HPF / WBC 0-2 /HPF   Sq Epi: x / Non Sq Epi: Occasional /HPF / Bacteria: Trace /HPF      LIVER FUNCTIONS - ( 19 Oct 2020 22:23 )  Alb: 3.0 g/dL / Pro: 6.2 g/dL / ALK PHOS: 100 U/L / ALT: 45 U/L DA / AST: 24 U/L / GGT: x    < from: CT Head No Cont (10.20.20 @ 00:13) >      IMPRESSION:  No evidence of acute intracranial process. Probable chronic microvascular ischemic changes with atrophy.    < end of copied text >

## 2020-10-20 NOTE — PHYSICAL THERAPY INITIAL EVALUATION ADULT - CRITERIA FOR SKILLED THERAPEUTIC INTERVENTIONS
therapy frequency/predicted duration of therapy intervention/anticipated equipment needs at discharge/rehab potential/impairments found/anticipated discharge recommendation/functional limitations in following categories/risk reduction/prevention

## 2020-10-20 NOTE — H&P ADULT - ATTENDING COMMENTS
Patient is an 81 y/o female with PMH of HTN, DM and dementia recently admitted to [facility] with chest pain and dx- with bilateral PE d/c ed on Xaralto. The patient fell today and hurt her right shoulder but xrays negative for fracture. Her serum sodium however was 117 and an ICU consult was requested for admission. The volume assessment she underwent in the ED resulted in her receiving Lasix 40mg with 1.5L evacuated via the mckeon. She is now in the ICU and we will check BP for orthostatic changes which might suggest hypovolemia. Will also repeat serum sodium. depending on these results will treat and monitor frequently progression of her serum sodium so as to avoid rapid correction. HCT 19, stool occult blood negative. Concern for anticoagulation related bleeding. Will hold anticoagulation. I reviewed all laboratory and roentgenographic data and any previous medical record from [facility] that existed. I also discussed the case with the ED attending or referring physician.   I have personally provided  30   minutes of critical care concurrently with the resident. This time excludes time spent teaching and time spent on separate procedures. I have reviewed the resident's documentation and agree with the assessment and plan of care.

## 2020-10-20 NOTE — PROGRESS NOTE ADULT - ASSESSMENT
81 y/o F Cantonese speaking from home, lives with son, ambulates with walker with PMHx of HTN, DM, HLD, Dementia, Pulmonary embolism (on xarelto)  presented to ED s/p fall last night with left sided shoulder pain and left sided chest pain. ED course, vitals are stable. Labs significant for Hb of 6.9 and Sodium of 117. One dose of Lasix was given. ICU was consulted for hyponatremia.     CNS   Acute Encephalopathy AAO x 1 and baseline mental status 1-2. Most likely due to metabolic like hyponatremia   CT scan of head revealed unremarkable    Dementia  Holding Quetiapine due to confusion and hyponatremia.  =====================[CVS ]===============================  # HTN   Holding anti hypertensives Metoprolol and Nifedical for now due to labile BP.    EKG NSR  Troponins X 1 Negative  Reviewed Echo on 10/2020 showed normal EF and grade 1 DD      =====================[RESP ]==============================  # Pulmonary embolism  CXR clear  Aspiration precautions  Holding xarelto due to symptomatic anemia    =====================[ GI ]================================  # Pt is having nausea, vomiting  started on zofran prn   started on Dash diet   - .  ====================[ RENAL ]=============================   # Hyponatremia likely 2/2  poor po intake  - on admission Na 117  repeat sodium is 115. s/p 500ml of IVF and repeat sodium is 122   Check BMP every 4hrs  -Monitor serum sodium for now and aim to correct serum sodium not more than 6-8 meq/L in the first 24hrs  U/A is negative  Serum osm is 253  f/u Urine electrolytes,     # Urinary obstruction  s/p Dorado and 1500ml was drained  Avoid nephrotoxins  monitor urine output-    =====================[ ID ]================================  no issues  ===================[ HEME/ONC ]===========================  # Symptomatic Anemia likely due to xarelto  Pt with Hb of 6.9, repeat Hb is 6.4 and s/p one unit of PRBC and repeat Hb is 7.9  Baseline H/H is 8.7  FOBT negative  f/u iron studies  Holding xarelto for now    =====================[ ENDO ]=============================  # history of DM  Pt takes janumet at home which is on hold.  - target CBG < 180  started on low dose ISS    MUSCULOSKELETAL   S/P Fall  Pain management with tylenol  left shoulder and humerus xray showed no fracture, follow up with official result.  PT evaluation  fall precautions    SKIN  # no issues    ==================[ PROPHYLAXIS ]==========================   # Dvt : SCD boots for now due to symptomatic anemia    ====================[ DISPO ]==============================   - Monitor in ICU   GOC Full code   79 y/o F Cantonese speaking from home, lives with son, ambulates with walker with PMHx of HTN, DM, HLD, Dementia, Pulmonary embolism (on xarelto)  presented to ED s/p fall last night with left sided shoulder pain and left sided chest pain. ED course, vitals are stable. Labs significant for Hb of 6.9 and Sodium of 117. One dose of Lasix was given. ICU was consulted for hyponatremia.     CNS   Acute Encephalopathy AAO x 1 and baseline mental status 1-2. Most likely due to metabolic like hyponatremia   CT scan of head revealed unremarkable    Dementia  Resume Quetiapine at 25mg QD for now   Monitor patient for agitation  =====================[CVS ]===============================  # HTN   Holding anti hypertensives Metoprolol and Nifedical for now due to labile BP.    EKG NSR  Troponins X 1 Negative  Reviewed Echo on 10/2020 showed normal EF and grade 1 DD    =====================[RESP ]==============================  # Pulmonary embolism  CXR clear  Aspiration precautions  Holding xarelto due to symptomatic anemia    =====================[ GI ]================================  # Pt is having nausea, vomiting  On zofran prn  On Dash diet     ====================[ RENAL ]=============================   # Hyponatremia likely 2/2  poor po intake  - on admission Na 117 --> 115 --> 122 --> 123  BMP q4  -Monitor serum sodium for now and aim to correct serum sodium not more than 6-8 meq/L in the first 24hrs  U/A is negative  -S/p 1mg Desmopressin IV  Serum osm is 253  Urine electrolytes taken after lasix    # Urinary obstruction  s/p Dorado and 1500ml was drained  Avoid nephrotoxins  monitor urine output-    =====================[ ID ]================================  no issues  ===================[ HEME/ONC ]===========================  # Symptomatic Anemia likely due to xarelto  Pt with Hb of 6.9, repeat Hb is 6.4 and s/p one unit of PRBC and repeat Hb is 7.9  Baseline H/H is 8.7  FOBT negative  Total Iron low, TIBC wnl, normocytic normochromic, likely due to acute blood loss  Continue holding xarelto for now    =====================[ ENDO ]=============================  # history of DM  Pt takes janumet at home which is on hold.  - target CBG < 180  started on low dose ISS    MUSCULOSKELETAL   #s/p Fall  Pain management with tylenol, lidocaine patch  left shoulder and humerus xray showed no fracture, osteoarthritis  PT evaluation  fall precautions    SKIN  # no issues    ==================[ PROPHYLAXIS ]==========================   # Dvt : SCD boots for now due to symptomatic anemia    ====================[ DISPO ]==============================   - Monitor in ICU   GOC Full code

## 2020-10-20 NOTE — H&P ADULT - HISTORY OF PRESENT ILLNESS
81 y/o F Cantonese speaking from home, lives with son, ambulates with walker with PMHx of HTN, DM, HLD, Dementia, Pulmonary embolism (on xarelto)  presented to ED s/p fall last night with left sided shoulder pain and left sided chest pain. Pt is AAO x 1-2 at baseline. History is obtained from the son Eliud dias. Son reports pt was having nausea, vomiting, confusion, body aches, increased urinary frequency, urgency, nocturia, hematuria, drinking lot of water, not eating well, bilateral leg swelling. Patient was walking with her walker to the bathroom when she lost her  of the walker and fell down onto her left side last night and she was on the floor for 1-2 mins. EMS called due to patient unable to move left shoulder secondary to pain, however, while being transferred, patient also complained of left sided chest pain.  Patient was recently discharged on Xarelto from [facility] 4 days ago after chest pain evaluation and diagnosed with Pulmonary embolism. She was discharged with mckeon and it was uncomfortable to her. It was removed by the PMD. Her hematuria subsided after removal of mckeon. Pt has no  Fever, chills, LOC, head trauma, diaphoresis, dizziness, chest pain, sob, diarrhea, urinary incontinence, fecal incontinence, bloody stools or any other complaints.   [facility] Full code

## 2020-10-20 NOTE — H&P ADULT - NSICDXPASTMEDICALHX_GEN_ALL_CORE_FT
PAST MEDICAL HISTORY:  Dementia     DM (diabetes mellitus)     HTN (hypertension)     HTN (hypertension)     Hyperlipidemia

## 2020-10-20 NOTE — H&P ADULT - ASSESSMENT
81 y/o F Cantonese speaking from home, lives with son, ambulates with walker with PMHx of HTN, DM, HLD, Dementia, Pulmonary embolism (on xarelto)  presented to ED s/p fall last night with left sided shoulder pain and left sided chest pain. ED course, vitals are stable. Labs significant for Hb of 6.9 and Sodium of 117. One dose of Lasix was given. ICU was consulted for hyponatremia.     CNS   Acute Encephalopathy AAO x 1 and baseline mental status 1-2. Most likely due to metabolic like hyponatremia  F/U CT scan of head     Dementia  Holding Quetiapine due to confusion and hyponatremia.  =====================[CVS ]===============================  # HTN   Holding anti hypertensives Metoprolol and Nifedical for now due to labile BP.    EKG NSR  Troponins X 1 Negative  Reviewed Echo on 10/2020 showed normal EF and grade 1 DD      =====================[RESP ]==============================  # Pulmonary embolism  CXR clear  Aspiration precautions  Holding xarelto due to symptomatic anemia    =====================[ GI ]================================  # started on Dash diet   - .      ====================[ RENAL ]=============================   # Hyponatremia likely 2/2  poor po intake  - on admission Na 117  Will repeat sodium and if it is less than 117. Will give 500ml of IVF    Check BMP every 4hrs  -Monitor serum sodium for now and aim to correct serum sodium not more than 6-8 meq/L in the first 24hrs  f/u Urine electrolytes, U/A   F/U Urine osm, Serum osm    # Urinary obstruction  s/p Dorado and 1500ml was drained  Avoid nephrotoxins  monitor urine output-    =====================[ ID ]================================  no issues  ===================[ HEME/ONC ]===========================  # Symptomatic Anemia likely due to xarelto  Pt with Hb of 6.9  Baseline H/H is 8.7  FOBT negative  f/u iron studies  Holding xarelto for now  Consent for blood transfusion is taken from son over phone and is in the chart.   =====================[ ENDO ]=============================  # history of DM  Pt takes janumet at home  - target CBG < 180  started on low dose ISS    MUSCULOSKELETAL   S/P Fall  Pain management with tylenol  f/u shoulder xray,   PT evaluation  fall precautions    SKIN  # no issues    ==================[ PROPHYLAXIS ]==========================   # Dvt : SCD boots for now due to symptomatic anemia    ====================[ DISPO ]==============================   - Monitor in ICU   GOC Full code   79 y/o F Cantonese speaking from home, lives with son, ambulates with walker with PMHx of HTN, DM, HLD, Dementia, Pulmonary embolism (on xarelto)  presented to ED s/p fall last night with left sided shoulder pain and left sided chest pain. ED course, vitals are stable. Labs significant for Hb of 6.9 and Sodium of 117. One dose of Lasix was given. ICU was consulted for hyponatremia.     CNS   Acute Encephalopathy AAO x 1 and baseline mental status 1-2. Most likely due to metabolic like hyponatremia  F/U CT scan of head     Dementia  Holding Quetiapine due to confusion and hyponatremia.  =====================[CVS ]===============================  # HTN   Holding anti hypertensives Metoprolol and Nifedical for now due to labile BP.    EKG NSR  Troponins X 1 Negative  Reviewed Echo on 10/2020 showed normal EF and grade 1 DD      =====================[RESP ]==============================  # Pulmonary embolism  CXR clear  Aspiration precautions  Holding xarelto due to symptomatic anemia    =====================[ GI ]================================  # Pt is having nausea, vomiting  started on zofran prn   started on Dash diet   - .  ====================[ RENAL ]=============================   # Hyponatremia likely 2/2  poor po intake  - on admission Na 117  Will repeat sodium and if it is less than 117. Will give 500ml of IVF    Check BMP every 4hrs  -Monitor serum sodium for now and aim to correct serum sodium not more than 6-8 meq/L in the first 24hrs  f/u Urine electrolytes, U/A   F/U Urine osm, Serum osm    # Urinary obstruction  s/p Dorado and 1500ml was drained  Avoid nephrotoxins  monitor urine output-    =====================[ ID ]================================  no issues  ===================[ HEME/ONC ]===========================  # Symptomatic Anemia likely due to xarelto  Pt with Hb of 6.9  Baseline H/H is 8.7  FOBT negative  f/u iron studies  Holding xarelto for now  Consent for blood transfusion is taken from son over the phone and is in the chart.   =====================[ ENDO ]=============================  # history of DM  Pt takes janumet at home which is on hold.  - target CBG < 180  started on low dose ISS    MUSCULOSKELETAL   S/P Fall  Pain management with tylenol  f/u shoulder xray  PT evaluation  fall precautions    SKIN  # no issues    ==================[ PROPHYLAXIS ]==========================   # Dvt : SCD boots for now due to symptomatic anemia    ====================[ DISPO ]==============================   - Monitor in ICU   GOC Full code   81 y/o F Cantonese speaking from home, lives with son, ambulates with walker with PMHx of HTN, DM, HLD, Dementia, Pulmonary embolism (on xarelto)  presented to ED s/p fall last night with left sided shoulder pain and left sided chest pain. ED course, vitals are stable. Labs significant for Hb of 6.9 and Sodium of 117. One dose of Lasix was given. ICU was consulted for hyponatremia.     CNS   Acute Encephalopathy AAO x 1 and baseline mental status 1-2. Most likely due to metabolic like hyponatremia   CT scan of head revealed unremarkable    Dementia  Holding Quetiapine due to confusion and hyponatremia.  =====================[CVS ]===============================  # HTN   Holding anti hypertensives Metoprolol and Nifedical for now due to labile BP.    EKG NSR  Troponins X 1 Negative  Reviewed Echo on 10/2020 showed normal EF and grade 1 DD      =====================[RESP ]==============================  # Pulmonary embolism  CXR clear  Aspiration precautions  Holding xarelto due to symptomatic anemia    =====================[ GI ]================================  # Pt is having nausea, vomiting  started on zofran prn   started on Dash diet   - .  ====================[ RENAL ]=============================   # Hyponatremia likely 2/2  poor po intake  - on admission Na 117  repeat sodium is 115. s/p 500ml of IVF and repeat sodium is 122   Check BMP every 4hrs  -Monitor serum sodium for now and aim to correct serum sodium not more than 6-8 meq/L in the first 24hrs  U/A is negative  Serum osm is 253  f/u Urine electrolytes,     # Urinary obstruction  s/p Dorado and 1500ml was drained  Avoid nephrotoxins  monitor urine output-    =====================[ ID ]================================  no issues  ===================[ HEME/ONC ]===========================  # Symptomatic Anemia likely due to xarelto  Pt with Hb of 6.9, repeat Hb is 6.4 and s/p one unit of PRBC and repeat Hb is 8.9  Baseline H/H is 8.7  FOBT negative  f/u iron studies  Holding xarelto for now    =====================[ ENDO ]=============================  # history of DM  Pt takes janumet at home which is on hold.  - target CBG < 180  started on low dose ISS    MUSCULOSKELETAL   S/P Fall  Pain management with tylenol  left shoulder and humerus xray showed no fracture, follow up with official result.  PT evaluation  fall precautions    SKIN  # no issues    ==================[ PROPHYLAXIS ]==========================   # Dvt : SCD boots for now due to symptomatic anemia    ====================[ DISPO ]==============================   - Monitor in ICU   GOC Full code   79 y/o F [place]ones[place] speaking from home, lives with son, ambulates with walker with PMHx of HTN, DM, HLD, Dementia, Pulmonary embolism (on xarelto)  presented to ED s/p fall last night with left sided shoulder pain and left sided chest pain. ED course, vitals are stable. Labs significant for Hb of 6.9 and Sodium of 117. One dose of Lasix was given. ICU was consulted for hyponatremia.     CNS   Acute Encephalopathy AAO x 1 and baseline mental status 1-2. Most likely due to metabolic like hyponatremia   CT scan of head revealed unremarkable    Dementia  Holding Quetiapine due to confusion and hyponatremia.  =====================[CVS ]===============================  # HTN   Holding anti hypertensives Metoprolol and Nifedical for now due to labile BP.    EKG NSR  Troponins X 1 Negative  Reviewed Echo on 10/2020 showed normal EF and grade 1 DD      =====================[RESP ]==============================  # Pulmonary embolism  CXR clear  Aspiration precautions  Holding xarelto due to symptomatic anemia    =====================[ GI ]================================  # Pt is having nausea, vomiting  started on zofran prn   started on Dash diet   - .  ====================[ RENAL ]=============================   # Hyponatremia likely 2/2  poor po intake  - on admission Na 117  repeat sodium is 115. s/p 500ml of IVF and repeat sodium is 122   Check BMP every 4hrs  -Monitor serum sodium for now and aim to correct serum sodium not more than 6-8 meq/L in the first 24hrs  U/A is negative  Serum osm is 253  f/u Urine electrolytes,     # Urinary obstruction  s/p Dorado and 1500ml was drained  Avoid nephrotoxins  monitor urine output-    =====================[ ID ]================================  no issues  ===================[ HEME/ONC ]===========================  # Symptomatic Anemia likely due to xarelto  Pt with Hb of 6.9, repeat Hb is 6.4 and s/p one unit of PRBC and repeat Hb is 7.9  Baseline H/H is 8.7  FOBT negative  f/u iron studies  Holding xarelto for now    =====================[ ENDO ]=============================  # history of DM  Pt takes janumet at home which is on hold.  - target CBG < 180  started on low dose ISS    MUSCULOSKELETAL   S/P Fall  Pain management with tylenol  left shoulder and humerus xray showed no fracture, follow up with official result.  PT evaluation  fall precautions    SKIN  # no issues    ==================[ PROPHYLAXIS ]==========================   # Dvt : SCD boots for now due to symptomatic anemia    ====================[ DISPO ]==============================   - Monitor in ICU   GOC Full code   79 y/o F [place]onese speaking from home, lives with son, ambulates with walker with PMHx of HTN, DM, HLD, Dementia, Pulmonary embolism (on xarelto)  presented to ED s/p fall last night with left sided shoulder pain and left sided chest pain. ED course, vitals are stable. Labs significant for Hb of 6.9 and Sodium of 117. One dose of Lasix was given. ICU was consulted for hyponatremia.     Plan  #Acute Encephalopathy  # Hyponatremia  #Symptomatic anemia  #Pulmonary embolism  #s/p fall  #Urinary obstruction  # HTN  #DM  #HLD  #Dementia    CNS   Acute Encephalopathy AAO x 1 and baseline mental status 1-2. Most likely due to metabolic like hyponatremia   CT scan of head revealed unremarkable    Dementia  Holding Quetiapine due to confusion and hyponatremia.  =====================[CVS ]===============================  # HTN   Holding anti hypertensives Metoprolol and Nifedical for now due to labile BP.    EKG NSR  Troponins X 1 Negative  Reviewed Echo on 10/2020 showed normal EF and grade 1 DD      =====================[RESP ]==============================  # Pulmonary embolism  CXR clear  Aspiration precautions  Holding xarelto due to symptomatic anemia    =====================[ GI ]================================  # Pt is having nausea, vomiting  started on zofran prn   started on Dash diet   - .  ====================[ RENAL ]=============================   # Hyponatremia likely 2/2  poor po intake  - on admission Na 117  repeat sodium is 115. s/p 500ml of IVF and repeat sodium is 122   Check BMP every 4hrs  -Monitor serum sodium for now and aim to correct serum sodium not more than 6-8 meq/L in the first 24hrs  U/A is negative  Serum osm is 253  f/u Urine electrolytes,     # Urinary obstruction  s/p Dorado and 1500ml was drained  Avoid nephrotoxins  monitor urine output-    =====================[ ID ]================================  no issues  ===================[ HEME/ONC ]===========================  # Symptomatic Anemia likely due to xarelto  Pt with Hb of 6.9, repeat Hb is 6.4 and s/p one unit of PRBC and repeat Hb is 7.9  Baseline H/H is 8.7  FOBT negative  f/u iron studies  Holding xarelto for now    =====================[ ENDO ]=============================  # history of DM  Pt takes janumet at home which is on hold.  - target CBG < 180  started on low dose ISS    MUSCULOSKELETAL   S/P Fall  Pain management with tylenol  left shoulder and humerus xray showed no fracture, follow up with official result.  PT evaluation  fall precautions    SKIN  # no issues    ==================[ PROPHYLAXIS ]==========================   # Dvt : SCD boots for now due to symptomatic anemia    ====================[ DISPO ]==============================   - Monitor in ICU   GOC Full code

## 2020-10-20 NOTE — PROGRESS NOTE ADULT - SUBJECTIVE AND OBJECTIVE BOX
INTERVAL HPI/OVERNIGHT EVENTS: ***    PRESSORS: [ ] YES [ ] NO  WHICH:    ANTIBIOTICS:                      Antimicrobial:    Cardiovascular:    Pulmonary:    Hematalogic:    Other:  acetaminophen   Tablet .. 650 milliGRAM(s) Oral every 6 hours PRN  artificial  tears Solution 1 Drop(s) Both EYES two times a day  ascorbic acid 500 milliGRAM(s) Oral daily  atorvastatin 20 milliGRAM(s) Oral at bedtime  ferrous    sulfate 325 milliGRAM(s) Oral daily  insulin lispro (ADMELOG) corrective regimen sliding scale   SubCutaneous Before meals and at bedtime  latanoprost 0.005% Ophthalmic Solution 1 Drop(s) Both EYES at bedtime  ondansetron Injectable 4 milliGRAM(s) IV Push every 4 hours  timolol 0.25% Solution 1 Drop(s) Both EYES two times a day    acetaminophen   Tablet .. 650 milliGRAM(s) Oral every 6 hours PRN  artificial  tears Solution 1 Drop(s) Both EYES two times a day  ascorbic acid 500 milliGRAM(s) Oral daily  atorvastatin 20 milliGRAM(s) Oral at bedtime  ferrous    sulfate 325 milliGRAM(s) Oral daily  insulin lispro (ADMELOG) corrective regimen sliding scale   SubCutaneous Before meals and at bedtime  latanoprost 0.005% Ophthalmic Solution 1 Drop(s) Both EYES at bedtime  ondansetron Injectable 4 milliGRAM(s) IV Push every 4 hours  timolol 0.25% Solution 1 Drop(s) Both EYES two times a day    Drug Dosing Weight  Height (cm): 152.4 (20 Oct 2020 01:26)  Weight (kg): 63.1 (20 Oct 2020 01:26)  BMI (kg/m2): 27.2 (20 Oct 2020 01:26)  BSA (m2): 1.6 (20 Oct 2020 01:26)    CENTRAL LINE: [ ] YES [ ] NO  LOCATION:   DATE INSERTED:  REMOVE: [ ] YES [ ] NO  EXPLAIN:    CANTU: [ ] YES [ ] NO    DATE INSERTED:  REMOVE:  [ ] YES [ ] NO  EXPLAIN:    A-LINE:  [ ] YES [ ] NO  LOCATION:   DATE INSERTED:  REMOVE:  [ ] YES [ ] NO  EXPLAIN:    PMH -reviewed admission note, no change since admission    ICU Vital Signs Last 24 Hrs  T(C): 36 (20 Oct 2020 05:30), Max: 37 (19 Oct 2020 23:14)  T(F): 96.8 (20 Oct 2020 05:30), Max: 98.6 (19 Oct 2020 23:14)  HR: 94 (20 Oct 2020 06:00) (85 - 104)  BP: 115/51 (20 Oct 2020 06:00) (105/65 - 147/86)  BP(mean): 72 (20 Oct 2020 06:00) (72 - 91)  ABP: --  ABP(mean): --  RR: 19 (20 Oct 2020 06:00) (16 - 26)  SpO2: 99% (20 Oct 2020 06:00) (95% - 100%)            10-19 @ 07:01  -  10-20 @ 07:00  --------------------------------------------------------  IN: 500 mL / OUT: 2000 mL / NET: -1500 mL            PHYSICAL EXAM:    GENERAL: NAD, well-groomed, well-developed  HEAD:  Atraumatic, Normocephalic  EYES: EOMI, PERRLA, conjunctiva and sclera clear  ENMT: No tonsillar erythema, exudates, or enlargement; Moist mucous membranes, Good dentition, No lesions  NECK: Supple, normal appearance, No JVD; Normal thyroid; Trachea midline  NERVOUS SYSTEM:  Alert & Oriented X3, Good concentration; Motor Strength 5/5 B/L upper and lower extremities; DTRs 2+ intact and symmetric  CHEST/LUNG: No chest deformity; Normal percussion bilaterally; No rales, rhonchi, wheezing   HEART: Regular rate and rhythm; No murmurs, rubs, or gallops  ABDOMEN: Soft, Nontender, Nondistended; Bowel sounds present  EXTREMITIES:  2+ Peripheral Pulses, No clubbing, cyanosis, or edema  LYMPH: No lymphadenopathy noted  SKIN: No rashes or lesions; Good capillary refill      LABS:  CBC Full  -  ( 20 Oct 2020 05:42 )  WBC Count : 13.41 K/uL  RBC Count : 2.86 M/uL  Hemoglobin : 7.9 g/dL  Hematocrit : 23.2 %  Platelet Count - Automated : 401 K/uL  Mean Cell Volume : 81.1 fl  Mean Cell Hemoglobin : 27.6 pg  Mean Cell Hemoglobin Concentration : 34.1 gm/dL  Auto Neutrophil # : x  Auto Lymphocyte # : x  Auto Monocyte # : x  Auto Eosinophil # : x  Auto Basophil # : x  Auto Neutrophil % : x  Auto Lymphocyte % : x  Auto Monocyte % : x  Auto Eosinophil % : x  Auto Basophil % : x    10-20    122<L>  |  88<L>  |  12  ----------------------------<  122<H>  3.7   |  24  |  0.62    Ca    7.7<L>      20 Oct 2020 05:42  Phos  3.1     10-  Mg     1.9     10-    TPro  6.2  /  Alb  3.0<L>  /  TBili  0.4  /  DBili  x   /  AST  24  /  ALT  45  /  AlkPhos  100  10-    PT/INR - ( 19 Oct 2020 22:23 )   PT: 13.7 sec;   INR: 1.16 ratio         PTT - ( 19 Oct 2020 22:23 )  PTT:33.7 sec  Urinalysis Basic - ( 19 Oct 2020 23:52 )    Color: Yellow / Appearance: Clear / S.015 / pH: x  Gluc: x / Ketone: Negative  / Bili: Negative / Urobili: Negative   Blood: x / Protein: 15 / Nitrite: Negative   Leuk Esterase: Negative / RBC: 5-10 /HPF / WBC 0-2 /HPF   Sq Epi: x / Non Sq Epi: Occasional /HPF / Bacteria: Trace /HPF          RADIOLOGY & ADDITIONAL STUDIES REVIEWED:  ***    GOALS OF CARE DISCUSSION WITH PATIENT/FAMILY/PROXY:    CRITICAL CARE TIME SPENT: 35 minutes INTERVAL HPI/OVERNIGHT EVENTS:   Pt given 500cc bolus overnight. Pt given 1mg Desmopressin IV. Pt comfortable complaining of ULE and LLE pain from fall. Pt tolerating breakfast without episodes of vomiting. Pt denies dizziness, lightheadedness, chest pain.      PRESSORS: [ ] YES [X] NO  WHICH:    ANTIBIOTICS:                      Antimicrobial:    Cardiovascular:    Pulmonary:    Hematalogic:    Other:  acetaminophen   Tablet .. 650 milliGRAM(s) Oral every 6 hours PRN  artificial  tears Solution 1 Drop(s) Both EYES two times a day  ascorbic acid 500 milliGRAM(s) Oral daily  atorvastatin 20 milliGRAM(s) Oral at bedtime  ferrous    sulfate 325 milliGRAM(s) Oral daily  insulin lispro (ADMELOG) corrective regimen sliding scale   SubCutaneous Before meals and at bedtime  latanoprost 0.005% Ophthalmic Solution 1 Drop(s) Both EYES at bedtime  ondansetron Injectable 4 milliGRAM(s) IV Push every 4 hours  timolol 0.25% Solution 1 Drop(s) Both EYES two times a day    acetaminophen   Tablet .. 650 milliGRAM(s) Oral every 6 hours PRN  artificial  tears Solution 1 Drop(s) Both EYES two times a day  ascorbic acid 500 milliGRAM(s) Oral daily  atorvastatin 20 milliGRAM(s) Oral at bedtime  ferrous    sulfate 325 milliGRAM(s) Oral daily  insulin lispro (ADMELOG) corrective regimen sliding scale   SubCutaneous Before meals and at bedtime  latanoprost 0.005% Ophthalmic Solution 1 Drop(s) Both EYES at bedtime  ondansetron Injectable 4 milliGRAM(s) IV Push every 4 hours  timolol 0.25% Solution 1 Drop(s) Both EYES two times a day    Drug Dosing Weight  Height (cm): 152.4 (20 Oct 2020 01:26)  Weight (kg): 63.1 (20 Oct 2020 01:26)  BMI (kg/m2): 27.2 (20 Oct 2020 01:)  BSA (m2): 1.6 (20 Oct 2020 01:)    CENTRAL LINE: [ ] YES [X] NO  LOCATION:   DATE INSERTED:  REMOVE: [ ] YES [ ] NO  EXPLAIN:    CANTU: [X] YES [ ] NO    DATE INSERTED: 10/20  REMOVE:  [ ] YES [ ] NO  EXPLAIN:    A-LINE:  [ ] YES [X] NO  LOCATION:   DATE INSERTED:  REMOVE:  [ ] YES [ ] NO  EXPLAIN:    PMH -reviewed admission note, no change since admission    ICU Vital Signs Last 24 Hrs  T(C): 36 (20 Oct 2020 05:30), Max: 37 (19 Oct 2020 23:14)  T(F): 96.8 (20 Oct 2020 05:30), Max: 98.6 (19 Oct 2020 23:14)  HR: 94 (20 Oct 2020 06:00) (85 - 104)  BP: 115/51 (20 Oct 2020 06:00) (105/65 - 147/86)  BP(mean): 72 (20 Oct 2020 06:00) (72 - 91)  ABP: --  ABP(mean): --  RR: 19 (20 Oct 2020 06:00) (16 - 26)  SpO2: 99% (20 Oct 2020 06:00) (95% - 100%)            10-19 @ 07:01  -  10-20 @ 07:00  --------------------------------------------------------  IN: 500 mL / OUT: 2000 mL / NET: -1500 mL            PHYSICAL EXAM:    GENERAL: NAD, well-groomed, well-developed  HEAD:  Atraumatic, Normocephalic  EYES: EOMI, PERRLA, conjunctiva and sclera clear  ENMT: No tonsillar erythema, exudates, or enlargement; Moist mucous membranes, Good dentition, No lesions  NECK: Supple, normal appearance, No JVD; Trachea midline  NERVOUS SYSTEM:  Alert & Oriented X2, Motor Strength 4/5 B/L upper and lower extremities; DTRs 2+ intact and symmetric  CHEST/LUNG: No chest deformity; Normal percussion bilaterally; No rales, rhonchi, wheezing   HEART: Regular rate and rhythm; No murmurs, rubs, or gallops  ABDOMEN: Soft, Nontender, Nondistended; Bowel sounds present  EXTREMITIES:  2+ Peripheral Pulses, No clubbing, cyanosis, or edema  LYMPH: No lymphadenopathy noted  SKIN: No rashes or lesions; Good capillary refill      LABS:  CBC Full  -  ( 20 Oct 2020 05:42 )  WBC Count : 13.41 K/uL  RBC Count : 2.86 M/uL  Hemoglobin : 7.9 g/dL  Hematocrit : 23.2 %  Platelet Count - Automated : 401 K/uL  Mean Cell Volume : 81.1 fl  Mean Cell Hemoglobin : 27.6 pg  Mean Cell Hemoglobin Concentration : 34.1 gm/dL  Auto Neutrophil # : x  Auto Lymphocyte # : x  Auto Monocyte # : x  Auto Eosinophil # : x  Auto Basophil # : x  Auto Neutrophil % : x  Auto Lymphocyte % : x  Auto Monocyte % : x  Auto Eosinophil % : x  Auto Basophil % : x    10-20    122<L>  |  88<L>  |  12  ----------------------------<  122<H>  3.7   |  24  |  0.62    Ca    7.7<L>      20 Oct 2020 05:42  Phos  3.1     10-20  Mg     1.9     10-20    TPro  6.2  /  Alb  3.0<L>  /  TBili  0.4  /  DBili  x   /  AST  24  /  ALT  45  /  AlkPhos  100  10-19    PT/INR - ( 19 Oct 2020 22:23 )   PT: 13.7 sec;   INR: 1.16 ratio         PTT - ( 19 Oct 2020 22:23 )  PTT:33.7 sec  Urinalysis Basic - ( 19 Oct 2020 23:52 )    Color: Yellow / Appearance: Clear / S.015 / pH: x  Gluc: x / Ketone: Negative  / Bili: Negative / Urobili: Negative   Blood: x / Protein: 15 / Nitrite: Negative   Leuk Esterase: Negative / RBC: 5-10 /HPF / WBC 0-2 /HPF   Sq Epi: x / Non Sq Epi: Occasional /HPF / Bacteria: Trace /HPF          RADIOLOGY & ADDITIONAL STUDIES REVIEWED:   < from: Xray Chest 1 View AP/PA (10.19.20 @ 22:05) >  IMPRESSION: No evidence for focal infiltrate or lobar consolidation.    < end of copied text >    < from: Xray Shoulder 2 Views, Left (10.19.20 @ 22:08) >  IMPRESSION:    Glenohumeral osteoarthritis AC joint arthropathy.    < end of copied text >      GOALS OF CARE DISCUSSION WITH PATIENT/FAMILY/PROXY:    CRITICAL CARE TIME SPENT: 35 minutes

## 2020-10-21 DIAGNOSIS — E43 UNSPECIFIED SEVERE PROTEIN-CALORIE MALNUTRITION: ICD-10-CM

## 2020-10-21 DIAGNOSIS — Z51.5 ENCOUNTER FOR PALLIATIVE CARE: ICD-10-CM

## 2020-10-21 DIAGNOSIS — R53.81 OTHER MALAISE: ICD-10-CM

## 2020-10-21 DIAGNOSIS — G30.1 ALZHEIMER'S DISEASE WITH LATE ONSET: ICD-10-CM

## 2020-10-21 LAB
ALBUMIN SERPL ELPH-MCNC: 2 G/DL — LOW (ref 3.5–5)
ALP SERPL-CCNC: 72 U/L — SIGNIFICANT CHANGE UP (ref 40–120)
ALT FLD-CCNC: 31 U/L DA — SIGNIFICANT CHANGE UP (ref 10–60)
ANION GAP SERPL CALC-SCNC: 5 MMOL/L — SIGNIFICANT CHANGE UP (ref 5–17)
ANION GAP SERPL CALC-SCNC: 6 MMOL/L — SIGNIFICANT CHANGE UP (ref 5–17)
ANION GAP SERPL CALC-SCNC: 7 MMOL/L — SIGNIFICANT CHANGE UP (ref 5–17)
AST SERPL-CCNC: 26 U/L — SIGNIFICANT CHANGE UP (ref 10–40)
BASOPHILS # BLD AUTO: 0.03 K/UL — SIGNIFICANT CHANGE UP (ref 0–0.2)
BASOPHILS # BLD AUTO: 0.03 K/UL — SIGNIFICANT CHANGE UP (ref 0–0.2)
BASOPHILS NFR BLD AUTO: 0.3 % — SIGNIFICANT CHANGE UP (ref 0–2)
BASOPHILS NFR BLD AUTO: 0.4 % — SIGNIFICANT CHANGE UP (ref 0–2)
BILIRUB SERPL-MCNC: 0.2 MG/DL — SIGNIFICANT CHANGE UP (ref 0.2–1.2)
BUN SERPL-MCNC: 12 MG/DL — SIGNIFICANT CHANGE UP (ref 7–18)
BUN SERPL-MCNC: 8 MG/DL — SIGNIFICANT CHANGE UP (ref 7–18)
BUN SERPL-MCNC: 9 MG/DL — SIGNIFICANT CHANGE UP (ref 7–18)
CALCIUM SERPL-MCNC: 6.3 MG/DL — CRITICAL LOW (ref 8.4–10.5)
CALCIUM SERPL-MCNC: 7.8 MG/DL — LOW (ref 8.4–10.5)
CALCIUM SERPL-MCNC: 8.4 MG/DL — SIGNIFICANT CHANGE UP (ref 8.4–10.5)
CHLORIDE SERPL-SCNC: 102 MMOL/L — SIGNIFICANT CHANGE UP (ref 96–108)
CHLORIDE SERPL-SCNC: 90 MMOL/L — LOW (ref 96–108)
CHLORIDE SERPL-SCNC: 94 MMOL/L — LOW (ref 96–108)
CO2 SERPL-SCNC: 20 MMOL/L — LOW (ref 22–31)
CO2 SERPL-SCNC: 27 MMOL/L — SIGNIFICANT CHANGE UP (ref 22–31)
CO2 SERPL-SCNC: 28 MMOL/L — SIGNIFICANT CHANGE UP (ref 22–31)
CREAT SERPL-MCNC: 0.36 MG/DL — LOW (ref 0.5–1.3)
CREAT SERPL-MCNC: 0.69 MG/DL — SIGNIFICANT CHANGE UP (ref 0.5–1.3)
CREAT SERPL-MCNC: 0.7 MG/DL — SIGNIFICANT CHANGE UP (ref 0.5–1.3)
CULTURE RESULTS: SIGNIFICANT CHANGE UP
EOSINOPHIL # BLD AUTO: 0.21 K/UL — SIGNIFICANT CHANGE UP (ref 0–0.5)
EOSINOPHIL # BLD AUTO: 0.22 K/UL — SIGNIFICANT CHANGE UP (ref 0–0.5)
EOSINOPHIL NFR BLD AUTO: 2.3 % — SIGNIFICANT CHANGE UP (ref 0–6)
EOSINOPHIL NFR BLD AUTO: 2.8 % — SIGNIFICANT CHANGE UP (ref 0–6)
GLUCOSE BLDC GLUCOMTR-MCNC: 114 MG/DL — HIGH (ref 70–99)
GLUCOSE BLDC GLUCOMTR-MCNC: 118 MG/DL — HIGH (ref 70–99)
GLUCOSE BLDC GLUCOMTR-MCNC: 137 MG/DL — HIGH (ref 70–99)
GLUCOSE BLDC GLUCOMTR-MCNC: 154 MG/DL — HIGH (ref 70–99)
GLUCOSE SERPL-MCNC: 119 MG/DL — HIGH (ref 70–99)
GLUCOSE SERPL-MCNC: 192 MG/DL — HIGH (ref 70–99)
GLUCOSE SERPL-MCNC: 96 MG/DL — SIGNIFICANT CHANGE UP (ref 70–99)
HCT VFR BLD CALC: 23 % — LOW (ref 34.5–45)
HCT VFR BLD CALC: 26 % — LOW (ref 34.5–45)
HGB BLD-MCNC: 8.2 G/DL — LOW (ref 11.5–15.5)
HGB BLD-MCNC: 8.8 G/DL — LOW (ref 11.5–15.5)
IMM GRANULOCYTES NFR BLD AUTO: 0.5 % — SIGNIFICANT CHANGE UP (ref 0–1.5)
IMM GRANULOCYTES NFR BLD AUTO: 0.6 % — SIGNIFICANT CHANGE UP (ref 0–1.5)
LYMPHOCYTES # BLD AUTO: 0.88 K/UL — LOW (ref 1–3.3)
LYMPHOCYTES # BLD AUTO: 1.21 K/UL — SIGNIFICANT CHANGE UP (ref 1–3.3)
LYMPHOCYTES # BLD AUTO: 11 % — LOW (ref 13–44)
LYMPHOCYTES # BLD AUTO: 13.2 % — SIGNIFICANT CHANGE UP (ref 13–44)
MAGNESIUM SERPL-MCNC: 1.6 MG/DL — SIGNIFICANT CHANGE UP (ref 1.6–2.6)
MAGNESIUM SERPL-MCNC: 2.6 MG/DL — SIGNIFICANT CHANGE UP (ref 1.6–2.6)
MCHC RBC-ENTMCNC: 27.4 PG — SIGNIFICANT CHANGE UP (ref 27–34)
MCHC RBC-ENTMCNC: 28.4 PG — SIGNIFICANT CHANGE UP (ref 27–34)
MCHC RBC-ENTMCNC: 33.8 GM/DL — SIGNIFICANT CHANGE UP (ref 32–36)
MCHC RBC-ENTMCNC: 35.7 GM/DL — SIGNIFICANT CHANGE UP (ref 32–36)
MCV RBC AUTO: 79.6 FL — LOW (ref 80–100)
MCV RBC AUTO: 81 FL — SIGNIFICANT CHANGE UP (ref 80–100)
MONOCYTES # BLD AUTO: 0.6 K/UL — SIGNIFICANT CHANGE UP (ref 0–0.9)
MONOCYTES # BLD AUTO: 0.71 K/UL — SIGNIFICANT CHANGE UP (ref 0–0.9)
MONOCYTES NFR BLD AUTO: 7.5 % — SIGNIFICANT CHANGE UP (ref 2–14)
MONOCYTES NFR BLD AUTO: 7.7 % — SIGNIFICANT CHANGE UP (ref 2–14)
NEUTROPHILS # BLD AUTO: 6.21 K/UL — SIGNIFICANT CHANGE UP (ref 1.8–7.4)
NEUTROPHILS # BLD AUTO: 6.97 K/UL — SIGNIFICANT CHANGE UP (ref 1.8–7.4)
NEUTROPHILS NFR BLD AUTO: 76 % — SIGNIFICANT CHANGE UP (ref 43–77)
NEUTROPHILS NFR BLD AUTO: 77.7 % — HIGH (ref 43–77)
NRBC # BLD: 0 /100 WBCS — SIGNIFICANT CHANGE UP (ref 0–0)
NRBC # BLD: 0 /100 WBCS — SIGNIFICANT CHANGE UP (ref 0–0)
PHOSPHATE SERPL-MCNC: 1.8 MG/DL — LOW (ref 2.5–4.5)
PHOSPHATE SERPL-MCNC: 3.4 MG/DL — SIGNIFICANT CHANGE UP (ref 2.5–4.5)
PLATELET # BLD AUTO: 360 K/UL — SIGNIFICANT CHANGE UP (ref 150–400)
PLATELET # BLD AUTO: 436 K/UL — HIGH (ref 150–400)
POTASSIUM SERPL-MCNC: 3.3 MMOL/L — LOW (ref 3.5–5.3)
POTASSIUM SERPL-MCNC: 4.2 MMOL/L — SIGNIFICANT CHANGE UP (ref 3.5–5.3)
POTASSIUM SERPL-MCNC: 4.3 MMOL/L — SIGNIFICANT CHANGE UP (ref 3.5–5.3)
POTASSIUM SERPL-SCNC: 3.3 MMOL/L — LOW (ref 3.5–5.3)
POTASSIUM SERPL-SCNC: 4.2 MMOL/L — SIGNIFICANT CHANGE UP (ref 3.5–5.3)
POTASSIUM SERPL-SCNC: 4.3 MMOL/L — SIGNIFICANT CHANGE UP (ref 3.5–5.3)
PROT SERPL-MCNC: 4.6 G/DL — LOW (ref 6–8.3)
RBC # BLD: 2.89 M/UL — LOW (ref 3.8–5.2)
RBC # BLD: 3.21 M/UL — LOW (ref 3.8–5.2)
RBC # FLD: 14.2 % — SIGNIFICANT CHANGE UP (ref 10.3–14.5)
RBC # FLD: 14.5 % — SIGNIFICANT CHANGE UP (ref 10.3–14.5)
SODIUM SERPL-SCNC: 124 MMOL/L — LOW (ref 135–145)
SODIUM SERPL-SCNC: 127 MMOL/L — LOW (ref 135–145)
SODIUM SERPL-SCNC: 128 MMOL/L — LOW (ref 135–145)
SPECIMEN SOURCE: SIGNIFICANT CHANGE UP
WBC # BLD: 7.99 K/UL — SIGNIFICANT CHANGE UP (ref 3.8–10.5)
WBC # BLD: 9.18 K/UL — SIGNIFICANT CHANGE UP (ref 3.8–10.5)
WBC # FLD AUTO: 7.99 K/UL — SIGNIFICANT CHANGE UP (ref 3.8–10.5)
WBC # FLD AUTO: 9.18 K/UL — SIGNIFICANT CHANGE UP (ref 3.8–10.5)

## 2020-10-21 PROCEDURE — 99221 1ST HOSP IP/OBS SF/LOW 40: CPT

## 2020-10-21 RX ORDER — POTASSIUM PHOSPHATE, MONOBASIC POTASSIUM PHOSPHATE, DIBASIC 236; 224 MG/ML; MG/ML
30 INJECTION, SOLUTION INTRAVENOUS ONCE
Refills: 0 | Status: COMPLETED | OUTPATIENT
Start: 2020-10-21 | End: 2020-10-21

## 2020-10-21 RX ORDER — RIVAROXABAN 15 MG-20MG
20 KIT ORAL
Refills: 0 | Status: DISCONTINUED | OUTPATIENT
Start: 2020-10-21 | End: 2020-10-21

## 2020-10-21 RX ORDER — SODIUM CHLORIDE 9 MG/ML
1000 INJECTION INTRAMUSCULAR; INTRAVENOUS; SUBCUTANEOUS
Refills: 0 | Status: DISCONTINUED | OUTPATIENT
Start: 2020-10-21 | End: 2020-10-21

## 2020-10-21 RX ORDER — POTASSIUM CHLORIDE 20 MEQ
10 PACKET (EA) ORAL
Refills: 0 | Status: COMPLETED | OUTPATIENT
Start: 2020-10-21 | End: 2020-10-21

## 2020-10-21 RX ORDER — MAGNESIUM SULFATE 500 MG/ML
1 VIAL (ML) INJECTION ONCE
Refills: 0 | Status: COMPLETED | OUTPATIENT
Start: 2020-10-21 | End: 2020-10-21

## 2020-10-21 RX ORDER — RIVAROXABAN 15 MG-20MG
15 KIT ORAL
Refills: 0 | Status: DISCONTINUED | OUTPATIENT
Start: 2020-10-21 | End: 2020-10-23

## 2020-10-21 RX ADMIN — RIVAROXABAN 15 MILLIGRAM(S): KIT at 18:43

## 2020-10-21 RX ADMIN — QUETIAPINE FUMARATE 25 MILLIGRAM(S): 200 TABLET, FILM COATED ORAL at 05:06

## 2020-10-21 RX ADMIN — Medication 100 MILLIEQUIVALENT(S): at 00:12

## 2020-10-21 RX ADMIN — ONDANSETRON 4 MILLIGRAM(S): 8 TABLET, FILM COATED ORAL at 05:06

## 2020-10-21 RX ADMIN — ONDANSETRON 4 MILLIGRAM(S): 8 TABLET, FILM COATED ORAL at 13:31

## 2020-10-21 RX ADMIN — Medication 100 MILLIEQUIVALENT(S): at 05:05

## 2020-10-21 RX ADMIN — ONDANSETRON 4 MILLIGRAM(S): 8 TABLET, FILM COATED ORAL at 11:54

## 2020-10-21 RX ADMIN — QUETIAPINE FUMARATE 25 MILLIGRAM(S): 200 TABLET, FILM COATED ORAL at 18:43

## 2020-10-21 RX ADMIN — Medication 325 MILLIGRAM(S): at 12:25

## 2020-10-21 RX ADMIN — ONDANSETRON 4 MILLIGRAM(S): 8 TABLET, FILM COATED ORAL at 17:07

## 2020-10-21 RX ADMIN — Medication 1 DROP(S): at 05:06

## 2020-10-21 RX ADMIN — SODIUM CHLORIDE 75 MILLILITER(S): 9 INJECTION INTRAMUSCULAR; INTRAVENOUS; SUBCUTANEOUS at 18:09

## 2020-10-21 RX ADMIN — Medication 500 MILLIGRAM(S): at 12:25

## 2020-10-21 RX ADMIN — Medication 100 MILLIEQUIVALENT(S): at 03:43

## 2020-10-21 RX ADMIN — LATANOPROST 1 DROP(S): 0.05 SOLUTION/ DROPS OPHTHALMIC; TOPICAL at 23:00

## 2020-10-21 RX ADMIN — ONDANSETRON 4 MILLIGRAM(S): 8 TABLET, FILM COATED ORAL at 22:38

## 2020-10-21 RX ADMIN — ATORVASTATIN CALCIUM 20 MILLIGRAM(S): 80 TABLET, FILM COATED ORAL at 22:38

## 2020-10-21 RX ADMIN — ONDANSETRON 4 MILLIGRAM(S): 8 TABLET, FILM COATED ORAL at 01:13

## 2020-10-21 RX ADMIN — Medication 100 MILLIEQUIVALENT(S): at 02:35

## 2020-10-21 RX ADMIN — POTASSIUM PHOSPHATE, MONOBASIC POTASSIUM PHOSPHATE, DIBASIC 83.33 MILLIMOLE(S): 236; 224 INJECTION, SOLUTION INTRAVENOUS at 03:52

## 2020-10-21 RX ADMIN — Medication 1 DROP(S): at 17:05

## 2020-10-21 RX ADMIN — Medication 100 GRAM(S): at 11:54

## 2020-10-21 RX ADMIN — Medication 100 GRAM(S): at 02:35

## 2020-10-21 NOTE — CONSULT NOTE ADULT - PROBLEM SELECTOR RECOMMENDATION 9
FAST 6E. Per family, patient lives with son/Chinmun, ambulatory with walker, confused, requires assistance with showers/bathroom/eating, +periods of incontinence, A&O x 1-2, variable PO intake, +anxiety. Continues seroquel. Son/Chinmum is primary surrogate. Patient is DNR / trial intubation. FAST 6E. Per family, patient lives with son/Chinmun, ambulatory with walker, confused, requires assistance with showers/bathroom/eating, +periods of incontinence, A&O x 1-2, variable PO intake, +anxiety. Patient has exhibited intermittent episodes of agitation/yelling/pulling outlines. Continues seroquel. Son/Larry is primary surrogate. Patient is DNR / trial intubation.

## 2020-10-21 NOTE — CONSULT NOTE ADULT - CONSULT REASON
79 y/o F with dementia, admitted to ICU for hyponatremia. LACE 12. Request to establish goals of care.

## 2020-10-21 NOTE — CONSULT NOTE ADULT - ATTENDING COMMENTS
80 y F with advancing dementia, debility under ICU care for hyponatremia, improving. Confused, frail, NAD. Son is primary surrogate, now DNR/trial of intubation. On seroquel for intermittent agitation. Reorientation.

## 2020-10-21 NOTE — CONSULT NOTE ADULT - CONVERSATION DETAILS
10/21/20: Spoke with patient's son/Anita, son/Ashwin, and daughter-in-law/Radha on the phone.  Family confirms son/Anita is primary surrogate and Ashwin is alternate. Obtained baseline mental/functional status. Discussed status. Family aware PT recommending MARIAA. Discussed risks vs benefits of resuscitation, intubation, artificial nutrition. Family states, "Let her go naturally. The best is with no pain. Let her go peacefully." MOLST reviewed and completed as per family's wishes: DNR / trial intubation/ send to hospital / trial feeding tube / trial IV fluids / use abx. Wishes confirmed by Dr. Dhaliwal. All questions answered. 10/21/20: Spoke with patient's son/Anita, son/Ashwin, and daughter-in-law/Radha on the phone.  Offered  services but family refused. Family confirms son/Anita is primary surrogate and Ashwin is alternate. Obtained baseline mental/functional status. Discussed status. Family aware PT recommending MARIAA. Discussed risks vs benefits of resuscitation, intubation, artificial nutrition. Family states, "Let her go naturally. The best is with no pain. Let her go peacefully." SABRAST reviewed and completed as per family's wishes: DNR / trial intubation/ send to hospital / trial feeding tube / trial IV fluids / use abx. Wishes confirmed by Dr. Dhaliwal. All questions answered.

## 2020-10-21 NOTE — CHART NOTE - NSCHARTNOTEFT_GEN_A_CORE
ICU TRANSFER NOTE:    81 y/o F Cantonese speaking from home, lives with son, ambulates with walker with PMHx of HTN, DM, HLD, Dementia, Pulmonary embolism (on xarelto)  presented to ED s/p fall last night with left sided shoulder pain and left sided chest pain. Pt is AAO x 1-2 at baseline. History is obtained from the son Eliud dias. Son reports pt was having nausea, vomiting, confusion, body aches, increased urinary frequency, urgency, nocturia, hematuria, drinking lot of water, not eating well, bilateral leg swelling. Patient was walking with her walker to the bathroom when she lost her  of the walker and fell down onto her left side last night and she was on the floor for 1-2 mins. EMS called due to patient unable to move left shoulder secondary to pain, however, while being transferred, patient also complained of left sided chest pain.  Patient was recently discharged on Xarelto from [facility] 4 days ago after chest pain evaluation and diagnosed with Pulmonary embolism. Pt with history of rapidly progressing dementia, with noted improvement after spinal tap. She was discharged with mckeon for urinary retention and it was uncomfortable to her. It was removed by the PMD. Her hematuria subsided after removal of mckeon. Pt has no  Fever, chills, LOC, head trauma, diaphoresis, dizziness, chest pain, sob, diarrhea, urinary incontinence, fecal incontinence, bloody stools or any other complaints. ED course, vitals are stable. Labs significant for Hb of 6.9 and Sodium of 117. One dose of Lasix was given. ICU was consulted for hyponatremia.     ICU Course:  Pt w/ hemoglobin 6.9 s/p 1pRBC. Pt given 500cc NS bolus. Pt given 1mg desmopressin to avoid fast correction of sodium. Pt restarted on quetiapine 25 BID for increased agitation. Pt AAOx1-2 during ICU stay. Pt started on diet. Sodium trended 117-> 115 -> 122 -> 123 -> 124 -> 128. Pt w/ hypokalemia, s/p 4 ryders. Kphos 30.    GOC Full code      Discussed with PGY-1/NP covering Floor and Attending Dr. Saenz. Patient will be transfered to floor. Discussed with ICU attending. Patient clinically stable for downgrade      To Follow:  Repeat BMP in PM, (Na, K)  Restart Xarelto if Hb Stable ICU TRANSFER NOTE:    79 y/o F Cantonese speaking from home, lives with son, ambulates with walker with PMHx of HTN, DM, HLD, Dementia, Pulmonary embolism (on xarelto)  presented to ED s/p fall last night with left sided shoulder pain and left sided chest pain. Pt is AAO x 1-2 at baseline. History is obtained from the son Eliud dias. Son reports pt was having nausea, vomiting, confusion, body aches, increased urinary frequency, urgency, nocturia, hematuria, drinking lot of water, not eating well, bilateral leg swelling. Patient was walking with her walker to the bathroom when she lost her  of the walker and fell down onto her left side last night and she was on the floor for 1-2 mins. EMS called due to patient unable to move left shoulder secondary to pain, however, while being transferred, patient also complained of left sided chest pain.  Patient was recently discharged on Xarelto from [facility] 4 days ago after chest pain evaluation and diagnosed with Pulmonary embolism. Pt with history of rapidly progressing dementia, with noted improvement after spinal tap. She was discharged with mckeon for urinary retention and it was uncomfortable to her. It was removed by the PMD. Her hematuria subsided after removal of mckeon. Pt has no  Fever, chills, LOC, head trauma, diaphoresis, dizziness, chest pain, sob, diarrhea, urinary incontinence, fecal incontinence, bloody stools or any other complaints. ED course, vitals are stable. Labs significant for Hb of 6.9 and Sodium of 117. One dose of Lasix was given. ICU was consulted for hyponatremia.     ICU Course:  Pt w/ hemoglobin 6.9 s/p 1pRBC. Pt given 500cc NS bolus. Pt given 1mg desmopressin to avoid fast correction of sodium. Pt restarted on quetiapine 25 BID for increased agitation. Pt AAOx1-2 during ICU stay. Pt started on diet. Sodium trended 117-> 115 -> 122 -> 123 -> 124 -> 128. Pt w/ hypokalemia, s/p 3 ryders. Kphos 30.    GOC Full code      Discussed with PGY-1/NP covering Floor and Attending Dr. Saenz. Patient will be transferred to floor. Discussed with ICU attending. Patient clinically stable for downgrade      To Follow:  Repeat CBC, BMP & phos in PM, (Na, K, phos)  Restart Xarelto if Hb Stable

## 2020-10-21 NOTE — CONSULT NOTE ADULT - PROBLEM SELECTOR RECOMMENDATION 4
See GOC section above. Michael, Anita Kline ([phone]) and Ashwin Kline ([phone]) are identified surrogates. MOLST completed as per family's wishes: DNR / trial intubation/ send to hospital / trial feeding tube / trial IV fluids / use abx.

## 2020-10-21 NOTE — CONSULT NOTE ADULT - SUBJECTIVE AND OBJECTIVE BOX
HPI:  81 y/o F Cantonese speaking from home, lives with son, ambulates with walker with PMHx of HTN, DM, HLD, Dementia, Pulmonary embolism (on xarelto)  presented to ED s/p fall last night with left sided shoulder pain and left sided chest pain. Pt is AAO x 1-2 at baseline. History is obtained from the son Eliud dias. Son reports pt was having nausea, vomiting, confusion, body aches, increased urinary frequency, urgency, nocturia, hematuria, drinking lot of water, not eating well, bilateral leg swelling. Patient was walking with her walker to the bathroom when she lost her  of the walker and fell down onto her left side last night and she was on the floor for 1-2 mins. EMS called due to patient unable to move left shoulder secondary to pain, however, while being transferred, patient also complained of left sided chest pain.  [facility] after chest pain evaluation and diagnosed with Pulmonary embolism. She was discharged with mckeon and it was uncomfortable to her. It was removed by the PMD. Her hematuria subsided after removal of mckeon. Pt has no  Fever, chills, LOC, head trauma, diaphoresis, dizziness, chest pain, sob, diarrhea, urinary incontinence, fecal incontinence, bloody stools or any other complaints.   [facility] Full code (20 Oct 2020 00:02)    Brief hospital course:  Patient admitted to ICU for hyponatremia - improved. Plan for downgrade today to medicine. Patient A&O x 1-2, + agitation/pulls at lines. Home seroquel restarted. LACE 12. Request to establish goals of care.       PAST MEDICAL & SURGICAL HISTORY:  Hyperlipidemia  HTN (hypertension)  Dementia  HTN (hypertension)  DM (diabetes mellitus)  No pertinent past surgical history    SOCIAL HISTORY:    Admitted from:  home, lives with son/Larry   Substance abuse history:   none           Tobacco hx:   none               Alcohol hx:   none           Home Opioid hx: none  Temple:       n/a                             Preferred Language: Cantonese    Surrogate/HCP/Guardian:    Anita Kline (son): [phone]  Ashwin Kline (son): [phone]    FAMILY HISTORY:  FH: hypertension  father and mother    Baseline ADLs (prior to admission): ambulatory with walker, confused, assist with showers/bathroom/eating, +periods of incontinent, A&O x 1-2, variable PO intake, +anxiety    No Known Allergies    Present Symptoms:   Dyspnea:   Nausea/Vomiting:   Anxiety:  Depressed   Fatigue:  Loss of appetite:   Pain:                                location:          Review of Systems: [All others negative or Unable to obtain due to poor mentation]    MEDICATIONS  (STANDING):  artificial  tears Solution 1 Drop(s) Both EYES two times a day  ascorbic acid 500 milliGRAM(s) Oral daily  atorvastatin 20 milliGRAM(s) Oral at bedtime  ferrous    sulfate 325 milliGRAM(s) Oral daily  insulin lispro (ADMELOG) corrective regimen sliding scale   SubCutaneous Before meals and at bedtime  latanoprost 0.005% Ophthalmic Solution 1 Drop(s) Both EYES at bedtime  magnesium sulfate  IVPB 1 Gram(s) IV Intermittent once  ondansetron Injectable 4 milliGRAM(s) IV Push every 4 hours  QUEtiapine 25 milliGRAM(s) Oral two times a day  timolol 0.25% Solution 1 Drop(s) Both EYES two times a day    MEDICATIONS  (PRN):  acetaminophen   Tablet .. 650 milliGRAM(s) Oral every 6 hours PRN Temp greater or equal to 38C (100.4F), Mild Pain (1 - 3)  benzocaine 15 mG/menthol 3.6 mG (Sugar-Free) Lozenge 1 Lozenge Oral every 4 hours PRN Sore Throat  guaiFENesin   Syrup  (Sugar-Free) 100 milliGRAM(s) Oral every 6 hours PRN Cough      PHYSICAL EXAM:    Vital Signs Last 24 Hrs  T(C): 36.9 (21 Oct 2020 10:51), Max: 36.9 (21 Oct 2020 10:51)  T(F): 98.4 (21 Oct 2020 10:51), Max: 98.4 (21 Oct 2020 10:51)  HR: 94 (21 Oct 2020 10:51) (58 - 94)  BP: 158/74 (21 Oct 2020 10:51) (95/42 - 163/80)  BP(mean): 106 (21 Oct 2020 08:30) (60 - 106)  RR: 18 (21 Oct 2020 10:51) (12 - 24)  SpO2: 100% (21 Oct 2020 10:51) (95% - 100%)    General: alert  oriented x 1-2, verbal, follows simple commands. No signs of distress.    Karnofsky Performance Score/Palliative Performance Status Version2:     20%    HEENT: normal   Lungs: comfortable, on RA. No signs of respiratory distress.    GI: incontinent  :  mckeon  Musculoskeletal: +generalized weakness, assist with ADLs  Skin: no BLE edema  Neuro: + dementia with behaviors at baseline. Patient A&O x 1-2, verbal, follows commands.   Oral intake ability: moderate  Diet: carbohydrate/no snack    LABS:                        8.2    9.18  )-----------( 360      ( 21 Oct 2020 01:54 )             23.0     10-21    128<L>  |  102  |  8   ----------------------------<  96  3.3<L>   |  20<L>  |  0.36<L>    Ca    6.3<LL>      21 Oct 2020 01:54  Phos  1.8     10-21  Mg     1.6     10-21    TPro  4.6<L>  /  Alb  2.0<L>  /  TBili  0.2  /  DBili  x   /  AST  26  /  ALT  31  /  AlkPhos  72  10-21    Urinalysis Basic - ( 19 Oct 2020 23:52 )    Color: Yellow / Appearance: Clear / S.015 / pH: x  Gluc: x / Ketone: Negative  / Bili: Negative / Urobili: Negative   Blood: x / Protein: 15 / Nitrite: Negative   Leuk Esterase: Negative / RBC: 5-10 /HPF / WBC 0-2 /HPF   Sq Epi: x / Non Sq Epi: Occasional /HPF / Bacteria: Trace /HPF    Albumin: 2.0    RADIOLOGY & ADDITIONAL STUDIES:  < from: CT Head No Cont (10.20.20 @ 00:13) >  EXAM:  CT BRAIN                        PROCEDURE DATE:  10/20/2020    INTERPRETATION:  PROVIDED HISTORY: s/p fall, head trauma  COMPARISON:  none  TECHNIQUE: CT of the head was performed without intravenous contrast.  FINDINGS:  The visualized paranasal sinuses and mastoid air cells are clear. Osseous structures appear intact. The extra-cranial soft tissues are unremarkable.  Patchy/multifocal hypodensity, nonspecific but present in a distribution typical for chronic microvascularischemic changes. Central and cortical atrophy.  There is no midline shift and the basal cisterns are preserved.  There is no evidence of acute intraparenchymal hemorrhage or territorial infarction, allowing for the limited ability of CT to detect early infarction. There are no extra-axial fluid collections.    IMPRESSION:  No evidence of acute intracranial process. Probable chronic microvascular ischemic changes with atrophy.  < end of copied text >    < from: Xray Shoulder 2 Views, Left (10.19.20 @ 22:08) >  EXAM:  HUMERUS LEFT                        EXAM:  SHOULDER LEFT (MINIMU 2V)                        PROCEDURE DATE:  10/19/2020    INTERPRETATION:  CLINICAL INDICATION: 80 years old Female with s/p fall, Lt shoulder pain.  COMPARISON: None  AP and transscapular Y views of the left shoulder were obtained. AP and lateral views of the remainder the humerus were obtained as well.  There are degenerative changes about the glenohumeral joint with glenoid and humeral head osteophyte formation and joint space narrowing. There is elevation of the humeral head related to chronic rotator cuff degeneration. There is no fracture, subluxation or dislocation about the shoulder or humerus. There are small osteophytes about the AC joint. Nolytic or blastic lesions are identified.  There is no periosteal reaction or cortical disruption to suggest osteomyelitis.  Diffuse osteopenia is present.  No soft tissue abnormality is seen.    IMPRESSION:  Glenohumeral osteoarthritis AC joint arthropathy.  < end of copied text >      < from: Xray Chest 1 View AP/PA (10.19.20 @ 22:05) >  EXAM:  XR CHEST AP OR PA 1V                        PROCEDURE DATE:  10/19/2020    INTERPRETATION:  INDICATION: Trauma  PRIORS: 10/4/2020.  VIEWS: Portable AP radiography of the chest performed.  FINDINGS: Heart size appears within normal limits. Superior mediastinal contours are unchanged. No hilar masses. No evidence for focal infiltrate, lobar consolidation or pulmonary edema. No pleural effusion. No pneumothorax. No mediastinal shift. No osseous fractures. Degenerative changes of the bilateral shoulder regions noted.  IMPRESSION: No evidence for focal infiltrate or lobar consolidation.  < end of copied text >    < from: Transthoracic Echocardiogram (10.05.20 @ 07:04) >    Patient name: GERARDO HERRERA  YOB: 1940   Age: 80 (F)   MR#: [id number]  Study Date: 10/5/2020  Location: [address] [place], [place] [address]Sonographer: Jed Martinez RDCS  Study quality: Technically good  Referring Physician:  JUAN MANUEL BAER MD  Blood Pressure: 133/55 mmHg  Height: 152 cm  Weight: 52 kg  BSA: 1.5 m2  ------------------------------------------------------------------------    PROCEDURE: Transthoracic echocardiogram with 2-D, M-Mode  and complete spectral and color flow Doppler.  INDICATION: Chest pain, unspecified (R07.9)  HISTORY:  ------------------------------------------------------------------------  DIMENSIONS:  Dimensions:     Normal Values:  LA:     3.7 cm    2.0 - 4.0 cm  Ao:     3.7 cm    2.0 - 3.8 cm  SEPTUM: 0.9 cm    0.6 - 1.2 cm  PWT:    1.0 cm    0.6 - 1.1 cm  LVIDd:  4.7 cm    3.0 - 5.6 cm  LVIDs:  2.7 cm    1.8 - 4.0 cm      Derived Variables:  LVMI: 104 g/m2  RWT: 0.42  Ejection Fraction Visual Estimate: 55-60 %  Ejection Fraction Teicholtz: 70 %  Ejection Fraction Camilo: 71 %    ------------------------------------------------------------------------  OBSERVATIONS:  Mitral Valve: Normal mitral valve. Mild mitral  regurgitation.  Aortic Root: Aortic Root: 3.7 cm.    Aortic Valve: Normal trileaflet aortic valve.  Left Atrium: Normal left atrium.  LA volume index = 31  cc/m2.  Left Ventricle: Normal Left Ventricular Systolic Function,  (EF = 55 to 60%) Mild concentric left ventricular  hypertrophy. Grade I diastolic dysfunction (Impaired  relaxation).  Right Heart: Normal right atrium. Normal right ventricular  size and systolic function (TAPSE  2.0cm). Normal tricuspid  valve. Pulmonic valve not well seen.  Pericardium/PleuraNormal pericardium with no pericardial  effusion.  Hemodynamic: Unable to estimate RVSP.  ------------------------------------------------------------------------  CONCLUSIONS:  1. Normal mitral valve. Mild mitral regurgitation.  2. Normal trileaflet aortic valve.  3. Aortic Root: 3.7 cm.  4. Normal left atrium.  LA volume index = 31 cc/m2.  5. Mild concentric left ventricular hypertrophy.  6. Normal Left Ventricular Systolic Function,  (EF = 55 to  60%)  7. Grade I diastolic dysfunction (Impaired relaxation).  8. Normal right atrium.  9. Normal right ventricular size and systolic function  (TAPSE  2.0cm).  10. Unable to estimate RVSP.  11. Normal tricuspid valve.  12. Pulmonic valve not well seen.  13. Normal pericardium with no pericardial effusion.  < end of copied text >      ADVANCE DIRECTIVES: MOLST: DNR / trial intubation / send to hospital / trial feeding tube / trial IV fluids / use abx.    HPI:  81 y/o F Cantonese speaking from home, lives with son, ambulates with walker with PMHx of HTN, DM, HLD, Dementia, Pulmonary embolism (on xarelto)  presented to ED s/p fall last night with left sided shoulder pain and left sided chest pain. Pt is AAO x 1-2 at baseline. History is obtained from the son Eliud dias. Son reports pt was having nausea, vomiting, confusion, body aches, increased urinary frequency, urgency, nocturia, hematuria, drinking lot of water, not eating well, bilateral leg swelling. Patient was walking with her walker to the bathroom when she lost her  of the walker and fell down onto her left side last night and she was on the floor for 1-2 mins. EMS called due to patient unable to move left shoulder secondary to pain, however, while being transferred, patient also complained of left sided chest pain.  Patient was recently discharged on Xarelto from [facility] 4 days ago after chest pain evaluation and diagnosed with Pulmonary embolism. She was discharged with mckeon and it was uncomfortable to her. It was removed by the PMD. Her hematuria subsided after removal of mckeon. Pt has no  Fever, chills, LOC, head trauma, diaphoresis, dizziness, chest pain, sob, diarrhea, urinary incontinence, fecal incontinence, bloody stools or any other complaints.   [facility] Full code (20 Oct 2020 00:02)    Brief hospital course:  Patient admitted to ICU for hyponatremia - improved. Plan for downgrade today to medicine. Patient A&O x 1-2, + agitation/pulls at lines. Home seroquel restarted. LACE 12. Request to establish goals of care.       PAST MEDICAL & SURGICAL HISTORY:  Hyperlipidemia  HTN (hypertension)  Dementia  HTN (hypertension)  DM (diabetes mellitus)  No pertinent past surgical history    SOCIAL HISTORY:    Admitted from:  home, lives with son/Larry   Substance abuse history:   none           Tobacco hx:   none               Alcohol hx:   none           Home Opioid hx: none  Jew:       n/a                             Preferred Language: Cantonese    Surrogate/HCP/Guardian:    Anita Kline (son): [phone]  Ashwin Kline (son): [phone]    FAMILY HISTORY:  FH: hypertension  father and mother    Baseline ADLs (prior to admission): ambulatory with walker, confused, assist with showers/bathroom/eating, +periods of incontinent, A&O x 1-2, variable PO intake, +anxiety    No Known Allergies    Present Symptoms:   Dyspnea: none     Review of Systems: Limited 2/2 mental status      MEDICATIONS  (STANDING):  artificial  tears Solution 1 Drop(s) Both EYES two times a day  ascorbic acid 500 milliGRAM(s) Oral daily  atorvastatin 20 milliGRAM(s) Oral at bedtime  ferrous    sulfate 325 milliGRAM(s) Oral daily  insulin lispro (ADMELOG) corrective regimen sliding scale   SubCutaneous Before meals and at bedtime  latanoprost 0.005% Ophthalmic Solution 1 Drop(s) Both EYES at bedtime  magnesium sulfate  IVPB 1 Gram(s) IV Intermittent once  ondansetron Injectable 4 milliGRAM(s) IV Push every 4 hours  QUEtiapine 25 milliGRAM(s) Oral two times a day  timolol 0.25% Solution 1 Drop(s) Both EYES two times a day    MEDICATIONS  (PRN):  acetaminophen   Tablet .. 650 milliGRAM(s) Oral every 6 hours PRN Temp greater or equal to 38C (100.4F), Mild Pain (1 - 3)  benzocaine 15 mG/menthol 3.6 mG (Sugar-Free) Lozenge 1 Lozenge Oral every 4 hours PRN Sore Throat  guaiFENesin   Syrup  (Sugar-Free) 100 milliGRAM(s) Oral every 6 hours PRN Cough      PHYSICAL EXAM:    Vital Signs Last 24 Hrs  T(C): 36.9 (21 Oct 2020 10:51), Max: 36.9 (21 Oct 2020 10:51)  T(F): 98.4 (21 Oct 2020 10:51), Max: 98.4 (21 Oct 2020 10:51)  HR: 94 (21 Oct 2020 10:51) (58 - 94)  BP: 158/74 (21 Oct 2020 10:51) (95/42 - 163/80)  BP(mean): 106 (21 Oct 2020 08:30) (60 - 106)  RR: 18 (21 Oct 2020 10:51) (12 - 24)  SpO2: 100% (21 Oct 2020 10:51) (95% - 100%)    General: alert  oriented x 1-2, verbal, follows simple commands, +intermittent agitation. No signs of distress.    Karnofsky Performance Score/Palliative Performance Status Version2:     20%    HEENT: normal   Lungs: comfortable, on RA. No signs of respiratory distress.    GI: incontinent  :  mckeon  Musculoskeletal: +generalized weakness, assist with ADLs  Skin: no BLE edema  Neuro: + dementia with behaviors at baseline. Patient A&O x 1-2, verbal, follows simple commands.   Oral intake ability: moderate  Diet: carbohydrate/no snack    LABS:                        8.2    9.18  )-----------( 360      ( 21 Oct 2020 01:54 )             23.0     10-21    128<L>  |  102  |  8   ----------------------------<  96  3.3<L>   |  20<L>  |  0.36<L>    Ca    6.3<LL>      21 Oct 2020 01:54  Phos  1.8     10-  Mg     1.6     10-    TPro  4.6<L>  /  Alb  2.0<L>  /  TBili  0.2  /  DBili  x   /  AST  26  /  ALT  31  /  AlkPhos  72  10-    Urinalysis Basic - ( 19 Oct 2020 23:52 )    Color: Yellow / Appearance: Clear / S.015 / pH: x  Gluc: x / Ketone: Negative  / Bili: Negative / Urobili: Negative   Blood: x / Protein: 15 / Nitrite: Negative   Leuk Esterase: Negative / RBC: 5-10 /HPF / WBC 0-2 /HPF   Sq Epi: x / Non Sq Epi: Occasional /HPF / Bacteria: Trace /HPF    Albumin: 2.0    RADIOLOGY & ADDITIONAL STUDIES:  < from: CT Head No Cont (10.20.20 @ 00:13) >  EXAM:  CT BRAIN                        PROCEDURE DATE:  10/20/2020    INTERPRETATION:  PROVIDED HISTORY: s/p fall, head trauma  COMPARISON:  none  TECHNIQUE: CT of the head was performed without intravenous contrast.  FINDINGS:  The visualized paranasal sinuses and mastoid air cells are clear. Osseous structures appear intact. The extra-cranial soft tissues are unremarkable.  Patchy/multifocal hypodensity, nonspecific but present in a distribution typical for chronic microvascularischemic changes. Central and cortical atrophy.  There is no midline shift and the basal cisterns are preserved.  There is no evidence of acute intraparenchymal hemorrhage or territorial infarction, allowing for the limited ability of CT to detect early infarction. There are no extra-axial fluid collections.    IMPRESSION:  No evidence of acute intracranial process. Probable chronic microvascular ischemic changes with atrophy.  < end of copied text >    < from: Xray Shoulder 2 Views, Left (10.19.20 @ 22:08) >  EXAM:  HUMERUS LEFT                        EXAM:  SHOULDER LEFT (MINIMU 2V)                        PROCEDURE DATE:  10/19/2020    INTERPRETATION:  CLINICAL INDICATION: 80 years old Female with s/p fall, Lt shoulder pain.  COMPARISON: None  AP and transscapular Y views of the left shoulder were obtained. AP and lateral views of the remainder the humerus were obtained as well.  There are degenerative changes about the glenohumeral joint with glenoid and humeral head osteophyte formation and joint space narrowing. There is elevation of the humeral head related to chronic rotator cuff degeneration. There is no fracture, subluxation or dislocation about the shoulder or humerus. There are small osteophytes about the AC joint. Nolytic or blastic lesions are identified.  There is no periosteal reaction or cortical disruption to suggest osteomyelitis.  Diffuse osteopenia is present.  No soft tissue abnormality is seen.    IMPRESSION:  Glenohumeral osteoarthritis AC joint arthropathy.  < end of copied text >      < from: Xray Chest 1 View AP/PA (10.19.20 @ 22:05) >  EXAM:  XR CHEST AP OR PA 1V                        PROCEDURE DATE:  10/19/2020    INTERPRETATION:  INDICATION: Trauma  PRIORS: 10/4/2020.  VIEWS: Portable AP radiography of the chest performed.  FINDINGS: Heart size appears within normal limits. Superior mediastinal contours are unchanged. No hilar masses. No evidence for focal infiltrate, lobar consolidation or pulmonary edema. No pleural effusion. No pneumothorax. No mediastinal shift. No osseous fractures. Degenerative changes of the bilateral shoulder regions noted.  IMPRESSION: No evidence for focal infiltrate or lobar consolidation.  < end of copied text >    < from: Transthoracic Echocardiogram (10.05.20 @ 07:04) >    Patient name: GERARDO HERRERA  YOB: 1940   Age: 80 (F)   MR#: [id number]  Study Date: 10/5/2020  Location: [address] [place], [place] [address]Sonographer: Jed Martinez RDCS  Study quality: Technically good  Referring Physician:  JUAN MANUEL BAER MD  Blood Pressure: 133/55 mmHg  Height: 152 cm  Weight: 52 kg  BSA: 1.5 m2  ------------------------------------------------------------------------    PROCEDURE: Transthoracic echocardiogram with 2-D, M-Mode  and complete spectral and color flow Doppler.  INDICATION: Chest pain, unspecified (R07.9)  HISTORY:  ------------------------------------------------------------------------  DIMENSIONS:  Dimensions:     Normal Values:  LA:     3.7 cm    2.0 - 4.0 cm  Ao:     3.7 cm    2.0 - 3.8 cm  SEPTUM: 0.9 cm    0.6 - 1.2 cm  PWT:    1.0 cm    0.6 - 1.1 cm  LVIDd:  4.7 cm    3.0 - 5.6 cm  LVIDs:  2.7 cm    1.8 - 4.0 cm      Derived Variables:  LVMI: 104 g/m2  RWT: 0.42  Ejection Fraction Visual Estimate: 55-60 %  Ejection Fraction Celsoicholtz: 70 %  Ejection Fraction Camilo: 71 %    ------------------------------------------------------------------------  OBSERVATIONS:  Mitral Valve: Normal mitral valve. Mild mitral  regurgitation.  Aortic Root: Aortic Root: 3.7 cm.    Aortic Valve: Normal trileaflet aortic valve.  Left Atrium: Normal left atrium.  LA volume index = 31  cc/m2.  Left Ventricle: Normal Left Ventricular Systolic Function,  (EF = 55 to 60%) Mild concentric left ventricular  hypertrophy. Grade I diastolic dysfunction (Impaired  relaxation).  Right Heart: Normal right atrium. Normal right ventricular  size and systolic function (TAPSE  2.0cm). Normal tricuspid  valve. Pulmonic valve not well seen.  Pericardium/PleuraNormal pericardium with no pericardial  effusion.  Hemodynamic: Unable to estimate RVSP.  ------------------------------------------------------------------------  CONCLUSIONS:  1. Normal mitral valve. Mild mitral regurgitation.  2. Normal trileaflet aortic valve.  3. Aortic Root: 3.7 cm.  4. Normal left atrium.  LA volume index = 31 cc/m2.  5. Mild concentric left ventricular hypertrophy.  6. Normal Left Ventricular Systolic Function,  (EF = 55 to  60%)  7. Grade I diastolic dysfunction (Impaired relaxation).  8. Normal right atrium.  9. Normal right ventricular size and systolic function  (TAPSE  2.0cm).  10. Unable to estimate RVSP.  11. Normal tricuspid valve.  12. Pulmonic valve not well seen.  13. Normal pericardium with no pericardial effusion.  < end of copied text >      ADVANCE DIRECTIVES: MOLST: DNR / trial intubation / send to hospital / trial feeding tube / trial IV fluids / use abx.

## 2020-10-21 NOTE — PROGRESS NOTE ADULT - SUBJECTIVE AND OBJECTIVE BOX
INTERVAL HPI/OVERNIGHT EVENTS: ***    PRESSORS: [ ] YES [ ] NO  WHICH:    ANTIBIOTICS:                      Antimicrobial:    Cardiovascular:    Pulmonary:  guaiFENesin   Syrup  (Sugar-Free) 100 milliGRAM(s) Oral every 6 hours PRN    Hematalogic:    Other:  acetaminophen   Tablet .. 650 milliGRAM(s) Oral every 6 hours PRN  artificial  tears Solution 1 Drop(s) Both EYES two times a day  ascorbic acid 500 milliGRAM(s) Oral daily  atorvastatin 20 milliGRAM(s) Oral at bedtime  benzocaine 15 mG/menthol 3.6 mG (Sugar-Free) Lozenge 1 Lozenge Oral every 4 hours PRN  ferrous    sulfate 325 milliGRAM(s) Oral daily  insulin lispro (ADMELOG) corrective regimen sliding scale   SubCutaneous Before meals and at bedtime  latanoprost 0.005% Ophthalmic Solution 1 Drop(s) Both EYES at bedtime  ondansetron Injectable 4 milliGRAM(s) IV Push every 4 hours  QUEtiapine 25 milliGRAM(s) Oral two times a day  timolol 0.25% Solution 1 Drop(s) Both EYES two times a day    acetaminophen   Tablet .. 650 milliGRAM(s) Oral every 6 hours PRN  artificial  tears Solution 1 Drop(s) Both EYES two times a day  ascorbic acid 500 milliGRAM(s) Oral daily  atorvastatin 20 milliGRAM(s) Oral at bedtime  benzocaine 15 mG/menthol 3.6 mG (Sugar-Free) Lozenge 1 Lozenge Oral every 4 hours PRN  ferrous    sulfate 325 milliGRAM(s) Oral daily  guaiFENesin   Syrup  (Sugar-Free) 100 milliGRAM(s) Oral every 6 hours PRN  insulin lispro (ADMELOG) corrective regimen sliding scale   SubCutaneous Before meals and at bedtime  latanoprost 0.005% Ophthalmic Solution 1 Drop(s) Both EYES at bedtime  ondansetron Injectable 4 milliGRAM(s) IV Push every 4 hours  QUEtiapine 25 milliGRAM(s) Oral two times a day  timolol 0.25% Solution 1 Drop(s) Both EYES two times a day    Drug Dosing Weight  Height (cm): 152.4 (20 Oct 2020 01:26)  Weight (kg): 63.1 (20 Oct 2020 01:26)  BMI (kg/m2): 27.2 (20 Oct 2020 01:26)  BSA (m2): 1.6 (20 Oct 2020 01:26)    CENTRAL LINE: [ ] YES [ ] NO  LOCATION:   DATE INSERTED:  REMOVE: [ ] YES [ ] NO  EXPLAIN:    CANTU: [ ] YES [ ] NO    DATE INSERTED:  REMOVE:  [ ] YES [ ] NO  EXPLAIN:    A-LINE:  [ ] YES [ ] NO  LOCATION:   DATE INSERTED:  REMOVE:  [ ] YES [ ] NO  EXPLAIN:    PMH -reviewed admission note, no change since admission    ICU Vital Signs Last 24 Hrs  T(C): 36.2 (21 Oct 2020 05:00), Max: 36.8 (20 Oct 2020 15:22)  T(F): 97.2 (21 Oct 2020 05:00), Max: 98.2 (20 Oct 2020 15:22)  HR: 83 (21 Oct 2020 07:00) (58 - 98)  BP: 152/62 (21 Oct 2020 07:00) (95/42 - 163/80)  BP(mean): 88 (21 Oct 2020 07:00) (60 - 105)  ABP: --  ABP(mean): --  RR: 18 (21 Oct 2020 07:00) (12 - 24)  SpO2: 97% (21 Oct 2020 07:00) (95% - 100%)            10-20 @ 07:01  -  10-21 @ 07:00  --------------------------------------------------------  IN: 420 mL / OUT: 3210 mL / NET: -2790 mL            PHYSICAL EXAM:    GENERAL: NAD, well-groomed, well-developed  HEAD:  Atraumatic, Normocephalic  EYES: EOMI, PERRLA, conjunctiva and sclera clear  ENMT: No tonsillar erythema, exudates, or enlargement; Moist mucous membranes, Good dentition, No lesions  NECK: Supple, normal appearance, No JVD; Normal thyroid; Trachea midline  NERVOUS SYSTEM:  Alert & Oriented X3, Good concentration; Motor Strength 5/5 B/L upper and lower extremities; DTRs 2+ intact and symmetric  CHEST/LUNG: No chest deformity; Normal percussion bilaterally; No rales, rhonchi, wheezing   HEART: Regular rate and rhythm; No murmurs, rubs, or gallops  ABDOMEN: Soft, Nontender, Nondistended; Bowel sounds present  EXTREMITIES:  2+ Peripheral Pulses, No clubbing, cyanosis, or edema  LYMPH: No lymphadenopathy noted  SKIN: No rashes or lesions; Good capillary refill      LABS:  CBC Full  -  ( 21 Oct 2020 01:54 )  WBC Count : 9.18 K/uL  RBC Count : 2.89 M/uL  Hemoglobin : 8.2 g/dL  Hematocrit : 23.0 %  Platelet Count - Automated : 360 K/uL  Mean Cell Volume : 79.6 fl  Mean Cell Hemoglobin : 28.4 pg  Mean Cell Hemoglobin Concentration : 35.7 gm/dL  Auto Neutrophil # : 6.97 K/uL  Auto Lymphocyte # : 1.21 K/uL  Auto Monocyte # : 0.71 K/uL  Auto Eosinophil # : 0.21 K/uL  Auto Basophil # : 0.03 K/uL  Auto Neutrophil % : 76.0 %  Auto Lymphocyte % : 13.2 %  Auto Monocyte % : 7.7 %  Auto Eosinophil % : 2.3 %  Auto Basophil % : 0.3 %    10    128<L>  |  102  |  8   ----------------------------<  96  3.3<L>   |  20<L>  |  0.36<L>    Ca    6.3<LL>      21 Oct 2020 01:54  Phos  1.8     10-21  Mg     1.6     10-21    TPro  4.6<L>  /  Alb  2.0<L>  /  TBili  0.2  /  DBili  x   /  AST  26  /  ALT  31  /  AlkPhos  72  10-21    PT/INR - ( 19 Oct 2020 22:23 )   PT: 13.7 sec;   INR: 1.16 ratio         PTT - ( 19 Oct 2020 22:23 )  PTT:33.7 sec  Urinalysis Basic - ( 19 Oct 2020 23:52 )    Color: Yellow / Appearance: Clear / S.015 / pH: x  Gluc: x / Ketone: Negative  / Bili: Negative / Urobili: Negative   Blood: x / Protein: 15 / Nitrite: Negative   Leuk Esterase: Negative / RBC: 5-10 /HPF / WBC 0-2 /HPF   Sq Epi: x / Non Sq Epi: Occasional /HPF / Bacteria: Trace /HPF          RADIOLOGY & ADDITIONAL STUDIES REVIEWED:  ***    GOALS OF CARE DISCUSSION WITH PATIENT/FAMILY/PROXY:    CRITICAL CARE TIME SPENT: 35 minutes INTERVAL HPI/OVERNIGHT EVENTS:   Pt comfortable AAOx1-2. Pt on b/l mittens due to increasing agitation towards staff and pulls at lines. Pt restarted on home quetiapine.     PRESSORS: [ ] YES [ ] NO  WHICH:    ANTIBIOTICS:                      Antimicrobial:    Cardiovascular:    Pulmonary:  guaiFENesin   Syrup  (Sugar-Free) 100 milliGRAM(s) Oral every 6 hours PRN    Hematalogic:    Other:  acetaminophen   Tablet .. 650 milliGRAM(s) Oral every 6 hours PRN  artificial  tears Solution 1 Drop(s) Both EYES two times a day  ascorbic acid 500 milliGRAM(s) Oral daily  atorvastatin 20 milliGRAM(s) Oral at bedtime  benzocaine 15 mG/menthol 3.6 mG (Sugar-Free) Lozenge 1 Lozenge Oral every 4 hours PRN  ferrous    sulfate 325 milliGRAM(s) Oral daily  insulin lispro (ADMELOG) corrective regimen sliding scale   SubCutaneous Before meals and at bedtime  latanoprost 0.005% Ophthalmic Solution 1 Drop(s) Both EYES at bedtime  ondansetron Injectable 4 milliGRAM(s) IV Push every 4 hours  QUEtiapine 25 milliGRAM(s) Oral two times a day  timolol 0.25% Solution 1 Drop(s) Both EYES two times a day    acetaminophen   Tablet .. 650 milliGRAM(s) Oral every 6 hours PRN  artificial  tears Solution 1 Drop(s) Both EYES two times a day  ascorbic acid 500 milliGRAM(s) Oral daily  atorvastatin 20 milliGRAM(s) Oral at bedtime  benzocaine 15 mG/menthol 3.6 mG (Sugar-Free) Lozenge 1 Lozenge Oral every 4 hours PRN  ferrous    sulfate 325 milliGRAM(s) Oral daily  guaiFENesin   Syrup  (Sugar-Free) 100 milliGRAM(s) Oral every 6 hours PRN  insulin lispro (ADMELOG) corrective regimen sliding scale   SubCutaneous Before meals and at bedtime  latanoprost 0.005% Ophthalmic Solution 1 Drop(s) Both EYES at bedtime  ondansetron Injectable 4 milliGRAM(s) IV Push every 4 hours  QUEtiapine 25 milliGRAM(s) Oral two times a day  timolol 0.25% Solution 1 Drop(s) Both EYES two times a day    Drug Dosing Weight  Height (cm): 152.4 (20 Oct 2020 01:26)  Weight (kg): 63.1 (20 Oct 2020 01:)  BMI (kg/m2): 27.2 (20 Oct 2020 01:)  BSA (m2): 1.6 (20 Oct 2020 01:)    CENTRAL LINE: [ ] YES [X] NO  LOCATION:   DATE INSERTED:  REMOVE: [ ] YES [ ] NO  EXPLAIN:    CANTU: [X] YES [ ] NO    DATE INSERTED: 10/19  REMOVE:  [ ] YES [ ] NO  EXPLAIN:    A-LINE:  [ ] YES [X] NO  LOCATION:   DATE INSERTED:  REMOVE:  [ ] YES [ ] NO  EXPLAIN:    PMH -reviewed admission note, no change since admission    ICU Vital Signs Last 24 Hrs  T(C): 36.2 (21 Oct 2020 05:00), Max: 36.8 (20 Oct 2020 15:22)  T(F): 97.2 (21 Oct 2020 05:00), Max: 98.2 (20 Oct 2020 15:22)  HR: 83 (21 Oct 2020 07:00) (58 - 98)  BP: 152/62 (21 Oct 2020 07:00) (95/42 - 163/80)  BP(mean): 88 (21 Oct 2020 07:00) (60 - 105)  ABP: --  ABP(mean): --  RR: 18 (21 Oct 2020 07:00) (12 - 24)  SpO2: 97% (21 Oct 2020 07:00) (95% - 100%)            10-20 @ 07:01  -  10-21 @ 07:00  --------------------------------------------------------  IN: 420 mL / OUT: 3210 mL / NET: -2790 mL            PHYSICAL EXAM:    GENERAL: NAD, well-groomed, well-developed  HEAD:  Atraumatic, Normocephalic  EYES: EOMI, PERRLA, conjunctiva and sclera clear  ENMT: No tonsillar erythema, exudates, or enlargement; Moist mucous membranes, Good dentition, No lesions  NECK: Supple, normal appearance, No JVD; Trachea midline  NERVOUS SYSTEM:  Alert & Oriented X1-2, knows name and that she's in hospital. Motor Strength 4/5 B/L upper and lower extremities; DTRs 2+ intact and symmetric  CHEST/LUNG: No chest deformity; Normal percussion bilaterally; No rales, rhonchi, wheezing   HEART: Regular rate and rhythm; No murmurs, rubs, or gallops  ABDOMEN: Soft, Nontender, Nondistended; Bowel sounds present  : (+) Cantu in place, draining light yellow clear urine  EXTREMITIES:  2+ Peripheral Pulses, No clubbing, cyanosis, or edema  LYMPH: No lymphadenopathy noted  SKIN: No rashes or lesions; Good capillary refill      LABS:  CBC Full  -  ( 21 Oct 2020 01:54 )  WBC Count : 9.18 K/uL  RBC Count : 2.89 M/uL  Hemoglobin : 8.2 g/dL  Hematocrit : 23.0 %  Platelet Count - Automated : 360 K/uL  Mean Cell Volume : 79.6 fl  Mean Cell Hemoglobin : 28.4 pg  Mean Cell Hemoglobin Concentration : 35.7 gm/dL  Auto Neutrophil # : 6.97 K/uL  Auto Lymphocyte # : 1.21 K/uL  Auto Monocyte # : 0.71 K/uL  Auto Eosinophil # : 0.21 K/uL  Auto Basophil # : 0.03 K/uL  Auto Neutrophil % : 76.0 %  Auto Lymphocyte % : 13.2 %  Auto Monocyte % : 7.7 %  Auto Eosinophil % : 2.3 %  Auto Basophil % : 0.3 %    10-21    128<L>  |  102  |  8   ----------------------------<  96  3.3<L>   |  20<L>  |  0.36<L>    Ca    6.3<LL>      21 Oct 2020 01:54  Phos  1.8     10-  Mg     1.6     10-    TPro  4.6<L>  /  Alb  2.0<L>  /  TBili  0.2  /  DBili  x   /  AST  26  /  ALT  31  /  AlkPhos  72  10-21    PT/INR - ( 19 Oct 2020 22:23 )   PT: 13.7 sec;   INR: 1.16 ratio         PTT - ( 19 Oct 2020 22:23 )  PTT:33.7 sec  Urinalysis Basic - ( 19 Oct 2020 23:52 )    Color: Yellow / Appearance: Clear / S.015 / pH: x  Gluc: x / Ketone: Negative  / Bili: Negative / Urobili: Negative   Blood: x / Protein: 15 / Nitrite: Negative   Leuk Esterase: Negative / RBC: 5-10 /HPF / WBC 0-2 /HPF   Sq Epi: x / Non Sq Epi: Occasional /HPF / Bacteria: Trace /HPF          RADIOLOGY & ADDITIONAL STUDIES REVIEWED:  ***    GOALS OF CARE DISCUSSION WITH PATIENT/FAMILY/PROXY:    CRITICAL CARE TIME SPENT: 35 minutes

## 2020-10-22 DIAGNOSIS — Z29.9 ENCOUNTER FOR PROPHYLACTIC MEASURES, UNSPECIFIED: ICD-10-CM

## 2020-10-22 DIAGNOSIS — D64.9 ANEMIA, UNSPECIFIED: ICD-10-CM

## 2020-10-22 DIAGNOSIS — E87.1 HYPO-OSMOLALITY AND HYPONATREMIA: ICD-10-CM

## 2020-10-22 DIAGNOSIS — I26.99 OTHER PULMONARY EMBOLISM WITHOUT ACUTE COR PULMONALE: ICD-10-CM

## 2020-10-22 DIAGNOSIS — I10 ESSENTIAL (PRIMARY) HYPERTENSION: ICD-10-CM

## 2020-10-22 DIAGNOSIS — N13.9 OBSTRUCTIVE AND REFLUX UROPATHY, UNSPECIFIED: ICD-10-CM

## 2020-10-22 DIAGNOSIS — E11.9 TYPE 2 DIABETES MELLITUS WITHOUT COMPLICATIONS: ICD-10-CM

## 2020-10-22 LAB
ALBUMIN SERPL ELPH-MCNC: 2.6 G/DL — LOW (ref 3.5–5)
ALP SERPL-CCNC: 90 U/L — SIGNIFICANT CHANGE UP (ref 40–120)
ALT FLD-CCNC: 38 U/L DA — SIGNIFICANT CHANGE UP (ref 10–60)
ANION GAP SERPL CALC-SCNC: 5 MMOL/L — SIGNIFICANT CHANGE UP (ref 5–17)
ANION GAP SERPL CALC-SCNC: 7 MMOL/L — SIGNIFICANT CHANGE UP (ref 5–17)
AST SERPL-CCNC: 26 U/L — SIGNIFICANT CHANGE UP (ref 10–40)
BASOPHILS # BLD AUTO: 0.03 K/UL — SIGNIFICANT CHANGE UP (ref 0–0.2)
BASOPHILS NFR BLD AUTO: 0.5 % — SIGNIFICANT CHANGE UP (ref 0–2)
BILIRUB SERPL-MCNC: 0.3 MG/DL — SIGNIFICANT CHANGE UP (ref 0.2–1.2)
BUN SERPL-MCNC: 11 MG/DL — SIGNIFICANT CHANGE UP (ref 7–18)
BUN SERPL-MCNC: 12 MG/DL — SIGNIFICANT CHANGE UP (ref 7–18)
CALCIUM SERPL-MCNC: 8.1 MG/DL — LOW (ref 8.4–10.5)
CALCIUM SERPL-MCNC: 8.4 MG/DL — SIGNIFICANT CHANGE UP (ref 8.4–10.5)
CHLORIDE SERPL-SCNC: 100 MMOL/L — SIGNIFICANT CHANGE UP (ref 96–108)
CHLORIDE SERPL-SCNC: 96 MMOL/L — SIGNIFICANT CHANGE UP (ref 96–108)
CO2 SERPL-SCNC: 25 MMOL/L — SIGNIFICANT CHANGE UP (ref 22–31)
CO2 SERPL-SCNC: 27 MMOL/L — SIGNIFICANT CHANGE UP (ref 22–31)
CREAT SERPL-MCNC: 0.8 MG/DL — SIGNIFICANT CHANGE UP (ref 0.5–1.3)
CREAT SERPL-MCNC: 0.88 MG/DL — SIGNIFICANT CHANGE UP (ref 0.5–1.3)
EOSINOPHIL # BLD AUTO: 0.44 K/UL — SIGNIFICANT CHANGE UP (ref 0–0.5)
EOSINOPHIL NFR BLD AUTO: 7.7 % — HIGH (ref 0–6)
GLUCOSE BLDC GLUCOMTR-MCNC: 143 MG/DL — HIGH (ref 70–99)
GLUCOSE BLDC GLUCOMTR-MCNC: 169 MG/DL — HIGH (ref 70–99)
GLUCOSE BLDC GLUCOMTR-MCNC: 172 MG/DL — HIGH (ref 70–99)
GLUCOSE BLDC GLUCOMTR-MCNC: 84 MG/DL — SIGNIFICANT CHANGE UP (ref 70–99)
GLUCOSE SERPL-MCNC: 140 MG/DL — HIGH (ref 70–99)
GLUCOSE SERPL-MCNC: 156 MG/DL — HIGH (ref 70–99)
HCT VFR BLD CALC: 24.3 % — LOW (ref 34.5–45)
HGB BLD-MCNC: 8.3 G/DL — LOW (ref 11.5–15.5)
IMM GRANULOCYTES NFR BLD AUTO: 0.5 % — SIGNIFICANT CHANGE UP (ref 0–1.5)
LYMPHOCYTES # BLD AUTO: 1.03 K/UL — SIGNIFICANT CHANGE UP (ref 1–3.3)
LYMPHOCYTES # BLD AUTO: 18.1 % — SIGNIFICANT CHANGE UP (ref 13–44)
MAGNESIUM SERPL-MCNC: 2.5 MG/DL — SIGNIFICANT CHANGE UP (ref 1.6–2.6)
MCHC RBC-ENTMCNC: 28.2 PG — SIGNIFICANT CHANGE UP (ref 27–34)
MCHC RBC-ENTMCNC: 34.2 GM/DL — SIGNIFICANT CHANGE UP (ref 32–36)
MCV RBC AUTO: 82.7 FL — SIGNIFICANT CHANGE UP (ref 80–100)
MONOCYTES # BLD AUTO: 0.51 K/UL — SIGNIFICANT CHANGE UP (ref 0–0.9)
MONOCYTES NFR BLD AUTO: 8.9 % — SIGNIFICANT CHANGE UP (ref 2–14)
NEUTROPHILS # BLD AUTO: 3.66 K/UL — SIGNIFICANT CHANGE UP (ref 1.8–7.4)
NEUTROPHILS NFR BLD AUTO: 64.3 % — SIGNIFICANT CHANGE UP (ref 43–77)
NRBC # BLD: 0 /100 WBCS — SIGNIFICANT CHANGE UP (ref 0–0)
PHOSPHATE SERPL-MCNC: 3.9 MG/DL — SIGNIFICANT CHANGE UP (ref 2.5–4.5)
PLATELET # BLD AUTO: 385 K/UL — SIGNIFICANT CHANGE UP (ref 150–400)
POTASSIUM SERPL-MCNC: 4.1 MMOL/L — SIGNIFICANT CHANGE UP (ref 3.5–5.3)
POTASSIUM SERPL-MCNC: 4.3 MMOL/L — SIGNIFICANT CHANGE UP (ref 3.5–5.3)
POTASSIUM SERPL-SCNC: 4.1 MMOL/L — SIGNIFICANT CHANGE UP (ref 3.5–5.3)
POTASSIUM SERPL-SCNC: 4.3 MMOL/L — SIGNIFICANT CHANGE UP (ref 3.5–5.3)
PROT SERPL-MCNC: 5.9 G/DL — LOW (ref 6–8.3)
RBC # BLD: 2.94 M/UL — LOW (ref 3.8–5.2)
RBC # FLD: 14.9 % — HIGH (ref 10.3–14.5)
SODIUM SERPL-SCNC: 128 MMOL/L — LOW (ref 135–145)
SODIUM SERPL-SCNC: 132 MMOL/L — LOW (ref 135–145)
WBC # BLD: 5.7 K/UL — SIGNIFICANT CHANGE UP (ref 3.8–10.5)
WBC # FLD AUTO: 5.7 K/UL — SIGNIFICANT CHANGE UP (ref 3.8–10.5)

## 2020-10-22 PROCEDURE — 99233 SBSQ HOSP IP/OBS HIGH 50: CPT | Mod: GC

## 2020-10-22 RX ORDER — POLYETHYLENE GLYCOL 3350 17 G/17G
17 POWDER, FOR SOLUTION ORAL DAILY
Refills: 0 | Status: DISCONTINUED | OUTPATIENT
Start: 2020-10-22 | End: 2020-10-23

## 2020-10-22 RX ORDER — ONDANSETRON 8 MG/1
4 TABLET, FILM COATED ORAL EVERY 4 HOURS
Refills: 0 | Status: DISCONTINUED | OUTPATIENT
Start: 2020-10-22 | End: 2020-10-23

## 2020-10-22 RX ADMIN — Medication 1 DROP(S): at 06:12

## 2020-10-22 RX ADMIN — RIVAROXABAN 15 MILLIGRAM(S): KIT at 17:29

## 2020-10-22 RX ADMIN — RIVAROXABAN 15 MILLIGRAM(S): KIT at 06:13

## 2020-10-22 RX ADMIN — LATANOPROST 1 DROP(S): 0.05 SOLUTION/ DROPS OPHTHALMIC; TOPICAL at 23:07

## 2020-10-22 RX ADMIN — Medication 1 DROP(S): at 17:29

## 2020-10-22 RX ADMIN — Medication 500 MILLIGRAM(S): at 12:49

## 2020-10-22 RX ADMIN — ONDANSETRON 4 MILLIGRAM(S): 8 TABLET, FILM COATED ORAL at 03:16

## 2020-10-22 RX ADMIN — ONDANSETRON 4 MILLIGRAM(S): 8 TABLET, FILM COATED ORAL at 06:12

## 2020-10-22 RX ADMIN — Medication 325 MILLIGRAM(S): at 12:49

## 2020-10-22 RX ADMIN — Medication 100 MILLIGRAM(S): at 06:14

## 2020-10-22 RX ADMIN — QUETIAPINE FUMARATE 25 MILLIGRAM(S): 200 TABLET, FILM COATED ORAL at 06:13

## 2020-10-22 RX ADMIN — Medication 1: at 08:37

## 2020-10-22 RX ADMIN — POLYETHYLENE GLYCOL 3350 17 GRAM(S): 17 POWDER, FOR SOLUTION ORAL at 17:28

## 2020-10-22 RX ADMIN — QUETIAPINE FUMARATE 25 MILLIGRAM(S): 200 TABLET, FILM COATED ORAL at 17:29

## 2020-10-22 RX ADMIN — Medication 1 DROP(S): at 06:14

## 2020-10-22 RX ADMIN — ATORVASTATIN CALCIUM 20 MILLIGRAM(S): 80 TABLET, FILM COATED ORAL at 23:07

## 2020-10-22 NOTE — PROGRESS NOTE ADULT - PROBLEM SELECTOR PLAN 1
on admission Na 117 --> 115 --> 122 --> 123 --> 128-->124-->127-->128-->132  BMP in the am  Given NS for hyponatremia yesterday  U/A is negative  -S/p 1mg Desmopressin IV  Serum osm is 253  Urine electrolytes taken after lasix

## 2020-10-22 NOTE — PROGRESS NOTE ADULT - PROBLEM SELECTOR PLAN 5
Resolved, Pt back at baseline   Pt with Hb of 6.9, repeat Hb is 6.4 and s/p one unit of PRBC and repeat Hb is 7.9  Baseline H/H is 8.7  FOBT negative  Total Iron low, TIBC wnl, normocytic normochromic, likely due to acute blood loss

## 2020-10-22 NOTE — PROGRESS NOTE ADULT - SUBJECTIVE AND OBJECTIVE BOX
PGY-1 Progress Note discussed with attending    PAGER #: [[phone]] TILL 5:00 PM  PLEASE CONTACT ON CALL TEAM:  - On Call Team (Please refer to [place]) FROM 5:00 PM - 8:30PM  - Nightfloat Team FROM 8:30 -7:30 AM    CHIEF COMPLAINT & BRIEF HOSPITAL COURSE:  81 y/o F Cantonese speaking from home, lives with son, ambulates with walker with PMHx of HTN, DM, HLD, Dementia, Pulmonary embolism (on xarelto)  presented to ED s/p fall last night with left sided shoulder pain and left sided chest pain. Pt is AAO x 1-2 at baseline. History is obtained from the son Eliud dias. Son reports pt was having nausea, vomiting, confusion, body aches, increased urinary frequency, urgency, nocturia, hematuria, drinking lot of water, not eating well, bilateral leg swelling. Patient was walking with her walker to the bathroom when she lost her  of the walker and fell down onto her left side last night and she was on the floor for 1-2 mins. EMS called due to patient unable to move left shoulder secondary to pain, however, while being transferred, patient also complained of left sided chest pain.  Patient was recently discharged on Xarelto from [facility] 4 days ago after chest pain evaluation and diagnosed with Pulmonary embolism. She was discharged with mckeon and it was uncomfortable to her. It was removed by the PMD. Her hematuria subsided after removal of mckeon. Pt has no  Fever, chills, LOC, head trauma, diaphoresis, dizziness, chest pain, sob, diarrhea, urinary incontinence, fecal incontinence, bloody stools or any other complaints.     ICU Course:  Pt w/ hemoglobin 6.9 s/p 1pRBC. Pt given 500cc NS bolus. Pt given 1mg desmopressin to avoid fast correction of sodium. Pt restarted on quetiapine 25 BID for increased agitation. Pt AAOx1-2 during ICU stay. Pt started on diet. Sodium trended 117-> 115 -> 122 -> 123 -> 124 -> 128. Pt w/ hypokalemia, s/p 3 ryders. Kphos 30.    INTERVAL HPI/OVERNIGHT EVENTS:   No acute overnight events. Pt's Na corrected with normal saline. Na trended 127->128->132. Pt is feeling much better. States she slept very well and is very comfortable in the hospital and wants to stay here.     MEDICATIONS  (STANDING):  artificial  tears Solution 1 Drop(s) Both EYES two times a day  ascorbic acid 500 milliGRAM(s) Oral daily  atorvastatin 20 milliGRAM(s) Oral at bedtime  ferrous    sulfate 325 milliGRAM(s) Oral daily  insulin lispro (ADMELOG) corrective regimen sliding scale   SubCutaneous Before meals and at bedtime  latanoprost 0.005% Ophthalmic Solution 1 Drop(s) Both EYES at bedtime  polyethylene glycol 3350 17 Gram(s) Oral daily  QUEtiapine 25 milliGRAM(s) Oral two times a day  rivaroxaban 15 milliGRAM(s) Oral two times a day  timolol 0.25% Solution 1 Drop(s) Both EYES two times a day    MEDICATIONS  (PRN):  acetaminophen   Tablet .. 650 milliGRAM(s) Oral every 6 hours PRN Temp greater or equal to 38C (100.4F), Mild Pain (1 - 3)  benzocaine 15 mG/menthol 3.6 mG (Sugar-Free) Lozenge 1 Lozenge Oral every 4 hours PRN Sore Throat  guaiFENesin   Syrup  (Sugar-Free) 100 milliGRAM(s) Oral every 6 hours PRN Cough  ondansetron Injectable 4 milliGRAM(s) IV Push every 4 hours PRN Nausea and/or Vomiting      REVIEW OF SYSTEMS:  CONSTITUTIONAL: No fever, weight loss, or fatigue  RESPIRATORY: No cough, wheezing, chills or hemoptysis; No shortness of breath  CARDIOVASCULAR: No chest pain, palpitations, dizziness, or leg swelling  GASTROINTESTINAL: No abdominal pain. No nausea, vomiting, or hematemesis; No diarrhea or constipation. No melena or hematochezia.  GENITOURINARY: No dysuria or hematuria, urinary frequency  NEUROLOGICAL: No headaches, memory loss, loss of strength, numbness, or tremors  SKIN: No itching, burning, rashes, or lesions     Vital Signs Last 24 Hrs  T(C): 36.8 (22 Oct 2020 14:20), Max: 36.8 (22 Oct 2020 14:20)  T(F): 98.3 (22 Oct 2020 14:20), Max: 98.3 (22 Oct 2020 14:20)  HR: 80 (22 Oct 2020 15:27) (74 - 97)  BP: 121/63 (22 Oct 2020 15:27) (118/60 - 129/79)  BP(mean): --  RR: 17 (22 Oct 2020 14:20) (16 - 18)  SpO2: 100% (22 Oct 2020 15:27) (98% - 100%)    PHYSICAL EXAMINATION:  GENERAL: NAD, well built  HEAD:  Atraumatic, Normocephalic  EYES:  conjunctiva and sclera clear  NECK: Supple, No JVD, Normal thyroid  CHEST/LUNG: Clear to auscultation. Clear to percussion bilaterally; No rales, rhonchi, wheezing, or rubs  HEART: Regular rate and rhythm; No murmurs, rubs, or gallops  ABDOMEN: Soft, Nontender, Nondistended; Bowel sounds present  NERVOUS SYSTEM:  Alert & Oriented X2,    EXTREMITIES:  2+ Peripheral Pulses, No clubbing, cyanosis, or edema  SKIN: warm dry                          8.3    5.70  )-----------( 385      ( 22 Oct 2020 08:30 )             24.3     10-22    132<L>  |  100  |  12  ----------------------------<  140<H>  4.3   |  25  |  0.88    Ca    8.4      22 Oct 2020 08:30  Phos  3.9     10-22  Mg     2.5     10-22    TPro  5.9<L>  /  Alb  2.6<L>  /  TBili  0.3  /  DBili  x   /  AST  26  /  ALT  38  /  AlkPhos  90  10-22    LIVER FUNCTIONS - ( 22 Oct 2020 08:30 )  Alb: 2.6 g/dL / Pro: 5.9 g/dL / ALK PHOS: 90 U/L / ALT: 38 U/L DA / AST: 26 U/L / GGT: x                   CAPILLARY BLOOD GLUCOSE      POCT Blood Glucose.: 84 mg/dL (22 Oct 2020 11:32)        Culture - Urine (collected 20 Oct 2020 06:35)  Source: .Urine Catheterized  Final Report (21 Oct 2020 10:58):    >=3 organisms. Probable collection contamination.        RADIOLOGY & ADDITIONAL TESTS:

## 2020-10-22 NOTE — PROGRESS NOTE ADULT - PROBLEM SELECTOR PLAN 6
CXR clear  Aspiration precautions  Held xarelto due to symptomatic anemia on admission  Restarted xarelto yesterday after Hb stabilized

## 2020-10-22 NOTE — PROGRESS NOTE ADULT - ASSESSMENT
81 y/o F Cantonese speaking from home, lives with son, ambulates with walker with PMHx of HTN, DM, HLD, Dementia, Pulmonary embolism (on xarelto)  presented to ED s/p fall last night with left sided shoulder pain and left sided chest pain. ED course, vitals are stable. Labs significant for Hb of 6.9 and Sodium of 117. One dose of Lasix was given. ICU was consulted for hyponatremia. Downgraded to the floor on 10/21

## 2020-10-22 NOTE — PROGRESS NOTE ADULT - ATTENDING COMMENTS
Assessment:  1. Euvolemic hyponatremia  2. Dementia   3. Anemia   4. History of pulmonary embolism   5. Diabetes mellitus  6. Hypertension     Plan  - S/p DDAVP x 1 dose  - Monitor urine output  - Cont. to monitor serum BMP  - Avoid rapid correction of sodium > 8-10 meq with in 24 hours  - Monitor neurologic status  - Restart home psych medications  - Monitor for signs of bleeding  - Trend hgb, no obvious bleeding noted   - Monitor fingerstick glucose with sliding scale insulin coverage   - SCD for DVT prophylaxis
Assessment:  1. Euvolemic hyponatremia  2. Dementia   3. Anemia   4. History of pulmonary embolism   5. Diabetes mellitus  6. Hypertension     Plan  - Sodium slowly correcting, out of critical window at this time  - Cont. to monitor serum BMP  - Supplement electrolytes  - Monitor neurologic status  - Cont. psych medications  - Trend hgb, no obvious bleeding noted, can consider restarting anticoagulation if hgb remains stable  - Monitor fingerstick glucose with sliding scale insulin coverage   - SCD for DVT prophylaxis  - Transfer out of ICU today
Patient seen and examined with PGY1/3 and MS3/4 this morning around 11 AM;  Agree with PGY1 A/P above with editing as needed. My independent assessment, findings on exam, diagnosis and plan of care as listed below. Discussed with Dr. Merrill.    patient well known to me from prior admission. Spoke with patient via Cantonese speaking  at bedside KARUNA Sutherland who patient knows well  from previous admission and has a good rapport    Patient denies fever, chills, SOB, palpitations, chest pain, nausea, vomiting, diarrhea, constipation, dizziness. She was able to ambulate with PT today after she ambulated with some assistance woith us and using a walker.     Vital Signs Last 24 Hrs  T(C): 36.8 (22 Oct 2020 14:20), Max: 36.8 (22 Oct 2020 14:20)  T(F): 98.3 (22 Oct 2020 14:20), Max: 98.3 (22 Oct 2020 14:20)  HR: 80 (22 Oct 2020 15:27) (74 - 97)  BP: 121/63 (22 Oct 2020 15:27) (118/60 - 129/79)  RR: 17 (22 Oct 2020 14:20) (16 - 18)  SpO2: 100% (22 Oct 2020 15:27) (98% - 100%)    Labs:                        8.3    5.70  )-----------( 385      ( 22 Oct 2020 08:30 )             24.3   10-22    132<L>  |  100  |  12  ----------------------------<  140<H>  4.3   |  25  |  0.88    Ca    8.4      22 Oct 2020 08:30  Phos  3.9     10-22  Mg     2.5     10-22    TPro  5.9<L>  /  Alb  2.6<L>  /  TBili  0.3  /  DBili  x   /  AST  26  /  ALT  38  /  AlkPhos  90  10-22      Assessment:  1. Euvolemic hyponatremia  2. Dementia   3. Anemia   4. History of pulmonary embolism   5. Diabetes mellitus  6. Hypertension     Plan  Na level normalized appropriately; off fluids; Cont. to monitor serum BMP can repeat in AM  Patient back to baseline mental status alert, awake, oriented x 2.5, not agitated overnight after extensive counseling yesterday with MS#  - Supplement electrolytes  - Monitor neurologic status  - Cont. psych medications  - Trend hgb, no obvious bleeding noted, can consider restarting anticoagulation if hgb remains stable  - Monitor fingerstick glucose with sliding scale insulin coverage   - SCD for DVT prophylaxis  - Transfer out of ICU today

## 2020-10-22 NOTE — CHART NOTE - NSCHARTNOTEFT_GEN_A_CORE
Received signout to follow up on Na level. Patient started on IVF this afternoon when Na noted to be 124. Na rising appropriately 124 -> 127 -> 128. D/c IVF for now given adequate rise in sodium. Primary team to follow up with RENEE RAMOS.

## 2020-10-23 ENCOUNTER — TRANSCRIPTION ENCOUNTER (OUTPATIENT)
Age: 80
End: 2020-10-23

## 2020-10-23 VITALS
DIASTOLIC BLOOD PRESSURE: 62 MMHG | OXYGEN SATURATION: 95 % | HEART RATE: 82 BPM | RESPIRATION RATE: 17 BRPM | SYSTOLIC BLOOD PRESSURE: 136 MMHG | TEMPERATURE: 98 F

## 2020-10-23 LAB
ANION GAP SERPL CALC-SCNC: 7 MMOL/L — SIGNIFICANT CHANGE UP (ref 5–17)
BUN SERPL-MCNC: 16 MG/DL — SIGNIFICANT CHANGE UP (ref 7–18)
CALCIUM SERPL-MCNC: 8.4 MG/DL — SIGNIFICANT CHANGE UP (ref 8.4–10.5)
CHLORIDE SERPL-SCNC: 100 MMOL/L — SIGNIFICANT CHANGE UP (ref 96–108)
CO2 SERPL-SCNC: 26 MMOL/L — SIGNIFICANT CHANGE UP (ref 22–31)
CREAT SERPL-MCNC: 0.81 MG/DL — SIGNIFICANT CHANGE UP (ref 0.5–1.3)
GLUCOSE BLDC GLUCOMTR-MCNC: 120 MG/DL — HIGH (ref 70–99)
GLUCOSE BLDC GLUCOMTR-MCNC: 147 MG/DL — HIGH (ref 70–99)
GLUCOSE BLDC GLUCOMTR-MCNC: 162 MG/DL — HIGH (ref 70–99)
GLUCOSE SERPL-MCNC: 130 MG/DL — HIGH (ref 70–99)
HCT VFR BLD CALC: 25.5 % — LOW (ref 34.5–45)
HGB BLD-MCNC: 8.4 G/DL — LOW (ref 11.5–15.5)
MAGNESIUM SERPL-MCNC: 2.9 MG/DL — HIGH (ref 1.6–2.6)
MCHC RBC-ENTMCNC: 27.7 PG — SIGNIFICANT CHANGE UP (ref 27–34)
MCHC RBC-ENTMCNC: 32.9 GM/DL — SIGNIFICANT CHANGE UP (ref 32–36)
MCV RBC AUTO: 84.2 FL — SIGNIFICANT CHANGE UP (ref 80–100)
NRBC # BLD: 0 /100 WBCS — SIGNIFICANT CHANGE UP (ref 0–0)
PHOSPHATE SERPL-MCNC: 3.6 MG/DL — SIGNIFICANT CHANGE UP (ref 2.5–4.5)
PLATELET # BLD AUTO: 413 K/UL — HIGH (ref 150–400)
POTASSIUM SERPL-MCNC: 4.1 MMOL/L — SIGNIFICANT CHANGE UP (ref 3.5–5.3)
POTASSIUM SERPL-SCNC: 4.1 MMOL/L — SIGNIFICANT CHANGE UP (ref 3.5–5.3)
RBC # BLD: 3.03 M/UL — LOW (ref 3.8–5.2)
RBC # FLD: 14.9 % — HIGH (ref 10.3–14.5)
SODIUM SERPL-SCNC: 133 MMOL/L — LOW (ref 135–145)
WBC # BLD: 6.85 K/UL — SIGNIFICANT CHANGE UP (ref 3.8–10.5)
WBC # FLD AUTO: 6.85 K/UL — SIGNIFICANT CHANGE UP (ref 3.8–10.5)

## 2020-10-23 PROCEDURE — 84100 ASSAY OF PHOSPHORUS: CPT

## 2020-10-23 PROCEDURE — 80053 COMPREHEN METABOLIC PANEL: CPT

## 2020-10-23 PROCEDURE — 86900 BLOOD TYPING SEROLOGIC ABO: CPT

## 2020-10-23 PROCEDURE — 83930 ASSAY OF BLOOD OSMOLALITY: CPT

## 2020-10-23 PROCEDURE — 86923 COMPATIBILITY TEST ELECTRIC: CPT

## 2020-10-23 PROCEDURE — 82009 KETONE BODYS QUAL: CPT

## 2020-10-23 PROCEDURE — 86850 RBC ANTIBODY SCREEN: CPT

## 2020-10-23 PROCEDURE — 85730 THROMBOPLASTIN TIME PARTIAL: CPT

## 2020-10-23 PROCEDURE — 73060 X-RAY EXAM OF HUMERUS: CPT

## 2020-10-23 PROCEDURE — 80048 BASIC METABOLIC PNL TOTAL CA: CPT

## 2020-10-23 PROCEDURE — 36415 COLL VENOUS BLD VENIPUNCTURE: CPT

## 2020-10-23 PROCEDURE — 84300 ASSAY OF URINE SODIUM: CPT

## 2020-10-23 PROCEDURE — 71045 X-RAY EXAM CHEST 1 VIEW: CPT

## 2020-10-23 PROCEDURE — 85610 PROTHROMBIN TIME: CPT

## 2020-10-23 PROCEDURE — 99285 EMERGENCY DEPT VISIT HI MDM: CPT | Mod: 25

## 2020-10-23 PROCEDURE — 82962 GLUCOSE BLOOD TEST: CPT

## 2020-10-23 PROCEDURE — 81001 URINALYSIS AUTO W/SCOPE: CPT

## 2020-10-23 PROCEDURE — 70450 CT HEAD/BRAIN W/O DYE: CPT

## 2020-10-23 PROCEDURE — 83550 IRON BINDING TEST: CPT

## 2020-10-23 PROCEDURE — 84466 ASSAY OF TRANSFERRIN: CPT

## 2020-10-23 PROCEDURE — 83735 ASSAY OF MAGNESIUM: CPT

## 2020-10-23 PROCEDURE — 87086 URINE CULTURE/COLONY COUNT: CPT

## 2020-10-23 PROCEDURE — 82728 ASSAY OF FERRITIN: CPT

## 2020-10-23 PROCEDURE — 83935 ASSAY OF URINE OSMOLALITY: CPT

## 2020-10-23 PROCEDURE — 86901 BLOOD TYPING SEROLOGIC RH(D): CPT

## 2020-10-23 PROCEDURE — 82272 OCCULT BLD FECES 1-3 TESTS: CPT

## 2020-10-23 PROCEDURE — 87635 SARS-COV-2 COVID-19 AMP PRB: CPT

## 2020-10-23 PROCEDURE — 84484 ASSAY OF TROPONIN QUANT: CPT

## 2020-10-23 PROCEDURE — 83540 ASSAY OF IRON: CPT

## 2020-10-23 PROCEDURE — P9040: CPT

## 2020-10-23 PROCEDURE — 99239 HOSP IP/OBS DSCHRG MGMT >30: CPT

## 2020-10-23 PROCEDURE — 82550 ASSAY OF CK (CPK): CPT

## 2020-10-23 PROCEDURE — 36430 TRANSFUSION BLD/BLD COMPNT: CPT

## 2020-10-23 PROCEDURE — 85025 COMPLETE CBC W/AUTO DIFF WBC: CPT

## 2020-10-23 PROCEDURE — 85027 COMPLETE CBC AUTOMATED: CPT

## 2020-10-23 PROCEDURE — 87641 MR-STAPH DNA AMP PROBE: CPT

## 2020-10-23 PROCEDURE — 73030 X-RAY EXAM OF SHOULDER: CPT

## 2020-10-23 PROCEDURE — 87640 STAPH A DNA AMP PROBE: CPT

## 2020-10-23 PROCEDURE — 93005 ELECTROCARDIOGRAM TRACING: CPT

## 2020-10-23 PROCEDURE — 96374 THER/PROPH/DIAG INJ IV PUSH: CPT

## 2020-10-23 RX ORDER — QUETIAPINE FUMARATE 200 MG/1
1 TABLET, FILM COATED ORAL
Qty: 0 | Refills: 0 | DISCHARGE

## 2020-10-23 RX ORDER — POLYETHYLENE GLYCOL 3350 17 G/17G
17 POWDER, FOR SOLUTION ORAL
Qty: 170 | Refills: 0
Start: 2020-10-23 | End: 2020-11-01

## 2020-10-23 RX ORDER — RIVAROXABAN 15 MG-20MG
1 KIT ORAL
Qty: 69 | Refills: 0
Start: 2020-10-23 | End: 2020-12-30

## 2020-10-23 RX ORDER — NIFEDIPINE 30 MG
1 TABLET, EXTENDED RELEASE 24 HR ORAL
Qty: 0 | Refills: 0 | DISCHARGE

## 2020-10-23 RX ORDER — METOPROLOL TARTRATE 50 MG
0.5 TABLET ORAL
Qty: 30 | Refills: 0
Start: 2020-10-23 | End: 2020-11-21

## 2020-10-23 RX ORDER — METOPROLOL TARTRATE 50 MG
0.5 TABLET ORAL
Qty: 0 | Refills: 0 | DISCHARGE

## 2020-10-23 RX ADMIN — Medication 500 MILLIGRAM(S): at 11:46

## 2020-10-23 RX ADMIN — QUETIAPINE FUMARATE 25 MILLIGRAM(S): 200 TABLET, FILM COATED ORAL at 17:01

## 2020-10-23 RX ADMIN — POLYETHYLENE GLYCOL 3350 17 GRAM(S): 17 POWDER, FOR SOLUTION ORAL at 11:46

## 2020-10-23 RX ADMIN — Medication 325 MILLIGRAM(S): at 11:46

## 2020-10-23 RX ADMIN — RIVAROXABAN 15 MILLIGRAM(S): KIT at 17:01

## 2020-10-23 RX ADMIN — Medication 1 DROP(S): at 17:01

## 2020-10-23 RX ADMIN — Medication 1 DROP(S): at 05:35

## 2020-10-23 RX ADMIN — RIVAROXABAN 15 MILLIGRAM(S): KIT at 05:34

## 2020-10-23 RX ADMIN — QUETIAPINE FUMARATE 25 MILLIGRAM(S): 200 TABLET, FILM COATED ORAL at 05:34

## 2020-10-23 RX ADMIN — Medication 1: at 12:11

## 2020-10-23 NOTE — DISCHARGE NOTE PROVIDER - PROVIDER TOKENS
PROVIDER:[TOKEN:[[id number]]] PROVIDER:[TOKEN:[[id number]]],PROVIDER:[TOKEN:[[id number]],FOLLOWUP:[2 weeks]]

## 2020-10-23 NOTE — DISCHARGE NOTE PROVIDER - CARE PROVIDER_API CALL
Karoline De Leon  UROLOGY  [address], [address] [place] [address]  Phone: [phone]  Fax: [phone]  Follow Up Time:    Karoline De Leon  UROLOGY  [address] [place] [address]  Phone: [phone]  Fax: [phone]  Follow Up Time:     Yuliana Alfaro (MD)  Clinical Neurophysiology; Neurology  [address] [place] [address]  Phone: [phone]  Fax: [phone]  Follow Up Time: 2 weeks

## 2020-10-23 NOTE — DISCHARGE NOTE NURSING/CASE MANAGEMENT/SOCIAL WORK - PATIENT PORTAL LINK FT
You can access the FollowMyHealth Patient Portal offered by [facility] by registering at the following website: [email or web address]. By joining [employer]’s FollowMyHealth portal, you will also be able to view your health information using other applications (apps) compatible with our system.

## 2020-10-23 NOTE — DISCHARGE NOTE PROVIDER - CARE PROVIDERS DIRECT ADDRESSES
[email or web address]

## 2020-10-23 NOTE — DISCHARGE NOTE PROVIDER - NSDCCPCAREPLAN_GEN_ALL_CORE_FT
PRINCIPAL DISCHARGE DIAGNOSIS  Diagnosis: Hyponatremia  Assessment and Plan of Treatment: When you came to the hospital your blood Sodium level was 117 which is very low, it is called hyponatremia. Normal blood sodium levels are 135-145. You were sent to the ICU for hyponatremia. Your hyponatremia was caused by urinary obstruction.  You were given normal saline through an IV line along with desmopressin to avoid fast correction of sodium. Fast correction of sodium can cause brain damage know as osmotic demylination syndrome. Your sodium level was increasing at an appropriate rate and you were downgraded to to floor. On the floor your sodium levels were corrected with normal saline again and now your sodium is at an acceptable value. You are stable for discharge. Sodium trended 117-> 115 -> 122 -> 123 -> 124 -> 128-->124-->127-->128-->132-->133. . Please follow up with your PCP in 1 week      SECONDARY DISCHARGE DIAGNOSES  Diagnosis: Anemia  Assessment and Plan of Treatment: Pt with Hb of 6.9, repeat Hb is 6.4 and s/p one unit of PRBC and repeat Hb is 7.9  Baseline H/H is 8.7  FOBT negative  Total Iron low, TIBC wnl, normocytic normochromic, likely due to acute blood loss  Restart xarelto if Hb is stable in PM      Diagnosis: Urinary retention  Assessment and Plan of Treatment: s/p [place] and 1500ml was drained  Avoid nephrotoxins  monitor urine output-      Diagnosis: Pulmonary embolism  Assessment and Plan of Treatment: Pulmonary embolismCXR clear  Aspiration precautions  Held xarelto due to symptomatic anemia  Restart xarelto if Hb stable in PM      Diagnosis: Hypertension  Assessment and Plan of Treatment: Holding anti hypertensives Metoprolol and Nifedical for now due to labile BP.  Restart as needed    Diagnosis: Diabetes mellitus  Assessment and Plan of Treatment: Pt takes janumet at home which is on hold.  - target CBG < 180  started on low dose ISS      Diagnosis: Dementia  Assessment and Plan of Treatment: Restarted  Quetiapine at 25mg QD for now   Monitor patient for agitation       PRINCIPAL DISCHARGE DIAGNOSIS  Diagnosis: Hyponatremia  Assessment and Plan of Treatment: When you came to the hospital your blood Sodium level was 117 which is very low, it is called hyponatremia. Normal blood sodium levels are 135-145. You were sent to the ICU for hyponatremia. Your hyponatremia was caused by urinary obstruction.  You were given normal saline through an IV line along with desmopressin to avoid fast correction of sodium. Fast correction of sodium can cause brain damage know as osmotic demylination syndrome. Your sodium level was increasing at an appropriate rate and you were downgraded to to floor. On the floor your sodium levels were corrected with normal saline again and now your sodium is at an acceptable value. You are stable for discharge. Sodium trended 117-> 115 -> 122 -> 123 -> 124 -> 128-->124-->127-->128-->132-->133. . Please follow up with your PCP in 1 week      SECONDARY DISCHARGE DIAGNOSES  Diagnosis: Anemia  Assessment and Plan of Treatment: Your Hemoglobin was 6.9 on admission we repeated it and it came out to be 6.4. We gave you one unit of red blood cells and your Hb increased. Your Baseline hemoglobin is 8.7. You are now at your baseline.  You were not bleeding and your iron levels were normal.   We restarted your xarelto once your Hb was stable. Follow up with your PCP in 1 week.       Diagnosis: Urinary retention  Assessment and Plan of Treatment: We discharged you with a mckeon last time you were admitted. Your mckeon was removed outpatient. When you came we did a bladder scan which showed you were retaining alot of urine becaue of urinary obstruction. We placed a Mckeon and 1500ml was drained.  Avoid nephrotoxin medications. Your mckeon should be renewed in 3 weeks. Follow up with urologist Dr. De Leon for further evealuation in 1-2 weeks.       Diagnosis: Pulmonary embolism  Assessment and Plan of Treatment: You were diagnosed with Pulmonary embolism on your last admission. On this admission your CXR was clear. At first we held your xarelto because of anemia as this medication can cause bleeding. It was restarted once your Hb was stabalized.   You have to take Xarelto 15mg  for a total of 21 days every 12 hours (till 11/3) then Xarelto 20mg once a day from day 22 onwards (from 11/4). You will be taking this medication for at least 3 months.      Diagnosis: Hypertension  Assessment and Plan of Treatment: You are on Metoprolol and Nifedical at home. We held these medications as your blood pressure was well maintained without them in the hospital. Follow up with your PCP within a week to restart these medications as needed.       Diagnosis: Diabetes mellitus  Assessment and Plan of Treatment: You take janumet at home,  which was held in the hospital. You were given insulin in the hospital to maintatin your blood sugars.   We will discharge you on your home medication. Follow up with your PCP in 1 week.       Diagnosis: Dementia  Assessment and Plan of Treatment: You take Quetiapine at home. Continue taking your home medication       PRINCIPAL DISCHARGE DIAGNOSIS  Diagnosis: Hyponatremia  Assessment and Plan of Treatment: When you came to the hospital your blood Sodium level was 117 which is very low, it is called hyponatremia. Normal blood sodium levels are 135-145. You were sent to the ICU for hyponatremia. Your hyponatremia was caused by urinary obstruction.  You were given normal saline through an IV line along with desmopressin to avoid fast correction of sodium. Fast correction of sodium can cause brain damage know as osmotic demylination syndrome. Your sodium level was increasing at an appropriate rate and you were downgraded to to floor. On the floor your sodium levels were corrected with normal saline again and now your sodium is at an acceptable value. You are stable for discharge. Sodium trended 117-> 115 -> 122 -> 123 -> 124 -> 128-->124-->127-->128-->132-->133. . Please follow up with your PCP in 1 week.      SECONDARY DISCHARGE DIAGNOSES  Diagnosis: Hypertension  Assessment and Plan of Treatment: You are on Metoprolol and Nifedical at home. We held these medications as your blood pressure was well maintained without them in the hospital. Follow up with your PCP within a week to restart these medications as needed.       Diagnosis: Diabetes mellitus  Assessment and Plan of Treatment: Your HbA1c was 7.1 on amdission. Please continue with your home medication.    Diagnosis: Urinary retention  Assessment and Plan of Treatment: We discharged you with a mckeon last time you were admitted. Your mckeon was removed outpatient. When you came we did a bladder scan which showed you were retaining alot of urine becaue of urinary obstruction. We placed a Mckeon and 1500ml was drained.  Avoid nephrotoxin medications. Your mckeon should be renewed in 3 weeks. Follow up with urologist Dr. De Leon for further evealuation in 1-2 weeks.       Diagnosis: Dementia  Assessment and Plan of Treatment: You take Quetiapine at home. Continue taking your home medication.      Diagnosis: Pulmonary embolism  Assessment and Plan of Treatment: You were diagnosed with Pulmonary embolism on your last admission. On this admission your CXR was clear. At first we held your xarelto because of anemia as this medication can cause bleeding. It was restarted once your Hb was stabalized.   You have to take Xarelto 15mg  for a total of 21 days every 12 hours (till 11/3) then Xarelto 20mg once a day from day 22 onwards (from 11/4). Please follow up with your PCP in a week for further management of pulmonary embolism.       Diagnosis: Anemia  Assessment and Plan of Treatment: Your Hemoglobin was 6.9 on admission we repeated it and it came out to be 6.4. We gave you one unit of red blood cells and your Hb increased. Your Baseline hemoglobin is 8.7. You are now at your baseline.  You were not bleeding and your iron levels were normal.   We restarted your xarelto once your Hb was stable. Follow up with your PCP in 1 week.        PRINCIPAL DISCHARGE DIAGNOSIS  Diagnosis: Hyponatremia  Assessment and Plan of Treatment: You was brought in to [facility] after a fall while walking with walker. Patient also had some confusion and urinary symptoms on presentation.   of note patient was discharged with a Mckeon which was removed by PCP due to patient constant pulling the cathetr and causing blood in urine.   When you came to the hospital your blood Sodium level was noted to be117 which is very low, it is called hyponatremia. Normal blood sodium levels are 135-145. You were admitted to Intensive Care unit (ICU) for hyponatremia. Your hyponatremia was likely caused by urinary obstruction.   You were given normal saline through an IV line along with desmopressin to avoid fast correction of sodium. Fast correction of sodium can cause brain damage know as osmotic demylination syndrome. Your sodium level was increasing at an appropriate rate and you were downgraded to to floor. On the floor your sodium levels were corrected with normal saline again and now your sodium is at an acceptable value. You are stable for discharge. Sodium trended 117-> 115 -> 122 -> 123 -> 124 -> 128-->124-->127-->128-->132-->133. . Please follow up with your PCP in 1 week for a repeat check of Sodium levels.   Trial of void (urination) could be attempted in a week or two after follow up with Urologist Dr. De Leon in the office.      SECONDARY DISCHARGE DIAGNOSES  Diagnosis: Hypertension  Assessment and Plan of Treatment: You are on Metoprolol and Nifedical at home. We held these medications as your blood pressure was well maintained without them in the hospital. Follow up with your PCP within a week to restart these medications as needed.       Diagnosis: Diabetes mellitus  Assessment and Plan of Treatment: Your HbA1c was 7.1 on amdission. Please continue with your home medication.    Diagnosis: Urinary retention  Assessment and Plan of Treatment: We discharged you with a mckeon last time you were admitted. Your mckeon was removed outpatient. When you came we did a bladder scan which showed you were retaining alot of urine becaue of urinary obstruction. We placed a Mckeon and 1500ml was drained. In addition Sodium was noted to be low from urianry obstruction.   Avoid nephrotoxin medications. Your mckeon should be replaced in 3 weeks if trial of void fails. Follow up with urologist Dr. Karoline De Leon for further evealuation in 1-2 weeks.       Diagnosis: Dementia  Assessment and Plan of Treatment: You take Quetiapine at home. Continue taking your home medication.  You may have a component of underlying Normal pressure Hydrocephalus (NPH) as in the previous admission you had a significant improvement in your mental status and confusion after Lumbar puncture. (Dementia, Urinary retnetion).  Please follow up with Neurologist Dr. Alfaro in the office as advised last admission. If worsening confusion in the future may need further revaluation and possible repeat Lumbar puncture. Discussed with your Son      Diagnosis: Pulmonary embolism  Assessment and Plan of Treatment: You were diagnosed with Pulmonary embolism on your last admission. On this admission your CXR was clear. At first we held your xarelto because of anemia as this medication can cause bleeding. It was restarted once your Hb was stabalized.   You have to take Xarelto 15mg  for a total of 21 days every 12 hours (till 11/3) then Xarelto 20mg once a day from day 22 onwards (from 11/4). Please follow up with your PCP in a week for further management of pulmonary embolism.       Diagnosis: Anemia  Assessment and Plan of Treatment: Your Hemoglobin was 6.9 on admission we repeated it and it came out to be 6.4. We gave you one unit of red blood cells and your Hb increased. Your Baseline hemoglobin is 8.7. You are now at your baseline.  You were not bleeding and your iron levels were normal.   We restarted your xarelto once your Hb was stable. Follow up with your PCP in 1 week.        PRINCIPAL DISCHARGE DIAGNOSIS  Diagnosis: Hyponatremia  Assessment and Plan of Treatment: You was brought in to [facility] after a fall while walking with walker. Patient also had some confusion and urinary symptoms on presentation.   of note patient was discharged with a Mckeon which was removed by PCP due to patient constant pulling the cathetr and causing blood in urine.   When you came to the hospital your blood Sodium level was noted to be117 which is very low, it is called hyponatremia. Normal blood sodium levels are 135-145. You were admitted to Intensive Care unit (ICU) for hyponatremia. Your hyponatremia was likely caused by urinary obstruction.   You were given normal saline through an IV line along with desmopressin to avoid fast correction of sodium. Fast correction of sodium can cause brain damage know as osmotic demylination syndrome. Your sodium level was increasing at an appropriate rate and you were downgraded to to floor. On the floor your sodium levels were corrected with normal saline again and now your sodium is at an acceptable value. You are stable for discharge. Sodium trended 117-> 115 -> 122 -> 123 -> 124 -> 128-->124-->127-->128-->132-->133. . Please follow up with your PCP in 1 week for a repeat check of Sodium levels.   Trial of void (urination) could be attempted in a week or two after follow up with Urologist Dr. De Leon in the office.      SECONDARY DISCHARGE DIAGNOSES  Diagnosis: Hypertension  Assessment and Plan of Treatment: You are on Metoprolol and Nifedical at home. We held these medications as your blood pressure was well maintained without them in the hospital. Follow up with your PCP within a week to restart these medications as needed.       Diagnosis: Diabetes mellitus  Assessment and Plan of Treatment: Your HbA1c was 7.1 on amdission. Please continue with your home medication.    Diagnosis: Urinary retention  Assessment and Plan of Treatment: We discharged you with a mckeon last time you were admitted. Your mckeon was removed outpatient. When you came we did a bladder scan which showed you were retaining alot of urine becaue of urinary obstruction. We placed a Mckeon and 1500ml was drained. In addition Sodium was noted to be low from urianry obstruction.   Avoid nephrotoxin medications. Your mckeon should be replaced in 3 weeks if trial of void fails. Follow up with urologist Dr. Karoline De Leon for further evealuation in 1-2 weeks.       Diagnosis: Dementia  Assessment and Plan of Treatment: You take Quetiapine at home. Continue taking your home medication.  You may have a component of underlying Normal pressure Hydrocephalus (NPH) as in the previous admission you had a significant improvement in your mental status and confusion after Lumbar puncture. (Dementia, Urinary retnetion).  Please follow up with Neurologist Dr. Alfaro in the office as advised last admission. If worsening confusion in the future may need further revaluation and possible repeat Lumbar puncture. Discussed with your Son      Diagnosis: Pulmonary embolism  Assessment and Plan of Treatment: You were diagnosed with Pulmonary embolism on your last admission. On this admission your CXR was clear. At first we held your xarelto because of anemia as this medication can cause bleeding. It was restarted once your Hemoglobin was stabalized.   You have to take Xarelto 15mg  for a total of 21 days every 12 hours (till 11/3/2020) then Xarelto 20 mg once a day from day 22 onwards (from 11/4). Please follow up with your PCP in a week for further management of pulmonary embolism.       Diagnosis: Anemia  Assessment and Plan of Treatment: Your Hemoglobin was 6.9 on admission we repeated it and it came out to be 6.4. We gave you one unit of red blood cells and your Hb increased. Your Baseline hemoglobin is 8.7. You are now at your baseline.  You were not bleeding and your iron levels were normal.   You likely had anemia due to blreeding in urine from trauma due to pulling mckeon with blood thinners contributing to the bleeding.   We restarted your xarelto once your Hemoglobin was stable. Follow up with your PCP in 1 week.        PRINCIPAL DISCHARGE DIAGNOSIS  Diagnosis: Hyponatremia  Assessment and Plan of Treatment: You was brought in to [facility] after a fall while walking with walker. Patient also had some confusion and urinary symptoms on presentation.   of note patient was discharged with a Mckeon which was removed by PCP due to patient constant pulling the cathetr and causing blood in urine.   When you came to the hospital your blood Sodium level was noted to be117 which is very low, it is called hyponatremia. Normal blood sodium levels are 135-145. You were admitted to Intensive Care unit (ICU) for hyponatremia. Your hyponatremia was likely caused by urinary obstruction.   You were given normal saline through an IV line along with desmopressin to avoid fast correction of sodium. Fast correction of sodium can cause brain damage know as osmotic demylination syndrome. Your sodium level was increasing at an appropriate rate and you were downgraded to to floor. On the floor your sodium levels were corrected with normal saline again and now your sodium is at an acceptable value. You are stable for discharge. Sodium trended 117-> 115 -> 122 -> 123 -> 124 -> 128-->124-->127-->128-->132-->133. . Please follow up with your PCP in 1 week for a repeat check of Sodium levels.   Trial of void (urination) could be attempted in a week or two after follow up with Urologist Dr. De Leon in the office.      SECONDARY DISCHARGE DIAGNOSES  Diagnosis: Anemia  Assessment and Plan of Treatment: Your Hemoglobin was 6.9 on admission we repeated it and it came out to be 6.4. We gave you one unit of red blood cells and your Hb increased. Your Baseline hemoglobin is 8.7. You are now at your baseline.  You were not bleeding and your iron levels were normal.   You likely had anemia due to blreeding in urine from trauma due to pulling mckeon with blood thinners contributing to the bleeding.   We restarted your xarelto once your Hemoglobin was stable. Follow up with your PCP in 1 week.       Diagnosis: Urinary retention  Assessment and Plan of Treatment: We discharged you with a mckeon last time you were admitted. Your mckeon was removed outpatient. When you came we did a bladder scan which showed you were retaining alot of urine becaue of urinary obstruction. We placed a Mckeon and 1500ml was drained. In addition Sodium was noted to be low from urianry obstruction.   Avoid nephrotoxin medications. Your mckeon should be replaced in 3 weeks if trial of void fails. Follow up with urologist Dr. Karoline De Leon for further evealuation in 1-2 weeks.       Diagnosis: Pulmonary embolism  Assessment and Plan of Treatment: You were diagnosed with Pulmonary embolism on your last admission. On this admission your CXR was clear. At first we held your xarelto because of anemia as this medication can cause bleeding. It was restarted once your Hemoglobin was stabalized.   You have to take Xarelto 15mg  for a total of 21 days every 12 hours (till 11/3/2020) then Xarelto 20 mg once a day from day 22 onwards (from 11/4). Please follow up with your PCP in a week for further management of pulmonary embolism.       Diagnosis: Hypertension  Assessment and Plan of Treatment: You are on Metoprolol and Nifedical at home. We held these medications as your blood pressure was well maintained without them in the hospital. Continue to take metoprolol at home but we will hold nifedical. Follow up with your PCP within a week to restart Nifedical as needed.       Diagnosis: Diabetes mellitus  Assessment and Plan of Treatment: Your HbA1c was 7.1 on amdission. Please continue with your home medication.    Diagnosis: Dementia  Assessment and Plan of Treatment: You take Quetiapine at home. Continue taking your home medication.  You may have a component of underlying Normal pressure Hydrocephalus (NPH) as in the previous admission you had a significant improvement in your mental status and confusion after Lumbar puncture. (Dementia, Urinary retnetion). Take Quetiapine at night time only. You can take an extra dose in the morning if needed for agitation.  Please follow up with Neurologist Dr. Alfaro in the office as advised last admission. If worsening confusion in the future may need further revaluation and possible repeat Lumbar puncture. Discussed with your Son       PRINCIPAL DISCHARGE DIAGNOSIS  Diagnosis: Hyponatremia  Assessment and Plan of Treatment: You was brought in to [facility] after a fall while walking with walker. Patient also had some confusion and urinary symptoms on presentation.   of note patient was discharged with a Cantu which was removed by PCP due to patient constant pulling the cathetr and causing blood in urine.   When you came to the hospital your blood Sodium level was noted to be117 which is very low, it is called hyponatremia. Normal blood sodium levels are 135-145. You were admitted to Intensive Care unit (ICU) for hyponatremia. Your hyponatremia was likely caused by urinary obstruction.   You were given normal saline through an IV line along with desmopressin to avoid fast correction of sodium. Fast correction of sodium can cause brain damage know as osmotic demylination syndrome. Your sodium level was increasing at an appropriate rate and you were downgraded to to floor. On the floor your sodium levels were corrected with normal saline again and now your sodium is at an acceptable value. You are stable for discharge. Sodium trended 117-> 115 -> 122 -> 123 -> 124 -> 128-->124-->127-->128-->132-->133. . Please follow up with your PCP in 1 week for a repeat check of Sodium levels.   Trial of void (urination) could be attempted in a week or two after follow up with Urologist Dr. De Leon in the office.      SECONDARY DISCHARGE DIAGNOSES  Diagnosis: Urinary retention  Assessment and Plan of Treatment: We discharged you with a cantu last time you were admitted. Your cantu was removed outpatient. When you came we did a bladder scan which showed you were retaining alot of urine becaue of urinary obstruction. We placed a Cantu and 1500ml was drained. In addition Sodium was noted to be low from urianry obstruction.   Avoid nephrotoxin medications. Your cantu should be replaced in 3 weeks if trial of void fails. Follow up with urologist Dr. Karoline De Leon for further evealuation in 1-2 weeks.   PLEASE KEEP CANTU UNTIL SEEN BY UROLOGIST.       Diagnosis: Social problem  Assessment and Plan of Treatment: Due to your underlying dementia atr times you are noted to be confused/ agitated but alert and oriented at other times. You need to be supervised at all times by a family member or Home [facility] aide. in my opinion you will benefit form a [facility] aide at least 12 hrs a day which will be evaluated by [facility]/ Home care agency on follow up visit and evalauation.    Diagnosis: Dementia  Assessment and Plan of Treatment: You take Quetiapine at home. Continue taking your home medication but will recommend to take 25 mg one tablet at 8 PM every night. If noted to be agitated or confused more than normal can givean additional dose in the morning.   You may have a component of underlying Normal pressure Hydrocephalus (NPH) as in the previous admission you had a significant improvement in your mental status and confusion after Lumbar puncture. (Dementia, Urinary retnetion). Take Quetiapine at night time only. You can take an extra dose in the morning if needed for agitation.  Please follow up with Neurologist Dr. Alfaro in the office as advised last admission. If worsening confusion in the future may need further revaluation and possible repeat Lumbar puncture. Discussed with your Son      Diagnosis: Hypertension  Assessment and Plan of Treatment: You are on Metoprolol and Nifedipine  at home. We held these medications as your blood pressure was low in ICU and was well maintained without them in the hospital. Your BP now slightly in the upper normal range. Continue to take only metoprolol twice daily at home  as p[rescribed on discharge last admission. DO NOT RESUME NIFEDIPINE AT THIS TIME Follow up with your PCP within a week to RESUME NIFEDIPINE (nIFEDICAL) IF BP NOTED TO BE MORE THAN 140/90 MM HG.   this has bewen discussed with your Son      Diagnosis: Diabetes mellitus  Assessment and Plan of Treatment: Your HbA1c was 7.1 on amdission. Please continue with your home medication.    Diagnosis: Pulmonary embolism  Assessment and Plan of Treatment: You were diagnosed with Pulmonary embolism on your last admission. On this admission your CXR was clear. At first we held your xarelto because of anemia as this medication can cause bleeding. It was restarted once your Hemoglobin was stabalized.   You have to take Xarelto 15mg  for a total of 21 days every 12 hours (till 11/3/2020) then Xarelto 20 mg once a day from day 22 onwards (from 11/4). Please follow up with your PCP in a week for further management of pulmonary embolism.       Diagnosis: Anemia  Assessment and Plan of Treatment: Your Hemoglobin was 6.9 on admission we repeated it and it came out to be 6.4. We gave you one unit of red blood cells and your Hb increased. Your Baseline hemoglobin is 8.7. You are now at your baseline.  You were not bleeding and your iron levels were normal.   You likely had anemia due to blreeding in urine from trauma due to pulling cantu with blood thinners contributing to the bleeding.   We restarted your xarelto once your Hemoglobin was stable. Follow up with your PCP in 1 week.        PRINCIPAL DISCHARGE DIAGNOSIS  Diagnosis: Hyponatremia  Assessment and Plan of Treatment: You was brought in to [facility] after a fall while walking with walker. Patient also had some confusion and urinary symptoms on presentation.   of note patient was discharged with a Cantu which was removed by PCP due to patient constant pulling the cathetr and causing blood in urine.   When you came to the hospital your blood Sodium level was noted to be117 which is very low, it is called hyponatremia. Normal blood sodium levels are 135-145. You were admitted to Intensive Care unit (ICU) for hyponatremia. Your hyponatremia was likely caused by urinary obstruction.   You were given normal saline through an IV line along with desmopressin to avoid fast correction of sodium. Fast correction of sodium can cause brain damage know as osmotic demylination syndrome. Your sodium level was increasing at an appropriate rate and you were downgraded to to floor. On the floor your sodium levels were corrected with normal saline again and now your sodium is at an acceptable value. You are stable for discharge. Sodium trended 117-> 115 -> 122 -> 123 -> 124 -> 128-->124-->127-->128-->132-->133. . Please follow up with your PCP in 1 week for a repeat check of Sodium levels.   Trial of void (urination) could be attempted in a week or two after follow up with Urologist Dr. De Leon in the office.      SECONDARY DISCHARGE DIAGNOSES  Diagnosis: Urinary retention  Assessment and Plan of Treatment: We discharged you with a cantu last time you were admitted. Your cantu was removed outpatient. When you came we did a bladder scan which showed you were retaining alot of urine becaue of urinary obstruction. We placed a Cantu and 1500ml was drained. In addition Sodium was noted to be low from urianry obstruction.   Avoid nephrotoxin medications. Your cantu should be replaced in 3 weeks if trial of void fails. Follow up with urologist Dr. Karoline De Leon for further evealuation in 1-2 weeks.   PLEASE KEEP CANTU UNTIL SEEN BY UROLOGIST.   USE LEG BAG DURING THE DAYS AND CANTU BAG AT NIGHT DURING SLEEP.  SON EDUCATED FOR USE OF CATHETER      Diagnosis: Social problem  Assessment and Plan of Treatment: Due to your underlying dementia atr times you are noted to be confused/ agitated but alert and oriented at other times. You need to be supervised at all times by a family member or Home helath aide. in my opinion you will benefit form a [facility] aide at least 12 hrs a day which will be evaluated by VNS/ Home care agency on follow up visit and evalauation.    Diagnosis: Dementia  Assessment and Plan of Treatment: You take Quetiapine at home. Continue taking your home medication but will recommend to take 25 mg one tablet at 8 PM every night. If noted to be agitated or confused more than normal can givean additional dose in the morning.   You may have a component of underlying Normal pressure Hydrocephalus (NPH) as in the previous admission you had a significant improvement in your mental status and confusion after Lumbar puncture. (Dementia, Urinary retnetion). Take Quetiapine at night time only. You can take an extra dose in the morning if needed for agitation.  Please follow up with Neurologist Dr. Alfaro in the office as advised last admission. If worsening confusion in the future may need further revaluation and possible repeat Lumbar puncture. Discussed with your Son      Diagnosis: Hypertension  Assessment and Plan of Treatment: You are on Metoprolol and Nifedipine  at home. We held these medications as your blood pressure was low in ICU and was well maintained without them in the hospital. Your BP now slightly in the upper normal range. Continue to take only metoprolol twice daily at home  as p[rescribed on discharge last admission. DO NOT RESUME NIFEDIPINE AT THIS TIME Follow up with your PCP within a week to RESUME NIFEDIPINE (nIFEDICAL) IF BP NOTED TO BE MORE THAN 140/90 MM HG.   this has bewen discussed with your Son      Diagnosis: Diabetes mellitus  Assessment and Plan of Treatment: Your HbA1c was 7.1 on amdission. Please continue with your home medication.    Diagnosis: Pulmonary embolism  Assessment and Plan of Treatment: You were diagnosed with Pulmonary embolism on your last admission. On this admission your CXR was clear. At first we held your xarelto because of anemia as this medication can cause bleeding. It was restarted once your Hemoglobin was stabalized.   You have to take Xarelto 15mg  for a total of 21 days every 12 hours (till 11/3/2020) then Xarelto 20 mg once a day from day 22 onwards (from 11/4). Please follow up with your PCP in a week for further management of pulmonary embolism.       Diagnosis: Anemia  Assessment and Plan of Treatment: Your Hemoglobin was 6.9 on admission we repeated it and it came out to be 6.4. We gave you one unit of red blood cells and your Hb increased. Your Baseline hemoglobin is 8.7. You are now at your baseline.  You were not bleeding and your iron levels were normal.   You likely had anemia due to blreeding in urine from trauma due to pulling cantu with blood thinners contributing to the bleeding.   We restarted your xarelto once your Hemoglobin was stable. Follow up with your PCP in 1 week.

## 2020-10-23 NOTE — DISCHARGE NOTE PROVIDER - HOSPITAL COURSE
81 y/o F Cantonese speaking from home, lives with son, ambulates with walker with PMHx of HTN, DM, HLD, Dementia, Pulmonary embolism (on xarelto)  presented to ED s/p fall last night with left sided shoulder pain and left sided chest pain. Pt is AAO x 1-2 at baseline. History was obtained from the son Eliud dias. Son reported pt was having nausea, vomiting, confusion, body aches, increased urinary frequency, urgency, nocturia, hematuria, drinking lot of water, not eating well, bilateral leg swelling. Patient was walking with her walker to the bathroom when she lost her  of the walker and fell down onto her left side last night and she was on the floor for 1-2 mins. EMS called due to patient unable to move left shoulder secondary to pain, however, while being transferred, patient also complained of left sided chest pain.  Patient was recently discharged on Xarelto from [facility] 4 days ago after chest pain evaluation and diagnosed with Pulmonary embolism. Pt with history of rapidly progressing dementia, with noted improvement after spinal tap. She was discharged with mckeon for urinary retention and it was uncomfortable to her. It was removed by the PMD. Her hematuria subsided after removal of mckeon. Pt had no  Fever, chills, LOC, head trauma, diaphoresis, dizziness, chest pain, sob, diarrhea, urinary incontinence, fecal incontinence, bloody stools or any other complaints. ED course, vitals are stable. Labs significant for Hb of 6.9 and Sodium of 117. One dose of Lasix was given. ICU was consulted for hyponatremia.     In the ICU Pt was [id number] and 500cc NS bolus. Pt given 1mg desmopressin to avoid fast correction of sodium. Pt restarted on quetiapine 25 BID for increased agitation. Pt AAOx1-2 during ICU stay. Pt started on diet. Sodium trended 117-> 115 -> 122 -> 123 -> 124 -> 128. Pt w/ hypokalemia, s/p 3 ryders. Kphos 30.   On the floor her Na went down to 124 was given NS and improved to 132. Pt is stable for discharge.       81 y/o F Cantonese speaking from home, lives with son, ambulates with walker with PMHx of HTN, DM, HLD, Dementia, Pulmonary embolism (on xarelto)  presented to ED s/p fall last night with left sided shoulder pain and left sided chest pain. Pt is AAO x 1-2 at baseline. History was obtained from the son Eliud dias. Son reported pt was having nausea, vomiting, confusion, body aches, increased urinary frequency, urgency, nocturia, hematuria, drinking lot of water, not eating well, bilateral leg swelling. Patient was walking with her walker to the bathroom when she lost her  of the walker and fell down onto her left side last night and she was on the floor for 1-2 mins. EMS called due to patient unable to move left shoulder secondary to pain, however, while being transferred, patient also complained of left sided chest pain.  Patient was recently discharged on Xarelto from [facility] 4 days ago after chest pain evaluation and diagnosed with Pulmonary embolism. Pt with history of rapidly progressing dementia, with noted improvement after spinal tap. She was discharged with mckeon for urinary retention and it was uncomfortable to her. It was removed by the PMD. Her hematuria subsided after removal of mckeon. Pt had no  Fever, chills, LOC, head trauma, diaphoresis, dizziness, chest pain, sob, diarrhea, urinary incontinence, fecal incontinence, bloody stools or any other complaints. ED course, vitals are stable. Labs significant for Hb of 6.9 and Sodium of 117. One dose of Lasix was given. ICU was consulted for hyponatremia.     In the ICU Pt was [id number] and 500cc NS bolus. Pt given 1mg desmopressin to avoid fast correction of sodium. Pt restarted on quetiapine 25 BID for increased agitation. Pt AAOx1-2 during ICU stay. Pt started on diet. Sodium trended 117-> 115 -> 122 -> 123 -> 124 -> 128. Pt w/ hypokalemia, s/p 3 ryders. Kphos 30.   On the floor her Na went down to 124 was given NS and improved to 133. Pt is stable for discharge.

## 2020-10-23 NOTE — DISCHARGE NOTE PROVIDER - NSDCMRMEDTOKEN_GEN_ALL_CORE_FT
aspirin 81 mg oral tablet, chewable: 1 tab(s) orally once a day  atorvastatin 20 mg oral tablet: 1 tab(s) orally once a day (at bedtime)  cefuroxime 500 mg oral tablet: 1 tab(s) orally every 12 hours starting 10/16  ferrous sulfate 325 mg (65 mg elemental iron) oral tablet: 1 tab(s) orally once a day  Janumet  mg-1000 mg oral tablet, extended release: 1 tab(s) orally once a day (in the evening)  latanoprost 0.005% ophthalmic solution: 1 drop(s) to each affected eye once a day (at bedtime)  Metoprolol Tartrate 25 mg oral tablet: 0.5 tab(s) orally 2 times a day  Nifedical XL 60 mg oral tablet, extended release: 1 tab(s) orally once a day  ocular lubricant ophthalmic solution: 1 drop(s) to each affected eye 2 times a day  QUEtiapine 25 mg oral tablet: 1 tab(s) orally 2 times a day  rivaroxaban 15 mg oral tablet: 1 tab(s) orally 2 times a day until 11/3  rivaroxaban 20 mg oral tablet: 1 tab(s) orally once a day starting 11/4  timolol maleate 0.25% ophthalmic solution: 1 drop(s) to each affected eye 2 times a day  Vitamin C 500 mg oral capsule: 1 cap(s) orally once a day with food   aspirin 81 mg oral tablet, chewable: 1 tab(s) orally once a day  atorvastatin 20 mg oral tablet: 1 tab(s) orally once a day (at bedtime)  cefuroxime 500 mg oral tablet: 1 tab(s) orally every 12 hours starting 10/16  ferrous sulfate 325 mg (65 mg elemental iron) oral tablet: 1 tab(s) orally once a day  Janumet  mg-1000 mg oral tablet, extended release: 1 tab(s) orally once a day (in the evening)  latanoprost 0.005% ophthalmic solution: 1 drop(s) to each affected eye once a day (at bedtime)  Metoprolol Tartrate 25 mg oral tablet: 0.5 tab(s) orally 2 times a day  Nifedical XL 60 mg oral tablet, extended release: 1 tab(s) orally once a day  ocular lubricant ophthalmic solution: 1 drop(s) to each affected eye 2 times a day  polyethylene glycol 3350 oral powder for reconstitution: 17 gram(s) orally once a day, As Needed -for constipation   QUEtiapine 25 mg oral tablet: 1 tab(s) orally 2 times a day  rivaroxaban 15 mg oral tablet: 1 tab(s) orally 2 times a day until 11/3  rivaroxaban 20 mg oral tablet: 1 tab(s) orally once a day starting 11/4  timolol maleate 0.25% ophthalmic solution: 1 drop(s) to each affected eye 2 times a day  Vitamin C 500 mg oral capsule: 1 cap(s) orally once a day with food   aspirin 81 mg oral tablet, chewable: 1 tab(s) orally once a day  atorvastatin 20 mg oral tablet: 1 tab(s) orally once a day (at bedtime)  ferrous sulfate 325 mg (65 mg elemental iron) oral tablet: 1 tab(s) orally once a day  Janumet  mg-1000 mg oral tablet, extended release: 1 tab(s) orally once a day (in the evening)  latanoprost 0.005% ophthalmic solution: 1 drop(s) to each affected eye once a day (at bedtime)  ocular lubricant ophthalmic solution: 1 drop(s) to each affected eye 2 times a day  polyethylene glycol 3350 oral powder for reconstitution: 17 gram(s) orally once a day, As Needed -for constipation   QUEtiapine 25 mg oral tablet: 1 tab(s) orally 2 times a day  rivaroxaban 15 mg oral tablet: 1 tab(s) orally 2 times a day until 11/3  timolol maleate 0.25% ophthalmic solution: 1 drop(s) to each affected eye 2 times a day  Vitamin C 500 mg oral capsule: 1 cap(s) orally once a day with food   aspirin 81 mg oral tablet, chewable: 1 tab(s) orally once a day  atorvastatin 20 mg oral tablet: 1 tab(s) orally once a day (at bedtime)  ferrous sulfate 325 mg (65 mg elemental iron) oral tablet: 1 tab(s) orally once a day  Janumet  mg-1000 mg oral tablet, extended release: 1 tab(s) orally once a day (in the evening)  latanoprost 0.005% ophthalmic solution: 1 drop(s) to each affected eye once a day (at bedtime)  ocular lubricant ophthalmic solution: 1 drop(s) to each affected eye 2 times a day  polyethylene glycol 3350 oral powder for reconstitution: 17 gram(s) orally once a day, As Needed -for constipation   QUEtiapine 25 mg oral tablet: 1 tab(s) orally once a day (at bedtime) at 8pm  Can give an extra dose in the morning if needed for agitation  rivaroxaban 15 mg oral tablet: 1 tab(s) orally 2 times a day until 11/3  timolol maleate 0.25% ophthalmic solution: 1 drop(s) to each affected eye 2 times a day  Vitamin C 500 mg oral capsule: 1 cap(s) orally once a day with food

## 2020-10-23 NOTE — DISCHARGE NOTE PROVIDER - NSDCFUADDINST_GEN_ALL_CORE_FT
Follow up with your PCP for repeat ultrasound of thyroid for thyroid nodule. Follow up with your PCP for repeat ultrasound of thyroid for thyroid nodule.

## 2020-10-30 PROBLEM — E78.5 HYPERLIPIDEMIA, UNSPECIFIED: Chronic | Status: ACTIVE | Noted: 2020-10-19

## 2020-10-31 ENCOUNTER — INPATIENT (INPATIENT)
Facility: HOSPITAL | Age: 80
LOS: 9 days | Discharge: NURSING, REHAB OR LONG-TERM CARE | DRG: 378 | End: 2020-11-10
Attending: INTERNAL MEDICINE | Admitting: INTERNAL MEDICINE
Payer: MEDICARE

## 2020-10-31 VITALS
HEIGHT: 60 IN | SYSTOLIC BLOOD PRESSURE: 126 MMHG | DIASTOLIC BLOOD PRESSURE: 57 MMHG | WEIGHT: 119.93 LBS | TEMPERATURE: 98 F | HEART RATE: 92 BPM | RESPIRATION RATE: 16 BRPM | OXYGEN SATURATION: 97 %

## 2020-10-31 DIAGNOSIS — K92.2 GASTROINTESTINAL HEMORRHAGE, UNSPECIFIED: ICD-10-CM

## 2020-10-31 DIAGNOSIS — Z78.9 OTHER SPECIFIED HEALTH STATUS: Chronic | ICD-10-CM

## 2020-10-31 LAB
ACETONE SERPL-MCNC: NEGATIVE — SIGNIFICANT CHANGE UP
ALBUMIN SERPL ELPH-MCNC: 2.7 G/DL — LOW (ref 3.5–5)
ALP SERPL-CCNC: 84 U/L — SIGNIFICANT CHANGE UP (ref 40–120)
ALT FLD-CCNC: 38 U/L DA — SIGNIFICANT CHANGE UP (ref 10–60)
ANION GAP SERPL CALC-SCNC: 8 MMOL/L — SIGNIFICANT CHANGE UP (ref 5–17)
APTT BLD: 30.1 SEC — SIGNIFICANT CHANGE UP (ref 27.5–35.5)
AST SERPL-CCNC: 36 U/L — SIGNIFICANT CHANGE UP (ref 10–40)
BASOPHILS # BLD AUTO: 0.01 K/UL — SIGNIFICANT CHANGE UP (ref 0–0.2)
BASOPHILS NFR BLD AUTO: 0.2 % — SIGNIFICANT CHANGE UP (ref 0–2)
BILIRUB SERPL-MCNC: 0.2 MG/DL — SIGNIFICANT CHANGE UP (ref 0.2–1.2)
BLD GP AB SCN SERPL QL: SIGNIFICANT CHANGE UP
BUN SERPL-MCNC: 19 MG/DL — HIGH (ref 7–18)
CALCIUM SERPL-MCNC: 8.1 MG/DL — LOW (ref 8.4–10.5)
CHLORIDE SERPL-SCNC: 103 MMOL/L — SIGNIFICANT CHANGE UP (ref 96–108)
CO2 SERPL-SCNC: 25 MMOL/L — SIGNIFICANT CHANGE UP (ref 22–31)
CREAT SERPL-MCNC: 0.76 MG/DL — SIGNIFICANT CHANGE UP (ref 0.5–1.3)
EOSINOPHIL # BLD AUTO: 0.29 K/UL — SIGNIFICANT CHANGE UP (ref 0–0.5)
EOSINOPHIL NFR BLD AUTO: 6 % — SIGNIFICANT CHANGE UP (ref 0–6)
GLUCOSE SERPL-MCNC: 203 MG/DL — HIGH (ref 70–99)
HCT VFR BLD CALC: 20.1 % — CRITICAL LOW (ref 34.5–45)
HGB BLD-MCNC: 6.2 G/DL — CRITICAL LOW (ref 11.5–15.5)
IMM GRANULOCYTES NFR BLD AUTO: 0.2 % — SIGNIFICANT CHANGE UP (ref 0–1.5)
INR BLD: 0.93 RATIO — SIGNIFICANT CHANGE UP (ref 0.88–1.16)
LYMPHOCYTES # BLD AUTO: 1.03 K/UL — SIGNIFICANT CHANGE UP (ref 1–3.3)
LYMPHOCYTES # BLD AUTO: 21.1 % — SIGNIFICANT CHANGE UP (ref 13–44)
MAGNESIUM SERPL-MCNC: 2.2 MG/DL — SIGNIFICANT CHANGE UP (ref 1.6–2.6)
MCHC RBC-ENTMCNC: 27 PG — SIGNIFICANT CHANGE UP (ref 27–34)
MCHC RBC-ENTMCNC: 30.8 GM/DL — LOW (ref 32–36)
MCV RBC AUTO: 87.4 FL — SIGNIFICANT CHANGE UP (ref 80–100)
MONOCYTES # BLD AUTO: 0.57 K/UL — SIGNIFICANT CHANGE UP (ref 0–0.9)
MONOCYTES NFR BLD AUTO: 11.7 % — SIGNIFICANT CHANGE UP (ref 2–14)
NEUTROPHILS # BLD AUTO: 2.96 K/UL — SIGNIFICANT CHANGE UP (ref 1.8–7.4)
NEUTROPHILS NFR BLD AUTO: 60.8 % — SIGNIFICANT CHANGE UP (ref 43–77)
NRBC # BLD: 0 /100 WBCS — SIGNIFICANT CHANGE UP (ref 0–0)
NT-PROBNP SERPL-SCNC: 70 PG/ML — SIGNIFICANT CHANGE UP (ref 0–450)
OB PNL STL: POSITIVE
PLATELET # BLD AUTO: 418 K/UL — HIGH (ref 150–400)
POTASSIUM SERPL-MCNC: 4.6 MMOL/L — SIGNIFICANT CHANGE UP (ref 3.5–5.3)
POTASSIUM SERPL-SCNC: 4.6 MMOL/L — SIGNIFICANT CHANGE UP (ref 3.5–5.3)
PROT SERPL-MCNC: 6 G/DL — SIGNIFICANT CHANGE UP (ref 6–8.3)
PROTHROM AB SERPL-ACNC: 11.1 SEC — SIGNIFICANT CHANGE UP (ref 10.6–13.6)
RBC # BLD: 2.3 M/UL — LOW (ref 3.8–5.2)
RBC # FLD: 14.9 % — HIGH (ref 10.3–14.5)
SODIUM SERPL-SCNC: 136 MMOL/L — SIGNIFICANT CHANGE UP (ref 135–145)
WBC # BLD: 4.87 K/UL — SIGNIFICANT CHANGE UP (ref 3.8–10.5)
WBC # FLD AUTO: 4.87 K/UL — SIGNIFICANT CHANGE UP (ref 3.8–10.5)

## 2020-10-31 PROCEDURE — 99223 1ST HOSP IP/OBS HIGH 75: CPT

## 2020-10-31 PROCEDURE — 99285 EMERGENCY DEPT VISIT HI MDM: CPT

## 2020-10-31 PROCEDURE — 71045 X-RAY EXAM CHEST 1 VIEW: CPT | Mod: 26

## 2020-10-31 RX ORDER — PANTOPRAZOLE SODIUM 20 MG/1
80 TABLET, DELAYED RELEASE ORAL ONCE
Refills: 0 | Status: COMPLETED | OUTPATIENT
Start: 2020-10-31 | End: 2020-10-31

## 2020-10-31 RX ORDER — SODIUM CHLORIDE 9 MG/ML
1000 INJECTION INTRAMUSCULAR; INTRAVENOUS; SUBCUTANEOUS
Refills: 0 | Status: DISCONTINUED | OUTPATIENT
Start: 2020-10-31 | End: 2020-11-02

## 2020-10-31 RX ORDER — FUROSEMIDE 40 MG
20 TABLET ORAL ONCE
Refills: 0 | Status: COMPLETED | OUTPATIENT
Start: 2020-10-31 | End: 2020-10-31

## 2020-10-31 RX ORDER — MORPHINE SULFATE 50 MG/1
4 CAPSULE, EXTENDED RELEASE ORAL ONCE
Refills: 0 | Status: DISCONTINUED | OUTPATIENT
Start: 2020-10-31 | End: 2020-10-31

## 2020-10-31 RX ADMIN — PANTOPRAZOLE SODIUM 80 MILLIGRAM(S): 20 TABLET, DELAYED RELEASE ORAL at 22:02

## 2020-10-31 RX ADMIN — SODIUM CHLORIDE 125 MILLILITER(S): 9 INJECTION INTRAMUSCULAR; INTRAVENOUS; SUBCUTANEOUS at 18:49

## 2020-10-31 NOTE — H&P ADULT - NSHPPHYSICALEXAM_GEN_ALL_CORE
Vital Signs (24 Hrs):  T(C): 36.6 (10-31-20 @ 17:49), Max: 36.6 (10-31-20 @ 17:49)  HR: 92 (10-31-20 @ 17:49) (92 - 92)  BP: 126/57 (10-31-20 @ 17:49) (126/57 - 126/57)  RR: 16 (10-31-20 @ 17:49) (16 - 16)  SpO2: 97% (10-31-20 @ 17:49) (97% - 97%)  Wt(kg): --  Daily Height in cm: 152.4 (31 Oct 2020 17:49)    Daily     I&O's Summary

## 2020-10-31 NOTE — H&P ADULT - PROBLEM SELECTOR PLAN 6
Pt was recently diagnosed and admitted in [facility] for PE ( Oct 2020)  Pt was admitted in ICU and started on Xarelto   Holding Xarelto for Gi bleed   F/u Hemo onc [facility]

## 2020-10-31 NOTE — ED PROVIDER NOTE - PROGRESS NOTE DETAILS
Pt with GIB, anemia, currently on Xarelto for PE, will admit pt, give transfusion.  Case d/w Dr. Bynum

## 2020-10-31 NOTE — ED ADULT NURSE NOTE - NSIMPLEMENTINTERV_GEN_ALL_ED
Implemented All Fall with Harm Risk Interventions:  [place] to call system. Call bell, personal items and telephone within reach. Instruct patient to call for assistance. Room bathroom lighting operational. Non-slip footwear when patient is off stretcher. Physically safe environment: no spills, clutter or unnecessary equipment. Stretcher in lowest position, wheels locked, appropriate side rails in place. Provide visual cue, wrist band, yellow gown, etc. Monitor gait and stability. Monitor for mental status changes and reorient to person, place, and time. Review medications for side effects contributing to fall risk. Reinforce activity limits and safety measures with patient and family. Provide visual clues: red socks.

## 2020-10-31 NOTE — H&P ADULT - PROBLEM SELECTOR PLAN 1
Pt sent by PMD to the Ed for low Hb 6.9 at his office  In the ED Hb- 6.2, FOBT +  Pt denies any bloody stool or any acute blood loss  Pt endorses increased fatigue and Ambulatory intolerance   S/p 1 unit of PRBC in ed   f/u CT abd   F/U CBC Q6   F/u anemia panel in am ( will be altered as Pt already started on PRBC)  f/U gi- Dr. Bentley

## 2020-10-31 NOTE — H&P ADULT - HISTORY OF PRESENT ILLNESS
80 year old woman, from home, lives with son, walks with walker  with PMH of DM2, HTN, HLD, dementia, recent diagnosis with PE (on xarelto) here with findings of asymptomatic drop in hgb. However, fair to note pt has dementia and might not have expressed symptoms if she had any and also report that she has been mostly bed bound might mean this might be due to fatigue.  Of note pt had fall risks and was admitted about 10 days ago post fall. During that admission, her hgb was low (6.9) and received 1 unit of pRBCs  Pt was made DNR with trial of intubation on that admission.   80 year old woman, from home, lives with son, walks with walker, Chronic mckeon ( placed on recent admission)  with PMH of DM2, HTN, HLD, dementia, recent diagnosis with PE (on xarelto) admitted for low Hb. Pt had a follow up appointment with her PMD and was informed about low Hb and recommended to get further evaluation. Pt is AAOX2, mildy in distress because of the Mckeon, able ot participate in discussion, but history limited due to Dementia.  Pt has no symptoms to endorse besides  increased fatigue that have limited her Ambulation and she spends more time laying on bed. Of note pt had fall risks and was admitted about 10 days ago post fall and was admitted to ICU for PE.  During that admission, her hgb was low (6.9) and received 1 unit of pRBCs  Pt was made DNR with trial of intubation on that admission.  Pt denies any fever, bloody BM, Hemetemesis, abd pain, N/V/D, chest pain, SOB any sick contact.  Pt denies any smoking, alcohol or any form of substance abuse.     In the ED,   Pt is AAOX2, Mildy agitated due to the mckeon   Vitals- 126/57, Hr 90, afebrile   Hb 6.3, FOBT+  s/p 1 unit of prbc in ed  BS- 203   CT head- no acute changes

## 2020-10-31 NOTE — H&P ADULT - ASSESSMENT
80 year old woman, from home, lives with son, walks with walker, Chronic mckeon ( placed on recent admission)  with PMH of DM2, HTN, HLD, dementia, recent diagnosis with PE (on xarelto) admitted for low Hb      In the ED,   Pt is AAOX2, Mildy agitated due to the mckeon   Vitals- 126/57, Hr 90, afebrile   Hb 6.3, FOBT+  s/p 1 unit of prbc in ed  BS- 203   CT head- no acute changes

## 2020-10-31 NOTE — H&P ADULT - PROBLEM SELECTOR PLAN 8
IMPROVE VTE Individual Risk Assessment    RISK                                                          Points  [] Previous VTE                                           3  [] Thrombophilia                                        2  [] Lower limb paralysis                              2   [] Current Cancer                                       2   [] Immobilization > 24 hrs                        1  [x] ICU/CCU stay > 24 hours                       1  [x Age > 60                                                   1    IMPROVE VTE Score: 2  scd  ppi

## 2020-10-31 NOTE — ED ADULT NURSE REASSESSMENT NOTE - NS ED NURSE REASSESS COMMENT FT1
received pt awake alert ,with son at bedside,not in acute distress,with saline lock intact,no redness,no swelling noted,waiting for dispo.

## 2020-10-31 NOTE — H&P ADULT - NSHPLABSRESULTS_GEN_ALL_CORE
6.2    4.87  )-----------( 418      ( 31 Oct 2020 18:51 )             20.1       10-31    136  |  103  |  19<H>  ----------------------------<  203<H>  4.6   |  25  |  0.76    Ca    8.1<L>      31 Oct 2020 18:51  Mg     2.2     10-31    TPro  6.0  /  Alb  2.7<L>  /  TBili  0.2  /  DBili  x   /  AST  36  /  ALT  38  /  AlkPhos  84  10-31                  PT/INR - ( 31 Oct 2020 18:51 )   PT: 11.1 sec;   INR: 0.93 ratio         PTT - ( 31 Oct 2020 18:51 )  PTT:30.1 sec    Lactate Trend            CAPILLARY BLOOD GLUCOSE            Culture Results:   >=3 organisms. Probable collection contamination. (10-20 @ 06:35)  Culture Results:   >100,000 CFU/ml Klebsiella variicola (10-12 @ 21:55)  Culture Results:   No growth at 3 days. (10-07 @ 19:01)  Culture Results:   No growth at 1 week. (10-07 @ 19:01)  Culture Results:   No growth (10-07 @ 19:01) 6.2    4.87  )-----------( 418      ( 31 Oct 2020 18:51 )             20.1       10-31    136  |  103  |  19<H>  ----------------------------<  203<H>  4.6   |  25  |  0.76    Ca    8.1<L>      31 Oct 2020 18:51  Mg     2.2     10-31    TPro  6.0  /  Alb  2.7<L>  /  TBili  0.2  /  DBili  x   /  AST  36  /  ALT  38  /  AlkPhos  84  10-31          PT/INR - ( 31 Oct 2020 18:51 )   PT: 11.1 sec;   INR: 0.93 ratio         PTT - ( 31 Oct 2020 18:51 )  PTT:30.1 sec    Lactate Trend            CAPILLARY BLOOD GLUCOSE            Culture Results:   >=3 organisms. Probable collection contamination. (10-20 @ 06:35)  Culture Results:   >100,000 CFU/ml Klebsiella variicola (10-12 @ 21:55)  Culture Results:   No growth at 3 days. (10-07 @ 19:01)  Culture Results:   No growth at 1 week. (10-07 @ 19:01)  Culture Results:   No growth (10-07 @ 19:01)

## 2020-10-31 NOTE — H&P ADULT - ATTENDING COMMENTS
Pt seen and examined    80 year old woman with PMH of DM2, HTN, HLD, dementia, recent diagnosis with PE on xarelto here with findings of asymptomatic drop in hgb. However, fair to note pt has dementia and might not have expressed symptoms if she had any and also report that she has been mostly bed bound might mean this might be due to fatigue.  Of note pt had fall risks and was admitted about 10 days ago post fall. During that admission, her hgb was low (6.9) and received 1 unit of pRBCs  Pt was made DNR with trial of intubation on that admission.    Vital Signs Last 24 Hrs  T(C): 36.6 (31 Oct 2020 17:49), Max: 36.6 (31 Oct 2020 17:49)  T(F): 97.8 (31 Oct 2020 17:49), Max: 97.8 (31 Oct 2020 17:49)  HR: 92 (31 Oct 2020 17:49) (92 - 92)  BP: 126/57 (31 Oct 2020 17:49) (126/57 - 126/57)  RR: 16 (31 Oct 2020 17:49) (16 - 16)  SpO2: 97% (31 Oct 2020 17:49) (97% - 97%)                          6.2    4.87  )-----------( 418      ( 31 Oct 2020 18:51 )             20.1     10-31    136  |  103  |  19<H>  ----------------------------<  203<H>  4.6   |  25  |  0.76    Ca    8.1<L>      31 Oct 2020 18:51  Mg     2.2     10-31    TPro  6.0  /  Alb  2.7<L>  /  TBili  0.2  /  DBili  x   /  AST  36  /  ALT  38  /  AlkPhos  84  10-31    FOBT -+VE    Impression  Acute GI bleed  History of PE on OAC  History of falls  Dementia  Severe PEM  Physical debility  Urinary retention Pt seen and examined    80 year old woman with PMH of DM2, HTN, HLD, dementia, recent diagnosis with PE on Xarelto here with findings of drop in hgb. However, fair to note pt has dementia and might not have expressed symptoms if she had any and also report that she has been mostly bed bound might mean this might be due to fatigue.  Of note pt had fall risks and was admitted about 10 days ago post fall. During that admission, her hgb was low (6.9) and received 1 unit of pRBCs  Pt was made DNR with trial of intubation on that admission.    Vital Signs Last 24 Hrs  T(C): 36.6 (31 Oct 2020 17:49), Max: 36.6 (31 Oct 2020 17:49)  T(F): 97.8 (31 Oct 2020 17:49), Max: 97.8 (31 Oct 2020 17:49)  HR: 92 (31 Oct 2020 17:49) (92 - 92)  BP: 126/57 (31 Oct 2020 17:49) (126/57 - 126/57)  RR: 16 (31 Oct 2020 17:49) (16 - 16)  SpO2: 97% (31 Oct 2020 17:49) (97% - 97%)    Elderly woman, lying in bed, NAD alert and aware of environment; cooperative  RRR S1S2 only  CTA B/L   Soft NT ND BS +  No pedal edema  Moving extremities spontaneously.    Lab                        6.2    4.87  )-----------( 418      ( 31 Oct 2020 18:51 )             20.1     10-31    136  |  103  |  19<H>  ----------------------------<  203<H>  4.6   |  25  |  0.76    Ca    8.1<L>      31 Oct 2020 18:51  Mg     2.2     10-31  TPro  6.0  /  Alb  2.7<L>  /  TBili  0.2  /  DBili  x   /  AST  36  /  ALT  38  /  AlkPhos  84  10-31    FOBT -+VE    Impression  - Acute GI bleed  Likely upper GI from presentation  Admit to medicine  Serial H/H  Transfuse 1 -2 packed red cells  GI consult     - Acute blood loss symptomatic anemia  Blood transfusion  Monitor closely    - History of PE on OAC  Pt on Xarelto  Pt also with hx of falls  Would consult Hematology regarding utility of anticoagulation    - Physical debility  PT evaluation     - Urinary retention  Dorado catheter in place  Urology consult in AM    - Dementia  Seroquel for agitation     - Code status  - DNR

## 2020-10-31 NOTE — ED PROVIDER NOTE - OBJECTIVE STATEMENT
80 y.o. female with h/o NIDDM, last dose this am BIBA pt was admitted recently with PE, urinary retention, pt also received 1 unit PRBC transfusion.  As per son, pt went to PMD for f/u on Thurs.  Received a phone call from PMD today, Hgb 6.9, no hematuria, no BRBPR, no dizziness, n/v/d, pt with good appetite.  Pt also wants mckeon out 2/2 discomfort

## 2020-11-01 ENCOUNTER — TRANSCRIPTION ENCOUNTER (OUTPATIENT)
Age: 80
End: 2020-11-01

## 2020-11-01 DIAGNOSIS — I26.99 OTHER PULMONARY EMBOLISM WITHOUT ACUTE COR PULMONALE: ICD-10-CM

## 2020-11-01 DIAGNOSIS — E11.9 TYPE 2 DIABETES MELLITUS WITHOUT COMPLICATIONS: ICD-10-CM

## 2020-11-01 DIAGNOSIS — I10 ESSENTIAL (PRIMARY) HYPERTENSION: ICD-10-CM

## 2020-11-01 DIAGNOSIS — E78.5 HYPERLIPIDEMIA, UNSPECIFIED: ICD-10-CM

## 2020-11-01 DIAGNOSIS — Z29.9 ENCOUNTER FOR PROPHYLACTIC MEASURES, UNSPECIFIED: ICD-10-CM

## 2020-11-01 DIAGNOSIS — F03.90 UNSPECIFIED DEMENTIA, UNSPECIFIED SEVERITY, WITHOUT BEHAVIORAL DISTURBANCE, PSYCHOTIC DISTURBANCE, MOOD DISTURBANCE, AND ANXIETY: ICD-10-CM

## 2020-11-01 DIAGNOSIS — K92.2 GASTROINTESTINAL HEMORRHAGE, UNSPECIFIED: ICD-10-CM

## 2020-11-01 DIAGNOSIS — Z71.89 OTHER SPECIFIED COUNSELING: ICD-10-CM

## 2020-11-01 LAB
A1C WITH ESTIMATED AVERAGE GLUCOSE RESULT: 6.2 % — HIGH (ref 4–5.6)
ALBUMIN SERPL ELPH-MCNC: 3.1 G/DL — LOW (ref 3.5–5)
ALP SERPL-CCNC: 92 U/L — SIGNIFICANT CHANGE UP (ref 40–120)
ALT FLD-CCNC: 34 U/L DA — SIGNIFICANT CHANGE UP (ref 10–60)
ANION GAP SERPL CALC-SCNC: 8 MMOL/L — SIGNIFICANT CHANGE UP (ref 5–17)
AST SERPL-CCNC: 21 U/L — SIGNIFICANT CHANGE UP (ref 10–40)
BASOPHILS # BLD AUTO: 0.02 K/UL — SIGNIFICANT CHANGE UP (ref 0–0.2)
BASOPHILS NFR BLD AUTO: 0.3 % — SIGNIFICANT CHANGE UP (ref 0–2)
BILIRUB SERPL-MCNC: 0.6 MG/DL — SIGNIFICANT CHANGE UP (ref 0.2–1.2)
BUN SERPL-MCNC: 12 MG/DL — SIGNIFICANT CHANGE UP (ref 7–18)
CALCIUM SERPL-MCNC: 8.7 MG/DL — SIGNIFICANT CHANGE UP (ref 8.4–10.5)
CHLORIDE SERPL-SCNC: 104 MMOL/L — SIGNIFICANT CHANGE UP (ref 96–108)
CHOLEST SERPL-MCNC: 151 MG/DL — SIGNIFICANT CHANGE UP
CO2 SERPL-SCNC: 28 MMOL/L — SIGNIFICANT CHANGE UP (ref 22–31)
CREAT SERPL-MCNC: 0.69 MG/DL — SIGNIFICANT CHANGE UP (ref 0.5–1.3)
EOSINOPHIL # BLD AUTO: 0.19 K/UL — SIGNIFICANT CHANGE UP (ref 0–0.5)
EOSINOPHIL NFR BLD AUTO: 2.5 % — SIGNIFICANT CHANGE UP (ref 0–6)
ERYTHROCYTE [SEDIMENTATION RATE] IN BLOOD: 20 MM/HR — SIGNIFICANT CHANGE UP (ref 0–20)
ESTIMATED AVERAGE GLUCOSE: 131 MG/DL — HIGH (ref 68–114)
FOLATE SERPL-MCNC: 16 NG/ML — SIGNIFICANT CHANGE UP
GLUCOSE BLDC GLUCOMTR-MCNC: 126 MG/DL — HIGH (ref 70–99)
GLUCOSE BLDC GLUCOMTR-MCNC: 146 MG/DL — HIGH (ref 70–99)
GLUCOSE BLDC GLUCOMTR-MCNC: 161 MG/DL — HIGH (ref 70–99)
GLUCOSE BLDC GLUCOMTR-MCNC: 176 MG/DL — HIGH (ref 70–99)
GLUCOSE SERPL-MCNC: 151 MG/DL — HIGH (ref 70–99)
HCT VFR BLD CALC: 31.5 % — LOW (ref 34.5–45)
HCT VFR BLD CALC: 31.9 % — LOW (ref 34.5–45)
HDLC SERPL-MCNC: 52 MG/DL — SIGNIFICANT CHANGE UP
HGB BLD-MCNC: 10.3 G/DL — LOW (ref 11.5–15.5)
HGB BLD-MCNC: 10.4 G/DL — LOW (ref 11.5–15.5)
IMM GRANULOCYTES NFR BLD AUTO: 0.3 % — SIGNIFICANT CHANGE UP (ref 0–1.5)
LACTATE SERPL-SCNC: 1 MMOL/L — SIGNIFICANT CHANGE UP (ref 0.7–2)
LIPID PNL WITH DIRECT LDL SERPL: 57 MG/DL — SIGNIFICANT CHANGE UP
LYMPHOCYTES # BLD AUTO: 0.92 K/UL — LOW (ref 1–3.3)
LYMPHOCYTES # BLD AUTO: 12.2 % — LOW (ref 13–44)
MAGNESIUM SERPL-MCNC: 2.1 MG/DL — SIGNIFICANT CHANGE UP (ref 1.6–2.6)
MCHC RBC-ENTMCNC: 27.4 PG — SIGNIFICANT CHANGE UP (ref 27–34)
MCHC RBC-ENTMCNC: 27.6 PG — SIGNIFICANT CHANGE UP (ref 27–34)
MCHC RBC-ENTMCNC: 32.6 GM/DL — SIGNIFICANT CHANGE UP (ref 32–36)
MCHC RBC-ENTMCNC: 32.7 GM/DL — SIGNIFICANT CHANGE UP (ref 32–36)
MCV RBC AUTO: 83.9 FL — SIGNIFICANT CHANGE UP (ref 80–100)
MCV RBC AUTO: 84.5 FL — SIGNIFICANT CHANGE UP (ref 80–100)
MONOCYTES # BLD AUTO: 0.53 K/UL — SIGNIFICANT CHANGE UP (ref 0–0.9)
MONOCYTES NFR BLD AUTO: 7 % — SIGNIFICANT CHANGE UP (ref 2–14)
NEUTROPHILS # BLD AUTO: 5.85 K/UL — SIGNIFICANT CHANGE UP (ref 1.8–7.4)
NEUTROPHILS NFR BLD AUTO: 77.7 % — HIGH (ref 43–77)
NON HDL CHOLESTEROL: 99 MG/DL — SIGNIFICANT CHANGE UP
NRBC # BLD: 0 /100 WBCS — SIGNIFICANT CHANGE UP (ref 0–0)
NRBC # BLD: 0 /100 WBCS — SIGNIFICANT CHANGE UP (ref 0–0)
PHOSPHATE SERPL-MCNC: 3.8 MG/DL — SIGNIFICANT CHANGE UP (ref 2.5–4.5)
PLATELET # BLD AUTO: 396 K/UL — SIGNIFICANT CHANGE UP (ref 150–400)
PLATELET # BLD AUTO: 412 K/UL — HIGH (ref 150–400)
POTASSIUM SERPL-MCNC: 3.6 MMOL/L — SIGNIFICANT CHANGE UP (ref 3.5–5.3)
POTASSIUM SERPL-SCNC: 3.6 MMOL/L — SIGNIFICANT CHANGE UP (ref 3.5–5.3)
PROT SERPL-MCNC: 6.5 G/DL — SIGNIFICANT CHANGE UP (ref 6–8.3)
RBC # BLD: 3.73 M/UL — LOW (ref 3.8–5.2)
RBC # BLD: 3.8 M/UL — SIGNIFICANT CHANGE UP (ref 3.8–5.2)
RBC # FLD: 14.4 % — SIGNIFICANT CHANGE UP (ref 10.3–14.5)
RBC # FLD: 14.6 % — HIGH (ref 10.3–14.5)
SARS-COV-2 RNA SPEC QL NAA+PROBE: SIGNIFICANT CHANGE UP
SODIUM SERPL-SCNC: 140 MMOL/L — SIGNIFICANT CHANGE UP (ref 135–145)
T3 SERPL-MCNC: 140 NG/DL — SIGNIFICANT CHANGE UP (ref 80–200)
T4 AB SER-ACNC: 11.4 UG/DL — SIGNIFICANT CHANGE UP (ref 4.6–12)
TRIGL SERPL-MCNC: 211 MG/DL — HIGH
TROPONIN I SERPL-MCNC: <0.015 NG/ML — SIGNIFICANT CHANGE UP (ref 0–0.04)
TSH SERPL-MCNC: 0.03 UU/ML — LOW (ref 0.34–4.82)
VIT B12 SERPL-MCNC: 1119 PG/ML — SIGNIFICANT CHANGE UP (ref 232–1245)
WBC # BLD: 7.53 K/UL — SIGNIFICANT CHANGE UP (ref 3.8–10.5)
WBC # BLD: 7.67 K/UL — SIGNIFICANT CHANGE UP (ref 3.8–10.5)
WBC # FLD AUTO: 7.53 K/UL — SIGNIFICANT CHANGE UP (ref 3.8–10.5)
WBC # FLD AUTO: 7.67 K/UL — SIGNIFICANT CHANGE UP (ref 3.8–10.5)

## 2020-11-01 PROCEDURE — 99232 SBSQ HOSP IP/OBS MODERATE 35: CPT | Mod: GC

## 2020-11-01 RX ORDER — HALOPERIDOL DECANOATE 100 MG/ML
0.5 INJECTION INTRAMUSCULAR ONCE
Refills: 0 | Status: DISCONTINUED | OUTPATIENT
Start: 2020-11-01 | End: 2020-11-01

## 2020-11-01 RX ORDER — PANTOPRAZOLE SODIUM 20 MG/1
40 TABLET, DELAYED RELEASE ORAL
Refills: 0 | Status: DISCONTINUED | OUTPATIENT
Start: 2020-11-01 | End: 2020-11-10

## 2020-11-01 RX ORDER — ATORVASTATIN CALCIUM 80 MG/1
20 TABLET, FILM COATED ORAL AT BEDTIME
Refills: 0 | Status: DISCONTINUED | OUTPATIENT
Start: 2020-11-01 | End: 2020-11-10

## 2020-11-01 RX ORDER — ASPIRIN/CALCIUM CARB/MAGNESIUM 324 MG
81 TABLET ORAL DAILY
Refills: 0 | Status: DISCONTINUED | OUTPATIENT
Start: 2020-11-01 | End: 2020-11-01

## 2020-11-01 RX ORDER — HALOPERIDOL DECANOATE 100 MG/ML
0.5 INJECTION INTRAMUSCULAR ONCE
Refills: 0 | Status: DISCONTINUED | OUTPATIENT
Start: 2020-11-01 | End: 2020-11-02

## 2020-11-01 RX ORDER — TIMOLOL 0.5 %
1 DROPS OPHTHALMIC (EYE)
Refills: 0 | Status: DISCONTINUED | OUTPATIENT
Start: 2020-11-01 | End: 2020-11-10

## 2020-11-01 RX ORDER — FERROUS SULFATE 325(65) MG
325 TABLET ORAL DAILY
Refills: 0 | Status: DISCONTINUED | OUTPATIENT
Start: 2020-11-01 | End: 2020-11-01

## 2020-11-01 RX ORDER — INSULIN LISPRO 100/ML
VIAL (ML) SUBCUTANEOUS EVERY 6 HOURS
Refills: 0 | Status: DISCONTINUED | OUTPATIENT
Start: 2020-11-01 | End: 2020-11-03

## 2020-11-01 RX ORDER — QUETIAPINE FUMARATE 200 MG/1
25 TABLET, FILM COATED ORAL AT BEDTIME
Refills: 0 | Status: DISCONTINUED | OUTPATIENT
Start: 2020-11-01 | End: 2020-11-10

## 2020-11-01 RX ORDER — IRON SUCROSE 20 MG/ML
200 INJECTION, SOLUTION INTRAVENOUS EVERY 24 HOURS
Refills: 0 | Status: COMPLETED | OUTPATIENT
Start: 2020-11-01 | End: 2020-11-03

## 2020-11-01 RX ORDER — ASCORBIC ACID 60 MG
500 TABLET,CHEWABLE ORAL DAILY
Refills: 0 | Status: DISCONTINUED | OUTPATIENT
Start: 2020-11-01 | End: 2020-11-10

## 2020-11-01 RX ORDER — LATANOPROST 0.05 MG/ML
1 SOLUTION/ DROPS OPHTHALMIC; TOPICAL AT BEDTIME
Refills: 0 | Status: DISCONTINUED | OUTPATIENT
Start: 2020-11-01 | End: 2020-11-10

## 2020-11-01 RX ADMIN — QUETIAPINE FUMARATE 25 MILLIGRAM(S): 200 TABLET, FILM COATED ORAL at 21:25

## 2020-11-01 RX ADMIN — Medication 1 DROP(S): at 17:19

## 2020-11-01 RX ADMIN — Medication 1: at 07:03

## 2020-11-01 RX ADMIN — Medication 500 MILLIGRAM(S): at 17:17

## 2020-11-01 RX ADMIN — Medication 1: at 23:56

## 2020-11-01 RX ADMIN — PANTOPRAZOLE SODIUM 40 MILLIGRAM(S): 20 TABLET, DELAYED RELEASE ORAL at 17:19

## 2020-11-01 RX ADMIN — ATORVASTATIN CALCIUM 20 MILLIGRAM(S): 80 TABLET, FILM COATED ORAL at 21:25

## 2020-11-01 RX ADMIN — LATANOPROST 1 DROP(S): 0.05 SOLUTION/ DROPS OPHTHALMIC; TOPICAL at 21:26

## 2020-11-01 RX ADMIN — SODIUM CHLORIDE 125 MILLILITER(S): 9 INJECTION INTRAMUSCULAR; INTRAVENOUS; SUBCUTANEOUS at 07:03

## 2020-11-01 RX ADMIN — Medication 1 DROP(S): at 07:03

## 2020-11-01 RX ADMIN — SODIUM CHLORIDE 125 MILLILITER(S): 9 INJECTION INTRAMUSCULAR; INTRAVENOUS; SUBCUTANEOUS at 17:26

## 2020-11-01 RX ADMIN — IRON SUCROSE 110 MILLIGRAM(S): 20 INJECTION, SOLUTION INTRAVENOUS at 12:13

## 2020-11-01 RX ADMIN — Medication 20 MILLIGRAM(S): at 01:05

## 2020-11-01 NOTE — PROGRESS NOTE ADULT - PROBLEM SELECTOR PLAN 1
Pt sent by PMD to the Ed for low Hb 6.9 at his office  In the ED Hb- 6.2, FOBT +  Pt denies any bloody stool or any acute blood loss  Pt endorses increased fatigue and Ambulatory intolerance   S/p 1 unit of PRBC in ed   f/u CT abd   F/U CBC Q6   F/u anemia panel in am ( will be altered as Pt already started on PRBC)  f/U gi- Dr. Bentley Pt sent by PMD to the Ed for low Hb 6.9 at his office  In the ED Hb- 6.2, FOBT +  Pt denies any bloody stool or any acute blood loss  Pt endorses increased fatigue and Ambulatory intolerance   S/p 2 unit of PRBC in ed , hb 10.4  f/u CT abd   F/U CBC Q6   f/U gi- Dr. Bentley Pt sent by PMD to the Ed for low Hb 6.9 at his office  In the ED Hb- 6.2, FOBT +  Pt denies any bloody stool or any acute blood loss  Pt endorses increased fatigue and Ambulatory intolerance   S/p 2 unit of PRBC in ed , hb 10.4  f/u CT abd   F/U CBC Q6   f/U gi- Dr. Sheree vail anaemia panel

## 2020-11-01 NOTE — CONSULT NOTE ADULT - ASSESSMENT
A/P:     # Anemia:   # GI bleed: FOBT +   # Iron def (labs from last admission):   Off note patient had anemia and FOBT + last admission on 10/10/20, repeat negative on 10/19/20. Xarelto was held and then restarted.   Appropriate response to PRBC tx.   Normal b12, folate, creat, LFTs, bili.   Recs:   -GI consulted. IV PPI   -Hold Xarelto   -IV Venofer 200 mg daily x 4 doses   -1U PRBC tx for Hb < 7     # PE:   Recently diagnosed on 10/9/20. Started on Xarelto. Now with FOBT + anemia.   10/8-10/9: CTA Chest and CT A/P: with no evidence of malignancy reported   10/11/20: USB b/l LE: negative for DVT   Recs:  -Continue to hold AC, till GI clearance    -Consider IVC filter if patient not a candidate for AC     Thank you for the consult.   Case d/w primary team. We will follow.

## 2020-11-01 NOTE — CONSULT NOTE ADULT - SUBJECTIVE AND OBJECTIVE BOX
80 year old woman, from home, lives with son, walks with walker, Chronic mckeon ( placed on recent admission)  with PMH of DM2, HTN, HLD, dementia, recent diagnosis with PE (on xarelto) admitted for low Hb. Pt had a follow up appointment with her PMD and was informed about low Hb and recommended to get further evaluation. Pt is AAOX2, mildy in distress because of the Mckeon, able ot participate in discussion, but history limited due to Dementia.  Pt has no symptoms to endorse besides  increased fatigue that have limited her Ambulation and she spends more time laying on bed. Of note pt had fall risks and was admitted about 10 days ago post fall and was admitted to ICU for PE.  During that admission, her hgb was low (6.9) and received 1 unit of pRBCs  Pt was made DNR with trial of intubation on that admission. Pt denies any fever, bloody BM, Hemetemesis, abd pain, N/V/D, chest pain, SOB any sick contact.  Patient seen this afternoon. Wants to eat. RN at bedside.     PMH/ PSH: as per hpi   Meds: reviewed in emr   Allergies: nkda   Social hist: No habits     Vital Signs Last 24 Hrs  T(C): 36.7 (01 Nov 2020 07:51), Max: 37.1 (01 Nov 2020 06:15)  T(F): 98 (01 Nov 2020 07:51), Max: 98.7 (01 Nov 2020 06:15)  HR: 92 (01 Nov 2020 07:51) (78 - 96)  BP: 146/67 (01 Nov 2020 07:51) (126/57 - 163/64)  BP(mean): --  RR: 16 (01 Nov 2020 07:51) (15 - 18)  SpO2: 100% (01 Nov 2020 07:51) (97% - 100%)    Gen: NAD  CVS: s1s2   Resp: CTABL   GI: soft, non tender   Ext: no c/c/e     CBC Full  -  ( 01 Nov 2020 07:06 )  WBC Count : 7.53 K/uL  RBC Count : 3.80 M/uL  Hemoglobin : 10.4 g/dL  Hematocrit : 31.9 %  Platelet Count - Automated : 412 K/uL  Mean Cell Volume : 83.9 fl  Mean Cell Hemoglobin : 27.4 pg  Mean Cell Hemoglobin Concentration : 32.6 gm/dL  Auto Neutrophil # : 5.85 K/uL  Auto Lymphocyte # : 0.92 K/uL  Auto Monocyte # : 0.53 K/uL  Auto Eosinophil # : 0.19 K/uL  Auto Basophil # : 0.02 K/uL  Auto Neutrophil % : 77.7 %  Auto Lymphocyte % : 12.2 %  Auto Monocyte % : 7.0 %  Auto Eosinophil % : 2.5 %  Auto Basophil % : 0.3 %    < from: CT Angio Chest w/ IV Cont (10.09.20 @ 14:45) >  IMPRESSION:  Acute pulmonary emboli in the right upper, middle and lower and left lower lobes.    There is no evidence of right heart strain or pulmonary infarct.      < from: CT Abdomen and Pelvis w/ Oral Cont and w/ IV Cont (10.08.20 @ 16:42) >  IMPRESSION:  Finding sat the right lung base suspicious for a pulmonary embolus, new since 10/4/2020.    Presacral edema and fluid of uncertain etiology not seen on the prior study.    No evidence of ascites or intestinal obstruction.    < end of copied text >

## 2020-11-01 NOTE — PROGRESS NOTE ADULT - PROBLEM SELECTOR PLAN 2
Pt has PMH of HTN   bp 126/57  Pt not currently on any home anti Htn meds   monitor vitals Pt has PMH of HTN   bp 146/67  Pt not currently on any home anti Htn meds   monitor vitals

## 2020-11-01 NOTE — PATIENT PROFILE ADULT - FUNCTIONAL SCREEN CURRENT LEVEL: COMMUNICATION, MLM
Ostomy  Nurse consult to assist patient with ostomy care and supplies Recent relocation to [place]- spouse at bedside and having difficulty with ostomy care and obtaining supplies. Assessment LLQ colostomy- leaking ostomy with layer of dressings and plastic bags applied by spouse prior to arrival. Recently revised stoma in [place] - spouse now actively cares for ostomy with difficulty and has no support locally to obtain supplies. Stoma is red, moist and budded- heather stomal skin is red, raw and excoriated under the stoma from leakage. Small amount soft formed stool in and around the stoma and appliance Treatment 2 1/4 2 piece appliance replaced, skin care given with powder, spray and paste. Ostomy care and instruction reviewed with spouse- needs reinforcement and redirection with care and step by step. Supplies gathered and reviewed for discharge, supply catalogs and local medical supply stores given to patient spouse.  to facilitate primary care MD and [facility] for ostomy care and support Plan of care discussed with patient and spouse with verbalized understanding.  
 
Alfredito Mari  

2 = difficulty understanding (not related to language barrier)

## 2020-11-01 NOTE — PROGRESS NOTE ADULT - SUBJECTIVE AND OBJECTIVE BOX
PGY-1 Progress Note discussed with attending    PAGER #: [[phone]] TILL 5:00 PM  PLEASE CONTACT ON CALL TEAM:  - On Call Team (Please refer to [place]) FROM 5:00 PM - 8:30PM  - Nightfloat Team FROM 8:30 -7:30 AM    CHIEF COMPLAINT & BRIEF HOSPITAL COURSE:    INTERVAL HPI/OVERNIGHT EVENTS:       REVIEW OF SYSTEMS:  CONSTITUTIONAL: No fever, weight loss, or fatigue  RESPIRATORY: No cough, wheezing, chills or hemoptysis; No shortness of breath  CARDIOVASCULAR: No chest pain, palpitations, dizziness, or leg swelling  GASTROINTESTINAL: No abdominal pain. No nausea, vomiting, or hematemesis; No diarrhea or constipation. No melena or hematochezia.  GENITOURINARY: No dysuria or hematuria, urinary frequency  NEUROLOGICAL: No headaches, memory loss, loss of strength, numbness, or tremors  SKIN: No itching, burning, rashes, or lesions     MEDICATIONS  (STANDING):  artificial  tears Solution 1 Drop(s) Both EYES two times a day  ascorbic acid 500 milliGRAM(s) Oral daily  atorvastatin 20 milliGRAM(s) Oral at bedtime  ferrous    sulfate 325 milliGRAM(s) Oral daily  insulin lispro (ADMELOG) corrective regimen sliding scale   SubCutaneous every 6 hours  latanoprost 0.005% Ophthalmic Solution 1 Drop(s) Both EYES at bedtime  pantoprazole    Tablet 40 milliGRAM(s) Oral before breakfast  QUEtiapine 25 milliGRAM(s) Oral at bedtime  sodium chloride 0.9%. 1000 milliLiter(s) (125 mL/Hr) IV Continuous <Continuous>  timolol 0.25% Solution 1 Drop(s) Both EYES two times a day    MEDICATIONS  (PRN):      Vital Signs Last 24 Hrs  T(C): 36.7 (01 Nov 2020 07:51), Max: 37.1 (01 Nov 2020 06:15)  T(F): 98 (01 Nov 2020 07:51), Max: 98.7 (01 Nov 2020 06:15)  HR: 92 (01 Nov 2020 07:51) (78 - 96)  BP: 146/67 (01 Nov 2020 07:51) (126/57 - 163/64)  BP(mean): --  RR: 16 (01 Nov 2020 07:51) (15 - 18)  SpO2: 100% (01 Nov 2020 07:51) (97% - 100%)    PHYSICAL EXAMINATION:  GENERAL: NAD, well built  HEAD:  Atraumatic, Normocephalic  EYES:  conjunctiva and sclera clear  NECK: Supple, No JVD, Normal thyroid  CHEST/LUNG: Clear to auscultation. Clear to percussion bilaterally; No rales, rhonchi, wheezing, or rubs  HEART: Regular rate and rhythm; No murmurs, rubs, or gallops  ABDOMEN: Soft, Nontender, Nondistended; Bowel sounds present  NERVOUS SYSTEM:  Alert & Oriented X3,    EXTREMITIES:  2+ Peripheral Pulses, No clubbing, cyanosis, or edema  SKIN: warm dry                          10.4   7.53  )-----------( 412      ( 01 Nov 2020 07:06 )             31.9     11-01    140  |  104  |  12  ----------------------------<  151<H>  3.6   |  28  |  0.69    Ca    8.7      01 Nov 2020 07:06  Phos  3.8     11-01  Mg     2.1     11-01    TPro  6.5  /  Alb  3.1<L>  /  TBili  0.6  /  DBili  x   /  AST  21  /  ALT  34  /  AlkPhos  92  11-01    LIVER FUNCTIONS - ( 01 Nov 2020 07:06 )  Alb: 3.1 g/dL / Pro: 6.5 g/dL / ALK PHOS: 92 U/L / ALT: 34 U/L DA / AST: 21 U/L / GGT: x           CARDIAC MARKERS ( 01 Nov 2020 07:06 )  <0.015 ng/mL / x     / x     / x     / x          PT/INR - ( 31 Oct 2020 18:51 )   PT: 11.1 sec;   INR: 0.93 ratio         PTT - ( 31 Oct 2020 18:51 )  PTT:30.1 sec        CAPILLARY BLOOD GLUCOSE  CAPILLARY BLOOD GLUCOSE      POCT Blood Glucose.: 161 mg/dL (01 Nov 2020 06:59)    CAPILLARY BLOOD GLUCOSE      POCT Blood Glucose.: 161 mg/dL (01 Nov 2020 06:59)      RADIOLOGY & ADDITIONAL TESTS:                   PGY-1 Progress Note discussed with attending    PAGER #: [[phone]] TILL 5:00 PM  PLEASE CONTACT ON CALL TEAM:  - On Call Team (Please refer to [place]) FROM 5:00 PM - 8:30PM  - Nightfloat Team FROM 8:30 -7:30 AM    CHIEF COMPLAINT & BRIEF HOSPITAL COURSE:80 year old woman, from home, lives with son, walks with walker, Chronic mckeon ( placed on recent admission)  with PMH of DM2, HTN, HLD, dementia, recent diagnosis with PE (on xarelto) admitted for low Hb. Pt had a follow up appointment with her PMD and was informed about low Hb and recommended to get further evaluation. Pt is AAOX2, mildy in distress because of the Mckeon, able ot participate in discussion, but history limited due to Dementia.  Pt has no symptoms to endorse besides  increased fatigue that have limited her Ambulation and she spends more time laying on bed. Of note pt had fall risks and was admitted about 10 days ago post fall and was admitted to ICU for PE.  During that admission, her hgb was low (6.9) and received 1 unit of pRBCs  Pt was made DNR with trial of intubation on that admission.  Pt denies any fever, bloody BM, Hemetemesis, abd pain, N/V/D, chest pain, SOB any sick contact.  Pt denies any smoking, alcohol or any form of substance abuse.     In the ED,   Pt is AAOX2, Mildy agitated due to the mckeon   Vitals- 126/57, Hr 90, afebrile   Hb 6.3, FOBT+  s/p 1 unit of prbc in ed  BS- 203   CT head- no acute changes       INTERVAL HPI/OVERNIGHT EVENTS:   patient examined bed side. no wekness, SOB or fatigue. AAOx 2      REVIEW OF SYSTEMS:  CONSTITUTIONAL: No fever, weight loss, or fatigue  RESPIRATORY: No cough, wheezing, chills or hemoptysis; No shortness of breath  CARDIOVASCULAR: No chest pain, palpitations, dizziness, or leg swelling  GASTROINTESTINAL: No abdominal pain. No nausea, vomiting, or hematemesis; No diarrhea or constipation. No melena or hematochezia.  GENITOURINARY: No dysuria or hematuria, urinary frequency  NEUROLOGICAL: No headaches, memory loss, loss of strength, numbness, or tremors  SKIN: No itching, burning, rashes, or lesions     MEDICATIONS  (STANDING):  artificial  tears Solution 1 Drop(s) Both EYES two times a day  ascorbic acid 500 milliGRAM(s) Oral daily  atorvastatin 20 milliGRAM(s) Oral at bedtime  ferrous    sulfate 325 milliGRAM(s) Oral daily  insulin lispro (ADMELOG) corrective regimen sliding scale   SubCutaneous every 6 hours  latanoprost 0.005% Ophthalmic Solution 1 Drop(s) Both EYES at bedtime  pantoprazole    Tablet 40 milliGRAM(s) Oral before breakfast  QUEtiapine 25 milliGRAM(s) Oral at bedtime  sodium chloride 0.9%. 1000 milliLiter(s) (125 mL/Hr) IV Continuous <Continuous>  timolol 0.25% Solution 1 Drop(s) Both EYES two times a day    MEDICATIONS  (PRN):      Vital Signs Last 24 Hrs  T(C): 36.7 (01 Nov 2020 07:51), Max: 37.1 (01 Nov 2020 06:15)  T(F): 98 (01 Nov 2020 07:51), Max: 98.7 (01 Nov 2020 06:15)  HR: 92 (01 Nov 2020 07:51) (78 - 96)  BP: 146/67 (01 Nov 2020 07:51) (126/57 - 163/64)  BP(mean): --  RR: 16 (01 Nov 2020 07:51) (15 - 18)  SpO2: 100% (01 Nov 2020 07:51) (97% - 100%)    PHYSICAL EXAMINATION:  GENERAL: NAD,  HEAD:  Atraumatic, Normocephalic  EYES:  conjunctiva and sclera clear  NECK: Supple, No JVD, Normal thyroid  CHEST/LUNG: Clear to auscultation. Clear to percussion bilaterally; No rales, rhonchi, wheezing, or rubs  HEART: Regular rate and rhythm; No murmurs, rubs, or gallops  ABDOMEN: Soft, Nontender, Nondistended; Bowel sounds present  NERVOUS SYSTEM:  Alert & Oriented X2,    EXTREMITIES:  2+ Peripheral Pulses, No clubbing, cyanosis, or edema  SKIN: warm dry                          10.4   7.53  )-----------( 412      ( 01 Nov 2020 07:06 )             31.9     11-01    140  |  104  |  12  ----------------------------<  151<H>  3.6   |  28  |  0.69    Ca    8.7      01 Nov 2020 07:06  Phos  3.8     11-01  Mg     2.1     11-01    TPro  6.5  /  Alb  3.1<L>  /  TBili  0.6  /  DBili  x   /  AST  21  /  ALT  34  /  AlkPhos  92  11-01    LIVER FUNCTIONS - ( 01 Nov 2020 07:06 )  Alb: 3.1 g/dL / Pro: 6.5 g/dL / ALK PHOS: 92 U/L / ALT: 34 U/L DA / AST: 21 U/L / GGT: x           CARDIAC MARKERS ( 01 Nov 2020 07:06 )  <0.015 ng/mL / x     / x     / x     / x          PT/INR - ( 31 Oct 2020 18:51 )   PT: 11.1 sec;   INR: 0.93 ratio         PTT - ( 31 Oct 2020 18:51 )  PTT:30.1 sec        CAPILLARY BLOOD GLUCOSE  CAPILLARY BLOOD GLUCOSE      POCT Blood Glucose.: 161 mg/dL (01 Nov 2020 06:59)    CAPILLARY BLOOD GLUCOSE      POCT Blood Glucose.: 161 mg/dL (01 Nov 2020 06:59)      RADIOLOGY & ADDITIONAL TESTS:

## 2020-11-01 NOTE — PROGRESS NOTE ADULT - PROBLEM SELECTOR PLAN 6
Pt was recently diagnosed and admitted in [facility] for PE ( Oct 2020)  Pt was admitted in ICU and started on Xarelto   Holding Xarelto for Gi bleed   F/u Hemo onc [facility]

## 2020-11-02 ENCOUNTER — APPOINTMENT (OUTPATIENT)
Dept: UROLOGY | Facility: CLINIC | Age: 80
End: 2020-11-02

## 2020-11-02 DIAGNOSIS — D64.9 ANEMIA, UNSPECIFIED: ICD-10-CM

## 2020-11-02 PROBLEM — Z00.00 ENCOUNTER FOR PREVENTIVE HEALTH EXAMINATION: Status: ACTIVE | Noted: 2020-11-02

## 2020-11-02 LAB
% ALBUMIN: 58 % — SIGNIFICANT CHANGE UP
% ALPHA 1: 5.2 % — SIGNIFICANT CHANGE UP
% ALPHA 2: 10 % — SIGNIFICANT CHANGE UP
% BETA: 12.6 % — SIGNIFICANT CHANGE UP
% GAMMA: 14.2 % — SIGNIFICANT CHANGE UP
ALBUMIN SERPL ELPH-MCNC: 3.1 G/DL — LOW (ref 3.5–5)
ALBUMIN SERPL ELPH-MCNC: 3.5 G/DL — LOW (ref 3.6–5.5)
ALBUMIN/GLOB SERPL ELPH: 1.3 RATIO — SIGNIFICANT CHANGE UP
ALP SERPL-CCNC: 98 U/L — SIGNIFICANT CHANGE UP (ref 40–120)
ALPHA1 GLOB SERPL ELPH-MCNC: 0.3 G/DL — SIGNIFICANT CHANGE UP (ref 0.1–0.4)
ALPHA2 GLOB SERPL ELPH-MCNC: 0.6 G/DL — SIGNIFICANT CHANGE UP (ref 0.5–1)
ALT FLD-CCNC: 33 U/L DA — SIGNIFICANT CHANGE UP (ref 10–60)
ANION GAP SERPL CALC-SCNC: 10 MMOL/L — SIGNIFICANT CHANGE UP (ref 5–17)
AST SERPL-CCNC: 21 U/L — SIGNIFICANT CHANGE UP (ref 10–40)
B-GLOBULIN SERPL ELPH-MCNC: 0.8 G/DL — SIGNIFICANT CHANGE UP (ref 0.5–1)
BASOPHILS # BLD AUTO: 0.04 K/UL — SIGNIFICANT CHANGE UP (ref 0–0.2)
BASOPHILS NFR BLD AUTO: 0.5 % — SIGNIFICANT CHANGE UP (ref 0–2)
BILIRUB SERPL-MCNC: 0.7 MG/DL — SIGNIFICANT CHANGE UP (ref 0.2–1.2)
BUN SERPL-MCNC: 9 MG/DL — SIGNIFICANT CHANGE UP (ref 7–18)
CALCIUM SERPL-MCNC: 9.1 MG/DL — SIGNIFICANT CHANGE UP (ref 8.4–10.5)
CHLORIDE SERPL-SCNC: 106 MMOL/L — SIGNIFICANT CHANGE UP (ref 96–108)
CO2 SERPL-SCNC: 24 MMOL/L — SIGNIFICANT CHANGE UP (ref 22–31)
CREAT SERPL-MCNC: 0.68 MG/DL — SIGNIFICANT CHANGE UP (ref 0.5–1.3)
EOSINOPHIL # BLD AUTO: 0.14 K/UL — SIGNIFICANT CHANGE UP (ref 0–0.5)
EOSINOPHIL NFR BLD AUTO: 1.6 % — SIGNIFICANT CHANGE UP (ref 0–6)
FERRITIN SERPL-MCNC: 201 NG/ML — HIGH (ref 15–150)
GAMMA GLOBULIN: 0.9 G/DL — SIGNIFICANT CHANGE UP (ref 0.6–1.6)
GLUCOSE BLDC GLUCOMTR-MCNC: 107 MG/DL — HIGH (ref 70–99)
GLUCOSE BLDC GLUCOMTR-MCNC: 156 MG/DL — HIGH (ref 70–99)
GLUCOSE BLDC GLUCOMTR-MCNC: 171 MG/DL — HIGH (ref 70–99)
GLUCOSE BLDC GLUCOMTR-MCNC: 177 MG/DL — HIGH (ref 70–99)
GLUCOSE SERPL-MCNC: 148 MG/DL — HIGH (ref 70–99)
HAPTOGLOB SERPL-MCNC: 140 MG/DL — SIGNIFICANT CHANGE UP (ref 34–200)
HCT VFR BLD CALC: 33.6 % — LOW (ref 34.5–45)
HGB BLD-MCNC: 10.8 G/DL — LOW (ref 11.5–15.5)
IMM GRANULOCYTES NFR BLD AUTO: 0.5 % — SIGNIFICANT CHANGE UP (ref 0–1.5)
IRON SATN MFR SERPL: 314 UG/DL — HIGH (ref 40–150)
IRON SATN MFR SERPL: 91 % — HIGH (ref 15–50)
LYMPHOCYTES # BLD AUTO: 0.92 K/UL — LOW (ref 1–3.3)
LYMPHOCYTES # BLD AUTO: 10.6 % — LOW (ref 13–44)
MAGNESIUM SERPL-MCNC: 1.9 MG/DL — SIGNIFICANT CHANGE UP (ref 1.6–2.6)
MCHC RBC-ENTMCNC: 27.4 PG — SIGNIFICANT CHANGE UP (ref 27–34)
MCHC RBC-ENTMCNC: 32.1 GM/DL — SIGNIFICANT CHANGE UP (ref 32–36)
MCV RBC AUTO: 85.3 FL — SIGNIFICANT CHANGE UP (ref 80–100)
MONOCYTES # BLD AUTO: 0.61 K/UL — SIGNIFICANT CHANGE UP (ref 0–0.9)
MONOCYTES NFR BLD AUTO: 7 % — SIGNIFICANT CHANGE UP (ref 2–14)
NEUTROPHILS # BLD AUTO: 6.93 K/UL — SIGNIFICANT CHANGE UP (ref 1.8–7.4)
NEUTROPHILS NFR BLD AUTO: 79.8 % — HIGH (ref 43–77)
NRBC # BLD: 0 /100 WBCS — SIGNIFICANT CHANGE UP (ref 0–0)
PHOSPHATE SERPL-MCNC: 4.1 MG/DL — SIGNIFICANT CHANGE UP (ref 2.5–4.5)
PLATELET # BLD AUTO: 448 K/UL — HIGH (ref 150–400)
POTASSIUM SERPL-MCNC: 3.8 MMOL/L — SIGNIFICANT CHANGE UP (ref 3.5–5.3)
POTASSIUM SERPL-SCNC: 3.8 MMOL/L — SIGNIFICANT CHANGE UP (ref 3.5–5.3)
PROT PATTERN SERPL ELPH-IMP: SIGNIFICANT CHANGE UP
PROT SERPL-MCNC: 6.1 G/DL — SIGNIFICANT CHANGE UP (ref 6–8.3)
PROT SERPL-MCNC: 6.1 G/DL — SIGNIFICANT CHANGE UP (ref 6–8.3)
PROT SERPL-MCNC: 7 G/DL — SIGNIFICANT CHANGE UP (ref 6–8.3)
RBC # BLD: 3.94 M/UL — SIGNIFICANT CHANGE UP (ref 3.8–5.2)
RBC # BLD: 3.94 M/UL — SIGNIFICANT CHANGE UP (ref 3.8–5.2)
RBC # FLD: 14.7 % — HIGH (ref 10.3–14.5)
RETICS #: 121 K/UL — SIGNIFICANT CHANGE UP (ref 25–125)
RETICS/RBC NFR: 3.1 % — HIGH (ref 0.5–2.5)
SODIUM SERPL-SCNC: 140 MMOL/L — SIGNIFICANT CHANGE UP (ref 135–145)
TIBC SERPL-MCNC: 345 UG/DL — SIGNIFICANT CHANGE UP (ref 250–450)
UIBC SERPL-MCNC: 31 UG/DL — LOW (ref 110–370)
WBC # BLD: 8.68 K/UL — SIGNIFICANT CHANGE UP (ref 3.8–10.5)
WBC # FLD AUTO: 8.68 K/UL — SIGNIFICANT CHANGE UP (ref 3.8–10.5)

## 2020-11-02 PROCEDURE — 99232 SBSQ HOSP IP/OBS MODERATE 35: CPT | Mod: GC

## 2020-11-02 PROCEDURE — 74174 CTA ABD&PLVS W/CONTRAST: CPT | Mod: 26

## 2020-11-02 RX ORDER — OLANZAPINE 15 MG/1
2.5 TABLET, FILM COATED ORAL ONCE
Refills: 0 | Status: COMPLETED | OUTPATIENT
Start: 2020-11-02 | End: 2020-11-02

## 2020-11-02 RX ORDER — SODIUM CHLORIDE 9 MG/ML
1000 INJECTION, SOLUTION INTRAVENOUS
Refills: 0 | Status: DISCONTINUED | OUTPATIENT
Start: 2020-11-02 | End: 2020-11-03

## 2020-11-02 RX ORDER — MULTIVIT WITH MIN/MFOLATE/K2 340-15/3 G
2 POWDER (GRAM) ORAL
Refills: 0 | Status: DISCONTINUED | OUTPATIENT
Start: 2020-11-02 | End: 2020-11-03

## 2020-11-02 RX ORDER — HALOPERIDOL DECANOATE 100 MG/ML
0.5 INJECTION INTRAMUSCULAR ONCE
Refills: 0 | Status: COMPLETED | OUTPATIENT
Start: 2020-11-02 | End: 2020-11-02

## 2020-11-02 RX ADMIN — PANTOPRAZOLE SODIUM 40 MILLIGRAM(S): 20 TABLET, DELAYED RELEASE ORAL at 05:58

## 2020-11-02 RX ADMIN — Medication 2 BOTTLE: at 23:37

## 2020-11-02 RX ADMIN — ATORVASTATIN CALCIUM 20 MILLIGRAM(S): 80 TABLET, FILM COATED ORAL at 21:48

## 2020-11-02 RX ADMIN — Medication 1 DROP(S): at 05:57

## 2020-11-02 RX ADMIN — Medication 1 DROP(S): at 17:51

## 2020-11-02 RX ADMIN — Medication 1: at 05:57

## 2020-11-02 RX ADMIN — OLANZAPINE 2.5 MILLIGRAM(S): 15 TABLET, FILM COATED ORAL at 23:35

## 2020-11-02 RX ADMIN — SODIUM CHLORIDE 125 MILLILITER(S): 9 INJECTION INTRAMUSCULAR; INTRAVENOUS; SUBCUTANEOUS at 04:17

## 2020-11-02 RX ADMIN — Medication 2 BOTTLE: at 17:51

## 2020-11-02 RX ADMIN — IRON SUCROSE 110 MILLIGRAM(S): 20 INJECTION, SOLUTION INTRAVENOUS at 12:02

## 2020-11-02 RX ADMIN — Medication 1: at 12:02

## 2020-11-02 RX ADMIN — QUETIAPINE FUMARATE 25 MILLIGRAM(S): 200 TABLET, FILM COATED ORAL at 21:48

## 2020-11-02 RX ADMIN — SODIUM CHLORIDE 70 MILLILITER(S): 9 INJECTION, SOLUTION INTRAVENOUS at 21:53

## 2020-11-02 RX ADMIN — LATANOPROST 1 DROP(S): 0.05 SOLUTION/ DROPS OPHTHALMIC; TOPICAL at 21:48

## 2020-11-02 RX ADMIN — HALOPERIDOL DECANOATE 0.5 MILLIGRAM(S): 100 INJECTION INTRAMUSCULAR at 15:45

## 2020-11-02 NOTE — PROGRESS NOTE ADULT - PROBLEM SELECTOR PLAN 3
Pt has PMH of dementia  Baseline AAOX2  Home meds- Quitapine 25mg  c/w home meds Pt has PMH of dementia  Baseline AAOX2  Home meds- Quetapine 25mg QD, Donepezil 5mg QD  c/w home meds Pt has PMH of dementia  Baseline AAOx2  Home meds- Quetapine 25mg QD, Donepezil 5mg QD  c/w home meds Pt has PMH of HTN   /54  Pt on Carvedilol 3.125mg BID, Amlodipine 10mg QD, Enalapril 2.5mg QD  Monitor vitals  - BP stable

## 2020-11-02 NOTE — PROGRESS NOTE ADULT - PROBLEM SELECTOR PLAN 4
Pt has PMH of DM     Home meds- Janumet   A1C from 10/5/2020- 7.1  c/w HSS  Monitor FS   NPO Pt has PMH of DM     Home meds- Janumet 100-1000mg QD, Lipitor 20mg QD  A1C from 11/1/2020- 6.2  c/w HSS  Monitor FS   NPO Pt has PMH of DM    (11/2)  Home meds- Janumet 100-1000mg QD, Lipitor 20mg QD  A1C from 11/1/2020 - 6.2  c/w HSS  Monitor FS   NPO Pt has PMH of dementia  Baseline AAOx2  Home meds- Quetapine 25mg QD, Donepezil 5mg QD  c/w home meds  - Haldol PRN for agitation or anxiety. QTc stable

## 2020-11-02 NOTE — PROGRESS NOTE ADULT - SUBJECTIVE AND OBJECTIVE BOX
PGY-1 Progress Note discussed with attending    PAGER #: [[phone]] TILL 5:00 PM  PLEASE CONTACT ON CALL TEAM:   - On Call Team (Please refer to [place]) FROM 5:00 PM - 8:30PM  - Nightfloat Team FROM 8:30 -7:30 AM    CHIEF COMPLAINT & BRIEF HOSPITAL COURSE:      INTERVAL HPI/OVERNIGHT EVENTS:       REVIEW OF SYSTEMS:  CONSTITUTIONAL: No fever, weight loss, or fatigue  RESPIRATORY: No cough, wheezing, chills or hemoptysis; No shortness of breath  CARDIOVASCULAR: No chest pain, palpitations, dizziness, or leg swelling  GASTROINTESTINAL: No abdominal pain. No nausea, vomiting, or hematemesis; No diarrhea or constipation. No melena or hematochezia.  GENITOURINARY: No dysuria or hematuria, urinary frequency  NEUROLOGICAL: No headaches, memory loss, loss of strength, numbness, or tremors  SKIN: No itching, burning, rashes, or lesions     MEDICATIONS  (STANDING):  artificial  tears Solution 1 Drop(s) Both EYES two times a day  ascorbic acid 500 milliGRAM(s) Oral daily  atorvastatin 20 milliGRAM(s) Oral at bedtime  dextrose 5% + sodium chloride 0.9%. 1000 milliLiter(s) (70 mL/Hr) IV Continuous <Continuous>  insulin lispro (ADMELOG) corrective regimen sliding scale   SubCutaneous every 6 hours  iron sucrose IVPB 200 milliGRAM(s) IV Intermittent every 24 hours  latanoprost 0.005% Ophthalmic Solution 1 Drop(s) Both EYES at bedtime  pantoprazole    Tablet 40 milliGRAM(s) Oral before breakfast  QUEtiapine 25 milliGRAM(s) Oral at bedtime  timolol 0.25% Solution 1 Drop(s) Both EYES two times a day    MEDICATIONS  (PRN):  haloperidol    Injectable 0.5 milliGRAM(s) IV Push once PRN ct scan      Vital Signs Last 24 Hrs  T(C): 36.6 (02 Nov 2020 07:40), Max: 37.2 (01 Nov 2020 15:58)  T(F): 97.9 (02 Nov 2020 07:40), Max: 99 (01 Nov 2020 15:58)  HR: 89 (02 Nov 2020 07:40) (89 - 109)  BP: 148/54 (02 Nov 2020 07:40) (106/74 - 155/58)  BP(mean): --  RR: 18 (02 Nov 2020 07:40) (17 - 18)  SpO2: 97% (02 Nov 2020 07:40) (97% - 98%)    PHYSICAL EXAMINATION:  GENERAL: NAD, well built  HEAD:  Atraumatic, Normocephalic  EYES:  conjunctiva and sclera clear  NECK: Supple, No JVD, Normal thyroid  CHEST/LUNG: Clear to auscultation. Clear to percussion bilaterally; No rales, rhonchi, wheezing, or rubs  HEART: Regular rate and rhythm; No murmurs, rubs, or gallops  ABDOMEN: Soft, Nontender, Nondistended; Bowel sounds present  NERVOUS SYSTEM:  Alert & Oriented X3,    EXTREMITIES:  2+ Peripheral Pulses, No clubbing, cyanosis, or edema  SKIN: warm dry                          10.8   8.68  )-----------( 448      ( 02 Nov 2020 07:23 )             33.6     11-02    140  |  106  |  9   ----------------------------<  148<H>  3.8   |  24  |  0.68    Ca    9.1      02 Nov 2020 07:23  Phos  4.1     11-02  Mg     1.9     11-02    TPro  7.0  /  Alb  3.1<L>  /  TBili  0.7  /  DBili  x   /  AST  21  /  ALT  33  /  AlkPhos  98  11-02    LIVER FUNCTIONS - ( 02 Nov 2020 07:23 )  Alb: 3.1 g/dL / Pro: 7.0 g/dL / ALK PHOS: 98 U/L / ALT: 33 U/L DA / AST: 21 U/L / GGT: x           CARDIAC MARKERS ( 01 Nov 2020 07:06 )  <0.015 ng/mL / x     / x     / x     / x          PT/INR - ( 31 Oct 2020 18:51 )   PT: 11.1 sec;   INR: 0.93 ratio         PTT - ( 31 Oct 2020 18:51 )  PTT:30.1 sec        I&O's Summary    01 Nov 2020 07:01  -  02 Nov 2020 07:00  --------------------------------------------------------  IN: 0 mL / OUT: 2375 mL / NET: -2375 mL          CAPILLARY BLOOD GLUCOSE  CAPILLARY BLOOD GLUCOSE      POCT Blood Glucose.: 171 mg/dL (02 Nov 2020 08:09)    CAPILLARY BLOOD GLUCOSE      POCT Blood Glucose.: 171 mg/dL (02 Nov 2020 08:09)  POCT Blood Glucose.: 156 mg/dL (02 Nov 2020 05:52)  POCT Blood Glucose.: 176 mg/dL (01 Nov 2020 23:48)  POCT Blood Glucose.: 126 mg/dL (01 Nov 2020 17:13)  POCT Blood Glucose.: 146 mg/dL (01 Nov 2020 11:43)      RADIOLOGY & ADDITIONAL TESTS:                   PGY-1 Progress Note discussed with attending    PAGER #: [[phone]] TILL 5:00 PM  PLEASE CONTACT ON CALL TEAM:   - On Call Team (Please refer to [place]) FROM 5:00 PM - 8:30PM  - Nightfloat Team FROM 8:30 -7:30 AM    CHIEF COMPLAINT & BRIEF HOSPITAL COURSE:  80 year old woman, from home, lives with son, walks with walker, Chronic mckeon ( placed on recent admission)  with PMH of DM2, HTN, HLD, dementia, recent diagnosis with PE (on xarelto) admitted for low Hb. Pt had a follow up appointment with her PMD and was informed about low Hb and recommended to get further evaluation. Pt is AAOX2, mildy in distress because of the Mckeon, able ot participate in discussion, but history limited due to Dementia.  Pt has no symptoms to endorse besides  increased fatigue that have limited her Ambulation and she spends more time laying on bed. Of note pt had fall risks and was admitted about 10 days ago post fall and was admitted to ICU for PE.  During that admission, her hgb was low (6.9) and received 1 unit of pRBCs  Pt was made DNR with trial of intubation on that admission.  Pt denies any fever, bloody BM, Hemetemesis, abd pain, N/V/D, chest pain, SOB any sick contact.  Pt denies any smoking, alcohol or any form of substance abuse.     In the ED,   Pt is AAOX2, Mildy agitated due to the mckeon   Vitals- 126/57, Hr 90, afebrile   Hb 6.3, FOBT+  s/p 1 unit of prbc in ed  BS- 203   CT head- no acute changes   *****    INTERVAL HPI/OVERNIGHT EVENTS:       REVIEW OF SYSTEMS:  CONSTITUTIONAL: No fever, weight loss, or fatigue  RESPIRATORY: No cough, wheezing, chills or hemoptysis; No shortness of breath  CARDIOVASCULAR: No chest pain, palpitations, dizziness, or leg swelling  GASTROINTESTINAL: No abdominal pain. No nausea, vomiting, or hematemesis; No diarrhea or constipation. No melena or hematochezia.  GENITOURINARY: No dysuria or hematuria, urinary frequency  NEUROLOGICAL: No headaches, memory loss, loss of strength, numbness, or tremors  SKIN: No itching, burning, rashes, or lesions     MEDICATIONS  (STANDING):  artificial  tears Solution 1 Drop(s) Both EYES two times a day  ascorbic acid 500 milliGRAM(s) Oral daily  atorvastatin 20 milliGRAM(s) Oral at bedtime  dextrose 5% + sodium chloride 0.9%. 1000 milliLiter(s) (70 mL/Hr) IV Continuous <Continuous>  insulin lispro (ADMELOG) corrective regimen sliding scale   SubCutaneous every 6 hours  iron sucrose IVPB 200 milliGRAM(s) IV Intermittent every 24 hours  latanoprost 0.005% Ophthalmic Solution 1 Drop(s) Both EYES at bedtime  pantoprazole    Tablet 40 milliGRAM(s) Oral before breakfast  QUEtiapine 25 milliGRAM(s) Oral at bedtime  timolol 0.25% Solution 1 Drop(s) Both EYES two times a day    MEDICATIONS  (PRN):  haloperidol    Injectable 0.5 milliGRAM(s) IV Push once PRN ct scan      Vital Signs Last 24 Hrs  T(C): 36.6 (02 Nov 2020 07:40), Max: 37.2 (01 Nov 2020 15:58)  T(F): 97.9 (02 Nov 2020 07:40), Max: 99 (01 Nov 2020 15:58)  HR: 89 (02 Nov 2020 07:40) (89 - 109)  BP: 148/54 (02 Nov 2020 07:40) (106/74 - 155/58)  BP(mean): --  RR: 18 (02 Nov 2020 07:40) (17 - 18)  SpO2: 97% (02 Nov 2020 07:40) (97% - 98%)    PHYSICAL EXAMINATION:  GENERAL: NAD, well built  HEAD:  Atraumatic, Normocephalic  EYES:  conjunctiva and sclera clear  NECK: Supple, No JVD, Normal thyroid  CHEST/LUNG: Clear to auscultation. Clear to percussion bilaterally; No rales, rhonchi, wheezing, or rubs  HEART: Regular rate and rhythm; No murmurs, rubs, or gallops  ABDOMEN: Soft, Nontender, Nondistended; Bowel sounds present  NERVOUS SYSTEM:  Alert & Oriented X3,    EXTREMITIES:  2+ Peripheral Pulses, No clubbing, cyanosis, or edema  SKIN: warm dry                          10.8   8.68  )-----------( 448      ( 02 Nov 2020 07:23 )             33.6     11-02    140  |  106  |  9   ----------------------------<  148<H>  3.8   |  24  |  0.68    Ca    9.1      02 Nov 2020 07:23  Phos  4.1     11-02  Mg     1.9     11-02    TPro  7.0  /  Alb  3.1<L>  /  TBili  0.7  /  DBili  x   /  AST  21  /  ALT  33  /  AlkPhos  98  11-02    LIVER FUNCTIONS - ( 02 Nov 2020 07:23 )  Alb: 3.1 g/dL / Pro: 7.0 g/dL / ALK PHOS: 98 U/L / ALT: 33 U/L DA / AST: 21 U/L / GGT: x           CARDIAC MARKERS ( 01 Nov 2020 07:06 )  <0.015 ng/mL / x     / x     / x     / x          PT/INR - ( 31 Oct 2020 18:51 )   PT: 11.1 sec;   INR: 0.93 ratio         PTT - ( 31 Oct 2020 18:51 )  PTT:30.1 sec        I&O's Summary    01 Nov 2020 07:01  -  02 Nov 2020 07:00  --------------------------------------------------------  IN: 0 mL / OUT: 2375 mL / NET: -2375 mL          CAPILLARY BLOOD GLUCOSE  CAPILLARY BLOOD GLUCOSE      POCT Blood Glucose.: 171 mg/dL (02 Nov 2020 08:09)    CAPILLARY BLOOD GLUCOSE      POCT Blood Glucose.: 171 mg/dL (02 Nov 2020 08:09)  POCT Blood Glucose.: 156 mg/dL (02 Nov 2020 05:52)  POCT Blood Glucose.: 176 mg/dL (01 Nov 2020 23:48)  POCT Blood Glucose.: 126 mg/dL (01 Nov 2020 17:13)  POCT Blood Glucose.: 146 mg/dL (01 Nov 2020 11:43)      RADIOLOGY & ADDITIONAL TESTS:                   PGY-1 Progress Note discussed with attending    PAGER #: [[phone]] TILL 5:00 PM  PLEASE CONTACT ON CALL TEAM:   - On Call Team (Please refer to [place]) FROM 5:00 PM - 8:30PM  - Nightfloat Team FROM 8:30 -7:30 AM    CHIEF COMPLAINT & BRIEF HOSPITAL COURSE:  80 year old woman from home, lives with son, walks with walker, chronic mckeon (placed on recent admission) with PMH of DM2, HTN, HLD, dementia, recent diagnosis with PE (on xarelto) admitted for low Hb. Pt is AAOX2, mildly in distress because of the Mckeon, able to participate in discussion, but history limited due to Dementia.  Pt has no symptoms to endorse besides increased fatigue that have limited her ambulation and she spends more time laying on bed. Of note pt had fall risks and was admitted about 10 days ago post fall and was admitted to ICU for PE. During that admission, her hgb was low (6.9) and received 1 unit of pRBCs. Pt was made DNR with trial of intubation on that admission.     In the ED,   Pt is AAOX2, Mildly agitated due to the mckeon   Hb 6.3, FOBT+  s/p 1 unit of prbc in ed   CT head (10/31) - no acute changes       *****    INTERVAL HPI/OVERNIGHT EVENTS:       REVIEW OF SYSTEMS:  CONSTITUTIONAL: No fever, weight loss, or fatigue  RESPIRATORY: No cough, wheezing, chills or hemoptysis; No shortness of breath  CARDIOVASCULAR: No chest pain, palpitations, dizziness, or leg swelling  GASTROINTESTINAL: No abdominal pain. No nausea, vomiting, or hematemesis; No diarrhea or constipation. No melena or hematochezia.  GENITOURINARY: No dysuria or hematuria, urinary frequency  NEUROLOGICAL: No headaches, memory loss, loss of strength, numbness, or tremors  SKIN: No itching, burning, rashes, or lesions     MEDICATIONS  (STANDING):  artificial  tears Solution 1 Drop(s) Both EYES two times a day  ascorbic acid 500 milliGRAM(s) Oral daily  atorvastatin 20 milliGRAM(s) Oral at bedtime  dextrose 5% + sodium chloride 0.9%. 1000 milliLiter(s) (70 mL/Hr) IV Continuous <Continuous>  insulin lispro (ADMELOG) corrective regimen sliding scale   SubCutaneous every 6 hours  iron sucrose IVPB 200 milliGRAM(s) IV Intermittent every 24 hours  latanoprost 0.005% Ophthalmic Solution 1 Drop(s) Both EYES at bedtime  pantoprazole    Tablet 40 milliGRAM(s) Oral before breakfast  QUEtiapine 25 milliGRAM(s) Oral at bedtime  timolol 0.25% Solution 1 Drop(s) Both EYES two times a day    MEDICATIONS  (PRN):  haloperidol    Injectable 0.5 milliGRAM(s) IV Push once PRN ct scan      Vital Signs Last 24 Hrs  T(C): 36.6 (02 Nov 2020 07:40), Max: 37.2 (01 Nov 2020 15:58)  T(F): 97.9 (02 Nov 2020 07:40), Max: 99 (01 Nov 2020 15:58)  HR: 89 (02 Nov 2020 07:40) (89 - 109)  BP: 148/54 (02 Nov 2020 07:40) (106/74 - 155/58)  BP(mean): --  RR: 18 (02 Nov 2020 07:40) (17 - 18)  SpO2: 97% (02 Nov 2020 07:40) (97% - 98%)    PHYSICAL EXAMINATION:  GENERAL: NAD, well built  HEAD:  Atraumatic, Normocephalic  EYES:  conjunctiva and sclera clear  NECK: Supple, No JVD, Normal thyroid  CHEST/LUNG: Clear to auscultation. Clear to percussion bilaterally; No rales, rhonchi, wheezing, or rubs  HEART: Regular rate and rhythm; No murmurs, rubs, or gallops  ABDOMEN: Soft, Nontender, Nondistended; Bowel sounds present  NERVOUS SYSTEM:  Alert & Oriented X3,    EXTREMITIES:  2+ Peripheral Pulses, No clubbing, cyanosis, or edema  SKIN: warm dry                          10.8   8.68  )-----------( 448      ( 02 Nov 2020 07:23 )             33.6     11-02    140  |  106  |  9   ----------------------------<  148<H>  3.8   |  24  |  0.68    Ca    9.1      02 Nov 2020 07:23  Phos  4.1     11-02  Mg     1.9     11-02    TPro  7.0  /  Alb  3.1<L>  /  TBili  0.7  /  DBili  x   /  AST  21  /  ALT  33  /  AlkPhos  98  11-02    LIVER FUNCTIONS - ( 02 Nov 2020 07:23 )  Alb: 3.1 g/dL / Pro: 7.0 g/dL / ALK PHOS: 98 U/L / ALT: 33 U/L DA / AST: 21 U/L / GGT: x           CARDIAC MARKERS ( 01 Nov 2020 07:06 )  <0.015 ng/mL / x     / x     / x     / x          PT/INR - ( 31 Oct 2020 18:51 )   PT: 11.1 sec;   INR: 0.93 ratio         PTT - ( 31 Oct 2020 18:51 )  PTT:30.1 sec        I&O's Summary    01 Nov 2020 07:01  -  02 Nov 2020 07:00  --------------------------------------------------------  IN: 0 mL / OUT: 2375 mL / NET: -2375 mL          CAPILLARY BLOOD GLUCOSE  CAPILLARY BLOOD GLUCOSE      POCT Blood Glucose.: 171 mg/dL (02 Nov 2020 08:09)    CAPILLARY BLOOD GLUCOSE      POCT Blood Glucose.: 171 mg/dL (02 Nov 2020 08:09)  POCT Blood Glucose.: 156 mg/dL (02 Nov 2020 05:52)  POCT Blood Glucose.: 176 mg/dL (01 Nov 2020 23:48)  POCT Blood Glucose.: 126 mg/dL (01 Nov 2020 17:13)  POCT Blood Glucose.: 146 mg/dL (01 Nov 2020 11:43)      RADIOLOGY & ADDITIONAL TESTS:                   PGY-1 Progress Note discussed with attending    PAGER #: [[phone]] TILL 5:00 PM  PLEASE CONTACT ON CALL TEAM:   - On Call Team (Please refer to [place]) FROM 5:00 PM - 8:30PM  - Nightfloat Team FROM 8:30 -7:30 AM    CHIEF COMPLAINT & BRIEF HOSPITAL COURSE:  80 year old woman from home, lives with son, walks with walker, chronic mckeon (placed on recent admission) with PMH of DM2, HTN, HLD, dementia, recent diagnosis with PE (on xarelto) admitted for low Hb. Pt is AAOX2, mildly in distress because of the Mckeon, able to participate in discussion, but history limited due to Dementia.  Pt has no symptoms to endorse besides increased fatigue that have limited her ambulation and she spends more time laying on bed. Of note, pt was admitted on 10/19 post fall and was admitted to ICU for PE. During that admission, her hgb was low (6.9) and received 1 unit of pRBCs. Pt was made DNR with trial of intubation on that admission.     In the ED,   Pt is AAOX2, Mildly agitated due to the mckeon   Hb 6.3, FOBT+  s/p 1 unit of prbc in ed   CT head (10/31) - no acute changes     *****    INTERVAL HPI/OVERNIGHT EVENTS:   Pt was agitated overnight and pulled out her IVs twice, resulting in placement of restraints. Her agitation continued through this AM and pulled out her Mckeon. Patient examined at bedside and was AAOx2,       REVIEW OF SYSTEMS:  CONSTITUTIONAL: No fever, weight loss, or fatigue  RESPIRATORY: No cough, wheezing, chills or hemoptysis; No shortness of breath  CARDIOVASCULAR: No chest pain, palpitations, dizziness, or leg swelling  GASTROINTESTINAL: No abdominal pain. No nausea, vomiting, or hematemesis; No diarrhea or constipation. No melena or hematochezia.  GENITOURINARY: No dysuria or hematuria, urinary frequency  NEUROLOGICAL: No headaches, memory loss, loss of strength, numbness, or tremors  SKIN: No itching, burning, rashes, or lesions     MEDICATIONS  (STANDING):  artificial  tears Solution 1 Drop(s) Both EYES two times a day  ascorbic acid 500 milliGRAM(s) Oral daily  atorvastatin 20 milliGRAM(s) Oral at bedtime  dextrose 5% + sodium chloride 0.9%. 1000 milliLiter(s) (70 mL/Hr) IV Continuous <Continuous>  insulin lispro (ADMELOG) corrective regimen sliding scale   SubCutaneous every 6 hours  iron sucrose IVPB 200 milliGRAM(s) IV Intermittent every 24 hours  latanoprost 0.005% Ophthalmic Solution 1 Drop(s) Both EYES at bedtime  pantoprazole    Tablet 40 milliGRAM(s) Oral before breakfast  QUEtiapine 25 milliGRAM(s) Oral at bedtime  timolol 0.25% Solution 1 Drop(s) Both EYES two times a day    MEDICATIONS  (PRN):  haloperidol    Injectable 0.5 milliGRAM(s) IV Push once PRN ct scan      Vital Signs Last 24 Hrs  T(C): 36.6 (02 Nov 2020 07:40), Max: 37.2 (01 Nov 2020 15:58)  T(F): 97.9 (02 Nov 2020 07:40), Max: 99 (01 Nov 2020 15:58)  HR: 89 (02 Nov 2020 07:40) (89 - 109)  BP: 148/54 (02 Nov 2020 07:40) (106/74 - 155/58)  BP(mean): --  RR: 18 (02 Nov 2020 07:40) (17 - 18)  SpO2: 97% (02 Nov 2020 07:40) (97% - 98%)    PHYSICAL EXAMINATION:  GENERAL: NAD, well built  HEAD:  Atraumatic, Normocephalic  EYES:  conjunctiva and sclera clear  NECK: Supple, No JVD, Normal thyroid  CHEST/LUNG: Clear to auscultation. Clear to percussion bilaterally; No rales, rhonchi, wheezing, or rubs  HEART: Regular rate and rhythm; No murmurs, rubs, or gallops  ABDOMEN: Soft, Nontender, Nondistended; Bowel sounds present  NERVOUS SYSTEM:  Alert & Oriented X3,    EXTREMITIES:  2+ Peripheral Pulses, No clubbing, cyanosis, or edema  SKIN: warm dry                          10.8   8.68  )-----------( 448      ( 02 Nov 2020 07:23 )             33.6     11-02    140  |  106  |  9   ----------------------------<  148<H>  3.8   |  24  |  0.68    Ca    9.1      02 Nov 2020 07:23  Phos  4.1     11-02  Mg     1.9     11-02    TPro  7.0  /  Alb  3.1<L>  /  TBili  0.7  /  DBili  x   /  AST  21  /  ALT  33  /  AlkPhos  98  11-02    LIVER FUNCTIONS - ( 02 Nov 2020 07:23 )  Alb: 3.1 g/dL / Pro: 7.0 g/dL / ALK PHOS: 98 U/L / ALT: 33 U/L DA / AST: 21 U/L / GGT: x           CARDIAC MARKERS ( 01 Nov 2020 07:06 )  <0.015 ng/mL / x     / x     / x     / x          PT/INR - ( 31 Oct 2020 18:51 )   PT: 11.1 sec;   INR: 0.93 ratio         PTT - ( 31 Oct 2020 18:51 )  PTT:30.1 sec        I&O's Summary    01 Nov 2020 07:01  -  02 Nov 2020 07:00  --------------------------------------------------------  IN: 0 mL / OUT: 2375 mL / NET: -2375 mL          CAPILLARY BLOOD GLUCOSE  CAPILLARY BLOOD GLUCOSE      POCT Blood Glucose.: 171 mg/dL (02 Nov 2020 08:09)    CAPILLARY BLOOD GLUCOSE      POCT Blood Glucose.: 171 mg/dL (02 Nov 2020 08:09)  POCT Blood Glucose.: 156 mg/dL (02 Nov 2020 05:52)  POCT Blood Glucose.: 176 mg/dL (01 Nov 2020 23:48)  POCT Blood Glucose.: 126 mg/dL (01 Nov 2020 17:13)  POCT Blood Glucose.: 146 mg/dL (01 Nov 2020 11:43)      RADIOLOGY & ADDITIONAL TESTS:                   PGY-1 Progress Note discussed with attending    PAGER #: [[phone]] TILL 5:00 PM  PLEASE CONTACT ON CALL TEAM:   - On Call Team (Please refer to [place]) FROM 5:00 PM - 8:30PM  - Nightfloat Team FROM 8:30 -7:30 AM    CHIEF COMPLAINT & BRIEF HOSPITAL COURSE:  80 year old woman from home, lives with son, walks with walker, chronic mckeon (placed on recent admission) with PMH of DM2, HTN, HLD, dementia, recent diagnosis with PE (on xarelto) admitted for weakness 2/2 to low Hb. Pt is AAOX2, mildly in distress because of the Mckeon, able to participate in discussion, but history limited due to Dementia.  Pt has no symptoms to endorse besides increased fatigue that have limited her ambulation and she spends more time laying on bed. Of note, pt was admitted on 10/19 post fall and was admitted to ICU for PE. During that admission, her hgb was low (6.9) and received 1 unit of pRBCs. Pt was made DNR with trial of intubation on that admission.     In the ED,   Pt is AAOx2, Mildly agitated due to the mckeon   Vitals- 126/57, Hr 90, afebrile   Hb 6.3, FOBT+  s/p 1 unit of prbc in ed  BS- 203   CT head- no acute changes     F/u CBC this AM showed uptrending values compared to yesterday: Hb (10.8 from 10.3), Hct (33.6 from 31.5), RBC (3.94 from 3.73). CT abd scheduled for today, GI f/u Dr. Orourke.    *****    INTERVAL HPI/OVERNIGHT EVENTS:   Pt was agitated overnight and pulled out her IVs twice, resulting in placement of restraints. Her agitation continued through this AM as she pulled out her Mckeon. Patient examined at bedside and was AAOx2, no SOB or weakness.      REVIEW OF SYSTEMS:  CONSTITUTIONAL: No fever, weight loss, or fatigue  RESPIRATORY: No cough, wheezing, chills or hemoptysis; No shortness of breath  CARDIOVASCULAR: No chest pain, palpitations, dizziness, or leg swelling  GASTROINTESTINAL: No abdominal pain. No nausea, vomiting, or hematemesis; No diarrhea or constipation. No melena or hematochezia.  GENITOURINARY: No dysuria or hematuria, urinary frequency  NEUROLOGICAL: No headaches, memory loss, loss of strength, numbness, or tremors  SKIN: No itching, burning, rashes, or lesions     MEDICATIONS  (STANDING):  artificial  tears Solution 1 Drop(s) Both EYES two times a day  ascorbic acid 500 milliGRAM(s) Oral daily  atorvastatin 20 milliGRAM(s) Oral at bedtime  dextrose 5% + sodium chloride 0.9%. 1000 milliLiter(s) (70 mL/Hr) IV Continuous <Continuous>  insulin lispro (ADMELOG) corrective regimen sliding scale   SubCutaneous every 6 hours  iron sucrose IVPB 200 milliGRAM(s) IV Intermittent every 24 hours  latanoprost 0.005% Ophthalmic Solution 1 Drop(s) Both EYES at bedtime  pantoprazole    Tablet 40 milliGRAM(s) Oral before breakfast  QUEtiapine 25 milliGRAM(s) Oral at bedtime  timolol 0.25% Solution 1 Drop(s) Both EYES two times a day    MEDICATIONS  (PRN):  haloperidol    Injectable 0.5 milliGRAM(s) IV Push once PRN ct scan      Vital Signs Last 24 Hrs  T(C): 36.6 (02 Nov 2020 07:40), Max: 37.2 (01 Nov 2020 15:58)  T(F): 97.9 (02 Nov 2020 07:40), Max: 99 (01 Nov 2020 15:58)  HR: 89 (02 Nov 2020 07:40) (89 - 109)  BP: 148/54 (02 Nov 2020 07:40) (106/74 - 155/58)  BP(mean): --  RR: 18 (02 Nov 2020 07:40) (17 - 18)  SpO2: 97% (02 Nov 2020 07:40) (97% - 98%)    PHYSICAL EXAMINATION:  GENERAL: NAD, well built  HEAD:  Atraumatic, Normocephalic  EYES:  conjunctiva and sclera clear  NECK: Supple, No JVD, Normal thyroid  CHEST/LUNG: Clear to auscultation. Clear to percussion bilaterally; No rales, rhonchi, wheezing, or rubs  HEART: Regular rate and rhythm; No murmurs, rubs, or gallops  ABDOMEN: Soft, Nontender, Nondistended; Bowel sounds present  NERVOUS SYSTEM:  Alert & Oriented X3,    EXTREMITIES:  2+ Peripheral Pulses, No clubbing, cyanosis, or edema  SKIN: warm dry                          10.8 <L>   8.68  )-----------( 448 <H> ( 02 Nov 2020 07:23 )             33.6 <L>    11-02    140  |  106  |  9   ----------------------------<  148<H>  3.8   |  24  |  0.68    Ca     9.1      02 Nov 2020 07:23  Phos  4.1      02 Nov 2020 07:23  Mg    1.9     02 Nov 2020 07:23    TPro  7.0  /  Alb  3.1<L>  /  TBili  0.7  /  DBili  x   /  AST  21  /  ALT  33  /  AlkPhos  98  11-02    LIVER FUNCTIONS - ( 02 Nov 2020 07:23 )  Alb: 3.1 g/dL / Pro: 7.0 g/dL / ALK PHOS: 98 U/L / ALT: 33 U/L DA / AST: 21 U/L / GGT: x           CARDIAC MARKERS ( 01 Nov 2020 07:06 )  <0.015 ng/mL / x     / x     / x     / x          PT/INR - ( 31 Oct 2020 18:51 )   PT: 11.1 sec;   INR: 0.93 ratio         PTT - ( 31 Oct 2020 18:51 )  PTT:30.1 sec        I&O's Summary    01 Nov 2020 07:01  -  02 Nov 2020 07:00  --------------------------------------------------------  IN: 0 mL / OUT: 2375 mL / NET: -2375 mL    CAPILLARY BLOOD GLUCOSE    POCT Blood Glucose.: 171 mg/dL (02 Nov 2020 08:09)  POCT Blood Glucose.: 156 mg/dL (02 Nov 2020 05:52)  POCT Blood Glucose.: 176 mg/dL (01 Nov 2020 23:48)  POCT Blood Glucose.: 126 mg/dL (01 Nov 2020 17:13)  POCT Blood Glucose.: 146 mg/dL (01 Nov 2020 11:43)      RADIOLOGY & ADDITIONAL TESTS:                   PGY-1 Progress Note discussed with attending    PAGER #: [[phone]] TILL 5:00 PM  PLEASE CONTACT ON CALL TEAM:   - On Call Team (Please refer to [place]) FROM 5:00 PM - 8:30PM  - Nightfloat Team FROM 8:30 -7:30 AM    CHIEF COMPLAINT & BRIEF HOSPITAL COURSE:  80 year old woman from home, lives with son, walks with walker, chronic mckeon (placed on recent admission) with PMH of DM2, HTN, HLD, dementia, recent diagnosis with PE (on xarelto) admitted for weakness 2/2 to low Hb. Pt is AAOX2, mildly in distress because of the Mckeon, able to participate in discussion, but history limited due to Dementia.  Pt has no symptoms to endorse besides increased fatigue that have limited her ambulation and she spends more time laying on bed. Of note, pt was admitted on 10/19 post fall and was admitted to ICU for PE. During that admission, her hgb was low (6.9) and received 1 unit of pRBCs. Pt was made DNR with trial of intubation on that admission.     In the ED,   Pt is AAOx2, Mildly agitated due to the mckeon   Vitals- 126/57, Hr 90, afebrile   Hb 6.3, FOBT+  s/p 1 unit of prbc in ed  BS- 203   CT head- no acute changes     F/u CBC this AM showed uptrending values compared to yesterday: Hb (10.8 from 10.3), Hct (33.6 from 31.5), RBC (3.94 from 3.73).    *****    INTERVAL HPI/OVERNIGHT EVENTS:   Pt was agitated overnight and pulled out her IVs twice, resulting in placement of restraints. Her agitation continued through this AM as she pulled out her Mckeon. Patient examined at bedside and was AAOx2, no SOB or weakness. CT abd scheduled for today, GI f/u Dr. Orourke.      REVIEW OF SYSTEMS:  CONSTITUTIONAL: No fever, weight loss, or fatigue  RESPIRATORY: No cough, wheezing, chills or hemoptysis; No shortness of breath  CARDIOVASCULAR: No chest pain, palpitations, dizziness, or leg swelling  GASTROINTESTINAL: No abdominal pain. No nausea, vomiting, or hematemesis; No diarrhea or constipation. No melena or hematochezia.  GENITOURINARY: No dysuria or hematuria, urinary frequency  NEUROLOGICAL: No headaches, memory loss, loss of strength, numbness, or tremors  SKIN: No itching, burning, rashes, or lesions     MEDICATIONS  (STANDING):  artificial  tears Solution 1 Drop(s) Both EYES two times a day  ascorbic acid 500 milliGRAM(s) Oral daily  atorvastatin 20 milliGRAM(s) Oral at bedtime  dextrose 5% + sodium chloride 0.9%. 1000 milliLiter(s) (70 mL/Hr) IV Continuous <Continuous>  insulin lispro (ADMELOG) corrective regimen sliding scale   SubCutaneous every 6 hours  iron sucrose IVPB 200 milliGRAM(s) IV Intermittent every 24 hours  latanoprost 0.005% Ophthalmic Solution 1 Drop(s) Both EYES at bedtime  pantoprazole    Tablet 40 milliGRAM(s) Oral before breakfast  QUEtiapine 25 milliGRAM(s) Oral at bedtime  timolol 0.25% Solution 1 Drop(s) Both EYES two times a day    MEDICATIONS  (PRN):  haloperidol    Injectable 0.5 milliGRAM(s) IV Push once PRN ct scan      Vital Signs Last 24 Hrs  T(C): 36.6 (02 Nov 2020 07:40), Max: 37.2 (01 Nov 2020 15:58)  T(F): 97.9 (02 Nov 2020 07:40), Max: 99 (01 Nov 2020 15:58)  HR: 89 (02 Nov 2020 07:40) (89 - 109)  BP: 148/54 (02 Nov 2020 07:40) (106/74 - 155/58)  BP(mean): --  RR: 18 (02 Nov 2020 07:40) (17 - 18)  SpO2: 97% (02 Nov 2020 07:40) (97% - 98%)    PHYSICAL EXAMINATION:  GENERAL: NAD, well built  HEAD:  Atraumatic, Normocephalic  EYES:  conjunctiva and sclera clear  NECK: Supple, No JVD, Normal thyroid  CHEST/LUNG: Clear to auscultation. Clear to percussion bilaterally; No rales, rhonchi, wheezing, or rubs  HEART: Regular rate and rhythm; No murmurs, rubs, or gallops  ABDOMEN: Soft, Nontender, Nondistended; Bowel sounds present  NERVOUS SYSTEM:  Alert & Oriented X3,    EXTREMITIES:  2+ Peripheral Pulses, No clubbing, cyanosis, or edema  SKIN: warm dry                          10.8 <L>   8.68  )-----------( 448 <H> ( 02 Nov 2020 07:23 )             33.6 <L>    11-02    140  |  106  |  9   ----------------------------<  148<H>  3.8   |  24  |  0.68    Ca     9.1      02 Nov 2020 07:23  Phos  4.1      02 Nov 2020 07:23  Mg    1.9     02 Nov 2020 07:23    TPro  7.0  /  Alb  3.1<L>  /  TBili  0.7  /  DBili  x   /  AST  21  /  ALT  33  /  AlkPhos  98  11-02    LIVER FUNCTIONS - ( 02 Nov 2020 07:23 )  Alb: 3.1 g/dL / Pro: 7.0 g/dL / ALK PHOS: 98 U/L / ALT: 33 U/L DA / AST: 21 U/L / GGT: x           CARDIAC MARKERS ( 01 Nov 2020 07:06 )  <0.015 ng/mL / x     / x     / x     / x          PT/INR - ( 31 Oct 2020 18:51 )   PT: 11.1 sec;   INR: 0.93 ratio         PTT - ( 31 Oct 2020 18:51 )  PTT:30.1 sec        I&O's Summary    01 Nov 2020 07:01  -  02 Nov 2020 07:00  --------------------------------------------------------  IN: 0 mL / OUT: 2375 mL / NET: -2375 mL    CAPILLARY BLOOD GLUCOSE    POCT Blood Glucose.: 171 mg/dL (02 Nov 2020 08:09)  POCT Blood Glucose.: 156 mg/dL (02 Nov 2020 05:52)  POCT Blood Glucose.: 176 mg/dL (01 Nov 2020 23:48)  POCT Blood Glucose.: 126 mg/dL (01 Nov 2020 17:13)  POCT Blood Glucose.: 146 mg/dL (01 Nov 2020 11:43)      RADIOLOGY & ADDITIONAL TESTS:                   PGY-1 Progress Note discussed with attending    PAGER #: [[phone]] TILL 5:00 PM  PLEASE CONTACT ON CALL TEAM:   - On Call Team (Please refer to [place]) FROM 5:00 PM - 8:30PM  - Nightfloat Team FROM 8:30 -7:30 AM    CHIEF COMPLAINT & BRIEF HOSPITAL COURSE:  80 year old woman from home, lives with son, walks with walker, chronic mckeon (placed on recent admission) with PMH of DM2, HTN, HLD, dementia, recent diagnosis with PE (on Xarelto) admitted for weakness 2/2 to low Hb. Pt is AAOx2, mildly in distress because of the Mckeon, able to participate in discussion, but history limited due to Dementia.  Pt has no symptoms to endorse besides increased fatigue that have limited her ambulation and she spends more time laying on bed. Of note, pt was admitted on 10/19 post fall and was admitted to ICU for PE. During that admission, her Hb was low (6.9) and received 1 unit of pRBCs. Pt was made DNR with trial of intubation on that admission. In the ED, Hb 6.3, FOBT+, s/p 2 units of PRBC; CXR (10/31) showed no evidence of active chest disease.    F/u CBC this AM showed uptrending values compared to yesterday: Hb (10.8 from 10.3), Hct (33.6 from 31.5), RBC (3.94 from 3.73).    *****    INTERVAL HPI/OVERNIGHT EVENTS:   Pt was agitated overnight and pulled out her IVs twice, resulting in placement of restraints. Her agitation continued through this AM as she pulled out her Mckeon. Patient examined at bedside and was AAOx2, no SOB or weakness. CT abd scheduled for today, GI f/u Dr. Orourke.      REVIEW OF SYSTEMS:  CONSTITUTIONAL: No fever, weight loss, or fatigue  RESPIRATORY: No cough, wheezing, chills or hemoptysis; No shortness of breath  CARDIOVASCULAR: No chest pain, palpitations, dizziness, or leg swelling  GASTROINTESTINAL: No abdominal pain. No nausea, vomiting, or hematemesis; No diarrhea or constipation. No melena or hematochezia.  GENITOURINARY: No dysuria or hematuria, urinary frequency  NEUROLOGICAL: No headaches, memory loss, loss of strength, numbness, or tremors  SKIN: No itching, burning, rashes, or lesions     MEDICATIONS  (STANDING):  artificial  tears Solution 1 Drop(s) Both EYES two times a day  ascorbic acid 500 milliGRAM(s) Oral daily  atorvastatin 20 milliGRAM(s) Oral at bedtime  dextrose 5% + sodium chloride 0.9%. 1000 milliLiter(s) (70 mL/Hr) IV Continuous <Continuous>  insulin lispro (ADMELOG) corrective regimen sliding scale   SubCutaneous every 6 hours  iron sucrose IVPB 200 milliGRAM(s) IV Intermittent every 24 hours  latanoprost 0.005% Ophthalmic Solution 1 Drop(s) Both EYES at bedtime  pantoprazole    Tablet 40 milliGRAM(s) Oral before breakfast  QUEtiapine 25 milliGRAM(s) Oral at bedtime  timolol 0.25% Solution 1 Drop(s) Both EYES two times a day    MEDICATIONS  (PRN):  haloperidol    Injectable 0.5 milliGRAM(s) IV Push once PRN ct scan      Vital Signs Last 24 Hrs  T(C): 36.6 (02 Nov 2020 07:40), Max: 37.2 (01 Nov 2020 15:58)  T(F): 97.9 (02 Nov 2020 07:40), Max: 99 (01 Nov 2020 15:58)  HR: 89 (02 Nov 2020 07:40) (89 - 109)  BP: 148/54 (02 Nov 2020 07:40) (106/74 - 155/58)  BP(mean): --  RR: 18 (02 Nov 2020 07:40) (17 - 18)  SpO2: 97% (02 Nov 2020 07:40) (97% - 98%)    PHYSICAL EXAMINATION:  GENERAL: Agitated  HEAD:  Atraumatic, Normocephalic  EYES:  conjunctiva and sclera clear  NECK: Supple, No JVD, Normal thyroid  CHEST/LUNG: Clear to auscultation. Clear to percussion bilaterally; No rales, rhonchi, wheezing, or rubs  HEART: Regular rate and rhythm; No murmurs, rubs, or gallops  ABDOMEN: Soft, Nontender, Nondistended; Bowel sounds present  NERVOUS SYSTEM:  Alert & Oriented X2  EXTREMITIES:  2+ Peripheral Pulses, No clubbing, cyanosis, or edema  SKIN: Warm, dry                          10.8 <L>   8.68  )-----------( 448 <H> ( 02 Nov 2020 07:23 )             33.6 <L>    11-02    140  |  106  |  9   ----------------------------<  148<H>  3.8   |  24  |  0.68    Ca     9.1      02 Nov 2020 07:23  Phos  4.1      02 Nov 2020 07:23  Mg    1.9     02 Nov 2020 07:23    TPro  7.0  /  Alb  3.1<L>  /  TBili  0.7  /  DBili  x   /  AST  21  /  ALT  33  /  AlkPhos  98  11-02    LIVER FUNCTIONS - ( 02 Nov 2020 07:23 )  Alb: 3.1 g/dL / Pro: 7.0 g/dL / ALK PHOS: 98 U/L / ALT: 33 U/L DA / AST: 21 U/L / GGT: x           CARDIAC MARKERS ( 01 Nov 2020 07:06 )  <0.015 ng/mL / x     / x     / x     / x        PT/INR - ( 31 Oct 2020 18:51 )   PT: 11.1 sec;   INR: 0.93 ratio       PTT - ( 31 Oct 2020 18:51 )  PTT:30.1 sec    I&O's Summary    01 Nov 2020 07:01  -  02 Nov 2020 07:00  --------------------------------------------------------  IN: 0 mL / OUT: 2375 mL / NET: -2375 mL    CAPILLARY BLOOD GLUCOSE    POCT Blood Glucose.: 171 mg/dL (02 Nov 2020 08:09)  POCT Blood Glucose.: 156 mg/dL (02 Nov 2020 05:52)  POCT Blood Glucose.: 176 mg/dL (01 Nov 2020 23:48)  POCT Blood Glucose.: 126 mg/dL (01 Nov 2020 17:13)  POCT Blood Glucose.: 146 mg/dL (01 Nov 2020 11:43)      RADIOLOGY & ADDITIONAL TESTS:                   PGY-1 Progress Note discussed with attending    PAGER #: [[phone]] TILL 5:00 PM  PLEASE CONTACT ON CALL TEAM:   - On Call Team (Please refer to [place]) FROM 5:00 PM - 8:30PM  - Nightfloat Team FROM 8:30 -7:30 AM    CHIEF COMPLAINT & BRIEF HOSPITAL COURSE:  80 year old woman from home, lives with son, walks with walker, chronic mckeon (placed on recent admission) with PMH of DM2, HTN, HLD, dementia, recent diagnosis with PE (on Xarelto) admitted for weakness 2/2 to low Hb. Pt is AAOx2, mildly in distress because of the Mckeon, able to participate in discussion, but history limited due to Dementia.  Pt has no symptoms to endorse besides increased fatigue that have limited her ambulation and she spends more time laying on bed. Of note, pt was admitted on 10/19 post fall and was admitted to ICU for PE. During that admission, her Hb was low (6.9) and received 1 unit of pRBCs. Pt was made DNR with trial of intubation on that admission. In the ED, Hb 6.3, FOBT+, s/p 2 units of PRBC; CXR (10/31) showed no evidence of active chest disease.    F/u CBC this AM showed uptrending values compared to yesterday: Hb (10.8 from 10.3), Hct (33.6 from 31.5), RBC (3.94 from 3.73).    *****    INTERVAL HPI/OVERNIGHT EVENTS:   Pt was agitated overnight and pulled out her IVs twice, resulting in placement of restraints. Her agitation continued through this AM as she pulled out her Mckeon. Patient examined at bedside and was AAOx2, no SOB or weakness. CT abd scheduled for today, GI f/u Dr. Orourke.      REVIEW OF SYSTEMS:  CONSTITUTIONAL: No fever, weight loss, or fatigue  RESPIRATORY: No cough, wheezing, chills or hemoptysis; No shortness of breath  CARDIOVASCULAR: No chest pain, palpitations, dizziness, or leg swelling  GASTROINTESTINAL: No abdominal pain. No nausea, vomiting, or hematemesis; No diarrhea or constipation. No melena or hematochezia.  GENITOURINARY: No dysuria or hematuria, urinary frequency  NEUROLOGICAL: No headaches, memory loss, loss of strength, numbness, or tremors  SKIN: No itching, burning, rashes, or lesions     MEDICATIONS  (STANDING):  artificial  tears Solution 1 Drop(s) Both EYES two times a day  ascorbic acid 500 milliGRAM(s) Oral daily  atorvastatin 20 milliGRAM(s) Oral at bedtime  dextrose 5% + sodium chloride 0.9%. 1000 milliLiter(s) (70 mL/Hr) IV Continuous <Continuous>  insulin lispro (ADMELOG) corrective regimen sliding scale   SubCutaneous every 6 hours  iron sucrose IVPB 200 milliGRAM(s) IV Intermittent every 24 hours  latanoprost 0.005% Ophthalmic Solution 1 Drop(s) Both EYES at bedtime  pantoprazole    Tablet 40 milliGRAM(s) Oral before breakfast  QUEtiapine 25 milliGRAM(s) Oral at bedtime  timolol 0.25% Solution 1 Drop(s) Both EYES two times a day    MEDICATIONS  (PRN):  haloperidol    Injectable 0.5 milliGRAM(s) IV Push once PRN ct scan      Vital Signs Last 24 Hrs  T(C): 36.6 (02 Nov 2020 07:40), Max: 37.2 (01 Nov 2020 15:58)  T(F): 97.9 (02 Nov 2020 07:40), Max: 99 (01 Nov 2020 15:58)  HR: 89 (02 Nov 2020 07:40) (89 - 109)  BP: 148/54 (02 Nov 2020 07:40) (106/74 - 155/58)  BP(mean): --  RR: 18 (02 Nov 2020 07:40) (17 - 18)  SpO2: 97% (02 Nov 2020 07:40) (97% - 98%)    PHYSICAL EXAMINATION:  GENERAL: 80 year old F who appears stated age, somewhat agitated  HEAD:  Atraumatic, Normocephalic  EYES:  Conjunctiva and sclera clear  NECK: Supple, No JVD, Normal thyroid  CHEST/LUNG: Clear to auscultation B/L. Clear to percussion; No rales, rhonchi, wheezing, or rubs  HEART: +S1/S2, no S3/S4, Regular rate and rhythm; No murmurs, rubs, or gallops  ABDOMEN: Soft, Nontender, Nondistended; Bowel sounds present on all four quadrants  NERVOUS SYSTEM:  Alert & Oriented X2  EXTREMITIES:  2+ Peripheral Pulses, No clubbing, cyanosis, or edema  SKIN: Warm, dry                          10.8 <L>   8.68  )-----------( 448 <H> ( 02 Nov 2020 07:23 )             33.6 <L>    11-02    140  |  106  |  9   ----------------------------<  148<H>  3.8   |  24  |  0.68    Ca     9.1      02 Nov 2020 07:23  Phos  4.1      02 Nov 2020 07:23  Mg    1.9     02 Nov 2020 07:23    TPro  7.0  /  Alb  3.1<L>  /  TBili  0.7  /  DBili  x   /  AST  21  /  ALT  33  /  AlkPhos  98  11-02    LIVER FUNCTIONS - ( 02 Nov 2020 07:23 )  Alb: 3.1 g/dL / Pro: 7.0 g/dL / ALK PHOS: 98 U/L / ALT: 33 U/L DA / AST: 21 U/L / GGT: x           CARDIAC MARKERS ( 01 Nov 2020 07:06 )  <0.015 ng/mL / x     / x     / x     / x        PT/INR - ( 31 Oct 2020 18:51 )   PT: 11.1 sec;   INR: 0.93 ratio       PTT - ( 31 Oct 2020 18:51 )  PTT:30.1 sec    I&O's Summary    01 Nov 2020 07:01  -  02 Nov 2020 07:00  --------------------------------------------------------  IN: 0 mL / OUT: 2375 mL / NET: -2375 mL    CAPILLARY BLOOD GLUCOSE    POCT Blood Glucose.: 177 mg/dL (02 Nov 2020 11:55)  POCT Blood Glucose.: 171 mg/dL (02 Nov 2020 08:09)  POCT Blood Glucose.: 156 mg/dL (02 Nov 2020 05:52)  POCT Blood Glucose.: 176 mg/dL (01 Nov 2020 23:48)  POCT Blood Glucose.: 126 mg/dL (01 Nov 2020 17:13)      RADIOLOGY & ADDITIONAL TESTS:                   PGY-1 Progress Note discussed with attending    PAGER #: [[phone]] TILL 5:00 PM  PLEASE CONTACT ON CALL TEAM:   - On Call Team (Please refer to [place]) FROM 5:00 PM - 8:30PM  - Nightfloat Team FROM 8:30 -7:30 AM    CHIEF COMPLAINT & BRIEF HOSPITAL COURSE:  80 year old woman from home, lives with son, walks with walker, chronic mckeon (placed on recent admission) with PMH of DM2, HTN, HLD, dementia, recent diagnosis with PE (on Xarelto) admitted for weakness 2/2 to low Hb. Pt is AAOx2, mildly in distress because of the Mckeon, able to participate in discussion, but history limited due to Dementia.  Pt has no symptoms to endorse besides increased fatigue that have limited her ambulation and she spends more time laying on bed. Of note, pt was admitted on 10/19 post fall and was admitted to ICU for PE. During that admission, her Hb was low (6.9) and received 1 unit of pRBCs. Pt was made DNR with trial of intubation on that admission. In the ED, Hb 6.3, FOBT+, s/p 2 units of PRBC; CXR (10/31) showed no evidence of active chest disease.    F/u CBC this AM showed uptrending values compared to yesterday: Hb (10.8 from 10.3), Hct (33.6 from 31.5), RBC (3.94 from 3.73).    *****    INTERVAL HPI/OVERNIGHT EVENTS:   Pt was agitated overnight and pulled out her IVs twice, resulting in placement of restraints. Her agitation continued through this AM as she pulled out her Mckeon. Patient examined at bedside and was AAOx2, no SOB or weakness. CT abd scheduled for today, GI f/u Dr. Orourke.      REVIEW OF SYSTEMS:  CONSTITUTIONAL: No fever, weight loss, or fatigue  RESPIRATORY: No cough, wheezing, chills or hemoptysis; No shortness of breath  CARDIOVASCULAR: No chest pain, palpitations, dizziness, or leg swelling  GASTROINTESTINAL: No abdominal pain. No nausea, vomiting, or hematemesis; No diarrhea or constipation. No melena or hematochezia.  GENITOURINARY: No dysuria or hematuria, urinary frequency  NEUROLOGICAL: No headaches, memory loss, loss of strength, numbness, or tremors  SKIN: No itching, burning, rashes, or lesions     MEDICATIONS  (STANDING):  artificial  tears Solution 1 Drop(s) Both EYES two times a day  ascorbic acid 500 milliGRAM(s) Oral daily  atorvastatin 20 milliGRAM(s) Oral at bedtime  dextrose 5% + sodium chloride 0.9%. 1000 milliLiter(s) (70 mL/Hr) IV Continuous <Continuous>  insulin lispro (ADMELOG) corrective regimen sliding scale   SubCutaneous every 6 hours  iron sucrose IVPB 200 milliGRAM(s) IV Intermittent every 24 hours  latanoprost 0.005% Ophthalmic Solution 1 Drop(s) Both EYES at bedtime  pantoprazole    Tablet 40 milliGRAM(s) Oral before breakfast  QUEtiapine 25 milliGRAM(s) Oral at bedtime  timolol 0.25% Solution 1 Drop(s) Both EYES two times a day    MEDICATIONS  (PRN):  haloperidol    Injectable 0.5 milliGRAM(s) IV Push once PRN ct scan      Vital Signs Last 24 Hrs  T(C): 36.6 (02 Nov 2020 07:40), Max: 37.2 (01 Nov 2020 15:58)  T(F): 97.9 (02 Nov 2020 07:40), Max: 99 (01 Nov 2020 15:58)  HR: 89 (02 Nov 2020 07:40) (89 - 109)  BP: 148/54 (02 Nov 2020 07:40) (106/74 - 155/58)  BP(mean): --  RR: 18 (02 Nov 2020 07:40) (17 - 18)  SpO2: 97% (02 Nov 2020 07:40) (97% - 98%)    PHYSICAL EXAMINATION:  GENERAL: 80 year old F who appears stated age, somewhat agitated  HEAD: Atraumatic, Normocephalic  EYES: EOMI, PERRLA, Conjunctiva and sclera clear  NECK: Supple, No JVD, Normal thyroid  CHEST/LUNG: Clear to auscultation B/L. Clear to percussion; No rales, rhonchi, wheezing, or rubs  HEART: +S1/S2, no S3/S4, Regular rate and rhythm; No murmurs, rubs, or gallops  ABDOMEN: Soft, Nontender, Nondistended; Bowel sounds present on all four quadrants  NERVOUS SYSTEM:  Alert & Oriented X2  EXTREMITIES:  2+ Peripheral Pulses, No clubbing, cyanosis, or edema  SKIN: Warm, dry                          10.8 <L>   8.68  )-----------( 448 <H> ( 02 Nov 2020 07:23 )             33.6 <L>    11-02    140  |  106  |  9   ----------------------------<  148<H>  3.8   |  24  |  0.68    Ca     9.1      02 Nov 2020 07:23  Phos  4.1      02 Nov 2020 07:23  Mg    1.9     02 Nov 2020 07:23    TPro  7.0  /  Alb  3.1<L>  /  TBili  0.7  /  DBili  x   /  AST  21  /  ALT  33  /  AlkPhos  98  11-02    LIVER FUNCTIONS - ( 02 Nov 2020 07:23 )  Alb: 3.1 g/dL / Pro: 7.0 g/dL / ALK PHOS: 98 U/L / ALT: 33 U/L DA / AST: 21 U/L / GGT: x           CARDIAC MARKERS ( 01 Nov 2020 07:06 )  <0.015 ng/mL / x     / x     / x     / x        PT/INR - ( 31 Oct 2020 18:51 )   PT: 11.1 sec;   INR: 0.93 ratio       PTT - ( 31 Oct 2020 18:51 )  PTT:30.1 sec    I&O's Summary    01 Nov 2020 07:01  -  02 Nov 2020 07:00  --------------------------------------------------------  IN: 0 mL / OUT: 2375 mL / NET: -2375 mL    CAPILLARY BLOOD GLUCOSE    POCT Blood Glucose.: 177 mg/dL (02 Nov 2020 11:55)  POCT Blood Glucose.: 171 mg/dL (02 Nov 2020 08:09)  POCT Blood Glucose.: 156 mg/dL (02 Nov 2020 05:52)  POCT Blood Glucose.: 176 mg/dL (01 Nov 2020 23:48)  POCT Blood Glucose.: 126 mg/dL (01 Nov 2020 17:13)      RADIOLOGY & ADDITIONAL TESTS:                   PGY-1 Progress Note discussed with attending    PAGER #: [[phone]] TILL 5:00 PM  PLEASE CONTACT ON CALL TEAM:   - On Call Team (Please refer to [place]) FROM 5:00 PM - 8:30PM  - Nightfloat Team FROM 8:30 -7:30 AM    CHIEF COMPLAINT & BRIEF HOSPITAL COURSE:  80 year old woman from home, lives with son, walks with walker, chronic mckeon (placed on recent admission) with PMH of DM2, HTN, HLD, dementia, recent diagnosis with PE (on Xarelto) admitted for weakness 2/2 to low Hb. Pt is AAOx2, mildly in distress because of the Mckeon, able to participate in discussion, but history limited due to Dementia.  Pt has no symptoms to endorse besides increased fatigue that have limited her ambulation and she spends more time laying on bed. Of note, pt was admitted on 10/19 post fall and was admitted to ICU for PE. During that admission, her Hb was low (6.9) and received 1 unit of pRBCs. Pt was made DNR with trial of intubation on that admission. In the ED, Hb 6.3, FOBT+, s/p 2 units of PRBC; CXR (10/31) showed no evidence of active chest disease. CT abd/pelvis with IV contrast scheduled for 11/2.     F/u CBC this AM showed uptrending values compared to yesterday: Hb (10.8 from 10.3), Hct (33.6 from 31.5), RBC (3.94 from 3.73).    *****    INTERVAL HPI/OVERNIGHT EVENTS:   Pt was agitated overnight and pulled out her IVs twice, resulting in placement of restraints. Her agitation continued through this AM as she pulled out her Mckeon. Patient examined at bedside and was AAOx2, no SOB or weakness. CT abd scheduled for 11/2, GI f/u Dr. Orourke.      REVIEW OF SYSTEMS:  CONSTITUTIONAL: No fever, weight loss, or fatigue  RESPIRATORY: No cough, wheezing, chills or hemoptysis; No shortness of breath  CARDIOVASCULAR: No chest pain, palpitations, dizziness, or leg swelling  GASTROINTESTINAL: No abdominal pain. No nausea, vomiting, or hematemesis; No diarrhea or constipation. No melena or hematochezia.  GENITOURINARY: No dysuria or hematuria. Mckeon in place.  NEUROLOGICAL: No headaches, memory loss, loss of strength, numbness, or tremors  SKIN: No itching, burning, rashes, or lesions     MEDICATIONS  (STANDING):  artificial  tears Solution 1 Drop(s) Both EYES two times a day  ascorbic acid 500 milliGRAM(s) Oral daily  atorvastatin 20 milliGRAM(s) Oral at bedtime  dextrose 5% + sodium chloride 0.9%. 1000 milliLiter(s) (70 mL/Hr) IV Continuous <Continuous>  insulin lispro (ADMELOG) corrective regimen sliding scale   SubCutaneous every 6 hours  iron sucrose IVPB 200 milliGRAM(s) IV Intermittent every 24 hours  latanoprost 0.005% Ophthalmic Solution 1 Drop(s) Both EYES at bedtime  pantoprazole    Tablet 40 milliGRAM(s) Oral before breakfast  QUEtiapine 25 milliGRAM(s) Oral at bedtime  timolol 0.25% Solution 1 Drop(s) Both EYES two times a day    MEDICATIONS  (PRN):  haloperidol    Injectable 0.5 milliGRAM(s) IV Push once PRN ct scan    Vital Signs Last 24 Hrs  T(C): 36.6 (02 Nov 2020 07:40), Max: 37.2 (01 Nov 2020 15:58)  T(F): 97.9 (02 Nov 2020 07:40), Max: 99 (01 Nov 2020 15:58)  HR: 89 (02 Nov 2020 07:40) (89 - 109)  BP: 148/54 (02 Nov 2020 07:40) (106/74 - 155/58)  BP(mean): --  RR: 18 (02 Nov 2020 07:40) (17 - 18)  SpO2: 97% (02 Nov 2020 07:40) (97% - 98%)    PHYSICAL EXAMINATION:  GENERAL: 80 year old F who appears stated age, somewhat agitated  HEAD: Atraumatic, Normocephalic  EYES: EOMI, PERRLA, Conjunctiva and sclera clear  NECK: Supple, No JVD, Normal thyroid  CHEST/LUNG: Clear to auscultation B/L. Clear to percussion; No rales, rhonchi, wheezing, or rubs  HEART: +S1/S2, no S3/S4, Regular rate and rhythm; No murmurs, rubs, or gallops  ABDOMEN: Soft, Nontender, Nondistended; Bowel sounds present on all four quadrants  NERVOUS SYSTEM:  Alert & Oriented X2  EXTREMITIES:  2+ Peripheral Pulses, No clubbing, cyanosis, or edema  SKIN: Warm, dry                          10.8 <L>   8.68  )-----------( 448 <H> ( 02 Nov 2020 07:23 )             33.6 <L>    11-02    140  |  106  |  9   ----------------------------<  148<H>  3.8   |  24  |  0.68    Ca     9.1      02 Nov 2020 07:23  Phos  4.1      02 Nov 2020 07:23  Mg    1.9     02 Nov 2020 07:23    TPro  7.0  /  Alb  3.1<L>  /  TBili  0.7  /  DBili  x   /  AST  21  /  ALT  33  /  AlkPhos  98  11-02    LIVER FUNCTIONS - ( 02 Nov 2020 07:23 )  Alb: 3.1 g/dL / Pro: 7.0 g/dL / ALK PHOS: 98 U/L / ALT: 33 U/L DA / AST: 21 U/L / GGT: x           CARDIAC MARKERS ( 01 Nov 2020 07:06 )  <0.015 ng/mL / x     / x     / x     / x        PT/INR - ( 31 Oct 2020 18:51 )   PT: 11.1 sec;   INR: 0.93 ratio       PTT - ( 31 Oct 2020 18:51 )  PTT:30.1 sec    I&O's Summary    01 Nov 2020 07:01  -  02 Nov 2020 07:00  --------------------------------------------------------  IN: 0 mL / OUT: 2375 mL / NET: -2375 mL    CAPILLARY BLOOD GLUCOSE  POCT Blood Glucose.: 177 mg/dL (02 Nov 2020 11:55)  POCT Blood Glucose.: 171 mg/dL (02 Nov 2020 08:09)  POCT Blood Glucose.: 156 mg/dL (02 Nov 2020 05:52)  POCT Blood Glucose.: 176 mg/dL (01 Nov 2020 23:48)  POCT Blood Glucose.: 126 mg/dL (01 Nov 2020 17:13)      RADIOLOGY & ADDITIONAL TESTS:                   PGY-1 Progress Note discussed with attending    PAGER #: [[phone]] TILL 5:00 PM  PLEASE CONTACT ON CALL TEAM:   - On Call Team (Please refer to [place]) FROM 5:00 PM - 8:30PM  - Nightfloat Team FROM 8:30 -7:30 AM    CHIEF COMPLAINT & BRIEF HOSPITAL COURSE:  80 year old woman from home, lives with son, walks with walker, chronic mckeon (placed on recent admission) with PMH of DM2, HTN, HLD, dementia, recent diagnosis with PE (on Xarelto) admitted for weakness 2/2 to low Hb. Pt is AAOx2, mildly in distress because of the Mckeon, able to participate in discussion, but history limited due to Dementia.  Pt has no symptoms to endorse besides increased fatigue that have limited her ambulation and she spends more time laying on bed. Of note, pt was admitted on 10/19 post fall and was admitted to ICU for PE. During that admission, her Hb was low (6.9) and received 1 unit of pRBCs. Pt was made DNR with trial of intubation on that admission. In the ED, Hb 6.3, FOBT+, s/p 2 units of PRBC; CXR (10/31) showed no evidence of active chest disease. CT abd/pelvis with IV contrast scheduled for 11/2.     F/u CBC this AM showed uptrending values compared to yesterday: Hb (10.8 from 10.3), Hct (33.6 from 31.5), RBC (3.94 from 3.73).    INTERVAL HPI/OVERNIGHT EVENTS:   Pt was agitated overnight and pulled out her IVs twice, resulting in placement of restraints. Her agitation continued through this AM as she pulled out her Mckeon. Patient examined at bedside and was AAOx2, no SOB or weakness. CT abd scheduled for 11/2, GI f/u Dr. Orourke.      REVIEW OF SYSTEMS:  CONSTITUTIONAL: No fever, weight loss, or fatigue  RESPIRATORY: No cough, wheezing, chills or hemoptysis; No shortness of breath  CARDIOVASCULAR: No chest pain, palpitations, dizziness, or leg swelling  GASTROINTESTINAL: No abdominal pain. No nausea, vomiting, or hematemesis; No diarrhea or constipation. No melena or hematochezia.  GENITOURINARY: No dysuria or hematuria. Mckeon in place.  NEUROLOGICAL: No headaches, memory loss, loss of strength, numbness, or tremors  SKIN: No itching, burning, rashes, or lesions     MEDICATIONS  (STANDING):  artificial  tears Solution 1 Drop(s) Both EYES two times a day  ascorbic acid 500 milliGRAM(s) Oral daily  atorvastatin 20 milliGRAM(s) Oral at bedtime  dextrose 5% + sodium chloride 0.9%. 1000 milliLiter(s) (70 mL/Hr) IV Continuous <Continuous>  insulin lispro (ADMELOG) corrective regimen sliding scale   SubCutaneous every 6 hours  iron sucrose IVPB 200 milliGRAM(s) IV Intermittent every 24 hours  latanoprost 0.005% Ophthalmic Solution 1 Drop(s) Both EYES at bedtime  pantoprazole    Tablet 40 milliGRAM(s) Oral before breakfast  QUEtiapine 25 milliGRAM(s) Oral at bedtime  timolol 0.25% Solution 1 Drop(s) Both EYES two times a day    MEDICATIONS  (PRN):  haloperidol    Injectable 0.5 milliGRAM(s) IV Push once PRN ct scan    Vital Signs Last 24 Hrs  T(C): 36.6 (02 Nov 2020 07:40), Max: 37.2 (01 Nov 2020 15:58)  T(F): 97.9 (02 Nov 2020 07:40), Max: 99 (01 Nov 2020 15:58)  HR: 89 (02 Nov 2020 07:40) (89 - 109)  BP: 148/54 (02 Nov 2020 07:40) (106/74 - 155/58)  BP(mean): --  RR: 18 (02 Nov 2020 07:40) (17 - 18)  SpO2: 97% (02 Nov 2020 07:40) (97% - 98%)    PHYSICAL EXAMINATION:  GENERAL: 80 year old F who appears stated age, somewhat agitated  HEAD: Atraumatic, Normocephalic  EYES: EOMI, PERRLA, Conjunctiva and sclera clear  NECK: Supple, No JVD, Normal thyroid  CHEST/LUNG: Clear to auscultation B/L. Clear to percussion; No rales, rhonchi, wheezing, or rubs  HEART: +S1/S2, no S3/S4, Regular rate and rhythm; No murmurs, rubs, or gallops  ABDOMEN: Soft, Nontender, Nondistended; Bowel sounds present on all four quadrants  NERVOUS SYSTEM:  Alert & Oriented X2  EXTREMITIES:  2+ Peripheral Pulses, No clubbing, cyanosis, or edema  SKIN: Warm, dry                          10.8 <L>   8.68  )-----------( 448 <H> ( 02 Nov 2020 07:23 )             33.6 <L>    11-02    140  |  106  |  9   ----------------------------<  148<H>  3.8   |  24  |  0.68    Ca     9.1      02 Nov 2020 07:23  Phos  4.1      02 Nov 2020 07:23  Mg    1.9     02 Nov 2020 07:23    TPro  7.0  /  Alb  3.1<L>  /  TBili  0.7  /  DBili  x   /  AST  21  /  ALT  33  /  AlkPhos  98  11-02    LIVER FUNCTIONS - ( 02 Nov 2020 07:23 )  Alb: 3.1 g/dL / Pro: 7.0 g/dL / ALK PHOS: 98 U/L / ALT: 33 U/L DA / AST: 21 U/L / GGT: x           CARDIAC MARKERS ( 01 Nov 2020 07:06 )  <0.015 ng/mL / x     / x     / x     / x        PT/INR - ( 31 Oct 2020 18:51 )   PT: 11.1 sec;   INR: 0.93 ratio       PTT - ( 31 Oct 2020 18:51 )  PTT:30.1 sec    I&O's Summary    01 Nov 2020 07:01  -  02 Nov 2020 07:00  --------------------------------------------------------  IN: 0 mL / OUT: 2375 mL / NET: -2375 mL    CAPILLARY BLOOD GLUCOSE  POCT Blood Glucose.: 177 mg/dL (02 Nov 2020 11:55)  POCT Blood Glucose.: 171 mg/dL (02 Nov 2020 08:09)  POCT Blood Glucose.: 156 mg/dL (02 Nov 2020 05:52)  POCT Blood Glucose.: 176 mg/dL (01 Nov 2020 23:48)  POCT Blood Glucose.: 126 mg/dL (01 Nov 2020 17:13)      RADIOLOGY & ADDITIONAL TESTS:                   PGY-1 Progress Note discussed with attending    PAGER #: [[phone]] TILL 5:00 PM  PLEASE CONTACT ON CALL TEAM:   - On Call Team (Please refer to [place]) FROM 5:00 PM - 8:30PM  - Nightfloat Team FROM 8:30 -7:30 AM    CHIEF COMPLAINT & BRIEF HOSPITAL COURSE:  80 year old woman from home, lives with son, walks with walker, chronic mckeon (placed on recent admission) with PMH of DM2, HTN, HLD, dementia, recent diagnosis with PE (on Xarelto) admitted for weakness 2/2 to low Hb. Pt is AAOx2, mildly in distress because of the Mckeon, able to participate in discussion, but history limited due to Dementia.  Pt has no symptoms to endorse besides increased fatigue that have limited her ambulation and she spends more time laying on bed. Of note, pt was admitted on 10/19 post fall and was admitted to ICU for PE. During that admission, her Hb was low (6.9) and received 1 unit of pRBCs. Pt was made DNR with trial of intubation on that admission. In the ED, Hb 6.3, FOBT+, s/p 2 units of PRBC; CXR (10/31) showed no evidence of active chest disease. CT abd/pelvis with IV contrast scheduled for 11/2. Patient pulled out Mckeon on 11/2 and it was replaced.      INTERVAL HPI/OVERNIGHT EVENTS:   Pt was agitated overnight and pulled out her IVs twice, resulting in placement of restraints. Her agitation continued through this AM as she pulled out her Mckeon; Mckeon replaced on 11/2. Patient examined at bedside and was AAOx2, no SOB or weakness. CT abd scheduled for 11/2 premedicated with Haldol for anxiety, GI f/u Dr. Orourke. Hb stable this AM 10.8 (was 10/3).       REVIEW OF SYSTEMS:  CONSTITUTIONAL: No fever, weight loss, or fatigue  RESPIRATORY: No cough, wheezing, chills or hemoptysis; No shortness of breath  CARDIOVASCULAR: No chest pain, palpitations, dizziness, or leg swelling  GASTROINTESTINAL: No abdominal pain. No nausea, vomiting, or hematemesis; No diarrhea or constipation. No melena or hematochezia.  GENITOURINARY: No dysuria or hematuria. Mckeon in place.  NEUROLOGICAL: No headaches, memory loss, loss of strength, numbness, or tremors  SKIN: No itching, burning, rashes, or lesions     MEDICATIONS  (STANDING):  artificial  tears Solution 1 Drop(s) Both EYES two times a day  ascorbic acid 500 milliGRAM(s) Oral daily  atorvastatin 20 milliGRAM(s) Oral at bedtime  dextrose 5% + sodium chloride 0.9%. 1000 milliLiter(s) (70 mL/Hr) IV Continuous <Continuous>  insulin lispro (ADMELOG) corrective regimen sliding scale   SubCutaneous every 6 hours  iron sucrose IVPB 200 milliGRAM(s) IV Intermittent every 24 hours  latanoprost 0.005% Ophthalmic Solution 1 Drop(s) Both EYES at bedtime  pantoprazole    Tablet 40 milliGRAM(s) Oral before breakfast  QUEtiapine 25 milliGRAM(s) Oral at bedtime  timolol 0.25% Solution 1 Drop(s) Both EYES two times a day    MEDICATIONS  (PRN):  haloperidol    Injectable 0.5 milliGRAM(s) IV Push once PRN ct scan    Vital Signs Last 24 Hrs  T(C): 36.6 (02 Nov 2020 07:40), Max: 37.2 (01 Nov 2020 15:58)  T(F): 97.9 (02 Nov 2020 07:40), Max: 99 (01 Nov 2020 15:58)  HR: 89 (02 Nov 2020 07:40) (89 - 109)  BP: 148/54 (02 Nov 2020 07:40) (106/74 - 155/58)  BP(mean): --  RR: 18 (02 Nov 2020 07:40) (17 - 18)  SpO2: 97% (02 Nov 2020 07:40) (97% - 98%)    PHYSICAL EXAMINATION:  GENERAL: 80 year old F who appears stated age, somewhat agitated  HEAD: Atraumatic, Normocephalic  EYES: EOMI, PERRLA, Conjunctiva and sclera clear  NECK: Supple, No JVD, Normal thyroid  CHEST/LUNG: Clear to auscultation B/L. Clear to percussion; No rales, rhonchi, wheezing, or rubs  HEART: +S1/S2, no S3/S4, Regular rate and rhythm; No murmurs, rubs, or gallops  ABDOMEN: Soft, Nontender, Nondistended; Bowel sounds present on all four quadrants  NERVOUS SYSTEM:  Alert & Oriented X2  : Mckeon in place, clear straw colored urine  EXTREMITIES:  2+ Peripheral Pulses, No clubbing, cyanosis, or edema  SKIN: Warm, dry                          10.8 <L>   8.68  )-----------( 448 <H> ( 02 Nov 2020 07:23 )             33.6 <L>    11-02    140  |  106  |  9   ----------------------------<  148<H>  3.8   |  24  |  0.68    Ca     9.1      02 Nov 2020 07:23  Phos  4.1      02 Nov 2020 07:23  Mg    1.9     02 Nov 2020 07:23    TPro  7.0  /  Alb  3.1<L>  /  TBili  0.7  /  DBili  x   /  AST  21  /  ALT  33  /  AlkPhos  98  11-02    LIVER FUNCTIONS - ( 02 Nov 2020 07:23 )  Alb: 3.1 g/dL / Pro: 7.0 g/dL / ALK PHOS: 98 U/L / ALT: 33 U/L DA / AST: 21 U/L / GGT: x           CARDIAC MARKERS ( 01 Nov 2020 07:06 )  <0.015 ng/mL / x     / x     / x     / x        PT/INR - ( 31 Oct 2020 18:51 )   PT: 11.1 sec;   INR: 0.93 ratio       PTT - ( 31 Oct 2020 18:51 )  PTT:30.1 sec    I&O's Summary    01 Nov 2020 07:01  -  02 Nov 2020 07:00  --------------------------------------------------------  IN: 0 mL / OUT: 2375 mL / NET: -2375 mL    CAPILLARY BLOOD GLUCOSE  POCT Blood Glucose.: 177 mg/dL (02 Nov 2020 11:55)  POCT Blood Glucose.: 171 mg/dL (02 Nov 2020 08:09)  POCT Blood Glucose.: 156 mg/dL (02 Nov 2020 05:52)  POCT Blood Glucose.: 176 mg/dL (01 Nov 2020 23:48)  POCT Blood Glucose.: 126 mg/dL (01 Nov 2020 17:13)      RADIOLOGY & ADDITIONAL TESTS:

## 2020-11-02 NOTE — PROGRESS NOTE ADULT - PROBLEM SELECTOR PLAN 1
Pt sent by PMD to the Ed for low Hb 6.9 at his office  In the ED Hb- 6.2, FOBT +  Pt denies any bloody stool or any acute blood loss  Pt endorses increased fatigue and Ambulatory intolerance   S/p 2 unit of PRBC in ed , hb 10.4  f/u CT abd   F/U CBC Q6   f/U gi- Dr. Sheree vail anaemia panel Pt sent by PMD to the Ed for low Hb 6.9 at his office  In the ED Hb- 6.2, FOBT +  Pt denies any bloody stool or any acute blood loss  Pt endorses increased fatigue and Ambulatory intolerance   S/p 2 units of PRBC in ED, Hb 10.8 (11/2)  f/u GI - Dr. Bentley  Anemia panel: Ferritin 201<H>, Iron 314<H>, TIBC 345, Iron Saturation 91%<H> Pt sent by PMD to the Ed for low Hb 6.9 at his office.   - Pt denies any bloody stool or any acute blood loss although is a poor historian   - s/o incr fatigue and ambulatory intolerance  - In the ED Hb- 6.2, FOBT +  - s/p 2 units of PRBC in ED, Hb 10.8 (11/2)  - f/u GI - Dr. Bentley  - for CT Ab/Pelvis with IV contrast 11/2

## 2020-11-02 NOTE — PROGRESS NOTE ADULT - PROBLEM SELECTOR PLAN 2
Pt has PMH of HTN   bp 146/67  Pt not currently on any home anti Htn meds   monitor vitals Pt has PMH of HTN   /54  Pt on Carvedilol 3.125mg BID, Amlodipine 10mg QD, Enalapril 2.5mg QD  monitor vitals Pt has PMH of HTN   /54  Pt on Carvedilol 3.125mg BID, Amlodipine 10mg QD, Enalapril 2.5mg QD  Monitor vitals P/w 6.2, FOBT +  Anemia panel 11/2 : Ferritin 201<H>, Iron 314<H>, TIBC 345, Iron Saturation 91%<H>  Hb stable 10.8  - s/p 2 U pRBC  - F/u CBC this AM showed uptrending values compared to yesterday: Hb (10.8 from 10.3), Hct (33.6 from 31.5), RBC (3.94 from 3.73).  - c/w Venofer 11/1 - 11/3 (3 Days)

## 2020-11-02 NOTE — PROGRESS NOTE ADULT - ASSESSMENT
Assessment and Recommendation:   · Assessment	  A/P:     # Anemia:   # GI bleed: FOBT +   # Iron def (labs from last admission):   Off note patient had anemia and FOBT + last admission on 10/10/20, repeat negative on 10/19/20. Xarelto was held and then restarted.   Appropriate response to PRBC tx.   Normal b12, folate, creat, LFTs, bili.   Recs:   -GI consulted. IV PPI   -Hold Xarelto   -Continue Venofer 200 mg daily x 4 doses   -1U PRBC tx for Hb < 7 or symptomatic    # PE:   Recently diagnosed on 10/9/20. Started on Xarelto. Now with FOBT + anemia.   10/8-10/9: CTA Chest and CT A/P: with no evidence of malignancy reported   10/11/20: USB b/l LE: negative for DVT   Recs:  -Continue to hold AC, till GI clearance    -Consider IVC filter if patient not a candidate for AC     Thank you for the referral. Will continue to monitor the patient.  Please call with any questions [phone]  Above reviewed with Attending Dr. Pastor

## 2020-11-02 NOTE — PROGRESS NOTE ADULT - PROBLEM SELECTOR PLAN 7
Pt and son agreed for DNR and trial of intubation on previous admission document  10/21/2020 Pt and son agreed for DNR and trial of intubation on previous admission documented 10/21/2020 Pt was recently diagnosed and admitted in [facility] for PE (10/19/2020)  Pt was admitted in ICU and started on Xarelto   Holding Xarelto for GI bleed   F/u Hemo onc ([facility])  - consulted Heme/Onc Indra PATEL f/u recs

## 2020-11-02 NOTE — CONSULT NOTE ADULT - ASSESSMENT
Colonoscopy tomorrow   D/W housestaff   Full consult to follow 80 year old female with lower Gi bleed

## 2020-11-02 NOTE — PROGRESS NOTE ADULT - PROBLEM SELECTOR PLAN 9
IMPROVE VTE Individual Risk Assessment    RISK                                                          Points  [] Previous VTE                                           3  [] Thrombophilia                                        2  [] Lower limb paralysis                               2   [] Current Cancer                                       2   [] Immobilization > 24 hrs                          1  [x] ICU/CCU stay > 24 hours                       1  [x Age > 60                                               1    IMPROVE VTE Score: 2  - scd  - ppi

## 2020-11-02 NOTE — PROGRESS NOTE ADULT - PROBLEM SELECTOR PLAN 6
Pt was recently diagnosed and admitted in [facility] for PE ( Oct 2020)  Pt was admitted in ICU and started on Xarelto   Holding Xarelto for Gi bleed   F/u Hemo onc [facility] Pt was recently diagnosed and admitted in [facility] for PE (10/19/2020)  Pt was admitted in ICU and started on Xarelto   Holding Xarelto for GI bleed   F/u [facility]o onc [facility] Pt has PMH of HLD  Home meds- aspirin and statin   c/w home meds  Lipid profile 11/1: Triglycerides 211<H>

## 2020-11-02 NOTE — PROGRESS NOTE ADULT - ASSESSMENT
80 year old woman, from home, lives with son, walks with walker, Chronic mckeon ( placed on recent admission)  with PMH of DM2, HTN, HLD, dementia, recent diagnosis with PE (on xarelto) admitted for low Hb      In the ED,   Pt is AAOX2, Mildy agitated due to the mckeon   Vitals- 126/57, Hr 90, afebrile   Hb 6.3, FOBT+  s/p 1 unit of prbc in ed  BS- 203   CT head- no acute changes 80 year old woman, from home, lives with son, walks with walker, Chronic mckeon (placed on recent admission)  with PMH of DM2, HTN, HLD, dementia, recent diagnosis with PE (on xarelto) admitted for weakness 2/2 to low Hb.      In the ED,   Pt is AAOX2, Mildly agitated due to the mckeon   Vitals- 126/57, Hr 90, afebrile   Hb 6.3, FOBT+  s/p 1 unit of prbc in ed  BS- 203   CT head- no acute changes 80 year old woman, from home, lives with son, walks with walker, Chronic mckeon (placed on recent admission)  with PMH of DM2, HTN, HLD, dementia, recent diagnosis with PE (on Xarelto) admitted for weakness 2/2 to low Hb.

## 2020-11-02 NOTE — PROGRESS NOTE ADULT - SUBJECTIVE AND OBJECTIVE BOX
complete note to follow     · Subjective and Objective:   80 year old woman, from home, lives with son, walks with walker, Chronic mckeon ( placed on recent admission)  with PMH of DM2, HTN, HLD, dementia, recent diagnosis with PE (on xarelto) admitted for low Hb. Pt had a follow up appointment with her PMD and was informed about low Hb and recommended to get further evaluation. Pt is AAOX2, mildy in distress because of the Mckeon, able ot participate in discussion, but history limited due to Dementia.  Pt has no symptoms to endorse besides  increased fatigue that have limited her Ambulation and she spends more time laying on bed. Of note pt had fall risks and was admitted about 10 days ago post fall and was admitted to ICU for PE.  During that admission, her hgb was low (6.9) and received 1 unit of pRBCs  Pt was made DNR with trial of intubation on that admission. Pt denies any fever, bloody BM, Hemetemesis, abd pain, N/V/D, chest pain, SOB any sick contact.  Patient seen this afternoon. Wants to eat. RN at bedside.     Interval: Pt examined at bedside. No new complaints. Pt is not oriented to person place or time, pt has dementia.   [facility] #[id number]    PMH/ PSH: as per hpi   Meds: reviewed in emr   Allergies: nkda   Social hist: No habits     ROS: unable to assess, pt has dementia and not oriented    Vitals:  Vital Signs Last 24 Hrs  T(C): 36.6 (02 Nov 2020 07:40), Max: 37.2 (01 Nov 2020 15:58)  T(F): 97.9 (02 Nov 2020 07:40), Max: 99 (01 Nov 2020 15:58)  HR: 89 (02 Nov 2020 07:40) (89 - 109)  BP: 148/54 (02 Nov 2020 07:40) (106/74 - 148/54)  BP(mean): --  ABP: --  ABP(mean): --  RR: 18 (02 Nov 2020 07:40) (17 - 18)  SpO2: 97% (02 Nov 2020 07:40) (97% - 97%)    Gen: NAD  CVS: s1s2   Resp: CTA B/L   GI: soft, non tender   Ext: no c/c/e     Labs:  CBC Full  -  ( 02 Nov 2020 07:23 )  WBC Count : 8.68 K/uL  RBC Count : 3.94 M/uL  Hemoglobin : 10.8 g/dL  Hematocrit : 33.6 %  Platelet Count - Automated : 448 K/uL  Mean Cell Volume : 85.3 fl  Mean Cell Hemoglobin : 27.4 pg  Mean Cell Hemoglobin Concentration : 32.1 gm/dL  Auto Neutrophil # : 6.93 K/uL  Auto Lymphocyte # : 0.92 K/uL  Auto Monocyte # : 0.61 K/uL  Auto Eosinophil # : 0.14 K/uL  Auto Basophil # : 0.04 K/uL  Auto Neutrophil % : 79.8 %  Auto Lymphocyte % : 10.6 %  Auto Monocyte % : 7.0 %  Auto Eosinophil % : 1.6 %  Auto Basophil % : 0.5 %      < from: CT Angio Chest w/ IV Cont (10.09.20 @ 14:45) >  IMPRESSION:  Acute pulmonary emboli in the right upper, middle and lower and left lower lobes.    There is no evidence of right heart strain or pulmonary infarct.      < from: CT Abdomen and Pelvis w/ Oral Cont and w/ IV Cont (10.08.20 @ 16:42) >  IMPRESSION:  Finding sat the right lung base suspicious for a pulmonary embolus, new since 10/4/2020.    Presacral edema and fluid of uncertain etiology not seen on the prior study.    No evidence of ascites or intestinal obstruction.    < end of copied text >

## 2020-11-02 NOTE — CONSULT NOTE ADULT - SUBJECTIVE AND OBJECTIVE BOX
Patient is a 80y old  Female who presents with a chief complaint of Low Hb (02 Nov 2020 12:03)     .     HPI:            REVIEW OF SYSTEMS  Constitutional:   No fever, no fatigue, no pallor, no night sweats, no weight loss.  HEENT:   No eye pain, no vision changes, no icterus, no mouth ulcers.  Respiratory:   No shortness of breath, no cough, no respiratory distress.   Cardiovascular:   No chest pain, no palpitations.   Gastrointestinal: No abdominal pain, no nausea, no vomiting , no diahrrea, no constipation, no hematochezia,no melena.  Skin:   No rashes, no jaundice, no eczema.   Musculoskeletal:   No joint pain, no swelling, no myalgia.   Neurologic:   No headache, no seizure, no weakness.   Genitourinary:   No dysuria, no decreased urine output.  Psychiatric:  No depression, no anxiety,   Endocrine:   No thyroid disease, no diabetes.  Heme/Lymphatic:   No anemia, no blood transfusions, no lymph node enlargement, no bleeding, no bruising.  ___________________________________________________________________________________________  Allergies    No Known Allergies    Intolerances      MEDICATIONS  (STANDING):  artificial  tears Solution 1 Drop(s) Both EYES two times a day  ascorbic acid 500 milliGRAM(s) Oral daily  atorvastatin 20 milliGRAM(s) Oral at bedtime  dextrose 5% + sodium chloride 0.9%. 1000 milliLiter(s) (70 mL/Hr) IV Continuous <Continuous>  insulin lispro (ADMELOG) corrective regimen sliding scale   SubCutaneous every 6 hours  iron sucrose IVPB 200 milliGRAM(s) IV Intermittent every 24 hours  latanoprost 0.005% Ophthalmic Solution 1 Drop(s) Both EYES at bedtime  magnesium citrate Oral Solution 2 Bottle Oral <User Schedule>  pantoprazole    Tablet 40 milliGRAM(s) Oral before breakfast  QUEtiapine 25 milliGRAM(s) Oral at bedtime  timolol 0.25% Solution 1 Drop(s) Both EYES two times a day    MEDICATIONS  (PRN):      PAST MEDICAL & SURGICAL HISTORY:  Hyperlipidemia    HTN (hypertension)    Dementia    HTN (hypertension)    DM (diabetes mellitus)    No pertinent past surgical history      FAMILY HISTORY:  FH: hypertension  father and mother      Social History: No hsitory of : Tobacco use, IVDA, EToH  ______________________________________________________________________________________    PHYSICAL EXAM    Daily     Daily   BMI: 23.4 (10-31 @ 17:49)  Change in Weight:  Vital Signs Last 24 Hrs  T(C): 36.9 (02 Nov 2020 16:06), Max: 36.9 (02 Nov 2020 16:06)  T(F): 98.4 (02 Nov 2020 16:06), Max: 98.4 (02 Nov 2020 16:06)  HR: 94 (02 Nov 2020 16:06) (89 - 109)  BP: 150/68 (02 Nov 2020 16:06) (106/74 - 150/68)  BP(mean): --  RR: 18 (02 Nov 2020 16:06) (17 - 18)  SpO2: 98% (02 Nov 2020 16:06) (97% - 98%)    General:  Well developed, well nourished, alert and active, no pallor, NAD.  HEENT:    Normal appearance of conjunctiva, ears, nose, lips, oropharynx, and oral mucosa, anicteric.  Neck:  No masses, no asymmetry.  Lymph Nodes:  No lymphadenopathy.   Cardiovascular:  RRR normal S1/S2, no murmur.  Respiratory:  CTA B/L, normal respiratory effort.   Abdominal:   soft, no masses or tenderness, normoactive BS, NT/ND, no HSM.  Extremities:   No clubbing or cyanosis, normal capillary refill, no edema.   Skin:   No rash, jaundice, lesions, eczema.   Musculoskeletal:  No joint swelling, erythema or tenderness.   Neuro: No focal deficits.   Other:   _______________________________________________________________________________________________  Lab Results:                          10.8   8.68  )-----------( 448      ( 02 Nov 2020 07:23 )             33.6     11-02    140  |  106  |  9   ----------------------------<  148<H>  3.8   |  24  |  0.68    Ca    9.1      02 Nov 2020 07:23  Phos  4.1     11-02  Mg     1.9     11-02    TPro  6.1  /  Alb  x   /  TBili  x   /  DBili  x   /  AST  x   /  ALT  x   /  AlkPhos  x   11-02    LIVER FUNCTIONS - ( 02 Nov 2020 10:05 )  Alb: x     / Pro: 6.1 g/dL / ALK PHOS: x     / ALT: x     / AST: x     / GGT: x           PT/INR - ( 31 Oct 2020 18:51 )   PT: 11.1 sec;   INR: 0.93 ratio         PTT - ( 31 Oct 2020 18:51 )  PTT:30.1 sec    CARDIAC MARKERS ( 01 Nov 2020 07:06 )  <0.015 ng/mL / x     / x     / x     / x          Stool Results:          RADIOLOGY RESULTS:    SURGICAL PATHOLOGY:      Patient is a 80y old  Female who presents with a chief complaint of Low Hb (02 Nov 2020 12:03)     .     HPI:  HPI:  80 year old woman, from home, lives with son, walks with walker, Chronic mckeon ( placed on recent admission)  with PMH of DM2, HTN, HLD, dementia, recent diagnosis with PE (on xarelto) admitted for low Hb. Pt had a follow up appointment with her PMD and was informed about low Hb and recommended to get further evaluation. Pt is AAOX2, mildy in distress because of the Mckeon, able ot participate in discussion, but history limited due to Dementia.  Pt has no symptoms to endorse besides  increased fatigue that have limited her Ambulation and she spends more time laying on bed. Of note pt had fall risks and was admitted about 10 days ago post fall and was admitted to ICU for PE.  During that admission, her hgb was low (6.9) and received 1 unit of pRBCs  Pt was made DNR with trial of intubation on that admission.  Pt denies any fever, bloody BM, Hemetemesis, abd pain, N/V/D, chest pain, SOB any sick contact.  Pt denies any smoking, alcohol or any form of substance abuse.     In the ED,   Pt is AAOX2, Mildy agitated due to the mckeon   Vitals- 126/57, Hr 90, afebrile   Hb 6.3, FOBT+  s/p 1 unit of prbc in ed  BS- 203   CT head- no acute changes    (31 Oct 2020 21:21)            REVIEW OF SYSTEMS  Constitutional:   No fever, no fatigue, no pallor, no night sweats, no weight loss.  HEENT:   No eye pain, no vision changes, no icterus, no mouth ulcers.  Respiratory:   No shortness of breath, no cough, no respiratory distress.   Cardiovascular:   No chest pain, no palpitations.   Gastrointestinal: No abdominal pain, no nausea, no vomiting , no diahrrea, no constipation, no hematochezia,no melena.  Skin:   No rashes, no jaundice, no eczema.   Musculoskeletal:   No joint pain, no swelling, no myalgia.   Neurologic:   No headache, no seizure, no weakness.   Genitourinary:   No dysuria, no decreased urine output.  Psychiatric:  No depression, no anxiety,   Endocrine:   No thyroid disease, no diabetes.  Heme/Lymphatic:   No anemia, no blood transfusions, no lymph node enlargement, no bleeding, no bruising.  ___________________________________________________________________________________________  Allergies    No Known Allergies    Intolerances      MEDICATIONS  (STANDING):  artificial  tears Solution 1 Drop(s) Both EYES two times a day  ascorbic acid 500 milliGRAM(s) Oral daily  atorvastatin 20 milliGRAM(s) Oral at bedtime  dextrose 5% + sodium chloride 0.9%. 1000 milliLiter(s) (70 mL/Hr) IV Continuous <Continuous>  insulin lispro (ADMELOG) corrective regimen sliding scale   SubCutaneous every 6 hours  iron sucrose IVPB 200 milliGRAM(s) IV Intermittent every 24 hours  latanoprost 0.005% Ophthalmic Solution 1 Drop(s) Both EYES at bedtime  magnesium citrate Oral Solution 2 Bottle Oral <User Schedule>  pantoprazole    Tablet 40 milliGRAM(s) Oral before breakfast  QUEtiapine 25 milliGRAM(s) Oral at bedtime  timolol 0.25% Solution 1 Drop(s) Both EYES two times a day    MEDICATIONS  (PRN):      PAST MEDICAL & SURGICAL HISTORY:  Hyperlipidemia    HTN (hypertension)    Dementia    HTN (hypertension)    DM (diabetes mellitus)    No pertinent past surgical history      FAMILY HISTORY:  FH: hypertension  father and mother      Social History: No hsitory of : Tobacco use, IVDA, EToH  ______________________________________________________________________________________    PHYSICAL EXAM    Daily     Daily   BMI: 23.4 (10-31 @ 17:49)  Change in Weight:  Vital Signs Last 24 Hrs  T(C): 36.9 (02 Nov 2020 16:06), Max: 36.9 (02 Nov 2020 16:06)  T(F): 98.4 (02 Nov 2020 16:06), Max: 98.4 (02 Nov 2020 16:06)  HR: 94 (02 Nov 2020 16:06) (89 - 109)  BP: 150/68 (02 Nov 2020 16:06) (106/74 - 150/68)  BP(mean): --  RR: 18 (02 Nov 2020 16:06) (17 - 18)  SpO2: 98% (02 Nov 2020 16:06) (97% - 98%)    General:  Well developed, well nourished, alert and active, no pallor, NAD.  HEENT:    Normal appearance of conjunctiva, ears, nose, lips, oropharynx, and oral mucosa, anicteric.  Neck:  No masses, no asymmetry.  Lymph Nodes:  No lymphadenopathy.   Cardiovascular:  RRR normal S1/S2, no murmur.  Respiratory:  CTA B/L, normal respiratory effort.   Abdominal:   soft, no masses or tenderness, normoactive BS, NT/ND, no HSM.  Extremities:   No clubbing or cyanosis, normal capillary refill, no edema.   Skin:   No rash, jaundice, lesions, eczema.   Musculoskeletal:  No joint swelling, erythema or tenderness.   Neuro: No focal deficits.   Other:   _______________________________________________________________________________________________  Lab Results:                          10.8   8.68  )-----------( 448      ( 02 Nov 2020 07:23 )             33.6     11-02    140  |  106  |  9   ----------------------------<  148<H>  3.8   |  24  |  0.68    Ca    9.1      02 Nov 2020 07:23  Phos  4.1     11-02  Mg     1.9     11-02    TPro  6.1  /  Alb  x   /  TBili  x   /  DBili  x   /  AST  x   /  ALT  x   /  AlkPhos  x   11-02    LIVER FUNCTIONS - ( 02 Nov 2020 10:05 )  Alb: x     / Pro: 6.1 g/dL / ALK PHOS: x     / ALT: x     / AST: x     / GGT: x           PT/INR - ( 31 Oct 2020 18:51 )   PT: 11.1 sec;   INR: 0.93 ratio         PTT - ( 31 Oct 2020 18:51 )  PTT:30.1 sec    CARDIAC MARKERS ( 01 Nov 2020 07:06 )  <0.015 ng/mL / x     / x     / x     / x          Stool Results:          RADIOLOGY RESULTS:    SURGICAL PATHOLOGY:   < from: CT Angio Abdomen and Pelvis w/ IV Cont (11.02.20 @ 17:04) >    EXAM:  CT ANGIO ABD PELV (W)AW IC                            PROCEDURE DATE:  11/02/2020          INTERPRETATION:  CLINICAL INDICATION: 80 years  Female with gi bleed. Anemia. PE.    COMPARISON: CT abdomen and pelvis 10/8/2020 and CTA abdomen and pelvis 10/4/2020    PROCEDURE:  CT of the Abdomen and Pelvis was performed with and without intravenous contrast.  Precontrast, Arterial and Delayed phases were performed.  Intravenous contrast: 90 ml Omnipaque 350. 10 ml discarded.  Oral contrast: None.  Sagittal and coronal reformats were performed.    FINDINGS:  LOWER CHEST: Mild cardiomegaly.    LIVER: Within normal limits.  BILE DUCTS: Normal caliber.  GALLBLADDER: Within normal limits.  SPLEEN: Within normal limits.  PANCREAS: 2 subcentimetercysts in the pancreatic neck reidentified.  ADRENALS: Stable mildly thickened adrenal glands.  KIDNEYS/URETERS: 1.1 cm left upper pole renal cyst. Foci of left renal cortical scarring. Renal hypodensities too small to characterize.    BLADDER: Decompressed around a Mckeon catheter. Diffuse bladder wall thickening.  REPRODUCTIVE ORGANS: Unremarkable uterus.    BOWEL: No bowel obstruction. Appendix is normal. No extravasation of contrast into the bowel lumen.  PERITONEUM: No ascites.  VESSELS: Tortuous atherosclerotic aorta without aneurysm.  RETROPERITONEUM/LYMPH NODES: No lymphadenopathy.  ABDOMINAL WALL: Within normal limits.  BONES: Scoliosis and degenerative changes. Grade 1 L4-5 anterolisthesis.    IMPRESSION:    No CT evidence of active GI bleed.    Bladder wall thickening secondary to decompression and/or cystitis. Mckeon catheter.    Small pancreatic cysts reidentified. Consider follow-up MRI to evaluate for IPMN.              STARLA JEFFREY MD; Attending Radiologist  This document hasbeen electronically signed. Nov 2 2020  5:18PM    < end of copied text >

## 2020-11-02 NOTE — PROGRESS NOTE ADULT - PROBLEM SELECTOR PLAN 5
Pt has PMH of HLD  Home meds- aspirin and statin   c/w home meds  f/u lipid profile Pt has PMH of HLD  Home meds- aspirin and statin   c/w home meds  Lipid profile 11/1: Increased TG (211) Pt has PMH of HLD  Home meds- aspirin and statin   c/w home meds  Lipid profile 11/1: Triglycerides 211<H> Pt has PMH of DM    (11/2)  Home meds- Janumet 100-1000mg QD, Lipitor 20mg QD  A1C from 11/1/2020 - 6.2  c/w HSS  Monitor FS   NPO

## 2020-11-03 LAB
ANION GAP SERPL CALC-SCNC: 10 MMOL/L — SIGNIFICANT CHANGE UP (ref 5–17)
ANION GAP SERPL CALC-SCNC: 7 MMOL/L — SIGNIFICANT CHANGE UP (ref 5–17)
BUN SERPL-MCNC: 11 MG/DL — SIGNIFICANT CHANGE UP (ref 7–18)
BUN SERPL-MCNC: 12 MG/DL — SIGNIFICANT CHANGE UP (ref 7–18)
CALCIUM SERPL-MCNC: 9.2 MG/DL — SIGNIFICANT CHANGE UP (ref 8.4–10.5)
CALCIUM SERPL-MCNC: 9.3 MG/DL — SIGNIFICANT CHANGE UP (ref 8.4–10.5)
CHLORIDE SERPL-SCNC: 114 MMOL/L — HIGH (ref 96–108)
CHLORIDE SERPL-SCNC: 115 MMOL/L — HIGH (ref 96–108)
CO2 SERPL-SCNC: 14 MMOL/L — LOW (ref 22–31)
CO2 SERPL-SCNC: 16 MMOL/L — LOW (ref 22–31)
CREAT SERPL-MCNC: 0.78 MG/DL — SIGNIFICANT CHANGE UP (ref 0.5–1.3)
CREAT SERPL-MCNC: 0.94 MG/DL — SIGNIFICANT CHANGE UP (ref 0.5–1.3)
GLUCOSE BLDC GLUCOMTR-MCNC: 126 MG/DL — HIGH (ref 70–99)
GLUCOSE BLDC GLUCOMTR-MCNC: 131 MG/DL — HIGH (ref 70–99)
GLUCOSE BLDC GLUCOMTR-MCNC: 153 MG/DL — HIGH (ref 70–99)
GLUCOSE BLDC GLUCOMTR-MCNC: 204 MG/DL — HIGH (ref 70–99)
GLUCOSE BLDC GLUCOMTR-MCNC: 221 MG/DL — HIGH (ref 70–99)
GLUCOSE SERPL-MCNC: 111 MG/DL — HIGH (ref 70–99)
GLUCOSE SERPL-MCNC: 165 MG/DL — HIGH (ref 70–99)
HCT VFR BLD CALC: 35.4 % — SIGNIFICANT CHANGE UP (ref 34.5–45)
HGB BLD-MCNC: 10.7 G/DL — LOW (ref 11.5–15.5)
MAGNESIUM SERPL-MCNC: 3.1 MG/DL — HIGH (ref 1.6–2.6)
MAGNESIUM SERPL-MCNC: 3.2 MG/DL — HIGH (ref 1.6–2.6)
MCHC RBC-ENTMCNC: 26.8 PG — LOW (ref 27–34)
MCHC RBC-ENTMCNC: 30.2 GM/DL — LOW (ref 32–36)
MCV RBC AUTO: 88.5 FL — SIGNIFICANT CHANGE UP (ref 80–100)
NRBC # BLD: 0 /100 WBCS — SIGNIFICANT CHANGE UP (ref 0–0)
PHOSPHATE SERPL-MCNC: 4.4 MG/DL — SIGNIFICANT CHANGE UP (ref 2.5–4.5)
PHOSPHATE SERPL-MCNC: 4.9 MG/DL — HIGH (ref 2.5–4.5)
PLATELET # BLD AUTO: 411 K/UL — HIGH (ref 150–400)
POTASSIUM SERPL-MCNC: 3.6 MMOL/L — SIGNIFICANT CHANGE UP (ref 3.5–5.3)
POTASSIUM SERPL-MCNC: 4 MMOL/L — SIGNIFICANT CHANGE UP (ref 3.5–5.3)
POTASSIUM SERPL-SCNC: 3.6 MMOL/L — SIGNIFICANT CHANGE UP (ref 3.5–5.3)
POTASSIUM SERPL-SCNC: 4 MMOL/L — SIGNIFICANT CHANGE UP (ref 3.5–5.3)
RBC # BLD: 4 M/UL — SIGNIFICANT CHANGE UP (ref 3.8–5.2)
RBC # FLD: 15.1 % — HIGH (ref 10.3–14.5)
SODIUM SERPL-SCNC: 138 MMOL/L — SIGNIFICANT CHANGE UP (ref 135–145)
SODIUM SERPL-SCNC: 138 MMOL/L — SIGNIFICANT CHANGE UP (ref 135–145)
WBC # BLD: 9.85 K/UL — SIGNIFICANT CHANGE UP (ref 3.8–10.5)
WBC # FLD AUTO: 9.85 K/UL — SIGNIFICANT CHANGE UP (ref 3.8–10.5)

## 2020-11-03 PROCEDURE — 99232 SBSQ HOSP IP/OBS MODERATE 35: CPT | Mod: GC

## 2020-11-03 RX ORDER — INSULIN LISPRO 100/ML
VIAL (ML) SUBCUTANEOUS
Refills: 0 | Status: DISCONTINUED | OUTPATIENT
Start: 2020-11-03 | End: 2020-11-10

## 2020-11-03 RX ADMIN — QUETIAPINE FUMARATE 25 MILLIGRAM(S): 200 TABLET, FILM COATED ORAL at 21:24

## 2020-11-03 RX ADMIN — IRON SUCROSE 110 MILLIGRAM(S): 20 INJECTION, SOLUTION INTRAVENOUS at 11:42

## 2020-11-03 RX ADMIN — Medication 1 DROP(S): at 17:12

## 2020-11-03 RX ADMIN — Medication 2: at 21:25

## 2020-11-03 RX ADMIN — ATORVASTATIN CALCIUM 20 MILLIGRAM(S): 80 TABLET, FILM COATED ORAL at 21:24

## 2020-11-03 RX ADMIN — PANTOPRAZOLE SODIUM 40 MILLIGRAM(S): 20 TABLET, DELAYED RELEASE ORAL at 06:39

## 2020-11-03 RX ADMIN — Medication 1 DROP(S): at 06:40

## 2020-11-03 RX ADMIN — Medication 1: at 00:41

## 2020-11-03 RX ADMIN — Medication 1 DROP(S): at 06:39

## 2020-11-03 RX ADMIN — LATANOPROST 1 DROP(S): 0.05 SOLUTION/ DROPS OPHTHALMIC; TOPICAL at 21:24

## 2020-11-03 RX ADMIN — Medication 2: at 06:39

## 2020-11-03 NOTE — PROGRESS NOTE ADULT - PROBLEM SELECTOR PLAN 9
IMPROVE VTE Individual Risk Assessment    RISK                                                          Points  [] Previous VTE                                           3  [] Thrombophilia                                        2  [] Lower limb paralysis                               2   [] Current Cancer                                       2   [] Immobilization > 24 hrs                          1  [x] ICU/CCU stay > 24 hours                       1  [x Age > 60                                               1    IMPROVE VTE Score: 2  - scd  - ppi IMPROVE VTE Individual Risk Assessment    RISK                                                          Points  [] Previous VTE                                           3  [] Thrombophilia                                        2  [] Lower limb paralysis                               2   [] Current Cancer                                       2   [] Immobilization > 24 hrs                          1  [x] ICU/CCU stay > 24 hours                       1  [x Age > 60                                               1    IMPROVE VTE Score: 2  - SCD  - PPI

## 2020-11-03 NOTE — PROGRESS NOTE ADULT - ASSESSMENT
Assessment and Plan:   · Assessment	    # Anemia:   Hgb stable  # GI bleed: FOBT +   # Iron def (labs from last admission):   Off note patient had anemia and FOBT + last admission on 10/10/20, repeat negative on 10/19/20. Xarelto was held and then restarted.   Appropriate response to PRBC tx.   Normal b12, folate, creat, LFTs, bili.   appreciate GI consult  s/p colonoscopy- 1-  Diverticulosis 2- No active bleeding 3- Colon polyp not removed in the setting of GI bleeding  Recs:   -no bleeding on colonoscopy, consider EGD as pt requires a/c  -await colonoscopy pathology  -conitnue to Hold Xarelto   -Continue Venofer 200 mg daily x 4 doses   -1U PRBC tx for Hb < 7 or symptomatic    # PE:   Recently diagnosed on 10/9/20. Started on Xarelto. Now with FOBT + anemia.   10/8-10/9: CTA Chest and CT A/P: with no evidence of malignancy reported   10/11/20: USB b/l LE: negative for DVT   Recs:  -Continue to hold AC, till GI clearance    -Consider IVC filter if patient not a candidate for AC     Discussed with Pt's Son Jevon Armas at bedside, his contact is [phone]  Thank you for the referral. Will continue to monitor the patient.  Please call with any questions [phone]  Above reviewed with Attending Dr. Pastor             Assessment and Plan:   · Assessment	    # Anemia:   Hgb stable  # GI bleed: FOBT +   # Iron def (labs from last admission):   Off note patient had anemia and FOBT + last admission on 10/10/20, repeat negative on 10/19/20. Xarelto was held and then restarted.   Appropriate response to PRBC tx.   Normal b12, folate, creat, LFTs, bili.   appreciate GI consult  s/p colonoscopy- 1-  Diverticulosis 2- No active bleeding 3- Colon polyp not removed in the setting of GI bleeding  Recs:   -no bleeding on colonoscopy, consider EGD as pt requires a/c  -await colonoscopy pathology  -conitnue to Hold Xarelto till GI clearance   -Continue Venofer 200 mg daily x 4 doses   -1U PRBC tx for Hb < 7 or symptomatic    # PE:   Recently diagnosed on 10/9/20. Started on Xarelto. Now with FOBT + anemia.   10/8-10/9: CTA Chest and CT A/P: with no evidence of malignancy reported   10/11/20: USB b/l LE: negative for DVT   Recs:  -Continue to hold AC, till GI clearance    -Consider IVC filter if patient not a candidate for AC     Discussed with Pt's Son Jevon Armas at bedside, his contact is [phone]  Thank you for the referral. Will continue to monitor the patient.  Please call with any questions [phone]  Above reviewed with Attending Dr. Meyers

## 2020-11-03 NOTE — PROGRESS NOTE ADULT - ASSESSMENT
80 year old woman, from home, lives with son, walks with walker, Chronic mckeon (placed on recent admission)  with PMH of DM2, HTN, HLD, dementia, recent diagnosis with PE (on Xarelto) admitted for weakness 2/2 to low Hb.

## 2020-11-03 NOTE — PROGRESS NOTE ADULT - PROBLEM SELECTOR PLAN 5
Pt has PMH of DM    (11/2)  Home meds- Janumet 100-1000mg QD, Lipitor 20mg QD  A1C from 11/1/2020 - 6.2  c/w HSS  Monitor FS   NPO Pt has PMH of DM    (11/2)  Home meds- Janumet 100-1000mg QD, Lipitor 20mg QD  A1C from 11/1/2020 - 6.2  c/w HSS  Monitor FS   advanced diet to clear liquid 11/3 after colonoscopy

## 2020-11-03 NOTE — PROGRESS NOTE ADULT - PROBLEM SELECTOR PLAN 6
Pt has PMH of HLD  Home meds- aspirin and statin   c/w home meds  Lipid profile 11/1: Triglycerides 211<H>

## 2020-11-03 NOTE — PROGRESS NOTE ADULT - PROBLEM SELECTOR PLAN 7
Pt was recently diagnosed and admitted in [facility] for PE (10/19/2020)  Pt was admitted in ICU and started on Xarelto   Holding Xarelto for GI bleed   F/u Hemo onc ([facility])  - consulted Heme/Onc Indra PATEL f/u recs Pt was recently diagnosed and admitted in [facility] for PE (10/19/2020)  Pt was admitted in ICU and started on Xarelto   Holding Xarelto for GI bleed   F/u Hemo onc ([facility])  - consulted Heme/Onc Indra PATEL f/u recs AC vs IVC

## 2020-11-03 NOTE — PROGRESS NOTE ADULT - PROBLEM SELECTOR PLAN 3
Pt has PMH of HTN   /54  Pt on Carvedilol 3.125mg BID, Amlodipine 10mg QD, Enalapril 2.5mg QD  Monitor vitals  - BP stable

## 2020-11-03 NOTE — PROGRESS NOTE ADULT - PROBLEM SELECTOR PLAN 2
P/w 6.2, FOBT +  Anemia panel 11/2 : Ferritin 201<H>, Iron 314<H>, TIBC 345, Iron Saturation 91%<H>  Hb stable 10.8  - s/p 2 U pRBC  - F/u CBC this AM showed uptrending values compared to yesterday: Hb (10.8 from 10.3), Hct (33.6 from 31.5), RBC (3.94 from 3.73).  - c/w Venofer 11/1 - 11/3 (3 Days) P/w 6.2, FOBT +  Anemia panel 11/2 : Ferritin 201<H>, Iron 314<H>, TIBC 345, Iron Saturation 91%<H>  Hb stable 10.8  - s/p 2 U pRBC  - F/u CBC this AM showed uptrending values compared to yesterday: Hb (10.8 from 10.3), Hct (33.6 from 31.5), RBC (3.94 from 3.73).  - c/w Venofer 11/1 - 11/3 (3 Days)  - Hb stable

## 2020-11-03 NOTE — PROGRESS NOTE ADULT - PROBLEM SELECTOR PLAN 4
Pt has PMH of dementia  Baseline AAOx2  Home meds- Quetapine 25mg QD, Donepezil 5mg QD  c/w home meds  - Haldol PRN for agitation or anxiety. QTc stable

## 2020-11-03 NOTE — PROGRESS NOTE ADULT - SUBJECTIVE AND OBJECTIVE BOX
PGY-1 Progress Note discussed with attending    PAGER #: [[phone]] TILL 5:00 PM  PLEASE CONTACT ON CALL TEAM:   - On Call Team (Please refer to [place]) FROM 5:00 PM - 8:30PM  - Nightfloat Team FROM 8:30 -7:30 AM    CHIEF COMPLAINT & BRIEF HOSPITAL COURSE:      INTERVAL HPI/OVERNIGHT EVENTS:       REVIEW OF SYSTEMS:  CONSTITUTIONAL: No fever, weight loss, or fatigue  RESPIRATORY: No cough, wheezing, chills or hemoptysis; No shortness of breath  CARDIOVASCULAR: No chest pain, palpitations, dizziness, or leg swelling  GASTROINTESTINAL: No abdominal pain. No nausea, vomiting, or hematemesis; No diarrhea or constipation. No melena or hematochezia.  GENITOURINARY: No dysuria or hematuria, urinary frequency  NEUROLOGICAL: No headaches, memory loss, loss of strength, numbness, or tremors  SKIN: No itching, burning, rashes, or lesions     MEDICATIONS  (STANDING):  artificial  tears Solution 1 Drop(s) Both EYES two times a day  ascorbic acid 500 milliGRAM(s) Oral daily  atorvastatin 20 milliGRAM(s) Oral at bedtime  dextrose 5% + sodium chloride 0.9%. 1000 milliLiter(s) (70 mL/Hr) IV Continuous <Continuous>  insulin lispro (ADMELOG) corrective regimen sliding scale   SubCutaneous every 6 hours  latanoprost 0.005% Ophthalmic Solution 1 Drop(s) Both EYES at bedtime  magnesium citrate Oral Solution 2 Bottle Oral <User Schedule>  pantoprazole    Tablet 40 milliGRAM(s) Oral before breakfast  QUEtiapine 25 milliGRAM(s) Oral at bedtime  timolol 0.25% Solution 1 Drop(s) Both EYES two times a day    MEDICATIONS  (PRN):      Vital Signs Last 24 Hrs  T(C): 36.9 (03 Nov 2020 07:57), Max: 36.9 (02 Nov 2020 16:06)  T(F): 98.4 (03 Nov 2020 07:57), Max: 98.4 (02 Nov 2020 16:06)  HR: 82 (03 Nov 2020 07:57) (82 - 94)  BP: 130/60 (03 Nov 2020 07:57) (130/60 - 150/68)  BP(mean): --  RR: 18 (03 Nov 2020 07:57) (18 - 18)  SpO2: 100% (03 Nov 2020 07:57) (98% - 100%)    PHYSICAL EXAMINATION:  GENERAL: NAD, well built  HEAD:  Atraumatic, Normocephalic  EYES:  conjunctiva and sclera clear  NECK: Supple, No JVD, Normal thyroid  CHEST/LUNG: Clear to auscultation. Clear to percussion bilaterally; No rales, rhonchi, wheezing, or rubs  HEART: Regular rate and rhythm; No murmurs, rubs, or gallops  ABDOMEN: Soft, Nontender, Nondistended; Bowel sounds present  NERVOUS SYSTEM:  Alert & Oriented X3,    EXTREMITIES:  2+ Peripheral Pulses, No clubbing, cyanosis, or edema  SKIN: warm dry                          10.7   9.85  )-----------( 411      ( 03 Nov 2020 08:23 )             35.4     11-03    138  |  115<H>  |  12  ----------------------------<  111<H>  4.0   |  16<L>  |  0.78    Ca    9.2      03 Nov 2020 10:54  Phos  4.4     11-03  Mg     3.1     11-03    TPro  6.1  /  Alb  3.5<L>  /  TBili  x   /  DBili  x   /  AST  x   /  ALT  x   /  AlkPhos  x   11-02    LIVER FUNCTIONS - ( 02 Nov 2020 10:05 )  Alb: 3.5 g/dL / Pro: 6.1 g/dL / ALK PHOS: x     / ALT: x     / AST: x     / GGT: x                       I&O's Summary    02 Nov 2020 07:01  -  03 Nov 2020 07:00  --------------------------------------------------------  IN: 0 mL / OUT: 900 mL / NET: -900 mL          CAPILLARY BLOOD GLUCOSE  CAPILLARY BLOOD GLUCOSE      POCT Blood Glucose.: 126 mg/dL (03 Nov 2020 11:35)    CAPILLARY BLOOD GLUCOSE      POCT Blood Glucose.: 126 mg/dL (03 Nov 2020 11:35)  POCT Blood Glucose.: 204 mg/dL (03 Nov 2020 06:16)  POCT Blood Glucose.: 153 mg/dL (03 Nov 2020 00:15)  POCT Blood Glucose.: 107 mg/dL (02 Nov 2020 17:22)      RADIOLOGY & ADDITIONAL TESTS:                   PGY-1 Progress Note discussed with attending    PAGER #: [[phone]] TILL 5:00 PM  PLEASE CONTACT ON CALL TEAM:   - On Call Team (Please refer to [place]) FROM 5:00 PM - 8:30PM  - Nightfloat Team FROM 8:30 -7:30 AM    CHIEF COMPLAINT & BRIEF HOSPITAL COURSE:  80 year old woman from home, lives with son, walks with walker, chronic mckeon (placed on recent admission) with PMH of DM2, HTN, HLD, dementia, recent diagnosis with PE (on Xarelto) admitted for weakness 2/2 to low Hb. Pt is AAOx2, mildly in distress because of the Mckeon, able to participate in discussion, but history limited due to Dementia.  Pt has no symptoms to endorse besides increased fatigue that have limited her ambulation and she spends more time laying on bed. Of note, pt was admitted on 10/19 post fall and was admitted to ICU for PE. During that admission, her Hb was low (6.9) and received 1 unit of pRBCs. Pt was made DNR with trial of intubation on that admission. In the ED, Hb 6.3, FOBT+, s/p 2 units of PRBC; CXR (10/31) showed no evidence of active chest disease. CT abd/pelvis with IV contrast scheduled for 11/2. Patient pulled out Mckeon on 11/2 and it was replaced.    INTERVAL HPI/OVERNIGHT EVENTS:       REVIEW OF SYSTEMS:  CONSTITUTIONAL: No fever, weight loss, or fatigue  RESPIRATORY: No cough, wheezing, chills or hemoptysis; No shortness of breath  CARDIOVASCULAR: No chest pain, palpitations, dizziness, or leg swelling  GASTROINTESTINAL: No abdominal pain. No nausea, vomiting, or hematemesis; No diarrhea or constipation. No melena or hematochezia.  GENITOURINARY: No dysuria or hematuria, urinary frequency  NEUROLOGICAL: No headaches, memory loss, loss of strength, numbness, or tremors  SKIN: No itching, burning, rashes, or lesions     MEDICATIONS  (STANDING):  artificial  tears Solution 1 Drop(s) Both EYES two times a day  ascorbic acid 500 milliGRAM(s) Oral daily  atorvastatin 20 milliGRAM(s) Oral at bedtime  dextrose 5% + sodium chloride 0.9%. 1000 milliLiter(s) (70 mL/Hr) IV Continuous <Continuous>  insulin lispro (ADMELOG) corrective regimen sliding scale   SubCutaneous every 6 hours  latanoprost 0.005% Ophthalmic Solution 1 Drop(s) Both EYES at bedtime  magnesium citrate Oral Solution 2 Bottle Oral <User Schedule>  pantoprazole    Tablet 40 milliGRAM(s) Oral before breakfast  QUEtiapine 25 milliGRAM(s) Oral at bedtime  timolol 0.25% Solution 1 Drop(s) Both EYES two times a day    MEDICATIONS  (PRN):      Vital Signs Last 24 Hrs  T(C): 36.9 (03 Nov 2020 07:57), Max: 36.9 (02 Nov 2020 16:06)  T(F): 98.4 (03 Nov 2020 07:57), Max: 98.4 (02 Nov 2020 16:06)  HR: 82 (03 Nov 2020 07:57) (82 - 94)  BP: 130/60 (03 Nov 2020 07:57) (130/60 - 150/68)  BP(mean): --  RR: 18 (03 Nov 2020 07:57) (18 - 18)  SpO2: 100% (03 Nov 2020 07:57) (98% - 100%)    PHYSICAL EXAMINATION:  GENERAL: 80 year old F who appears stated age, somewhat agitated  HEAD: Atraumatic, Normocephalic  EYES: EOMI, PERRLA, Conjunctiva and sclera clear  NECK: Supple, No JVD, Normal thyroid  CHEST/LUNG: Clear to auscultation B/L. Clear to percussion; No rales, rhonchi, wheezing, or rubs  HEART: +S1/S2, no S3/S4, Regular rate and rhythm; No murmurs, rubs, or gallops  ABDOMEN: Soft, Nontender, Nondistended; Bowel sounds present on all four quadrants  NERVOUS SYSTEM:  Alert & Oriented X2  : Mckeon in place, clear straw colored urine  EXTREMITIES:  2+ Peripheral Pulses, No clubbing, cyanosis, or edema  SKIN: Warm, dry                          10.7   9.85  )-----------( 411      ( 03 Nov 2020 08:23 )             35.4     11-03    138  |  115<H>  |  12  ----------------------------<  111<H>  4.0   |  16<L>  |  0.78    Ca    9.2      03 Nov 2020 10:54  Phos  4.4     11-03  Mg     3.1     11-03    TPro  6.1  /  Alb  3.5<L>  /  TBili  x   /  DBili  x   /  AST  x   /  ALT  x   /  AlkPhos  x   11-02    LIVER FUNCTIONS - ( 02 Nov 2020 10:05 )  Alb: 3.5 g/dL / Pro: 6.1 g/dL / ALK PHOS: x     / ALT: x     / AST: x     / GGT: x                       I&O's Summary    02 Nov 2020 07:01  -  03 Nov 2020 07:00  --------------------------------------------------------  IN: 0 mL / OUT: 900 mL / NET: -900 mL          CAPILLARY BLOOD GLUCOSE  CAPILLARY BLOOD GLUCOSE      POCT Blood Glucose.: 126 mg/dL (03 Nov 2020 11:35)    CAPILLARY BLOOD GLUCOSE      POCT Blood Glucose.: 126 mg/dL (03 Nov 2020 11:35)  POCT Blood Glucose.: 204 mg/dL (03 Nov 2020 06:16)  POCT Blood Glucose.: 153 mg/dL (03 Nov 2020 00:15)  POCT Blood Glucose.: 107 mg/dL (02 Nov 2020 17:22)      RADIOLOGY & ADDITIONAL TESTS:                   PGY-1 Progress Note discussed with attending    PAGER #: [[phone]] TILL 5:00 PM  PLEASE CONTACT ON CALL TEAM:   - On Call Team (Please refer to [place]) FROM 5:00 PM - 8:30PM  - Nightfloat Team FROM 8:30 -7:30 AM    CHIEF COMPLAINT & BRIEF HOSPITAL COURSE:  80 year old woman from home, lives with son, walks with walker, chronic mckeon (placed on recent admission) with PMH of DM2, HTN, HLD, dementia, recent diagnosis with PE (on Xarelto) admitted for weakness 2/2 to low Hb. Pt is AAOx2, mildly in distress because of the Mckeon, able to participate in discussion, but history limited due to Dementia.  Pt has no symptoms to endorse besides increased fatigue that have limited her ambulation and she spends more time laying on bed. Of note, pt was admitted on 10/19 post fall and was admitted to ICU for PE. During that admission, her Hb was low (6.9) and received 1 unit of pRBCs. Pt was made DNR with trial of intubation on that admission. In the ED, Hb 6.3, FOBT+, s/p 2 units of PRBC; CXR (10/31) showed no evidence of active chest disease. CT abd/pelvis angio on 11/2 showed no evidence for GI bleed. Patient pulled out Mckeon on 11/2 and it was replaced. Colonoscopy on 11/3 showed 5mm sessile polyp which was NOT removed due to GI bleeding, scattered diverticula and internal hemorrhoids.    INTERVAL HPI/OVERNIGHT EVENTS:   Patient for colonoscopy today, tolerated well. Clear liquid diet started. Plan for discussion about EGD and necessity for IVC filter with Heme/Onc.    REVIEW OF SYSTEMS:  CONSTITUTIONAL: No fever, weight loss, or fatigue  RESPIRATORY: No cough, wheezing, chills or hemoptysis; No shortness of breath  CARDIOVASCULAR: No chest pain, palpitations, dizziness, or leg swelling  GASTROINTESTINAL: No abdominal pain. No nausea, vomiting, or hematemesis; + diarrhea with bowel prep. No constipation. No melena or hematochezia.  GENITOURINARY: No dysuria or hematuria. Mckeon in place.  NEUROLOGICAL: No headaches, + baseline memory loss from dementia.   SKIN: No itching, burning, rashes, or lesions     MEDICATIONS  (STANDING):  artificial  tears Solution 1 Drop(s) Both EYES two times a day  ascorbic acid 500 milliGRAM(s) Oral daily  atorvastatin 20 milliGRAM(s) Oral at bedtime  dextrose 5% + sodium chloride 0.9%. 1000 milliLiter(s) (70 mL/Hr) IV Continuous <Continuous>  insulin lispro (ADMELOG) corrective regimen sliding scale   SubCutaneous every 6 hours  latanoprost 0.005% Ophthalmic Solution 1 Drop(s) Both EYES at bedtime  magnesium citrate Oral Solution 2 Bottle Oral <User Schedule>  pantoprazole    Tablet 40 milliGRAM(s) Oral before breakfast  QUEtiapine 25 milliGRAM(s) Oral at bedtime  timolol 0.25% Solution 1 Drop(s) Both EYES two times a day    MEDICATIONS  (PRN):      Vital Signs Last 24 Hrs  T(C): 36.9 (03 Nov 2020 07:57), Max: 36.9 (02 Nov 2020 16:06)  T(F): 98.4 (03 Nov 2020 07:57), Max: 98.4 (02 Nov 2020 16:06)  HR: 82 (03 Nov 2020 07:57) (82 - 94)  BP: 130/60 (03 Nov 2020 07:57) (130/60 - 150/68)  BP(mean): --  RR: 18 (03 Nov 2020 07:57) (18 - 18)  SpO2: 100% (03 Nov 2020 07:57) (98% - 100%)    PHYSICAL EXAMINATION:  GENERAL: 80 year old F who appears stated age, calm, no restraints  HEAD: Atraumatic, Normocephalic  EYES: EOMI, PERRLA, Conjunctiva and sclera clear  NECK: Supple, No JVD, Normal thyroid  CHEST/LUNG: Clear to auscultation B/L. Clear to percussion; No rales, rhonchi, wheezing, or rubs  HEART: +S1/S2, no S3/S4, Regular rate and rhythm; No murmurs, rubs, or gallops  ABDOMEN: Soft, Nontender, Nondistended; Bowel sounds present on all four quadrants  NERVOUS SYSTEM:  Alert & Oriented X2, requires frequent re-orientation  : Mckeon in place, clear straw colored urine  EXTREMITIES:  2+ Peripheral Pulses, No clubbing, cyanosis, or edema  SKIN: Warm, dry                          10.7   9.85  )-----------( 411      ( 03 Nov 2020 08:23 )             35.4     11-03    138  |  115<H>  |  12  ----------------------------<  111<H>  4.0   |  16<L>  |  0.78    Ca    9.2      03 Nov 2020 10:54  Phos  4.4     11-03  Mg     3.1     11-03    TPro  6.1  /  Alb  3.5<L>  /  TBili  x   /  DBili  x   /  AST  x   /  ALT  x   /  AlkPhos  x   11-02    LIVER FUNCTIONS - ( 02 Nov 2020 10:05 )  Alb: 3.5 g/dL / Pro: 6.1 g/dL / ALK PHOS: x     / ALT: x     / AST: x     / GGT: x                 I&O's Summary    02 Nov 2020 07:01  -  03 Nov 2020 07:00  --------------------------------------------------------  IN: 0 mL / OUT: 900 mL / NET: -900 mL          CAPILLARY BLOOD GLUCOSE  POCT Blood Glucose.: 126 mg/dL (03 Nov 2020 11:35)    CAPILLARY BLOOD GLUCOSE  POCT Blood Glucose.: 126 mg/dL (03 Nov 2020 11:35)  POCT Blood Glucose.: 204 mg/dL (03 Nov 2020 06:16)  POCT Blood Glucose.: 153 mg/dL (03 Nov 2020 00:15)  POCT Blood Glucose.: 107 mg/dL (02 Nov 2020 17:22)      RADIOLOGY & ADDITIONAL TESTS:          Colonoscopy Report  Indication: Gi Bleed   Referring:  Instrument:    Anesthesia: MAC  Consent:  Informed consent was obtained from the patient after providing any opportunity for questions  Procedure: After placing the patient in the left lateral decubitus position, the colonoscope was gently inserted into the rectum and advanced to the cecum. Color, texture, mucosa, and anatomy of the colon were carefully examined with the scope. The patient tolerated the procedure well. After completion of the exam, the patient was transferred to the recovery room.     Preparation:  Findings:     Anal Canal	Normal    Rectum	              Internal hemorrhoids (non bleeding) seen on retro-flexion     Sigmoid Colon 	Few scattered diverticula. 5 mm sessile polyp ( not removed)     Descending Colon	Normal    Splenic Flexure	Normal    Transverse Colon	5 mm sessile polyp ( not removed)     Hepatic Flexure	Normal    Ascending Colon	Few scattered small diverticula     Cecum	Normal    Ileo-cecal Valve	Normal    Ileum 	              Normal       Impression:  1-  Diverticulosis 2- No active bleeding 3- Colon polyp not removed in the setting of GI bleeding 4- No active bleeding     Recommendations:1- Advance diet -high fiber 2- Monitor CBC 3- Discharge planning         Attending:    Electronic Signatures:  Ashwin Orourke)  (Signed 03-Nov-2020 15:21)  	Authored: Note Type, Note      Last Updated: 03-Nov-2020 15:21 by Ashwin Orourke)

## 2020-11-03 NOTE — PROGRESS NOTE ADULT - SUBJECTIVE AND OBJECTIVE BOX
· Subjective and Objective:   80 year old woman, from home, lives with son, walks with walker, Chronic mckeon ( placed on recent admission)  with PMH of DM2, HTN, HLD, dementia, recent diagnosis with PE (on xarelto) admitted for low Hb. Pt had a follow up appointment with her PMD and was informed about low Hb and recommended to get further evaluation. Pt is AAOX2, mildy in distress because of the Mckeon, able ot participate in discussion, but history limited due to Dementia.  Pt has no symptoms to endorse besides  increased fatigue that have limited her Ambulation and she spends more time laying on bed. Of note pt had fall risks and was admitted about 10 days ago post fall and was admitted to ICU for PE.  During that admission, her hgb was low (6.9) and received 1 unit of pRBCs  Pt was made DNR with trial of intubation on that admission. Pt denies any fever, bloody BM, Hemetemesis, abd pain, N/V/D, chest pain, SOB any sick contact.  Patient seen this afternoon. Wants to eat. RN at bedside.     Interval: Pt examined at bedside. No new complaints, lethargic s/p colonoscopy.  Pt has known dementia.   Son at bedside    PMH/ PSH: as per hpi   Meds: reviewed in emr   Allergies: nkda   Social hist: No habits     ROS: unable to assess, pt has dementia and not oriented    Vitals:  Vital Signs Last 24 Hrs  T(C): 36.8 (03 Nov 2020 16:09), Max: 36.9 (03 Nov 2020 07:57)  T(F): 98.3 (03 Nov 2020 16:09), Max: 98.4 (03 Nov 2020 07:57)  HR: 93 (03 Nov 2020 16:09) (82 - 94)  BP: 139/60 (03 Nov 2020 16:09) (130/60 - 145/76)  BP(mean): --  ABP: --  ABP(mean): --  RR: 18 (03 Nov 2020 16:09) (18 - 18)  SpO2: 100% (03 Nov 2020 16:09) (100% - 100%)      Physical Exam:  Gen: NAD, lethargic  CVS: s1s2   Resp: CTA B/L   GI: soft, non tender   Ext: no c/c/e     Labs:  CBC Full  -  ( 03 Nov 2020 08:23 )  WBC Count : 9.85 K/uL  RBC Count : 4.00 M/uL  Hemoglobin : 10.7 g/dL  Hematocrit : 35.4 %  Platelet Count - Automated : 411 K/uL  Mean Cell Volume : 88.5 fl  Mean Cell Hemoglobin : 26.8 pg  Mean Cell Hemoglobin Concentration : 30.2 gm/dL  Auto Neutrophil # : x  Auto Lymphocyte # : x  Auto Monocyte # : x  Auto Eosinophil # : x  Auto Basophil # : x  Auto Neutrophil % : x  Auto Lymphocyte % : x  Auto Monocyte % : x  Auto Eosinophil % : x  Auto Basophil % : x    11-03    138  |  115<H>  |  12  ----------------------------<  111<H>  4.0   |  16<L>  |  0.78    Ca    9.2      03 Nov 2020 10:54  Phos  4.4     11-03  Mg     3.1     11-03    TPro  6.1  /  Alb  3.5<L>  /  TBili  x   /  DBili  x   /  AST  x   /  ALT  x   /  AlkPhos  x   11-02      Radiology:    < from: CT Angio Chest w/ IV Cont (10.09.20 @ 14:45) >  IMPRESSION:  Acute pulmonary emboli in the right upper, middle and lower and left lower lobes.    There is no evidence of right heart strain or pulmonary infarct.      < from: CT Abdomen and Pelvis w/ Oral Cont and w/ IV Cont (10.08.20 @ 16:42) >  IMPRESSION:  Finding sat the right lung base suspicious for a pulmonary embolus, new since 10/4/2020.    Presacral edema and fluid of uncertain etiology not seen on the prior study.    No evidence of ascites or intestinal obstruction.    < end of copied text >

## 2020-11-03 NOTE — CHART NOTE - NSCHARTNOTEFT_GEN_A_CORE
Colonoscopy Report  Indication: Gi Bleed   Referring:  Instrument:    Anesthesia: MAC  Consent:  Informed consent was obtained from the patient after providing any opportunity for questions  Procedure: After placing the patient in the left lateral decubitus position, the colonoscope was gently inserted into the rectum and advanced to the cecum. Color, texture, mucosa, and anatomy of the colon were carefully examined with the scope. The patient tolerated the procedure well. After completion of the exam, the patient was transferred to the recovery room.     Preparation:  Findings:     Anal Canal	Normal    Rectum	              Internal hemorrhoids (non bleeding) seen on retro-flexion     Sigmoid Colon 	Few scattered diverticula. 5 mm sessile polyp ( not removed)     Descending Colon	Normal    Splenic Flexure	Normal    Transverse Colon	5 mm sessile polyp ( not removed)     Hepatic Flexure	Normal    Ascending Colon	Few scattered small diverticula     Cecum	Normal    Ileo-cecal Valve	Normal    Ileum 	              Normal       Impression:  1-  Diverticulosis 2- No active bleeding 3- Colon polyp not removed in the setting of GI bleeding 4- No active bleeding     Recommendations:1- Advance diet -high fiber 2- Monitor CBC 3- Discharge planning                                     Attending:

## 2020-11-03 NOTE — PROGRESS NOTE ADULT - PROBLEM SELECTOR PLAN 1
Pt sent by PMD to the Ed for low Hb 6.9 at his office.   - Pt denies any bloody stool or any acute blood loss although is a poor historian   - s/o incr fatigue and ambulatory intolerance  - In the ED Hb- 6.2, FOBT +  - s/p 2 units of PRBC in ED, Hb 10.8 (11/2)  - f/u GI - Dr. Bentley  - for CT Ab/Pelvis with IV contrast 11/2 Pt sent by PMD to the Ed for low Hb 6.9 at his office.   - Pt denies any bloody stool or any acute blood loss although is a poor historian   - s/o incr fatigue and ambulatory intolerance  - In the ED Hb- 6.2, FOBT +  - s/p 2 units of PRBC in ED, Hb 10.7 (11/2)  - CT Ab/Pelvis Angio 11/2 showed no signs of GI bleed  - GI - Dr. Bentley, s/p colon showed sessile polyp- not removed, diverticulosis and internal hemorrhoids  - f/u Heme/Onc for plan on AC vs. IVC filter

## 2020-11-04 LAB
ANION GAP SERPL CALC-SCNC: 10 MMOL/L — SIGNIFICANT CHANGE UP (ref 5–17)
APPEARANCE UR: ABNORMAL
B-OH-BUTYR SERPL-SCNC: 0.1 MMOL/L — SIGNIFICANT CHANGE UP
BACTERIA # UR AUTO: ABNORMAL /HPF
BILIRUB UR-MCNC: NEGATIVE — SIGNIFICANT CHANGE UP
BUN SERPL-MCNC: 14 MG/DL — SIGNIFICANT CHANGE UP (ref 7–18)
CALCIUM SERPL-MCNC: 8.8 MG/DL — SIGNIFICANT CHANGE UP (ref 8.4–10.5)
CHLORIDE SERPL-SCNC: 109 MMOL/L — HIGH (ref 96–108)
CHLORIDE UR-SCNC: 82 MMOL/L — SIGNIFICANT CHANGE UP
CO2 SERPL-SCNC: 15 MMOL/L — LOW (ref 22–31)
COLOR SPEC: YELLOW — SIGNIFICANT CHANGE UP
CREAT ?TM UR-MCNC: 76 MG/DL — SIGNIFICANT CHANGE UP
CREAT SERPL-MCNC: 0.78 MG/DL — SIGNIFICANT CHANGE UP (ref 0.5–1.3)
DIFF PNL FLD: ABNORMAL
EPI CELLS # UR: SIGNIFICANT CHANGE UP /HPF
GLUCOSE BLDC GLUCOMTR-MCNC: 171 MG/DL — HIGH (ref 70–99)
GLUCOSE BLDC GLUCOMTR-MCNC: 180 MG/DL — HIGH (ref 70–99)
GLUCOSE BLDC GLUCOMTR-MCNC: 205 MG/DL — HIGH (ref 70–99)
GLUCOSE BLDC GLUCOMTR-MCNC: 214 MG/DL — HIGH (ref 70–99)
GLUCOSE SERPL-MCNC: 156 MG/DL — HIGH (ref 70–99)
GLUCOSE UR QL: NEGATIVE — SIGNIFICANT CHANGE UP
HCT VFR BLD CALC: 32.1 % — LOW (ref 34.5–45)
HCT VFR BLD CALC: 33.9 % — LOW (ref 34.5–45)
HGB BLD-MCNC: 10.5 G/DL — LOW (ref 11.5–15.5)
HGB BLD-MCNC: 10.8 G/DL — LOW (ref 11.5–15.5)
KETONES UR-MCNC: NEGATIVE — SIGNIFICANT CHANGE UP
LACTATE SERPL-SCNC: 1.5 MMOL/L — SIGNIFICANT CHANGE UP (ref 0.7–2)
LEUKOCYTE ESTERASE UR-ACNC: ABNORMAL
MAGNESIUM SERPL-MCNC: 2.4 MG/DL — SIGNIFICANT CHANGE UP (ref 1.6–2.6)
MCHC RBC-ENTMCNC: 27.1 PG — SIGNIFICANT CHANGE UP (ref 27–34)
MCHC RBC-ENTMCNC: 27.3 PG — SIGNIFICANT CHANGE UP (ref 27–34)
MCHC RBC-ENTMCNC: 31.9 GM/DL — LOW (ref 32–36)
MCHC RBC-ENTMCNC: 32.7 GM/DL — SIGNIFICANT CHANGE UP (ref 32–36)
MCV RBC AUTO: 83.4 FL — SIGNIFICANT CHANGE UP (ref 80–100)
MCV RBC AUTO: 85 FL — SIGNIFICANT CHANGE UP (ref 80–100)
NITRITE UR-MCNC: NEGATIVE — SIGNIFICANT CHANGE UP
NRBC # BLD: 0 /100 WBCS — SIGNIFICANT CHANGE UP (ref 0–0)
NRBC # BLD: 0 /100 WBCS — SIGNIFICANT CHANGE UP (ref 0–0)
OSMOLALITY UR: 411 MOS/KG — SIGNIFICANT CHANGE UP (ref 50–1200)
PH UR: 6 — SIGNIFICANT CHANGE UP (ref 5–8)
PHOSPHATE SERPL-MCNC: 3.4 MG/DL — SIGNIFICANT CHANGE UP (ref 2.5–4.5)
PLATELET # BLD AUTO: 326 K/UL — SIGNIFICANT CHANGE UP (ref 150–400)
PLATELET # BLD AUTO: 357 K/UL — SIGNIFICANT CHANGE UP (ref 150–400)
POTASSIUM SERPL-MCNC: 4.4 MMOL/L — SIGNIFICANT CHANGE UP (ref 3.5–5.3)
POTASSIUM SERPL-SCNC: 4.4 MMOL/L — SIGNIFICANT CHANGE UP (ref 3.5–5.3)
POTASSIUM UR-SCNC: 30 MMOL/L — SIGNIFICANT CHANGE UP
PROCALCITONIN SERPL-MCNC: 0.28 NG/ML — HIGH (ref 0.02–0.1)
PROT UR-MCNC: 100
RBC # BLD: 3.85 M/UL — SIGNIFICANT CHANGE UP (ref 3.8–5.2)
RBC # BLD: 3.99 M/UL — SIGNIFICANT CHANGE UP (ref 3.8–5.2)
RBC # FLD: 14.7 % — HIGH (ref 10.3–14.5)
RBC # FLD: 15.2 % — HIGH (ref 10.3–14.5)
RBC CASTS # UR COMP ASSIST: ABNORMAL /HPF (ref 0–2)
SODIUM SERPL-SCNC: 134 MMOL/L — LOW (ref 135–145)
SODIUM UR-SCNC: 34 MMOL/L — SIGNIFICANT CHANGE UP
SP GR SPEC: 1.01 — SIGNIFICANT CHANGE UP (ref 1.01–1.02)
URATE SERPL-MCNC: 4.4 MG/DL — SIGNIFICANT CHANGE UP (ref 2.5–7)
URATE UR-MCNC: 44.9 MG/DL — SIGNIFICANT CHANGE UP
UROBILINOGEN FLD QL: NEGATIVE — SIGNIFICANT CHANGE UP
WBC # BLD: 11.32 K/UL — HIGH (ref 3.8–10.5)
WBC # BLD: 9.29 K/UL — SIGNIFICANT CHANGE UP (ref 3.8–10.5)
WBC # FLD AUTO: 11.32 K/UL — HIGH (ref 3.8–10.5)
WBC # FLD AUTO: 9.29 K/UL — SIGNIFICANT CHANGE UP (ref 3.8–10.5)
WBC UR QL: >50 /HPF (ref 0–5)

## 2020-11-04 PROCEDURE — 71045 X-RAY EXAM CHEST 1 VIEW: CPT | Mod: 26

## 2020-11-04 PROCEDURE — 99233 SBSQ HOSP IP/OBS HIGH 50: CPT | Mod: GC

## 2020-11-04 RX ORDER — PREDNISOLONE SODIUM PHOSPHATE 1 %
1 DROPS OPHTHALMIC (EYE) THREE TIMES A DAY
Refills: 0 | Status: DISCONTINUED | OUTPATIENT
Start: 2020-11-04 | End: 2020-11-06

## 2020-11-04 RX ORDER — HALOPERIDOL DECANOATE 100 MG/ML
5 INJECTION INTRAMUSCULAR ONCE
Refills: 0 | Status: COMPLETED | OUTPATIENT
Start: 2020-11-04 | End: 2020-11-04

## 2020-11-04 RX ORDER — CEFTRIAXONE 500 MG/1
INJECTION, POWDER, FOR SOLUTION INTRAMUSCULAR; INTRAVENOUS
Refills: 0 | Status: DISCONTINUED | OUTPATIENT
Start: 2020-11-04 | End: 2020-11-05

## 2020-11-04 RX ORDER — SODIUM CHLORIDE 9 MG/ML
1000 INJECTION, SOLUTION INTRAVENOUS
Refills: 0 | Status: DISCONTINUED | OUTPATIENT
Start: 2020-11-04 | End: 2020-11-05

## 2020-11-04 RX ORDER — CEFTRIAXONE 500 MG/1
1000 INJECTION, POWDER, FOR SOLUTION INTRAMUSCULAR; INTRAVENOUS EVERY 24 HOURS
Refills: 0 | Status: DISCONTINUED | OUTPATIENT
Start: 2020-11-05 | End: 2020-11-05

## 2020-11-04 RX ORDER — ENOXAPARIN SODIUM 100 MG/ML
40 INJECTION SUBCUTANEOUS DAILY
Refills: 0 | Status: DISCONTINUED | OUTPATIENT
Start: 2020-11-04 | End: 2020-11-04

## 2020-11-04 RX ORDER — SODIUM,POTASSIUM PHOSPHATES 278-250MG
1 POWDER IN PACKET (EA) ORAL THREE TIMES A DAY
Refills: 0 | Status: COMPLETED | OUTPATIENT
Start: 2020-11-04 | End: 2020-11-05

## 2020-11-04 RX ORDER — ENOXAPARIN SODIUM 100 MG/ML
55 INJECTION SUBCUTANEOUS
Refills: 0 | Status: DISCONTINUED | OUTPATIENT
Start: 2020-11-04 | End: 2020-11-10

## 2020-11-04 RX ORDER — ACETAMINOPHEN 500 MG
650 TABLET ORAL EVERY 6 HOURS
Refills: 0 | Status: DISCONTINUED | OUTPATIENT
Start: 2020-11-04 | End: 2020-11-04

## 2020-11-04 RX ORDER — ACETAMINOPHEN 500 MG
650 TABLET ORAL EVERY 6 HOURS
Refills: 0 | Status: DISCONTINUED | OUTPATIENT
Start: 2020-11-04 | End: 2020-11-10

## 2020-11-04 RX ORDER — CEFTRIAXONE 500 MG/1
1000 INJECTION, POWDER, FOR SOLUTION INTRAMUSCULAR; INTRAVENOUS ONCE
Refills: 0 | Status: COMPLETED | OUTPATIENT
Start: 2020-11-04 | End: 2020-11-04

## 2020-11-04 RX ADMIN — Medication 2: at 11:50

## 2020-11-04 RX ADMIN — Medication 500 MILLIGRAM(S): at 11:49

## 2020-11-04 RX ADMIN — Medication 650 MILLIGRAM(S): at 16:17

## 2020-11-04 RX ADMIN — Medication 1 DROP(S): at 05:18

## 2020-11-04 RX ADMIN — PANTOPRAZOLE SODIUM 40 MILLIGRAM(S): 20 TABLET, DELAYED RELEASE ORAL at 06:01

## 2020-11-04 RX ADMIN — Medication 1 DROP(S): at 21:20

## 2020-11-04 RX ADMIN — ENOXAPARIN SODIUM 55 MILLIGRAM(S): 100 INJECTION SUBCUTANEOUS at 11:49

## 2020-11-04 RX ADMIN — Medication 650 MILLIGRAM(S): at 13:29

## 2020-11-04 RX ADMIN — Medication 1 PACKET(S): at 13:29

## 2020-11-04 RX ADMIN — HALOPERIDOL DECANOATE 5 MILLIGRAM(S): 100 INJECTION INTRAMUSCULAR at 01:08

## 2020-11-04 RX ADMIN — Medication 1: at 08:23

## 2020-11-04 RX ADMIN — Medication 1 DROP(S): at 17:16

## 2020-11-04 RX ADMIN — CEFTRIAXONE 100 MILLIGRAM(S): 500 INJECTION, POWDER, FOR SOLUTION INTRAMUSCULAR; INTRAVENOUS at 16:19

## 2020-11-04 RX ADMIN — ENOXAPARIN SODIUM 55 MILLIGRAM(S): 100 INJECTION SUBCUTANEOUS at 22:19

## 2020-11-04 RX ADMIN — Medication 1 DROP(S): at 17:15

## 2020-11-04 RX ADMIN — Medication 1 PACKET(S): at 21:21

## 2020-11-04 RX ADMIN — Medication 2: at 17:16

## 2020-11-04 RX ADMIN — Medication 650 MILLIGRAM(S): at 19:05

## 2020-11-04 RX ADMIN — QUETIAPINE FUMARATE 25 MILLIGRAM(S): 200 TABLET, FILM COATED ORAL at 21:21

## 2020-11-04 RX ADMIN — LATANOPROST 1 DROP(S): 0.05 SOLUTION/ DROPS OPHTHALMIC; TOPICAL at 21:21

## 2020-11-04 RX ADMIN — ATORVASTATIN CALCIUM 20 MILLIGRAM(S): 80 TABLET, FILM COATED ORAL at 21:21

## 2020-11-04 RX ADMIN — SODIUM CHLORIDE 50 MILLILITER(S): 9 INJECTION, SOLUTION INTRAVENOUS at 17:26

## 2020-11-04 RX ADMIN — Medication 1: at 21:21

## 2020-11-04 RX ADMIN — Medication 650 MILLIGRAM(S): at 19:38

## 2020-11-04 NOTE — PROGRESS NOTE ADULT - PROBLEM SELECTOR PLAN 2
P/w 6.2, FOBT +  Anemia panel 11/2 : Ferritin 201<H>, Iron 314<H>, TIBC 345, Iron Saturation 91%<H>  Hb stable 10.8  - s/p 2 U pRBC  - F/u CBC this AM showed uptrending values compared to yesterday: Hb (10.8 from 10.3), Hct (33.6 from 31.5), RBC (3.94 from 3.73).  - c/w Venofer 11/1 - 11/3 (3 Days)  - Hb stable P/w 6.2, FOBT +  Anemia panel 11/2 : Ferritin 201<H>, Iron 314<H>, TIBC 345, Iron Saturation 91%<H>  - s/p 2 U pRBC  - Hb, Hct stable

## 2020-11-04 NOTE — PROGRESS NOTE ADULT - PROBLEM SELECTOR PLAN 4
Pt has PMH of dementia  Baseline AAOx2  Home meds- Quetapine 25mg QD, Donepezil 5mg QD  c/w home meds  - Haldol PRN for agitation or anxiety. QTc stable Pt has PMH of dementia  Baseline AAOx2  Home meds- Quetapine 25mg QD, Donepezil 5mg QD  - c/w home meds

## 2020-11-04 NOTE — PROGRESS NOTE ADULT - PROBLEM SELECTOR PLAN 8
Pt and son agreed for DNR and trial of intubation on previous admission documented 10/21/2020 Pt and son agreed for DNR and trial of intubation on previous admission documented 10/21

## 2020-11-04 NOTE — PROGRESS NOTE ADULT - PROBLEM SELECTOR PLAN 1
Pt sent by PMD to the Ed for low Hb 6.9 at his office.   - Pt denies any bloody stool or any acute blood loss although is a poor historian   - s/o incr fatigue and ambulatory intolerance  - In the ED Hb- 6.2, FOBT +  - s/p 2 units of PRBC in ED, Hb 10.7 (11/2)  - CT Ab/Pelvis Angio 11/2 showed no signs of GI bleed  - GI - Dr. Bentley, s/p colon showed sessile polyp- not removed, diverticulosis and internal hemorrhoids  - f/u Heme/Onc for plan on AC vs. IVC filter Pt sent by PMD to the ED for low Hb 6.9 at his office.   - Pt denies any bloody stool or any acute blood loss although is a poor historian   - s/o incr fatigue and ambulatory intolerance  - In the ED Hb- 6.2, FOBT +  - s/p 2 units of PRBC in ED, Hb 10.7 (11/2)  - CT Ab/Pelvis Angio 11/2 showed no signs of GI bleed  - GI - Dr. Bentley, s/p colon showed sessile polyp- not removed, diverticulosis and internal hemorrhoids  - Pt placed on 55mg IV BID Lovenox as per Heme/Onc  - f/u Heme/Onc for plan on AC vs. IVC filter Pt sent by PMD to the ED for low Hb 6.9 at his office.   Pt denies any bloody stool or any acute blood loss although is a poor historian   s/o incr fatigue and ambulatory intolerance  In the ED Hb- 6.2, FOBT +  s/p 2 units of PRBC in ED, Hb 10.7 (11/2)  CT Ab/Pelvis Angio 11/2 showed no signs of GI bleed  GI - Dr. Bentley, s/p colonoscopy 11/3 showed sessile polyp- not removed, diverticulosis and internal hemorrhoids  - Pt placed on 55mg IV BID Lovenox as per Heme/Onc  - f/u Heme/Onc for plan on AC vs. IVC filter

## 2020-11-04 NOTE — PROGRESS NOTE ADULT - SUBJECTIVE AND OBJECTIVE BOX
complete note to follow   · Subjective and Objective:   · Subjective and Objective:   80 year old woman, from home, lives with son, walks with walker, Chronic mckeon ( placed on recent admission)  with PMH of DM2, HTN, HLD, dementia, recent diagnosis with PE (on xarelto) admitted for low Hb. Pt had a follow up appointment with her PMD and was informed about low Hb and recommended to get further evaluation. Pt is AAOX2, mildy in distress because of the Mckeon, able ot participate in discussion, but history limited due to Dementia.  Pt has no symptoms to endorse besides  increased fatigue that have limited her Ambulation and she spends more time laying on bed. Of note pt had fall risks and was admitted about 10 days ago post fall and was admitted to ICU for PE.  During that admission, her hgb was low (6.9) and received 1 unit of pRBCs  Pt was made DNR with trial of intubation on that admission. Pt denies any fever, bloody BM, Hemetemesis, abd pain, N/V/D, chest pain, SOB any sick contact.  Patient seen this afternoon. Wants to eat. RN at bedside.     Interval: Pt examined at bedside. No new complaints, lethargic. Now with UTI, febrile and conjunctivitis.  Pt has known dementia.   Son at bedside    PMH/ PSH: as per hpi   Meds: reviewed in emr   Allergies: nkda   Social hist: No habits     ROS: unable to assess, pt has dementia and not oriented    Vitals:  Vital Signs Last 24 Hrs  T(C): 38 (04 Nov 2020 16:03), Max: 38 (04 Nov 2020 16:03)  T(F): 100.4 (04 Nov 2020 16:03), Max: 100.4 (04 Nov 2020 16:03)  HR: 96 (04 Nov 2020 16:03) (91 - 99)  BP: 144/55 (04 Nov 2020 16:03) (143/84 - 156/66)  BP(mean): --  ABP: --  ABP(mean): --  RR: 18 (04 Nov 2020 16:03) (16 - 18)  SpO2: 99% (04 Nov 2020 16:03) (97% - 99%)      Physical Exam:  Gen: NAD, lethargic  CVS: s1s2   Resp: CTA B/L   GI: soft, non tender   : mckeon with clear yellow fluid  Ext: no c/c/e     Labs:    CBC Full  -  ( 04 Nov 2020 06:32 )  WBC Count : 11.32 K/uL  RBC Count : 3.99 M/uL  Hemoglobin : 10.8 g/dL  Hematocrit : 33.9 %  Platelet Count - Automated : 357 K/uL  Mean Cell Volume : 85.0 fl  Mean Cell Hemoglobin : 27.1 pg  Mean Cell Hemoglobin Concentration : 31.9 gm/dL  Auto Neutrophil # : x  Auto Lymphocyte # : x  Auto Monocyte # : x  Auto Eosinophil # : x  Auto Basophil # : x  Auto Neutrophil % : x  Auto Lymphocyte % : x  Auto Monocyte % : x  Auto Eosinophil % : x  Auto Basophil % : x      11-04    134<L>  |  109<H>  |  14  ----------------------------<  156<H>  4.4   |  15<L>  |  0.78    Ca    8.8      04 Nov 2020 06:32  Phos  3.4     11-04  Mg     2.4     11-04      Radiology:    < from: CT Angio Chest w/ IV Cont (10.09.20 @ 14:45) >  IMPRESSION:  Acute pulmonary emboli in the right upper, middle and lower and left lower lobes.    There is no evidence of right heart strain or pulmonary infarct.      < from: CT Abdomen and Pelvis w/ Oral Cont and w/ IV Cont (10.08.20 @ 16:42) >  IMPRESSION:  Finding sat the right lung base suspicious for a pulmonary embolus, new since 10/4/2020.    Presacral edema and fluid of uncertain etiology not seen on the prior study.    No evidence of ascites or intestinal obstruction.    < end of copied text >                                     · Subjective and Objective:   80 year old woman, from home, lives with son, walks with walker, Chronic mckeon ( placed on recent admission)  with PMH of DM2, HTN, HLD, dementia, recent diagnosis with PE (on xarelto) admitted for low Hb. Pt had a follow up appointment with her PMD and was informed about low Hb and recommended to get further evaluation. Pt is AAOX2, mildy in distress because of the Mckeon, able ot participate in discussion, but history limited due to Dementia.  Pt has no symptoms to endorse besides  increased fatigue that have limited her Ambulation and she spends more time laying on bed. Of note pt had fall risks and was admitted about 10 days ago post fall and was admitted to ICU for PE.  During that admission, her hgb was low (6.9) and received 1 unit of pRBCs  Pt was made DNR with trial of intubation on that admission. Pt denies any fever, bloody BM, Hemetemesis, abd pain, N/V/D, chest pain, SOB any sick contact.  Patient seen this afternoon. Wants to eat. RN at bedside.     Interval: Pt examined at bedside. No new complaints, lethargic. Now with UTI, febrile and conjunctivitis.  Pt has known dementia.   Son at bedside    PMH/ PSH: as per hpi   Meds: reviewed in emr   Allergies: nkda   Social hist: No habits     ROS: unable to assess, pt has dementia and not oriented    Vitals:  Vital Signs Last 24 Hrs  T(C): 38 (04 Nov 2020 16:03), Max: 38 (04 Nov 2020 16:03)  T(F): 100.4 (04 Nov 2020 16:03), Max: 100.4 (04 Nov 2020 16:03)  HR: 96 (04 Nov 2020 16:03) (91 - 99)  BP: 144/55 (04 Nov 2020 16:03) (143/84 - 156/66)  BP(mean): --  ABP: --  ABP(mean): --  RR: 18 (04 Nov 2020 16:03) (16 - 18)  SpO2: 99% (04 Nov 2020 16:03) (97% - 99%)      Physical Exam:  Gen: NAD, lethargic  CVS: s1s2   Resp: CTA B/L   GI: soft, non tender   : mckeon with clear yellow fluid  Ext: no c/c/e     Labs:    CBC Full  -  ( 04 Nov 2020 06:32 )  WBC Count : 11.32 K/uL  RBC Count : 3.99 M/uL  Hemoglobin : 10.8 g/dL  Hematocrit : 33.9 %  Platelet Count - Automated : 357 K/uL  Mean Cell Volume : 85.0 fl  Mean Cell Hemoglobin : 27.1 pg  Mean Cell Hemoglobin Concentration : 31.9 gm/dL  Auto Neutrophil # : x  Auto Lymphocyte # : x  Auto Monocyte # : x  Auto Eosinophil # : x  Auto Basophil # : x  Auto Neutrophil % : x  Auto Lymphocyte % : x  Auto Monocyte % : x  Auto Eosinophil % : x  Auto Basophil % : x      11-04    134<L>  |  109<H>  |  14  ----------------------------<  156<H>  4.4   |  15<L>  |  0.78    Ca    8.8      04 Nov 2020 06:32  Phos  3.4     11-04  Mg     2.4     11-04      Radiology:    < from: CT Angio Chest w/ IV Cont (10.09.20 @ 14:45) >  IMPRESSION:  Acute pulmonary emboli in the right upper, middle and lower and left lower lobes.    There is no evidence of right heart strain or pulmonary infarct.      < from: CT Abdomen and Pelvis w/ Oral Cont and w/ IV Cont (10.08.20 @ 16:42) >  IMPRESSION:  Finding sat the right lung base suspicious for a pulmonary embolus, new since 10/4/2020.    Presacral edema and fluid of uncertain etiology not seen on the prior study.    No evidence of ascites or intestinal obstruction.    < end of copied text >

## 2020-11-04 NOTE — PROGRESS NOTE ADULT - ASSESSMENT
80 year old woman, from home, lives with son, walks with walker, Chronic mckeon (placed on recent admission)  with PMH of DM2, HTN, HLD, dementia, recent diagnosis with PE (on Xarelto) admitted for weakness 2/2 to low Hb. 80 year old woman, from home, lives with son, walks with walker, Chronic mckeon (placed on recent admission) with PMH of DM2, HTN, HLD, dementia, recent diagnosis with PE (on Xarelto) admitted for weakness 2/2 to low Hb.

## 2020-11-04 NOTE — PROGRESS NOTE ADULT - PROBLEM SELECTOR PLAN 7
Pt was recently diagnosed and admitted in [facility] for PE (10/19/2020)  Pt was admitted in ICU and started on Xarelto   Holding Xarelto for GI bleed   F/u Hemo onc ([facility])  - consulted Heme/Onc Indra PATEL f/u recs AC vs IVC Pt was recently diagnosed and admitted in [facility] for PE (10/19/2020)  Pt was admitted in ICU and started on Xarelto   - Holding Xarelto for GI bleed   - F/u Hemo onc ([facility])  - Consulted Heme/Onc Indra PATEL f/u recs AC vs IVC Pt was recently diagnosed and admitted in [facility] for PE (10/19)  Pt was admitted in ICU and started on Xarelto   - Holding Xarelto for GI bleed   - F/u Hemo onc ([facility])  - Consulted Heme/Onc Indra PATEL f/u recs AC vs IVC

## 2020-11-04 NOTE — PROGRESS NOTE ADULT - PROBLEM SELECTOR PLAN 5
Pt has PMH of DM    (11/2)  Home meds- Janumet 100-1000mg QD, Lipitor 20mg QD  A1C from 11/1/2020 - 6.2  c/w HSS  Monitor FS   advanced diet to clear liquid 11/3 after colonoscopy Pt has PMH of DM    (11/4)  Home meds- Janumet 100-1000mg QD, Lipitor 20mg QD  A1C from 11/1/2020 - 6.2  - c/w HSS  - Monitor FS   - Advanced diet to clear liquid 11/3 after colonoscopy Pt has PMH of DM    (11/4)  Home meds- Janumet 100-1000mg QD, Lipitor 20mg QD  A1C from 11/1 - 6.2  Advanced diet to clear liquid 11/3 after colonoscopy  - c/w HSS  - Monitor FS

## 2020-11-04 NOTE — PROGRESS NOTE ADULT - PROBLEM SELECTOR PLAN 3
Pt has PMH of HTN   /54  Pt on Carvedilol 3.125mg BID, Amlodipine 10mg QD, Enalapril 2.5mg QD  Monitor vitals  - BP stable Pt has PMH of HTN   /66  Home meds- Carvedilol 3.125mg BID, Amlodipine 10mg QD, Enalapril 2.5mg QD   - Monitor vitals  - BP stable Pt has PMH of HTN   /66, stable  Home meds- Carvedilol 3.125mg BID, Amlodipine 10mg QD, Enalapril 2.5mg QD   - Monitor vitals

## 2020-11-04 NOTE — PROGRESS NOTE ADULT - PROBLEM SELECTOR PLAN 6
Pt has PMH of HLD  Home meds- aspirin and statin   c/w home meds  Lipid profile 11/1: Triglycerides 211<H> Pt has PMH of HLD  Home meds- aspirin and statin   Lipid profile 11/1: Triglycerides 211<H>  - c/w home meds

## 2020-11-04 NOTE — PROGRESS NOTE ADULT - ASSESSMENT
Assessment and Plan:   · Assessment	    # Anemia:   Hgb stable  # GI bleed: FOBT +   # Iron def (labs from last admission):   Off note patient had anemia and FOBT + last admission on 10/10/20, repeat negative on 10/19/20. Xarelto was held and then restarted.   Appropriate response to PRBC tx.   Normal b12, folate, creat, LFTs, bili.   appreciate GI consult  s/p colonoscopy- 1-  Diverticulosis 2- No active bleeding 3- Colon polyp not removed in the setting of GI bleeding  Recs:   -no bleeding on colonoscopy, consider EGD as pt requires a/c  -await colonoscopy pathology  -conitnue to Hold Xarelto till GI clearance   -Continue Venofer 200 mg daily x 4 doses   -1U PRBC tx for Hb < 7 or symptomatic    # PE:   Recently diagnosed on 10/9/20. Started on Xarelto. Now with FOBT + anemia.   10/8-10/9: CTA Chest and CT A/P: with no evidence of malignancy reported   10/11/20: USB b/l LE: negative for DVT   Recs:  -Continue to hold AC, till GI clearance    -Consider IVC filter if patient not a candidate for AC     Discussed with Pt's Son Jevon Armas at bedside, his contact is [phone]  Thank you for the referral. Will continue to monitor the patient.  Please call with any questions [phone]  Above reviewed with Attending Dr. Pastor Assessment and Plan:   · Assessment	    # Anemia:   Hgb stable  # GI bleed: FOBT +   # Iron def (labs from last admission):   Off note patient had anemia and FOBT + last admission on 10/10/20, repeat negative on 10/19/20. Xarelto was held and then restarted.   Appropriate response to PRBC tx.   Normal b12, folate, creat, LFTs, bili.   appreciate GI consult  s/p colonoscopy- 1-  Diverticulosis 2- No active bleeding 3- Colon polyp not removed in the setting of GI bleeding  Recs:   -no bleeding on colonoscopy, GI states no indication for EGD and safe to restart a/c  -await colonoscopy pathology  -lovenox full dose started   -Continue Venofer 200 mg daily x 4 doses   -1U PRBC tx for Hb < 7 or symptomatic  -conservative management    # PE:   Recently diagnosed on 10/9/20. Started on Xarelto. Now with FOBT + anemia.   10/8-10/9: CTA Chest and CT A/P: with no evidence of malignancy reported   10/11/20: USB b/l LE: negative for DVT   Recs:  -Started full-dose lovenox, a/c safe as per GI   -Monitor H/H q 12 hours  -Consider IVC filter if patient has recurrent GIB    Discussed with Pt's Son Jevon Armas at bedside, his contact is [phone]  Thank you for the referral. Will continue to monitor the patient.  Please call with any questions [phone]  Above reviewed with Attending Dr. Pastor

## 2020-11-04 NOTE — PROGRESS NOTE ADULT - SUBJECTIVE AND OBJECTIVE BOX
PGY-1 Progress Note discussed with attending    PAGER #: [[phone]] TILL 5:00 PM  PLEASE CONTACT ON CALL TEAM:   - On Call Team (Please refer to [place]) FROM 5:00 PM - 8:30PM  - Nightfloat Team FROM 8:30 -7:30 AM    CHIEF COMPLAINT & BRIEF HOSPITAL COURSE:      INTERVAL HPI/OVERNIGHT EVENTS:       REVIEW OF SYSTEMS:  CONSTITUTIONAL: No fever, weight loss, or fatigue  RESPIRATORY: No cough, wheezing, chills or hemoptysis; No shortness of breath  CARDIOVASCULAR: No chest pain, palpitations, dizziness, or leg swelling  GASTROINTESTINAL: No abdominal pain. No nausea, vomiting, or hematemesis; No diarrhea or constipation. No melena or hematochezia.  GENITOURINARY: No dysuria or hematuria, urinary frequency  NEUROLOGICAL: No headaches, memory loss, loss of strength, numbness, or tremors  SKIN: No itching, burning, rashes, or lesions     MEDICATIONS  (STANDING):  artificial  tears Solution 1 Drop(s) Both EYES two times a day  ascorbic acid 500 milliGRAM(s) Oral daily  atorvastatin 20 milliGRAM(s) Oral at bedtime  enoxaparin Injectable 55 milliGRAM(s) SubCutaneous two times a day  insulin lispro (ADMELOG) corrective regimen sliding scale   SubCutaneous Before meals and at bedtime  latanoprost 0.005% Ophthalmic Solution 1 Drop(s) Both EYES at bedtime  pantoprazole    Tablet 40 milliGRAM(s) Oral before breakfast  potassium phosphate / sodium phosphate Powder (PHOS-NaK) 1 Packet(s) Oral three times a day  QUEtiapine 25 milliGRAM(s) Oral at bedtime  timolol 0.25% Solution 1 Drop(s) Both EYES two times a day    MEDICATIONS  (PRN):      Vital Signs Last 24 Hrs  T(C): 36.7 (04 Nov 2020 07:57), Max: 37.5 (04 Nov 2020 00:14)  T(F): 98.1 (04 Nov 2020 07:57), Max: 99.5 (04 Nov 2020 00:14)  HR: 99 (04 Nov 2020 00:14) (93 - 99)  BP: 156/66 (04 Nov 2020 07:57) (139/60 - 156/66)  BP(mean): --  RR: 16 (04 Nov 2020 07:57) (16 - 18)  SpO2: 97% (04 Nov 2020 07:57) (97% - 100%)    PHYSICAL EXAMINATION:  GENERAL: NAD, well built  HEAD:  Atraumatic, Normocephalic  EYES:  conjunctiva and sclera clear  NECK: Supple, No JVD, Normal thyroid  CHEST/LUNG: Clear to auscultation. Clear to percussion bilaterally; No rales, rhonchi, wheezing, or rubs  HEART: Regular rate and rhythm; No murmurs, rubs, or gallops  ABDOMEN: Soft, Nontender, Nondistended; Bowel sounds present  NERVOUS SYSTEM:  Alert & Oriented X3,    EXTREMITIES:  2+ Peripheral Pulses, No clubbing, cyanosis, or edema  SKIN: warm dry                          10.8   11.32 )-----------( 357      ( 04 Nov 2020 06:32 )             33.9     11-04    134<L>  |  109<H>  |  14  ----------------------------<  156<H>  4.4   |  15<L>  |  0.78    Ca    8.8      04 Nov 2020 06:32  Phos  3.4     11-04  Mg     2.4     11-04    TPro  6.1  /  Alb  3.5<L>  /  TBili  x   /  DBili  x   /  AST  x   /  ALT  x   /  AlkPhos  x   11-02    LIVER FUNCTIONS - ( 02 Nov 2020 10:05 )  Alb: 3.5 g/dL / Pro: 6.1 g/dL / ALK PHOS: x     / ALT: x     / AST: x     / GGT: x                       I&O's Summary    03 Nov 2020 07:01  -  04 Nov 2020 07:00  --------------------------------------------------------  IN: 0 mL / OUT: 700 mL / NET: -700 mL          CAPILLARY BLOOD GLUCOSE  CAPILLARY BLOOD GLUCOSE      POCT Blood Glucose.: 171 mg/dL (04 Nov 2020 08:18)    CAPILLARY BLOOD GLUCOSE      POCT Blood Glucose.: 171 mg/dL (04 Nov 2020 08:18)  POCT Blood Glucose.: 221 mg/dL (03 Nov 2020 20:53)  POCT Blood Glucose.: 131 mg/dL (03 Nov 2020 17:16)  POCT Blood Glucose.: 126 mg/dL (03 Nov 2020 11:35)      RADIOLOGY & ADDITIONAL TESTS:                   PGY-1 Progress Note discussed with attending    PAGER #: [[phone]] TILL 5:00 PM  PLEASE CONTACT ON CALL TEAM:   - On Call Team (Please refer to [place]) FROM 5:00 PM - 8:30PM  - Nightfloat Team FROM 8:30 -7:30 AM    CHIEF COMPLAINT & BRIEF HOSPITAL COURSE:  80 year old woman from home, lives with son, walks with walker, chronic mckeon (placed on recent admission) with PMH of DM2, HTN, HLD, dementia, recent diagnosis with PE (on Xarelto) admitted for weakness 2/2 to low Hb. Pt is AAOx2, mildly in distress because of the Mckeon, able to participate in discussion, but history limited due to Dementia.  Pt has no symptoms to endorse besides increased fatigue that have limited her ambulation and she spends more time laying on bed. Of note, pt was admitted on 10/19 post fall and was admitted to ICU for PE. During that admission, her Hb was low (6.9) and received 1 unit of pRBCs. Pt was made DNR with trial of intubation on that admission. In the ED, Hb 6.3, FOBT+, s/p 2 units of PRBC; CXR (10/31) showed no evidence of active chest disease. CT abd/pelvis angio on 11/2 showed no evidence for GI bleed. Patient pulled out Mckeon on 11/2 and it was replaced. Colonoscopy on 11/3 showed 5mm sessile polyp which was NOT removed due to GI bleeding, scattered diverticula and internal hemorrhoids; patient subsequently started on clear liquid diet.    INTERVAL HPI/OVERNIGHT EVENTS:   Patient experienced chills this AM and urinalysis was positive, showing cloudy urine with proteinuria, moderate leukocyte esterase, and bacteria; patient started on Rocephin 1000mg IV QD. CXR ordered on 11/4 showed no acute infiltrate. Patient also experienced bilateral eye discomfort which led her to keep her eyes closed, prednisolone eye drops ordered for relief. Labs showed non-anion gap metabolic acidosis. GI stated no need for EGD, Heme/onc recommended full dose Lovenox (55mg) BID.    REVIEW OF SYSTEMS:  CONSTITUTIONAL: No fever, weight loss, or fatigue  RESPIRATORY: No cough, wheezing, chills or hemoptysis; No shortness of breath  CARDIOVASCULAR: No chest pain, palpitations, dizziness, or leg swelling  GASTROINTESTINAL: No abdominal pain. No nausea, vomiting, or hematemesis; + diarrhea with bowel prep. No constipation. No melena or hematochezia.  GENITOURINARY: No dysuria or hematuria. Mckeon in place.  NEUROLOGICAL: No headaches, + baseline memory loss from dementia.   SKIN: No itching, burning, rashes, or lesions     MEDICATIONS  (STANDING):  artificial  tears Solution 1 Drop(s) Both EYES two times a day  ascorbic acid 500 milliGRAM(s) Oral daily  atorvastatin 20 milliGRAM(s) Oral at bedtime  enoxaparin Injectable 55 milliGRAM(s) SubCutaneous two times a day  insulin lispro (ADMELOG) corrective regimen sliding scale   SubCutaneous Before meals and at bedtime  latanoprost 0.005% Ophthalmic Solution 1 Drop(s) Both EYES at bedtime  pantoprazole    Tablet 40 milliGRAM(s) Oral before breakfast  potassium phosphate / sodium phosphate Powder (PHOS-NaK) 1 Packet(s) Oral three times a day  QUEtiapine 25 milliGRAM(s) Oral at bedtime  timolol 0.25% Solution 1 Drop(s) Both EYES two times a day    MEDICATIONS  (PRN):      Vital Signs Last 24 Hrs  T(C): 36.7 (04 Nov 2020 07:57), Max: 37.5 (04 Nov 2020 00:14)  T(F): 98.1 (04 Nov 2020 07:57), Max: 99.5 (04 Nov 2020 00:14)  HR: 99 (04 Nov 2020 00:14) (93 - 99)  BP: 156/66 (04 Nov 2020 07:57) (139/60 - 156/66)  BP(mean): --  RR: 16 (04 Nov 2020 07:57) (16 - 18)  SpO2: 97% (04 Nov 2020 07:57) (97% - 100%)    PHYSICAL EXAMINATION:  GENERAL: 80 year old F who appears stated age, calm, no restraints. Slightly rigorous  HEAD: Atraumatic, Normocephalic  EYES: Nontender to palpation over eyelids, no erythema of surrounding skin. Mild B/L nonpurulent crusting exudates, no conjunctival injection, no anisocoria  NECK: Supple, No JVD, Normal thyroid  CHEST/LUNG: Clear to auscultation B/L. Clear to percussion; No rales, rhonchi, wheezing, or rubs  HEART: +S1/S2, no S3/S4, Regular rate and rhythm; No murmurs, rubs, or gallops  ABDOMEN: Soft, Nontender, Nondistended; Bowel sounds present on all four quadrants  NERVOUS SYSTEM:  Alert & Oriented X2, requires frequent re-orientation  : Mckeon in place, mildly clouded urine  EXTREMITIES:  2+ Peripheral Pulses, No clubbing, cyanosis, or edema  SKIN: Warm, moist                                 10.8<L>   11.32<H> )-----------( 357      ( 04 Nov 2020 06:32 )                    33.9<L>    11-04    134<L>  |  109<H>  |  14  ----------------------------<  156<H>  4.4   |  15<L>  |  0.78    Ca     8.8      04 Nov 2020 06:32  Phos  3.4     11-04  Mg     2.4     11-04    TPro  6.1  /  Alb  3.5<L>  /  TBili  x   /  DBili  x   /  AST  x   /  ALT  x   /  AlkPhos  x   11-02    LIVER FUNCTIONS - ( 02 Nov 2020 10:05 )  Alb: 3.5 g/dL / Pro: 6.1 g/dL / ALK PHOS: x     / ALT: x     / AST: x     / GGT: x             I&O's Summary    03 Nov 2020 07:01  -  04 Nov 2020 07:00  --------------------------------------------------------  IN: 0 mL / OUT: 700 mL / NET: -700 mL    CAPILLARY BLOOD GLUCOSE    POCT Blood Glucose.: 171 mg/dL (04 Nov 2020 08:18)  POCT Blood Glucose.: 221 mg/dL (03 Nov 2020 20:53)  POCT Blood Glucose.: 131 mg/dL (03 Nov 2020 17:16)  POCT Blood Glucose.: 126 mg/dL (03 Nov 2020 11:35)      RADIOLOGY & ADDITIONAL TESTS:    < from: Xray Chest 1 View- PORTABLE-Urgent (Xray Chest 1 View- PORTABLE-Urgent .) (11.04.20 @ 12:32) >  EXAM:  XR CHEST PORTABLE URGENT 1V                            PROCEDURE DATE:  11/04/2020          INTERPRETATION:  CLINICAL INDICATION: 80 years  Female with r/o pna.    COMPARISON: 10/19/2020    AP view of the chest demonstrates the lungs to be clear. There is no pleural effusion. There is no pneumothorax.    The heart is mildly enlarged. The aorta is atherosclerotic. The mediastinum and rayshawn cannot be assessed due to projection.    The pulmonary vasculature is normal.    Mild thoracic degenerative changes are present. There are degenerative changes of both shoulders.    IMPRESSION:    No acute infiltrate.    Mild cardiomegaly.    < end of copied text >       PGY-1 Progress Note discussed with attending    PAGER #: [[phone]] TILL 5:00 PM  PLEASE CONTACT ON CALL TEAM:   - On Call Team (Please refer to [place]) FROM 5:00 PM - 8:30PM  - Nightfloat Team FROM 8:30 -7:30 AM    CHIEF COMPLAINT & BRIEF HOSPITAL COURSE:  80 year old woman from home, lives with son, walks with walker, chronic mckeon (placed on recent admission) with PMH of DM2, HTN, HLD, dementia, recent diagnosis with PE (on Xarelto) admitted for weakness 2/2 to low Hb. Pt is AAOx2, mildly in distress because of the Mckeon, able to participate in discussion, but history limited due to Dementia.  Pt has no symptoms to endorse besides increased fatigue that have limited her ambulation and she spends more time laying on bed. Of note, pt was admitted on 10/19 post fall and was admitted to ICU for PE. During that admission, her Hb was low (6.9) and received 1 unit of pRBCs. Pt was made DNR with trial of intubation on that admission. In the ED, Hb 6.3, FOBT+, s/p 2 units of PRBC; CXR (10/31) showed no evidence of active chest disease. CT abd/pelvis angio on 11/2/2020 showed no evidence for GI bleed. Patient pulled out Mckeon on 11/2/2020 and it was replaced. Colonoscopy on 11/3/2020 showed 5mm sessile polyp which was NOT removed due to GI bleeding, scattered diverticula and internal hemorrhoids; patient subsequently started on clear liquid diet.    INTERVAL HPI/OVERNIGHT EVENTS:   Patient experienced chills this AM (11/4/2020) and urinalysis was positive, showing cloudy urine with proteinuria, moderate leukocyte esterase, and bacteria; patient started on Rocephin 1000mg IV QD. CXR ordered on 11/4/2020 showed no acute infiltrate. Patient also experienced bilateral eye discomfort which led her to keep her eyes closed, prednisolone eye drops ordered for relief. Labs showed non-anion gap metabolic acidosis. GI stated no need for EGD, patient started on full dose Lovenox (55mg) BID as per Heme/onc recommendations.    REVIEW OF SYSTEMS:  CONSTITUTIONAL: No fever, weight loss, or fatigue  RESPIRATORY: No cough, wheezing, chills or hemoptysis; No shortness of breath  CARDIOVASCULAR: No chest pain, palpitations, dizziness, or leg swelling  GASTROINTESTINAL: No abdominal pain. No nausea, vomiting, or hematemesis. No constipation. No melena or hematochezia.  GENITOURINARY: No dysuria or hematuria. Mckeon in place.  NEUROLOGICAL: No headaches, + baseline memory loss from dementia.   SKIN: No itching, burning, rashes, or lesions     MEDICATIONS  (STANDING):  artificial  tears Solution 1 Drop(s) Both EYES two times a day  ascorbic acid 500 milliGRAM(s) Oral daily  atorvastatin 20 milliGRAM(s) Oral at bedtime  enoxaparin Injectable 55 milliGRAM(s) SubCutaneous two times a day  insulin lispro (ADMELOG) corrective regimen sliding scale   SubCutaneous Before meals and at bedtime  latanoprost 0.005% Ophthalmic Solution 1 Drop(s) Both EYES at bedtime  pantoprazole    Tablet 40 milliGRAM(s) Oral before breakfast  potassium phosphate / sodium phosphate Powder (PHOS-NaK) 1 Packet(s) Oral three times a day  QUEtiapine 25 milliGRAM(s) Oral at bedtime  timolol 0.25% Solution 1 Drop(s) Both EYES two times a day    MEDICATIONS  (PRN):      Vital Signs Last 24 Hrs  T(C): 36.7 (04 Nov 2020 07:57), Max: 37.5 (04 Nov 2020 00:14)  T(F): 98.1 (04 Nov 2020 07:57), Max: 99.5 (04 Nov 2020 00:14)  HR: 99 (04 Nov 2020 00:14) (93 - 99)  BP: 156/66 (04 Nov 2020 07:57) (139/60 - 156/66)  BP(mean): --  RR: 16 (04 Nov 2020 07:57) (16 - 18)  SpO2: 97% (04 Nov 2020 07:57) (97% - 100%)    PHYSICAL EXAMINATION:  GENERAL: 80 year old F who appears stated age, calm, no restraints. Slightly rigorous  HEAD: Atraumatic, Normocephalic  EYES: Nontender to palpation over eyelids, no erythema of surrounding skin. Mild B/L nonpurulent crusting exudates, no conjunctival injection, no anisocoria  NECK: Supple, No JVD, Normal thyroid  CHEST/LUNG: Clear to auscultation B/L. Clear to percussion; No rales, rhonchi, wheezing, or rubs  HEART: +S1/S2, no S3/S4, Regular rate and rhythm; No murmurs, rubs, or gallops  ABDOMEN: Soft, Nontender, Nondistended; Bowel sounds present on all four quadrants  NERVOUS SYSTEM:  Alert & Oriented X2, requires frequent re-orientation  : Mckeon in place, mildly clouded urine  EXTREMITIES:  2+ Peripheral Pulses, No clubbing, cyanosis, or edema  SKIN: Warm, moist                                 10.8<L>   11.32<H> )-----------( 357      ( 04 Nov 2020 06:32 )                    33.9<L>    11-04    134<L>  |  109<H>  |  14  ----------------------------<  156<H>  4.4   |  15<L>  |  0.78    Ca     8.8      04 Nov 2020 06:32  Phos  3.4     11-04  Mg     2.4     11-04    TPro  6.1  /  Alb  3.5<L>  /  TBili  x   /  DBili  x   /  AST  x   /  ALT  x   /  AlkPhos  x   11-02    LIVER FUNCTIONS - ( 02 Nov 2020 10:05 )  Alb: 3.5 g/dL / Pro: 6.1 g/dL / ALK PHOS: x     / ALT: x     / AST: x     / GGT: x             I&O's Summary    03 Nov 2020 07:01  -  04 Nov 2020 07:00  --------------------------------------------------------  IN: 0 mL / OUT: 700 mL / NET: -700 mL    CAPILLARY BLOOD GLUCOSE    POCT Blood Glucose.: 171 mg/dL (04 Nov 2020 08:18)  POCT Blood Glucose.: 221 mg/dL (03 Nov 2020 20:53)  POCT Blood Glucose.: 131 mg/dL (03 Nov 2020 17:16)  POCT Blood Glucose.: 126 mg/dL (03 Nov 2020 11:35)      RADIOLOGY & ADDITIONAL TESTS:    < from: Xray Chest 1 View- PORTABLE-Urgent (Xray Chest 1 View- PORTABLE-Urgent .) (11.04.20 @ 12:32) >  EXAM:  XR CHEST PORTABLE URGENT 1V                            PROCEDURE DATE:  11/04/2020          INTERPRETATION:  CLINICAL INDICATION: 80 years  Female with r/o pna.    COMPARISON: 10/19/2020    AP view of the chest demonstrates the lungs to be clear. There is no pleural effusion. There is no pneumothorax.    The heart is mildly enlarged. The aorta is atherosclerotic. The mediastinum and rayshawn cannot be assessed due to projection.    The pulmonary vasculature is normal.    Mild thoracic degenerative changes are present. There are degenerative changes of both shoulders.    IMPRESSION:    No acute infiltrate.    Mild cardiomegaly.    < end of copied text >

## 2020-11-05 DIAGNOSIS — N39.0 URINARY TRACT INFECTION, SITE NOT SPECIFIED: ICD-10-CM

## 2020-11-05 LAB
ALBUMIN SERPL ELPH-MCNC: 2.7 G/DL — LOW (ref 3.5–5)
ALP SERPL-CCNC: 93 U/L — SIGNIFICANT CHANGE UP (ref 40–120)
ALT FLD-CCNC: 26 U/L DA — SIGNIFICANT CHANGE UP (ref 10–60)
ANION GAP SERPL CALC-SCNC: 8 MMOL/L — SIGNIFICANT CHANGE UP (ref 5–17)
AST SERPL-CCNC: 18 U/L — SIGNIFICANT CHANGE UP (ref 10–40)
BASOPHILS # BLD AUTO: 0.03 K/UL — SIGNIFICANT CHANGE UP (ref 0–0.2)
BASOPHILS NFR BLD AUTO: 0.4 % — SIGNIFICANT CHANGE UP (ref 0–2)
BILIRUB SERPL-MCNC: 0.8 MG/DL — SIGNIFICANT CHANGE UP (ref 0.2–1.2)
BUN SERPL-MCNC: 14 MG/DL — SIGNIFICANT CHANGE UP (ref 7–18)
CALCIUM SERPL-MCNC: 8.9 MG/DL — SIGNIFICANT CHANGE UP (ref 8.4–10.5)
CHLORIDE SERPL-SCNC: 107 MMOL/L — SIGNIFICANT CHANGE UP (ref 96–108)
CO2 SERPL-SCNC: 21 MMOL/L — LOW (ref 22–31)
CREAT SERPL-MCNC: 0.79 MG/DL — SIGNIFICANT CHANGE UP (ref 0.5–1.3)
EOSINOPHIL # BLD AUTO: 0.24 K/UL — SIGNIFICANT CHANGE UP (ref 0–0.5)
EOSINOPHIL NFR BLD AUTO: 3.3 % — SIGNIFICANT CHANGE UP (ref 0–6)
GLUCOSE BLDC GLUCOMTR-MCNC: 138 MG/DL — HIGH (ref 70–99)
GLUCOSE BLDC GLUCOMTR-MCNC: 139 MG/DL — HIGH (ref 70–99)
GLUCOSE BLDC GLUCOMTR-MCNC: 161 MG/DL — HIGH (ref 70–99)
GLUCOSE BLDC GLUCOMTR-MCNC: 186 MG/DL — HIGH (ref 70–99)
GLUCOSE SERPL-MCNC: 135 MG/DL — HIGH (ref 70–99)
HCT VFR BLD CALC: 35.7 % — SIGNIFICANT CHANGE UP (ref 34.5–45)
HGB BLD-MCNC: 11.3 G/DL — LOW (ref 11.5–15.5)
IMM GRANULOCYTES NFR BLD AUTO: 0.6 % — SIGNIFICANT CHANGE UP (ref 0–1.5)
LACTATE SERPL-SCNC: 1.3 MMOL/L — SIGNIFICANT CHANGE UP (ref 0.7–2)
LYMPHOCYTES # BLD AUTO: 0.81 K/UL — LOW (ref 1–3.3)
LYMPHOCYTES # BLD AUTO: 11.1 % — LOW (ref 13–44)
MAGNESIUM SERPL-MCNC: 2.3 MG/DL — SIGNIFICANT CHANGE UP (ref 1.6–2.6)
MCHC RBC-ENTMCNC: 26.9 PG — LOW (ref 27–34)
MCHC RBC-ENTMCNC: 31.7 GM/DL — LOW (ref 32–36)
MCV RBC AUTO: 85 FL — SIGNIFICANT CHANGE UP (ref 80–100)
MONOCYTES # BLD AUTO: 0.63 K/UL — SIGNIFICANT CHANGE UP (ref 0–0.9)
MONOCYTES NFR BLD AUTO: 8.7 % — SIGNIFICANT CHANGE UP (ref 2–14)
NEUTROPHILS # BLD AUTO: 5.52 K/UL — SIGNIFICANT CHANGE UP (ref 1.8–7.4)
NEUTROPHILS NFR BLD AUTO: 75.9 % — SIGNIFICANT CHANGE UP (ref 43–77)
NRBC # BLD: 0 /100 WBCS — SIGNIFICANT CHANGE UP (ref 0–0)
PHOSPHATE SERPL-MCNC: 4 MG/DL — SIGNIFICANT CHANGE UP (ref 2.5–4.5)
PLATELET # BLD AUTO: 290 K/UL — SIGNIFICANT CHANGE UP (ref 150–400)
POTASSIUM SERPL-MCNC: 3.4 MMOL/L — LOW (ref 3.5–5.3)
POTASSIUM SERPL-SCNC: 3.4 MMOL/L — LOW (ref 3.5–5.3)
PROT SERPL-MCNC: 6.5 G/DL — SIGNIFICANT CHANGE UP (ref 6–8.3)
RBC # BLD: 4.2 M/UL — SIGNIFICANT CHANGE UP (ref 3.8–5.2)
RBC # FLD: 15.2 % — HIGH (ref 10.3–14.5)
SODIUM SERPL-SCNC: 136 MMOL/L — SIGNIFICANT CHANGE UP (ref 135–145)
WBC # BLD: 7.27 K/UL — SIGNIFICANT CHANGE UP (ref 3.8–10.5)
WBC # FLD AUTO: 7.27 K/UL — SIGNIFICANT CHANGE UP (ref 3.8–10.5)

## 2020-11-05 PROCEDURE — 99233 SBSQ HOSP IP/OBS HIGH 50: CPT | Mod: GC

## 2020-11-05 RX ORDER — POTASSIUM CHLORIDE 20 MEQ
10 PACKET (EA) ORAL
Refills: 0 | Status: COMPLETED | OUTPATIENT
Start: 2020-11-05 | End: 2020-11-05

## 2020-11-05 RX ORDER — SODIUM CHLORIDE 9 MG/ML
1000 INJECTION, SOLUTION INTRAVENOUS
Refills: 0 | Status: DISCONTINUED | OUTPATIENT
Start: 2020-11-05 | End: 2020-11-10

## 2020-11-05 RX ORDER — CEFEPIME 1 G/1
2000 INJECTION, POWDER, FOR SOLUTION INTRAMUSCULAR; INTRAVENOUS EVERY 8 HOURS
Refills: 0 | Status: DISCONTINUED | OUTPATIENT
Start: 2020-11-05 | End: 2020-11-06

## 2020-11-05 RX ORDER — CEFEPIME 1 G/1
2000 INJECTION, POWDER, FOR SOLUTION INTRAMUSCULAR; INTRAVENOUS ONCE
Refills: 0 | Status: COMPLETED | OUTPATIENT
Start: 2020-11-05 | End: 2020-11-05

## 2020-11-05 RX ORDER — METRONIDAZOLE 500 MG
500 TABLET ORAL EVERY 8 HOURS
Refills: 0 | Status: DISCONTINUED | OUTPATIENT
Start: 2020-11-05 | End: 2020-11-06

## 2020-11-05 RX ORDER — CEFEPIME 1 G/1
INJECTION, POWDER, FOR SOLUTION INTRAMUSCULAR; INTRAVENOUS
Refills: 0 | Status: DISCONTINUED | OUTPATIENT
Start: 2020-11-05 | End: 2020-11-06

## 2020-11-05 RX ORDER — ACETAMINOPHEN 500 MG
1000 TABLET ORAL ONCE
Refills: 0 | Status: COMPLETED | OUTPATIENT
Start: 2020-11-05 | End: 2020-11-05

## 2020-11-05 RX ORDER — OFLOXACIN 0.3 %
1 DROPS OPHTHALMIC (EYE) EVERY 6 HOURS
Refills: 0 | Status: DISCONTINUED | OUTPATIENT
Start: 2020-11-05 | End: 2020-11-06

## 2020-11-05 RX ADMIN — Medication 1000 MILLIGRAM(S): at 17:45

## 2020-11-05 RX ADMIN — ATORVASTATIN CALCIUM 20 MILLIGRAM(S): 80 TABLET, FILM COATED ORAL at 21:51

## 2020-11-05 RX ADMIN — Medication 1 DROP(S): at 21:53

## 2020-11-05 RX ADMIN — SODIUM CHLORIDE 50 MILLILITER(S): 9 INJECTION, SOLUTION INTRAVENOUS at 11:27

## 2020-11-05 RX ADMIN — Medication 1 DROP(S): at 13:43

## 2020-11-05 RX ADMIN — QUETIAPINE FUMARATE 25 MILLIGRAM(S): 200 TABLET, FILM COATED ORAL at 21:52

## 2020-11-05 RX ADMIN — Medication 100 MILLIEQUIVALENT(S): at 17:29

## 2020-11-05 RX ADMIN — Medication 500 MILLIGRAM(S): at 21:52

## 2020-11-05 RX ADMIN — ENOXAPARIN SODIUM 55 MILLIGRAM(S): 100 INJECTION SUBCUTANEOUS at 11:21

## 2020-11-05 RX ADMIN — Medication 1 DROP(S): at 05:32

## 2020-11-05 RX ADMIN — Medication 100 MILLIEQUIVALENT(S): at 19:57

## 2020-11-05 RX ADMIN — Medication 400 MILLIGRAM(S): at 16:27

## 2020-11-05 RX ADMIN — Medication 1 DROP(S): at 17:27

## 2020-11-05 RX ADMIN — CEFTRIAXONE 100 MILLIGRAM(S): 500 INJECTION, POWDER, FOR SOLUTION INTRAMUSCULAR; INTRAVENOUS at 13:49

## 2020-11-05 RX ADMIN — Medication 1000 MILLIGRAM(S): at 01:55

## 2020-11-05 RX ADMIN — Medication 1 DROP(S): at 17:32

## 2020-11-05 RX ADMIN — ENOXAPARIN SODIUM 55 MILLIGRAM(S): 100 INJECTION SUBCUTANEOUS at 22:03

## 2020-11-05 RX ADMIN — Medication 1 DROP(S): at 17:29

## 2020-11-05 RX ADMIN — PANTOPRAZOLE SODIUM 40 MILLIGRAM(S): 20 TABLET, DELAYED RELEASE ORAL at 05:32

## 2020-11-05 RX ADMIN — CEFEPIME 100 MILLIGRAM(S): 1 INJECTION, POWDER, FOR SOLUTION INTRAMUSCULAR; INTRAVENOUS at 17:32

## 2020-11-05 RX ADMIN — Medication 1 DROP(S): at 23:31

## 2020-11-05 RX ADMIN — Medication 650 MILLIGRAM(S): at 16:10

## 2020-11-05 RX ADMIN — CEFEPIME 100 MILLIGRAM(S): 1 INJECTION, POWDER, FOR SOLUTION INTRAMUSCULAR; INTRAVENOUS at 21:51

## 2020-11-05 RX ADMIN — Medication 500 MILLIGRAM(S): at 17:29

## 2020-11-05 RX ADMIN — Medication 1 PACKET(S): at 05:31

## 2020-11-05 RX ADMIN — Medication 1: at 08:46

## 2020-11-05 RX ADMIN — Medication 1: at 21:52

## 2020-11-05 RX ADMIN — Medication 400 MILLIGRAM(S): at 00:42

## 2020-11-05 RX ADMIN — LATANOPROST 1 DROP(S): 0.05 SOLUTION/ DROPS OPHTHALMIC; TOPICAL at 21:53

## 2020-11-05 RX ADMIN — Medication 1 DROP(S): at 05:49

## 2020-11-05 NOTE — PROGRESS NOTE ADULT - PROBLEM SELECTOR PLAN 1
Pt sent by PMD to the ED for low Hb 6.9 at his office.   Pt denies any bloody stool or any acute blood loss although is a poor historian   s/o incr fatigue and ambulatory intolerance  In the ED Hb- 6.2, FOBT +  s/p 2 units of PRBC in ED, Hb 10.7 (11/2)  CT Ab/Pelvis Angio 11/2 showed no signs of GI bleed  GI - Dr. Bentley, s/p colonoscopy 11/3 showed sessile polyp- not removed, diverticulosis and internal hemorrhoids  - Pt placed on 55mg IV BID Lovenox as per Heme/Onc  - f/u Heme/Onc for plan on AC vs. IVC filter

## 2020-11-05 NOTE — PROGRESS NOTE ADULT - SUBJECTIVE AND OBJECTIVE BOX
PGY-1 Progress Note discussed with attending    PAGER #: [[phone]] TILL 5:00 PM  PLEASE CONTACT ON CALL TEAM:   - On Call Team (Please refer to [place]) FROM 5:00 PM - 8:30PM  - Nightfloat Team FROM 8:30 -7:30 AM    CHIEF COMPLAINT & BRIEF HOSPITAL COURSE:      INTERVAL HPI/OVERNIGHT EVENTS:       REVIEW OF SYSTEMS:  CONSTITUTIONAL: No fever, weight loss, or fatigue  RESPIRATORY: No cough, wheezing, chills or hemoptysis; No shortness of breath  CARDIOVASCULAR: No chest pain, palpitations, dizziness, or leg swelling  GASTROINTESTINAL: No abdominal pain. No nausea, vomiting, or hematemesis; No diarrhea or constipation. No melena or hematochezia.  GENITOURINARY: No dysuria or hematuria, urinary frequency  NEUROLOGICAL: No headaches, memory loss, loss of strength, numbness, or tremors  SKIN: No itching, burning, rashes, or lesions     MEDICATIONS  (STANDING):  artificial  tears Solution 1 Drop(s) Both EYES two times a day  ascorbic acid 500 milliGRAM(s) Oral daily  atorvastatin 20 milliGRAM(s) Oral at bedtime  cefTRIAXone   IVPB      cefTRIAXone   IVPB 1000 milliGRAM(s) IV Intermittent every 24 hours  enoxaparin Injectable 55 milliGRAM(s) SubCutaneous two times a day  insulin lispro (ADMELOG) corrective regimen sliding scale   SubCutaneous Before meals and at bedtime  lactated ringers. 1000 milliLiter(s) (50 mL/Hr) IV Continuous <Continuous>  latanoprost 0.005% Ophthalmic Solution 1 Drop(s) Both EYES at bedtime  pantoprazole    Tablet 40 milliGRAM(s) Oral before breakfast  prednisoLONE acetate 1% Suspension 1 Drop(s) Both EYES three times a day  QUEtiapine 25 milliGRAM(s) Oral at bedtime  timolol 0.25% Solution 1 Drop(s) Both EYES two times a day    MEDICATIONS  (PRN):  acetaminophen    Suspension .. 650 milliGRAM(s) Oral every 6 hours PRN Temp greater or equal to 38C (100.4F), Mild Pain (1 - 3)      Vital Signs Last 24 Hrs  T(C): 36.8 (05 Nov 2020 07:35), Max: 39.1 (04 Nov 2020 19:42)  T(F): 98.2 (05 Nov 2020 07:35), Max: 102.3 (04 Nov 2020 19:42)  HR: 77 (05 Nov 2020 07:35) (77 - 113)  BP: 166/54 (05 Nov 2020 07:35) (138/99 - 166/54)  BP(mean): --  RR: 18 (05 Nov 2020 07:35) (17 - 20)  SpO2: 100% (05 Nov 2020 07:35) (98% - 100%)    PHYSICAL EXAMINATION:  GENERAL: NAD, well built  HEAD:  Atraumatic, Normocephalic  EYES:  conjunctiva and sclera clear  NECK: Supple, No JVD, Normal thyroid  CHEST/LUNG: Clear to auscultation. Clear to percussion bilaterally; No rales, rhonchi, wheezing, or rubs  HEART: Regular rate and rhythm; No murmurs, rubs, or gallops  ABDOMEN: Soft, Nontender, Nondistended; Bowel sounds present  NERVOUS SYSTEM:  Alert & Oriented X3,    EXTREMITIES:  2+ Peripheral Pulses, No clubbing, cyanosis, or edema  SKIN: warm dry                          11.3   7.27  )-----------( 290      ( 05 Nov 2020 07:16 )             35.7     11-05    136  |  107  |  14  ----------------------------<  135<H>  3.4<L>   |  21<L>  |  0.79    Ca    8.9      05 Nov 2020 07:16  Phos  4.0     11-05  Mg     2.3     11-05    TPro  6.5  /  Alb  2.7<L>  /  TBili  0.8  /  DBili  x   /  AST  18  /  ALT  26  /  AlkPhos  93  11-05    LIVER FUNCTIONS - ( 05 Nov 2020 07:16 )  Alb: 2.7 g/dL / Pro: 6.5 g/dL / ALK PHOS: 93 U/L / ALT: 26 U/L DA / AST: 18 U/L / GGT: x                       I&O's Summary    04 Nov 2020 07:01  -  05 Nov 2020 07:00  --------------------------------------------------------  IN: 0 mL / OUT: 700 mL / NET: -700 mL    05 Nov 2020 07:01  -  05 Nov 2020 14:03  --------------------------------------------------------  IN: 0 mL / OUT: 400 mL / NET: -400 mL          CAPILLARY BLOOD GLUCOSE  CAPILLARY BLOOD GLUCOSE      POCT Blood Glucose.: 138 mg/dL (05 Nov 2020 11:33)    CAPILLARY BLOOD GLUCOSE      POCT Blood Glucose.: 138 mg/dL (05 Nov 2020 11:33)  POCT Blood Glucose.: 161 mg/dL (05 Nov 2020 08:28)  POCT Blood Glucose.: 180 mg/dL (04 Nov 2020 21:14)  POCT Blood Glucose.: 214 mg/dL (04 Nov 2020 16:57)      RADIOLOGY & ADDITIONAL TESTS:                   PGY-1 Progress Note discussed with attending    PAGER #: [[phone]] TILL 5:00 PM  PLEASE CONTACT ON CALL TEAM:   - On Call Team (Please refer to [place]) FROM 5:00 PM - 8:30PM  - Nightfloat Team FROM 8:30 -7:30 AM    CHIEF COMPLAINT & BRIEF HOSPITAL COURSE:  80 year old woman from home, lives with son, walks with walker, chronic mckeon (placed on recent admission) with PMH of DM2, HTN, HLD, dementia, recent diagnosis with PE (on Xarelto) admitted for weakness 2/2 to low Hb. Pt is AAOx2, mildly in distress because of the Mckeon, able to participate in discussion, but history limited due to Dementia.  Pt has no symptoms to endorse besides increased fatigue that have limited her ambulation and she spends more time laying on bed. Of note, pt was admitted on 10/19 post fall and was admitted to ICU for PE. During that admission, her Hb was low (6.9) and received 1 unit of pRBCs. Pt was made DNR with trial of intubation on that admission. In the ED, Hb 6.3, FOBT+, s/p 2 units of PRBC; CXR (10/31) showed no evidence of active chest disease. CT abd/pelvis angio on 11/2/2020 showed no evidence for GI bleed. Patient pulled out Mckeon on 11/2/2020 and it was replaced. Colonoscopy on 11/3/2020 showed 5mm sessile polyp which was NOT removed due to GI bleeding, scattered diverticula and internal hemorrhoids; patient subsequently started on clear liquid diet. Patient experienced chills on 11/4; UA was positive, showing cloudy urine with proteinuria, moderate leukocyte esterase and bacteria. Patient started on Rocephin 1000mg IV QD. CXR on 11/4 showed no acute infiltrate. Patient also experienced bilateral eye discomfort which led her to keep her eyes closed, prednisolone eye drops ordered for relief. Labs showed non-anion gap metabolic acidosis. GI stated no need for EGD, patient started on full dose Lovenox (55mg) BID as per Heme/Onc recommendations.    INTERVAL HPI/OVERNIGHT EVENTS:   Fever persisted overnight with Tmax of 102.3, patient was placed on restraints overnight which was removed this AM (11/5). She has been receiving Tylenol PRN q6h which was eventually switched to IV as patient started coughing this afternoon. Patient started on Cefepime and Flagyl for aspiration PNA/UTI, ofloxacin drops were ordered for eye infection. Potassium was decreased (3.4) and KCl was administered.    REVIEW OF SYSTEMS:  CONSTITUTIONAL: + Fever, no weight loss, or fatigue  RESPIRATORY: + Cough, no wheezing, chills or hemoptysis; No shortness of breath  CARDIOVASCULAR: No chest pain, palpitations, dizziness, or leg swelling  GASTROINTESTINAL: No abdominal pain. No nausea, vomiting, or hematemesis. No constipation. No melena or hematochezia.  GENITOURINARY: No dysuria or hematuria. Mckeon in place.  NEUROLOGICAL: No headaches, + baseline memory loss from dementia.   SKIN: No itching, burning, rashes, or lesions     MEDICATIONS  (STANDING):  artificial  tears Solution 1 Drop(s) Both EYES two times a day  ascorbic acid 500 milliGRAM(s) Oral daily  atorvastatin 20 milliGRAM(s) Oral at bedtime  cefTRIAXone   IVPB      cefTRIAXone   IVPB 1000 milliGRAM(s) IV Intermittent every 24 hours  enoxaparin Injectable 55 milliGRAM(s) SubCutaneous two times a day  insulin lispro (ADMELOG) corrective regimen sliding scale   SubCutaneous Before meals and at bedtime  lactated ringers. 1000 milliLiter(s) (50 mL/Hr) IV Continuous <Continuous>  latanoprost 0.005% Ophthalmic Solution 1 Drop(s) Both EYES at bedtime  pantoprazole    Tablet 40 milliGRAM(s) Oral before breakfast  prednisoLONE acetate 1% Suspension 1 Drop(s) Both EYES three times a day  QUEtiapine 25 milliGRAM(s) Oral at bedtime  timolol 0.25% Solution 1 Drop(s) Both EYES two times a day    MEDICATIONS  (PRN):  acetaminophen    Suspension .. 650 milliGRAM(s) Oral every 6 hours PRN Temp greater or equal to 38C (100.4F), Mild Pain (1 - 3)      Vital Signs Last 24 Hrs  T(C): 36.8 (05 Nov 2020 07:35), Max: 39.1 (04 Nov 2020 19:42)  T(F): 98.2 (05 Nov 2020 07:35), Max: 102.3 (04 Nov 2020 19:42)  HR: 77 (05 Nov 2020 07:35) (77 - 113)  BP: 166/54 (05 Nov 2020 07:35) (138/99 - 166/54)  BP(mean): --  RR: 18 (05 Nov 2020 07:35) (17 - 20)  SpO2: 100% (05 Nov 2020 07:35) (98% - 100%)    PHYSICAL EXAMINATION:  GENERAL: 80 year old F who appears stated age, calm. Slightly rigorous  HEAD: Atraumatic, Normocephalic  EYES: Nontender to palpation over eyelids, no erythema of surrounding skin. Mild B/L nonpurulent crusting exudates, no conjunctival injection, no anisocoria  NECK: Supple, No JVD, Normal thyroid  CHEST/LUNG: Clear to auscultation B/L. Clear to percussion; No rales, rhonchi, wheezing, or rubs  HEART: +S1/S2, no S3/S4, Regular rate and rhythm; No murmurs, rubs, or gallops  ABDOMEN: Soft, Nontender, Nondistended; Bowel sounds present on all four quadrants  NERVOUS SYSTEM:  Alert & Oriented X2, requires frequent re-orientation  : Mckeon in place, clear straw-colored urine  EXTREMITIES:  2+ Peripheral Pulses, No clubbing, cyanosis, or edema  SKIN: Warm, moist                          11.3<L>   7.27  )-----------( 290      ( 05 Nov 2020 07:16 )             35.7     11-05    136  |  107  |  14  ----------------------------<  135<H>  3.4<L>   |  21<L>  |  0.79    Ca    8.9      05 Nov 2020 07:16  Phos  4.0     11-05  Mg     2.3     11-05    TPro  6.5  /  Alb  2.7<L>  /  TBili  0.8  /  DBili  x   /  AST  18  /  ALT  26  /  AlkPhos  93  11-05    LIVER FUNCTIONS - ( 05 Nov 2020 07:16 )  Alb: 2.7 g/dL / Pro: 6.5 g/dL / ALK PHOS: 93 U/L / ALT: 26 U/L DA / AST: 18 U/L / GGT: x             I&O's Summary    04 Nov 2020 07:01  -  05 Nov 2020 07:00  --------------------------------------------------------  IN: 0 mL / OUT: 700 mL / NET: -700 mL    05 Nov 2020 07:01  -  05 Nov 2020 14:03  --------------------------------------------------------  IN: 0 mL / OUT: 400 mL / NET: -400 mL      CAPILLARY BLOOD GLUCOSE    POCT Blood Glucose.: 138 mg/dL (05 Nov 2020 11:33)  POCT Blood Glucose.: 161 mg/dL (05 Nov 2020 08:28)  POCT Blood Glucose.: 180 mg/dL (04 Nov 2020 21:14)  POCT Blood Glucose.: 214 mg/dL (04 Nov 2020 16:57)      RADIOLOGY & ADDITIONAL TESTS:

## 2020-11-05 NOTE — PROGRESS NOTE ADULT - PROBLEM SELECTOR PLAN 4
Pt has PMH of dementia  Baseline AAOx2  Home meds- Quetapine 25mg QD, Donepezil 5mg QD  - c/w home meds Pt has PMH of HTN   /53, stable  Home meds- Carvedilol 3.125mg BID, Amlodipine 10mg QD, Enalapril 2.5mg QD   - Monitor vitals

## 2020-11-05 NOTE — PROGRESS NOTE ADULT - ASSESSMENT
80 year old woman, from home, lives with son, walks with walker, Chronic mckeon (placed on recent admission) with PMH of DM2, HTN, HLD, dementia, recent diagnosis with PE (on Xarelto) admitted for weakness 2/2 to low Hb. 80 year old woman from home, lives with son, walks with walker, Chronic mckeon (placed on recent admission) with PMH of DM2, HTN, HLD, dementia, recent diagnosis with PE (on Xarelto) admitted for weakness 2/2 to low Hb.

## 2020-11-05 NOTE — PROGRESS NOTE ADULT - PROBLEM SELECTOR PLAN 5
Pt has PMH of DM    (11/4)  Home meds- Janumet 100-1000mg QD, Lipitor 20mg QD  A1C from 11/1 - 6.2  Advanced diet to clear liquid 11/3 after colonoscopy  - c/w HSS  - Monitor FS Pt has PMH of dementia  Baseline AAOx2  Home meds- Quetapine 25mg QD, Donepezil 5mg QD  - c/w home meds

## 2020-11-05 NOTE — PROGRESS NOTE ADULT - PROBLEM SELECTOR PLAN 8
Pt and son agreed for DNR and trial of intubation on previous admission documented 10/21 Pt was recently diagnosed and admitted in [facility] for PE (10/19)  Pt was admitted in ICU and started on Xarelto   - Holding Xarelto for GI bleed   - F/u Hemo onc ([facility])  - Consulted Heme/Onc Indra PATEL f/u recs AC vs IVC

## 2020-11-05 NOTE — PROGRESS NOTE ADULT - PROBLEM SELECTOR PLAN 6
Pt has PMH of HLD  Home meds- aspirin and statin   Lipid profile 11/1: Triglycerides 211<H>  - c/w home meds Pt has PMH of DM    (11/4)  Home meds- Janumet 100-1000mg QD, Lipitor 20mg QD  A1C from 11/1 - 6.2  - c/w HSS  - Monitor FS

## 2020-11-05 NOTE — PROGRESS NOTE ADULT - PROBLEM SELECTOR PLAN 7
Pt was recently diagnosed and admitted in [facility] for PE (10/19)  Pt was admitted in ICU and started on Xarelto   - Holding Xarelto for GI bleed   - F/u Hemo onc ([facility])  - Consulted Heme/Onc Indra PATEL f/u recs AC vs IVC Pt has PMH of HLD  Home meds- aspirin and statin   Lipid profile 11/1: Triglycerides 211<H>  - c/w home meds

## 2020-11-05 NOTE — PROGRESS NOTE ADULT - ASSESSMENT
Assessment and Plan:   · Assessment	    # Anemia:   Hgb stable  # GI bleed: FOBT +   # Iron def (labs from last admission):   Off note patient had anemia and FOBT + last admission on 10/10/20, repeat negative on 10/19/20. Xarelto was held and then restarted.   Appropriate response to PRBC tx.   Normal b12, folate, creat, LFTs, bili.   appreciate GI consult  s/p colonoscopy- 1-  Diverticulosis 2- No active bleeding 3- Colon polyp not removed in the setting of GI bleeding  Recs:   -no bleeding on colonoscopy, GI states no indication for EGD and safe to restart a/c  -await colonoscopy pathology  -continue lovenox full dose  -Continue Venofer 200 mg daily x 4 doses   -1U PRBC tx for Hb < 7 or symptomatic  -conservative management    # PE:   Recently diagnosed on 10/9/20. Started on Xarelto. Now with FOBT + anemia.   10/8-10/9: CTA Chest and CT A/P: with no evidence of malignancy reported   10/11/20: USB b/l LE: negative for DVT   Recs:  -continue full-dose lovenox, a/c safe as per GI   -Monitor H/H q 12 hours (Hgb improved to 11.3)  -Consider IVC filter if patient has recurrent GIB    # UTI/Febrile  still febrile  BCx drawn 11/04  ID on board  on abx    Discussed with Pt's Son Jevon Armas at bedside, his contact is [phone]  Thank you for the referral. Will continue to monitor the patient.  Please call with any questions [phone]  Above reviewed with Attending Dr. Fallon

## 2020-11-05 NOTE — PROGRESS NOTE ADULT - PROBLEM SELECTOR PLAN 3
Pt has PMH of HTN   /66, stable  Home meds- Carvedilol 3.125mg BID, Amlodipine 10mg QD, Enalapril 2.5mg QD   - Monitor vitals P/w 6.2, FOBT +  Anemia panel 11/2 : Ferritin 201<H>, Iron 314<H>, TIBC 345, Iron Saturation 91%<H>  - s/p 2 U pRBC  - Hb, Hct stable

## 2020-11-05 NOTE — PROGRESS NOTE ADULT - PROBLEM SELECTOR PLAN 9
IMPROVE VTE Individual Risk Assessment    RISK                                                          Points  [] Previous VTE                                           3  [] Thrombophilia                                        2  [] Lower limb paralysis                               2   [] Current Cancer                                       2   [] Immobilization > 24 hrs                          1  [x] ICU/CCU stay > 24 hours                       1  [x Age > 60                                               1    IMPROVE VTE Score: 2  - SCD  - PPI Pt and son agreed for DNR and trial of intubation on previous admission documented 10/21

## 2020-11-05 NOTE — PROGRESS NOTE ADULT - SUBJECTIVE AND OBJECTIVE BOX
complete note to follow     · Subjective and Objective:   · Subjective and Objective:   80 year old woman, from home, lives with son, walks with walker, Chronic mckeon ( placed on recent admission)  with PMH of DM2, HTN, HLD, dementia, recent diagnosis with PE (on xarelto) admitted for low Hb. Pt had a follow up appointment with her PMD and was informed about low Hb and recommended to get further evaluation. Pt is AAOX2, mildy in distress because of the Mckeon, able ot participate in discussion, but history limited due to Dementia.  Pt has no symptoms to endorse besides  increased fatigue that have limited her Ambulation and she spends more time laying on bed. Of note pt had fall risks and was admitted about 10 days ago post fall and was admitted to ICU for PE.  During that admission, her hgb was low (6.9) and received 1 unit of pRBCs  Pt was made DNR with trial of intubation on that admission. Pt denies any fever, bloody BM, Hemetemesis, abd pain, N/V/D, chest pain, SOB any sick contact.  Patient seen this afternoon. Wants to eat. RN at bedside.     Interval: Pt examined at bedside. No new complaints, lethargic. Still febrile and ongoing B/L conjunctivitis.  Pt has known dementia.   Son at bedside    PMH/ PSH: as per hpi   Meds: reviewed in emr   Allergies: nkda   Social hist: No habits     ROS: unable to assess, pt has dementia and not oriented    Vitals:  Vital Signs Last 24 Hrs  T(C): 38.4 (05 Nov 2020 15:59), Max: 39.1 (04 Nov 2020 19:42)  T(F): 101.2 (05 Nov 2020 15:59), Max: 102.3 (04 Nov 2020 19:42)  HR: 94 (05 Nov 2020 15:59) (77 - 113)  BP: 157/53 (05 Nov 2020 15:59) (138/99 - 166/54)  BP(mean): --  ABP: --  ABP(mean): --  RR: 18 (05 Nov 2020 15:59) (17 - 20)  SpO2: 100% (05 Nov 2020 15:59) (98% - 100%)    Physical Exam:  Gen: NAD, lethargic, difficulty opening eyes  Eyes: B/L discharge  CVS: s1s2   Resp: CTA B/L   GI: soft, non tender   : mckeon with clear yellow fluid  Ext: no c/c/e     Labs:    CBC Full  -  ( 05 Nov 2020 07:16 )  WBC Count : 7.27 K/uL  RBC Count : 4.20 M/uL  Hemoglobin : 11.3 g/dL  Hematocrit : 35.7 %  Platelet Count - Automated : 290 K/uL  Mean Cell Volume : 85.0 fl  Mean Cell Hemoglobin : 26.9 pg  Mean Cell Hemoglobin Concentration : 31.7 gm/dL  Auto Neutrophil # : 5.52 K/uL  Auto Lymphocyte # : 0.81 K/uL  Auto Monocyte # : 0.63 K/uL  Auto Eosinophil # : 0.24 K/uL  Auto Basophil # : 0.03 K/uL  Auto Neutrophil % : 75.9 %  Auto Lymphocyte % : 11.1 %  Auto Monocyte % : 8.7 %  Auto Eosinophil % : 3.3 %  Auto Basophil % : 0.4 %    11-05    136  |  107  |  14  ----------------------------<  135<H>  3.4<L>   |  21<L>  |  0.79    Ca    8.9      05 Nov 2020 07:16  Phos  4.0     11-05  Mg     2.3     11-05    TPro  6.5  /  Alb  2.7<L>  /  TBili  0.8  /  DBili  x   /  AST  18  /  ALT  26  /  AlkPhos  93  11-05      Radiology:    < from: CT Angio Chest w/ IV Cont (10.09.20 @ 14:45) >  IMPRESSION:  Acute pulmonary emboli in the right upper, middle and lower and left lower lobes.    There is no evidence of right heart strain or pulmonary infarct.      < from: CT Abdomen and Pelvis w/ Oral Cont and w/ IV Cont (10.08.20 @ 16:42) >  IMPRESSION:  Finding sat the right lung base suspicious for a pulmonary embolus, new since 10/4/2020.    Presacral edema and fluid of uncertain etiology not seen on the prior study.    No evidence of ascites or intestinal obstruction.    < end of copied text >

## 2020-11-05 NOTE — PROGRESS NOTE ADULT - PROBLEM SELECTOR PLAN 2
P/w 6.2, FOBT +  Anemia panel 11/2 : Ferritin 201<H>, Iron 314<H>, TIBC 345, Iron Saturation 91%<H>  - s/p 2 U pRBC  - Hb, Hct stable UA on 11/4 showed cloudy urine with proteinuria, WBCs, moderate leukocyte esterase and bacteria  - Cefepime 2000mg q8h and Flagyl 500mg q8h for aspiration PNA coverage  - treat associated fever with Tylenol IV

## 2020-11-06 LAB
ALBUMIN SERPL ELPH-MCNC: 2.7 G/DL — LOW (ref 3.5–5)
ALP SERPL-CCNC: 97 U/L — SIGNIFICANT CHANGE UP (ref 40–120)
ALT FLD-CCNC: 29 U/L DA — SIGNIFICANT CHANGE UP (ref 10–60)
ANION GAP SERPL CALC-SCNC: 10 MMOL/L — SIGNIFICANT CHANGE UP (ref 5–17)
AST SERPL-CCNC: 20 U/L — SIGNIFICANT CHANGE UP (ref 10–40)
BILIRUB SERPL-MCNC: 0.6 MG/DL — SIGNIFICANT CHANGE UP (ref 0.2–1.2)
BUN SERPL-MCNC: 10 MG/DL — SIGNIFICANT CHANGE UP (ref 7–18)
CALCIUM SERPL-MCNC: 8.8 MG/DL — SIGNIFICANT CHANGE UP (ref 8.4–10.5)
CHLORIDE SERPL-SCNC: 101 MMOL/L — SIGNIFICANT CHANGE UP (ref 96–108)
CO2 SERPL-SCNC: 23 MMOL/L — SIGNIFICANT CHANGE UP (ref 22–31)
CREAT SERPL-MCNC: 0.62 MG/DL — SIGNIFICANT CHANGE UP (ref 0.5–1.3)
GLUCOSE BLDC GLUCOMTR-MCNC: 148 MG/DL — HIGH (ref 70–99)
GLUCOSE BLDC GLUCOMTR-MCNC: 174 MG/DL — HIGH (ref 70–99)
GLUCOSE BLDC GLUCOMTR-MCNC: 181 MG/DL — HIGH (ref 70–99)
GLUCOSE BLDC GLUCOMTR-MCNC: 193 MG/DL — HIGH (ref 70–99)
GLUCOSE SERPL-MCNC: 172 MG/DL — HIGH (ref 70–99)
HCT VFR BLD CALC: 34.4 % — LOW (ref 34.5–45)
HGB BLD-MCNC: 11.2 G/DL — LOW (ref 11.5–15.5)
LDH SERPL L TO P-CCNC: 158 U/L — SIGNIFICANT CHANGE UP (ref 120–225)
MAGNESIUM SERPL-MCNC: 1.8 MG/DL — SIGNIFICANT CHANGE UP (ref 1.6–2.6)
MCHC RBC-ENTMCNC: 27.2 PG — SIGNIFICANT CHANGE UP (ref 27–34)
MCHC RBC-ENTMCNC: 32.6 GM/DL — SIGNIFICANT CHANGE UP (ref 32–36)
MCV RBC AUTO: 83.5 FL — SIGNIFICANT CHANGE UP (ref 80–100)
MISCELLANEOUS TEST NAME: SIGNIFICANT CHANGE UP
NRBC # BLD: 0 /100 WBCS — SIGNIFICANT CHANGE UP (ref 0–0)
PHOSPHATE SERPL-MCNC: 2.1 MG/DL — LOW (ref 2.5–4.5)
PLATELET # BLD AUTO: 278 K/UL — SIGNIFICANT CHANGE UP (ref 150–400)
POTASSIUM SERPL-MCNC: 3.3 MMOL/L — LOW (ref 3.5–5.3)
POTASSIUM SERPL-SCNC: 3.3 MMOL/L — LOW (ref 3.5–5.3)
PROCALCITONIN SERPL-MCNC: 0.54 NG/ML — HIGH (ref 0.02–0.1)
PROCALCITONIN SERPL-MCNC: 0.6 NG/ML — HIGH (ref 0.02–0.1)
PROT SERPL-MCNC: 6.7 G/DL — SIGNIFICANT CHANGE UP (ref 6–8.3)
RBC # BLD: 4.12 M/UL — SIGNIFICANT CHANGE UP (ref 3.8–5.2)
RBC # FLD: 15.5 % — HIGH (ref 10.3–14.5)
SODIUM SERPL-SCNC: 134 MMOL/L — LOW (ref 135–145)
WBC # BLD: 5.46 K/UL — SIGNIFICANT CHANGE UP (ref 3.8–10.5)
WBC # FLD AUTO: 5.46 K/UL — SIGNIFICANT CHANGE UP (ref 3.8–10.5)

## 2020-11-06 PROCEDURE — 99232 SBSQ HOSP IP/OBS MODERATE 35: CPT | Mod: GC

## 2020-11-06 RX ORDER — CEFEPIME 1 G/1
1000 INJECTION, POWDER, FOR SOLUTION INTRAMUSCULAR; INTRAVENOUS EVERY 12 HOURS
Refills: 0 | Status: DISCONTINUED | OUTPATIENT
Start: 2020-11-06 | End: 2020-11-10

## 2020-11-06 RX ORDER — POTASSIUM CHLORIDE 20 MEQ
10 PACKET (EA) ORAL
Refills: 0 | Status: COMPLETED | OUTPATIENT
Start: 2020-11-06 | End: 2020-11-06

## 2020-11-06 RX ORDER — AMLODIPINE BESYLATE 2.5 MG/1
10 TABLET ORAL DAILY
Refills: 0 | Status: DISCONTINUED | OUTPATIENT
Start: 2020-11-06 | End: 2020-11-10

## 2020-11-06 RX ADMIN — Medication 1 DROP(S): at 06:15

## 2020-11-06 RX ADMIN — Medication 1 DROP(S): at 11:52

## 2020-11-06 RX ADMIN — AMLODIPINE BESYLATE 10 MILLIGRAM(S): 2.5 TABLET ORAL at 08:34

## 2020-11-06 RX ADMIN — Medication 1: at 08:23

## 2020-11-06 RX ADMIN — Medication 1 DROP(S): at 06:17

## 2020-11-06 RX ADMIN — CEFEPIME 100 MILLIGRAM(S): 1 INJECTION, POWDER, FOR SOLUTION INTRAMUSCULAR; INTRAVENOUS at 17:13

## 2020-11-06 RX ADMIN — ENOXAPARIN SODIUM 55 MILLIGRAM(S): 100 INJECTION SUBCUTANEOUS at 22:01

## 2020-11-06 RX ADMIN — Medication 62.5 MILLIMOLE(S): at 15:02

## 2020-11-06 RX ADMIN — CEFEPIME 100 MILLIGRAM(S): 1 INJECTION, POWDER, FOR SOLUTION INTRAMUSCULAR; INTRAVENOUS at 06:09

## 2020-11-06 RX ADMIN — Medication 100 MILLIEQUIVALENT(S): at 10:27

## 2020-11-06 RX ADMIN — Medication 500 MILLIGRAM(S): at 10:27

## 2020-11-06 RX ADMIN — Medication 1: at 11:50

## 2020-11-06 RX ADMIN — Medication 100 MILLIEQUIVALENT(S): at 08:34

## 2020-11-06 RX ADMIN — ATORVASTATIN CALCIUM 20 MILLIGRAM(S): 80 TABLET, FILM COATED ORAL at 22:02

## 2020-11-06 RX ADMIN — Medication 500 MILLIGRAM(S): at 06:12

## 2020-11-06 RX ADMIN — QUETIAPINE FUMARATE 25 MILLIGRAM(S): 200 TABLET, FILM COATED ORAL at 22:02

## 2020-11-06 RX ADMIN — ENOXAPARIN SODIUM 55 MILLIGRAM(S): 100 INJECTION SUBCUTANEOUS at 10:28

## 2020-11-06 RX ADMIN — LATANOPROST 1 DROP(S): 0.05 SOLUTION/ DROPS OPHTHALMIC; TOPICAL at 22:01

## 2020-11-06 RX ADMIN — PANTOPRAZOLE SODIUM 40 MILLIGRAM(S): 20 TABLET, DELAYED RELEASE ORAL at 06:13

## 2020-11-06 RX ADMIN — CEFEPIME 100 MILLIGRAM(S): 1 INJECTION, POWDER, FOR SOLUTION INTRAMUSCULAR; INTRAVENOUS at 14:45

## 2020-11-06 RX ADMIN — Medication 1 DROP(S): at 17:14

## 2020-11-06 RX ADMIN — Medication 1: at 17:15

## 2020-11-06 RX ADMIN — Medication 1 DROP(S): at 06:12

## 2020-11-06 NOTE — PROGRESS NOTE ADULT - PROBLEM SELECTOR PLAN 5
Plan: Pt has PMH of dementia  Baseline AAOx2  Home meds- Quetapine 25mg QD, Donepezil 5mg QD  - c/w home meds

## 2020-11-06 NOTE — PROGRESS NOTE ADULT - PROBLEM SELECTOR PLAN 8
Plan: [facility]  Pt was admitted in ICU and started on Xarelto. Xarelto held on this admission due to GI bleed  - On Lovenox full dose (55mg) as per Heme/Onc Plan: Pt was recently diagnosed and admitted in [facility] for PE (10/19)  Pt was admitted in ICU and started on Xarelto. Xarelto held on this admission due to GI bleed  - On Lovenox full dose (55mg) as per Heme/Onc, will consider IVC filter if pt has recurrent GIB

## 2020-11-06 NOTE — PROGRESS NOTE ADULT - PROBLEM SELECTOR PLAN 10
Plan: IMPROVE VTE Individual Risk Assessment    RISK                                                          Points  [] Previous VTE                                           3  [] Thrombophilia                                        2  [] Lower limb paralysis                               2   [] Current Cancer                                       2   [] Immobilization > 24 hrs                          1  [x] ICU/CCU stay > 24 hours                       1  [x Age > 60                                               1    IMPROVE VTE Score: 2  - SCD  - PPI Plan: IMPROVE VTE Individual Risk Assessment    RISK                                                          Points  [] Previous VTE                                           3  [] Thrombophilia                                        2  [] Lower limb paralysis                               2   [] Current Cancer                                       2   [] Immobilization > 24 hrs                          1  [x] ICU/CCU stay > 24 hours                       1  [x Age > 60                                               1    IMPROVE VTE Score: 2  on Full dose Lovenox

## 2020-11-06 NOTE — CONSULT NOTE ADULT - MENTAL STATUS
awake and talking a little now but is confused and not making any sense , her daughter is at the bedside

## 2020-11-06 NOTE — DIETITIAN INITIAL EVALUATION ADULT. - OTHER INFO
Pt visited.  Pt seen for LOS. D/W son at bedside.  Son is able to speak English. Information Obtained. Pt  with good appetite at Home. Weight stable. C/O cough observed after taking sips of water. Per son even at home pt coughs on water. D/W RN. may benefit from SLP sindy.

## 2020-11-06 NOTE — PROGRESS NOTE ADULT - SUBJECTIVE AND OBJECTIVE BOX
Stable  Family by bedside    No acute problems      Complete Blood Count in AM (11.06.20 @ 06:59)   Nucleated RBC: 0 /100 WBCs   WBC Count: 5.46 K/uL   RBC Count: 4.12 M/uL   Hemoglobin: 11.2 g/dL   Hematocrit: 34.4 %   Mean Cell Volume: 83.5 fl   Mean Cell Hemoglobin: 27.2 pg   Mean Cell Hemoglobin Conc: 32.6 gm/dL   Red Cell Distrib Width: 15.5 %   Platelet Count - Automated: 278 K/uL

## 2020-11-06 NOTE — CONSULT NOTE ADULT - ASSESSMENT
Fevers  UTI - with Pseudomonas( sensitivity pending)  No signs of aspiration Pneumonia    Plan - DC flagyl  Cont Maxipime 1 gm iv q12hrs for now.

## 2020-11-06 NOTE — CONSULT NOTE ADULT - SUBJECTIVE AND OBJECTIVE BOX
HPI:  80 year old woman, from home, lives with son, walks with walker, Chronic mckeon ( placed on recent admission)  with PMH of DM2, HTN, HLD, dementia, recent diagnosis with PE (on xarelto) admitted for low Hb. Pt had a follow up appointment with her PMD and was informed about low Hb and recommended to get further evaluation. Pt is AAOX2, mildy in distress because of the Mckeon, able ot participate in discussion, but history limited due to Dementia.  Pt has no symptoms to endorse besides  increased fatigue that have limited her Ambulation and she spends more time laying on bed. Of note pt had fall risks and was admitted about 10 days ago post fall and was admitted to ICU for PE.  During that admission, her hgb was low (6.9) and received 1 unit of pRBCs  Pt was made DNR with trial of intubation on that admission.  Pt denies any fever, bloody BM, Hemetemesis, abd pain, N/V/D, chest pain, SOB any sick contact.  Pt denies any smoking, alcohol or any form of substance abuse.     In the ED,   Pt is AAOX2, Mildy agitated due to the mckeon   Vitals- 126/57, Hr 90, afebrile   Hb 6.3, FOBT+  s/p 1 unit of prbc in ed  BS- 203   CT head- no acute changes       interval hx:   Patient was admitted for LGIB. Colonoscopy on 11/3/2020 showed 5mm sessile polyp which was NOT removed due to GI bleeding, scattered diverticula and internal hemorrhoids; patient subsequently started on clear liquid diet. Patient experienced chills on 11/4; UA was positive, showing cloudy urine with proteinuria, moderate leukocyte esterase and bacteria. Patient started on Rocephin 1000mg IV QD. CXR on 11/4 showed no acute infiltrate. Patient continued to have fevers 101 max Patient started on Cefepime and Flagyl for aspiration PNA/UTI, blood cultures are testing. Urine cultures are growing pseudomonas      PAST MEDICAL & SURGICAL HISTORY:  Hyperlipidemia    HTN (hypertension)    Dementia    HTN (hypertension)    DM (diabetes mellitus)    No pertinent past surgical history        MEDS:  acetaminophen    Suspension .. 650 milliGRAM(s) Oral every 6 hours PRN  amLODIPine   Tablet 10 milliGRAM(s) Oral daily  artificial  tears Solution 1 Drop(s) Both EYES two times a day  ascorbic acid 500 milliGRAM(s) Oral daily  atorvastatin 20 milliGRAM(s) Oral at bedtime  cefepime   IVPB      cefepime   IVPB 2000 milliGRAM(s) IV Intermittent every 8 hours  enoxaparin Injectable 55 milliGRAM(s) SubCutaneous two times a day  insulin lispro (ADMELOG) corrective regimen sliding scale   SubCutaneous Before meals and at bedtime  lactated ringers. 1000 milliLiter(s) IV Continuous <Continuous>  latanoprost 0.005% Ophthalmic Solution 1 Drop(s) Both EYES at bedtime  metroNIDAZOLE    Tablet 500 milliGRAM(s) Oral every 8 hours  ofloxacin 0.3% Solution 1 Drop(s) Both EYES every 6 hours  pantoprazole    Tablet 40 milliGRAM(s) Oral before breakfast  prednisoLONE acetate 1% Suspension 1 Drop(s) Both EYES three times a day  QUEtiapine 25 milliGRAM(s) Oral at bedtime  timolol 0.25% Solution 1 Drop(s) Both EYES two times a day      ALLERGIES  No Known Allergies      SOCIAL HISTORY: Pt stays at home with son  Pt denies any smoking, alcohol or any form of substance abuse.    FAMILY HISTORY:  FH: hypertension  father and mother        ROS:  	    PHYSICAL EXAM:    Vital Signs Last 24 Hrs  T(C): 37.1 (06 Nov 2020 09:24), Max: 38.4 (05 Nov 2020 15:59)  T(F): 98.8 (06 Nov 2020 09:24), Max: 101.2 (05 Nov 2020 15:59)  HR: 100 (06 Nov 2020 09:24) (80 - 100)  BP: 152/70 (06 Nov 2020 09:24) (152/70 - 176/70)  BP(mean): --  RR: 28 (06 Nov 2020 09:24) (17 - 28)  SpO2: 100% (06 Nov 2020 09:24) (98% - 100%)        LABS/DIAGNOSTIC TESTS:                          11.2   5.46  )-----------( 278      ( 06 Nov 2020 06:59 )             34.4     WBC Count: 5.46 K/uL (11-06 @ 06:59)  WBC Count: 7.27 K/uL (11-05 @ 07:16)  WBC Count: 9.29 K/uL (11-04 @ 20:06)  WBC Count: 11.32 K/uL (11-04 @ 06:32)      11-06    134<L>  |  101  |  10  ----------------------------<  172<H>  3.3<L>   |  23  |  0.62    Ca    8.8      06 Nov 2020 06:59  Phos  2.1     11-06  Mg     1.8     11-06    TPro  6.7  /  Alb  2.7<L>  /  TBili  0.6  /  DBili  x   /  AST  20  /  ALT  29  /  AlkPhos  97  11-06          LIVER FUNCTIONS - ( 06 Nov 2020 06:59 )  Alb: 2.7 g/dL / Pro: 6.7 g/dL / ALK PHOS: 97 U/L / ALT: 29 U/L DA / AST: 20 U/L / GGT: x                 LACTATE:    ABG -     CULTURES:     Culture - Urine (collected 11-04-20 @ 22:17)  Source: .Urine Clean Catch (Midstream)  Preliminary Report (11-06-20 @ 03:11):    >100,000 CFU/ml Pseudomonas aeruginosa    Culture - Urine (collected 10-20-20 @ 06:35)  Source: .Urine Catheterized  Final Report (10-21-20 @ 10:58):    >=3 organisms. Probable collection contamination.    Culture - Urine (collected 10-12-20 @ 21:55)  Source: .Urine Clean Catch (Midstream)  Final Report (10-14-20 @ 17:16):    >100,000 CFU/ml Klebsiella variicola  Organism: Klebsiella variicola (10-14-20 @ 17:16)  Organism: Klebsiella variicola (10-14-20 @ 17:16)      -  Amikacin: S <=16      -  Amoxicillin/Clavulanic Acid: S <=8/4      -  Ampicillin: R 16 These ampicillin results predict results for amoxicillin      -  Ampicillin/Sulbactam: S <=4/2 Enterobacter, Citrobacter, and Serratia may develop resistance during prolonged therapy (3-4 days)      -  Aztreonam: S <=4      -  Cefazolin: S <=2      -  Cefepime: S <=2      -  Cefoxitin: S <=8      -  Ceftriaxone: S <=1 Enterobacter, Citrobacter, and Serratia may develop resistance during prolonged therapy      -  Ciprofloxacin: S <=0.25      -  Ertapenem: S <=0.5      -  Gentamicin: S <=2      -  Imipenem: S <=1      -  Levofloxacin: S <=0.5      -  Meropenem: S <=1      -  Nitrofurantoin: S <=32 Should not be used to treat pyelonephritis      -  Piperacillin/Tazobactam: S <=8      -  Tigecycline: S <=2      -  Tobramycin: S <=2      -  Trimethoprim/Sulfamethoxazole: S <=0.5/9.5      Method Type: BEN    Culture - Fungal, CSF (collected 10-07-20 @ 19:01)  Source: .CSF CSF  Preliminary Report (10-17-20 @ 15:01):    No growth    Culture - Acid Fast - CSF (collected 10-07-20 @ 19:01)  Source: .CSF CSF  Preliminary Report (10-17-20 @ 15:03):    No growth at 1 week.    Culture - CSF with Gram Stain (collected 10-07-20 @ 19:01)  Source: .CSF CSF  Gram Stain (10-07-20 @ 20:24):    No polymorphonuclear leukocytes    No organisms seen    by cytocentrifuge  Final Report (10-10-20 @ 16:03):    No growth at 3 days.        RADIOLOGY        EXAM:  XR CHEST PORTABLE URGENT 1V                            PROCEDURE DATE:  11/04/2020          INTERPRETATION:  CLINICAL INDICATION: 80 years  Female with r/o pna.    COMPARISON: 10/19/2020    AP view of the chest demonstrates the lungs to be clear. There is no pleural effusion. There is no pneumothorax.    The heart is mildly enlarged. The aorta is atherosclerotic. The mediastinum and rayshawn cannot be assessed due to projection.    The pulmonary vasculature is normal.    Mild thoracic degenerative changes are present. There are degenerative changes of both shoulders.    IMPRESSION:    No acute infiltrate.    Mild cardiomegaly.            STARLA JEFFREY MD; Attending Radiologist  This document has been electronically signed. Nov 4 2020  2:28PM  --------------------------------------------------------------------------------------------------  < from: CT Angio Abdomen and Pelvis w/ IV Cont (11.02.20 @ 17:04) >    EXAM:  CT ANGIO ABD PELV (W)AW IC                            PROCEDURE DATE:  11/02/2020          INTERPRETATION:  CLINICAL INDICATION: 80 years  Female with gi bleed. Anemia. PE.    COMPARISON: CT abdomen and pelvis 10/8/2020 and CTA abdomen and pelvis 10/4/2020    PROCEDURE:  CT of the Abdomen and Pelvis was performed with and without intravenous contrast.  Precontrast, Arterial and Delayed phases were performed.  Intravenous contrast: 90 ml Omnipaque 350. 10 ml discarded.  Oral contrast: None.  Sagittal and coronal reformats were performed.    FINDINGS:  LOWER CHEST: Mild cardiomegaly.    LIVER: Within normal limits.  BILE DUCTS: Normal caliber.  GALLBLADDER: Within normal limits.  SPLEEN: Within normal limits.  PANCREAS: 2 subcentimetercysts in the pancreatic neck reidentified.  ADRENALS: Stable mildly thickened adrenal glands.  KIDNEYS/URETERS: 1.1 cm left upper pole renal cyst. Foci of left renal cortical scarring. Renal hypodensities too small to characterize.    BLADDER: Decompressed around a Mckeon catheter. Diffuse bladder wall thickening.  REPRODUCTIVE ORGANS: Unremarkable uterus.    BOWEL: No bowel obstruction. Appendix is normal. No extravasation of contrast into the bowel lumen.  PERITONEUM: No ascites.  VESSELS: Tortuous atherosclerotic aorta without aneurysm.  RETROPERITONEUM/LYMPH NODES: No lymphadenopathy.  ABDOMINAL WALL: Within normal limits.  BONES: Scoliosis and degenerative changes. Grade 1 L4-5 anterolisthesis.    IMPRESSION:    No CT evidence of active GI bleed.    Bladder wall thickening secondary to decompression and/or cystitis. Mckeon catheter.    Small pancreatic cysts reidentified. Consider follow-up MRI to evaluate for IPMN.              STARLA JEFFREY MD; Attending Radiologist  This document hasbeen electronically signed. Nov 2 2020  5:18PM      --------------------------------------------------------------------------------------------------  EXAM:  XR CHEST PORTABLE IMMED 1V                            PROCEDURE DATE:  10/31/2020          INTERPRETATION:  Portable chest radiograph    CLINICAL INFORMATION:   Weakness. Dementia.    TECHNIQUE:  Portable  AP view of the chest was obtained.    COMPARISON: 10/19/2020 chest available for review.    FINDINGS:  The lungs are clear of airspace consolidations or effusions. No pneumothorax.    The  heart is enlarged in transverse diameter. No hilar mass.  Atherosclerotic calcified intimal wall plaques within nondilated thoracic aorta   Visualized osseous structures are intact.        IMPRESSION:   No evidence of active chest disease.                   HPI:  80 year old woman, from home, lives with son, walks with walker, Chronic mckeon ( placed on recent admission)  with PMH of DM2, HTN, HLD, dementia, recent diagnosis with PE (on xarelto) admitted for low Hb. Pt had a follow up appointment with her PMD and was informed about low Hb and recommended to get further evaluation. Pt is AAOX2, mildy in distress because of the Mckeon, able ot participate in discussion, but history limited due to Dementia.  Pt has no symptoms to endorse besides  increased fatigue that have limited her Ambulation and she spends more time laying on bed. Of note pt had fall risks and was admitted about 10 days ago post fall and was admitted to ICU for PE.  During that admission, her hgb was low (6.9) and received 1 unit of pRBCs  Pt was made DNR with trial of intubation on that admission.  Pt denies any fever, bloody BM, Hemetemesis, abd pain, N/V/D, chest pain, SOB any sick contact.  Pt denies any smoking, alcohol or any form of substance abuse.     In the ED,   Pt is AAOX2, Mildy agitated due to the mckeon   Vitals- 126/57, Hr 90, afebrile   Hb 6.3, FOBT+  s/p 1 unit of prbc in ed  BS- 203   CT head- no acute changes       interval hx:   Patient was admitted for LGIB. Colonoscopy on 11/3/2020 showed 5mm sessile polyp which was NOT removed due to GI bleeding, scattered diverticula and internal hemorrhoids; patient subsequently started on clear liquid diet. Patient experienced chills on 11/4; UA was positive, showing cloudy urine with proteinuria, moderate leukocyte esterase and bacteria. Patient started on Rocephin 1000mg IV QD. CXR on 11/4 showed no acute infiltrate. Patient continued to have fevers 101 max Patient started on Cefepime and Flagyl for aspiration PNA/UTI, blood cultures are testing. Urine cultures are growing pseudomonas      PAST MEDICAL & SURGICAL HISTORY:  Hyperlipidemia    HTN (hypertension)    Dementia    HTN (hypertension)    DM (diabetes mellitus)    No pertinent past surgical history        MEDS:  acetaminophen    Suspension .. 650 milliGRAM(s) Oral every 6 hours PRN  amLODIPine   Tablet 10 milliGRAM(s) Oral daily  artificial  tears Solution 1 Drop(s) Both EYES two times a day  ascorbic acid 500 milliGRAM(s) Oral daily  atorvastatin 20 milliGRAM(s) Oral at bedtime  cefepime   IVPB      cefepime   IVPB 2000 milliGRAM(s) IV Intermittent every 8 hours  enoxaparin Injectable 55 milliGRAM(s) SubCutaneous two times a day  insulin lispro (ADMELOG) corrective regimen sliding scale   SubCutaneous Before meals and at bedtime  lactated ringers. 1000 milliLiter(s) IV Continuous <Continuous>  latanoprost 0.005% Ophthalmic Solution 1 Drop(s) Both EYES at bedtime  metroNIDAZOLE    Tablet 500 milliGRAM(s) Oral every 8 hours  ofloxacin 0.3% Solution 1 Drop(s) Both EYES every 6 hours  pantoprazole    Tablet 40 milliGRAM(s) Oral before breakfast  prednisoLONE acetate 1% Suspension 1 Drop(s) Both EYES three times a day  QUEtiapine 25 milliGRAM(s) Oral at bedtime  timolol 0.25% Solution 1 Drop(s) Both EYES two times a day      ALLERGIES  No Known Allergies      SOCIAL HISTORY: Pt stays at home with son  Pt denies any smoking, alcohol or any form of substance abuse.    FAMILY HISTORY:  FH: hypertension  father and mother        ROS: unable to obtain as patient is not communicating with  #[id number]  	    PHYSICAL EXAM:    Vital Signs Last 24 Hrs  T(C): 37.1 (06 Nov 2020 09:24), Max: 38.4 (05 Nov 2020 15:59)  T(F): 98.8 (06 Nov 2020 09:24), Max: 101.2 (05 Nov 2020 15:59)  HR: 100 (06 Nov 2020 09:24) (80 - 100)  BP: 152/70 (06 Nov 2020 09:24) (152/70 - 176/70)  BP(mean): --  RR: 28 (06 Nov 2020 09:24) (17 - 28)  SpO2: 100% (06 Nov 2020 09:24) (98% - 100%)        LABS/DIAGNOSTIC TESTS:                          11.2   5.46  )-----------( 278      ( 06 Nov 2020 06:59 )             34.4     WBC Count: 5.46 K/uL (11-06 @ 06:59)  WBC Count: 7.27 K/uL (11-05 @ 07:16)  WBC Count: 9.29 K/uL (11-04 @ 20:06)  WBC Count: 11.32 K/uL (11-04 @ 06:32)      11-06    134<L>  |  101  |  10  ----------------------------<  172<H>  3.3<L>   |  23  |  0.62    Ca    8.8      06 Nov 2020 06:59  Phos  2.1     11-06  Mg     1.8     11-06    TPro  6.7  /  Alb  2.7<L>  /  TBili  0.6  /  DBili  x   /  AST  20  /  ALT  29  /  AlkPhos  97  11-06          LIVER FUNCTIONS - ( 06 Nov 2020 06:59 )  Alb: 2.7 g/dL / Pro: 6.7 g/dL / ALK PHOS: 97 U/L / ALT: 29 U/L DA / AST: 20 U/L / GGT: x                 LACTATE:    ABG -     CULTURES:     Culture - Urine (collected 11-04-20 @ 22:17)  Source: .Urine Clean Catch (Midstream)  Preliminary Report (11-06-20 @ 03:11):    >100,000 CFU/ml Pseudomonas aeruginosa    Culture - Urine (collected 10-20-20 @ 06:35)  Source: .Urine Catheterized  Final Report (10-21-20 @ 10:58):    >=3 organisms. Probable collection contamination.    Culture - Urine (collected 10-12-20 @ 21:55)  Source: .Urine Clean Catch (Midstream)  Final Report (10-14-20 @ 17:16):    >100,000 CFU/ml Klebsiella variicola  Organism: Klebsiella variicola (10-14-20 @ 17:16)  Organism: Klebsiella variicola (10-14-20 @ 17:16)      -  Amikacin: S <=16      -  Amoxicillin/Clavulanic Acid: S <=8/4      -  Ampicillin: R 16 These ampicillin results predict results for amoxicillin      -  Ampicillin/Sulbactam: S <=4/2 Enterobacter, Citrobacter, and Serratia may develop resistance during prolonged therapy (3-4 days)      -  Aztreonam: S <=4      -  Cefazolin: S <=2      -  Cefepime: S <=2      -  Cefoxitin: S <=8      -  Ceftriaxone: S <=1 Enterobacter, Citrobacter, and Serratia may develop resistance during prolonged therapy      -  Ciprofloxacin: S <=0.25      -  Ertapenem: S <=0.5      -  Gentamicin: S <=2      -  Imipenem: S <=1      -  Levofloxacin: S <=0.5      -  Meropenem: S <=1      -  Nitrofurantoin: S <=32 Should not be used to treat pyelonephritis      -  Piperacillin/Tazobactam: S <=8      -  Tigecycline: S <=2      -  Tobramycin: S <=2      -  Trimethoprim/Sulfamethoxazole: S <=0.5/9.5      Method Type: BEN    Culture - Fungal, CSF (collected 10-07-20 @ 19:01)  Source: .CSF CSF  Preliminary Report (10-17-20 @ 15:01):    No growth    Culture - Acid Fast - CSF (collected 10-07-20 @ 19:01)  Source: .CSF CSF  Preliminary Report (10-17-20 @ 15:03):    No growth at 1 week.    Culture - CSF with Gram Stain (collected 10-07-20 @ 19:01)  Source: .CSF CSF  Gram Stain (10-07-20 @ 20:24):    No polymorphonuclear leukocytes    No organisms seen    by cytocentrifuge  Final Report (10-10-20 @ 16:03):    No growth at 3 days.        RADIOLOGY        EXAM:  XR CHEST PORTABLE URGENT 1V                            PROCEDURE DATE:  11/04/2020          INTERPRETATION:  CLINICAL INDICATION: 80 years  Female with r/o pna.    COMPARISON: 10/19/2020    AP view of the chest demonstrates the lungs to be clear. There is no pleural effusion. There is no pneumothorax.    The heart is mildly enlarged. The aorta is atherosclerotic. The mediastinum and rayshawn cannot be assessed due to projection.    The pulmonary vasculature is normal.    Mild thoracic degenerative changes are present. There are degenerative changes of both shoulders.    IMPRESSION:    No acute infiltrate.    Mild cardiomegaly.            STARLA JEFFREY MD; Attending Radiologist  This document has been electronically signed. Nov 4 2020  2:28PM  --------------------------------------------------------------------------------------------------  < from: CT Angio Abdomen and Pelvis w/ IV Cont (11.02.20 @ 17:04) >    EXAM:  CT ANGIO ABD PELV (W)AW IC                            PROCEDURE DATE:  11/02/2020          INTERPRETATION:  CLINICAL INDICATION: 80 years  Female with gi bleed. Anemia. PE.    COMPARISON: CT abdomen and pelvis 10/8/2020 and CTA abdomen and pelvis 10/4/2020    PROCEDURE:  CT of the Abdomen and Pelvis was performed with and without intravenous contrast.  Precontrast, Arterial and Delayed phases were performed.  Intravenous contrast: 90 ml Omnipaque 350. 10 ml discarded.  Oral contrast: None.  Sagittal and coronal reformats were performed.    FINDINGS:  LOWER CHEST: Mild cardiomegaly.    LIVER: Within normal limits.  BILE DUCTS: Normal caliber.  GALLBLADDER: Within normal limits.  SPLEEN: Within normal limits.  PANCREAS: 2 subcentimetercysts in the pancreatic neck reidentified.  ADRENALS: Stable mildly thickened adrenal glands.  KIDNEYS/URETERS: 1.1 cm left upper pole renal cyst. Foci of left renal cortical scarring. Renal hypodensities too small to characterize.    BLADDER: Decompressed around a Mckeon catheter. Diffuse bladder wall thickening.  REPRODUCTIVE ORGANS: Unremarkable uterus.    BOWEL: No bowel obstruction. Appendix is normal. No extravasation of contrast into the bowel lumen.  PERITONEUM: No ascites.  VESSELS: Tortuous atherosclerotic aorta without aneurysm.  RETROPERITONEUM/LYMPH NODES: No lymphadenopathy.  ABDOMINAL WALL: Within normal limits.  BONES: Scoliosis and degenerative changes. Grade 1 L4-5 anterolisthesis.    IMPRESSION:    No CT evidence of active GI bleed.    Bladder wall thickening secondary to decompression and/or cystitis. Mckeon catheter.    Small pancreatic cysts reidentified. Consider follow-up MRI to evaluate for IPMN.              STARLA JEFFREY MD; Attending Radiologist  This document hasbeen electronically signed. Nov 2 2020  5:18PM      --------------------------------------------------------------------------------------------------  EXAM:  XR CHEST PORTABLE IMMED 1V                            PROCEDURE DATE:  10/31/2020          INTERPRETATION:  Portable chest radiograph    CLINICAL INFORMATION:   Weakness. Dementia.    TECHNIQUE:  Portable  AP view of the chest was obtained.    COMPARISON: 10/19/2020 chest available for review.    FINDINGS:  The lungs are clear of airspace consolidations or effusions. No pneumothorax.    The  heart is enlarged in transverse diameter. No hilar mass.  Atherosclerotic calcified intimal wall plaques within nondilated thoracic aorta   Visualized osseous structures are intact.        IMPRESSION:   No evidence of active chest disease.                   HPI:  80 year old woman, from home, lives with son, walks with walker, Chronic mckeon ( placed on recent admission)  with PMH of DM2, HTN, HLD, dementia, recent diagnosis with PE (on xarelto) admitted for low Hb. Pt had a follow up appointment with her PMD and was informed about low Hb and recommended to get further evaluation. Pt is AAOX2, mildy in distress because of the Mckeon, able ot participate in discussion, but history limited due to Dementia.  Pt has no symptoms to endorse besides  increased fatigue that have limited her Ambulation and she spends more time laying on bed. Of note pt had fall risks and was admitted about 10 days ago post fall and was admitted to ICU for PE.  During that admission, her hgb was low (6.9) and received 1 unit of pRBCs  Pt was made DNR with trial of intubation on that admission.  Pt denies any fever, bloody BM, Hemetemesis, abd pain, N/V/D, chest pain, SOB any sick contact.  Pt denies any smoking, alcohol or any form of substance abuse.     In the ED,   Pt is AAOX2, Mildy agitated due to the mckeon   Vitals- 126/57, Hr 90, afebrile   Hb 6.3, FOBT+  s/p 1 unit of prbc in ed  BS- 203   CT head- no acute changes       interval hx:   Patient was admitted for LGIB. Colonoscopy on 11/3/2020 showed 5mm sessile polyp which was NOT removed due to GI bleeding, scattered diverticula and internal hemorrhoids; patient subsequently started on clear liquid diet. Patient experienced chills on 11/4; UA was positive, showing cloudy urine with proteinuria, moderate leukocyte esterase and bacteria. Patient started on Rocephin 1000mg IV QD. CXR on 11/4 showed no acute infiltrate. Patient continued to have fevers 101 max Patient started on Cefepime and Flagyl for aspiration PNA/UTI, blood cultures are testing. Urine cultures are growing pseudomonas      PAST MEDICAL & SURGICAL HISTORY:  Hyperlipidemia    HTN (hypertension)    Dementia    HTN (hypertension)    DM (diabetes mellitus)    No pertinent past surgical history        MEDS:  acetaminophen    Suspension .. 650 milliGRAM(s) Oral every 6 hours PRN  amLODIPine   Tablet 10 milliGRAM(s) Oral daily  artificial  tears Solution 1 Drop(s) Both EYES two times a day  ascorbic acid 500 milliGRAM(s) Oral daily  atorvastatin 20 milliGRAM(s) Oral at bedtime  cefepime   IVPB      cefepime   IVPB 2000 milliGRAM(s) IV Intermittent every 8 hours  enoxaparin Injectable 55 milliGRAM(s) SubCutaneous two times a day  insulin lispro (ADMELOG) corrective regimen sliding scale   SubCutaneous Before meals and at bedtime  lactated ringers. 1000 milliLiter(s) IV Continuous <Continuous>  latanoprost 0.005% Ophthalmic Solution 1 Drop(s) Both EYES at bedtime  metroNIDAZOLE    Tablet 500 milliGRAM(s) Oral every 8 hours  ofloxacin 0.3% Solution 1 Drop(s) Both EYES every 6 hours  pantoprazole    Tablet 40 milliGRAM(s) Oral before breakfast  prednisoLONE acetate 1% Suspension 1 Drop(s) Both EYES three times a day  QUEtiapine 25 milliGRAM(s) Oral at bedtime  timolol 0.25% Solution 1 Drop(s) Both EYES two times a day      ALLERGIES  No Known Allergies      SOCIAL HISTORY: Pt stays at home with son  Pt denies any smoking, alcohol or any form of substance abuse.    FAMILY HISTORY:  FH: hypertension  father and mother        ROS: unable to obtain as patient is not communicating with  #[id number]  	    PHYSICAL EXAM:    Vital Signs Last 24 Hrs  T(C): 37.1 (06 Nov 2020 09:24), Max: 38.4 (05 Nov 2020 15:59)  T(F): 98.8 (06 Nov 2020 09:24), Max: 101.2 (05 Nov 2020 15:59)  HR: 100 (06 Nov 2020 09:24) (80 - 100)  BP: 152/70 (06 Nov 2020 09:24) (152/70 - 176/70)  BP(mean): --  RR: 28 (06 Nov 2020 09:24) (17 - 28)  SpO2: 100% (06 Nov 2020 09:24) (98% - 100%)        LABS/DIAGNOSTIC TESTS:                          11.2   5.46  )-----------( 278      ( 06 Nov 2020 06:59 )             34.4     WBC Count: 5.46 K/uL (11-06 @ 06:59)  WBC Count: 7.27 K/uL (11-05 @ 07:16)  WBC Count: 9.29 K/uL (11-04 @ 20:06)  WBC Count: 11.32 K/uL (11-04 @ 06:32)      11-06    134<L>  |  101  |  10  ----------------------------<  172<H>  3.3<L>   |  23  |  0.62    Ca    8.8      06 Nov 2020 06:59  Phos  2.1     11-06  Mg     1.8     11-06    TPro  6.7  /  Alb  2.7<L>  /  TBili  0.6  /  DBili  x   /  AST  20  /  ALT  29  /  AlkPhos  97  11-06    LIVER FUNCTIONS - ( 06 Nov 2020 06:59 )  Alb: 2.7 g/dL / Pro: 6.7 g/dL / ALK PHOS: 97 U/L / ALT: 29 U/L DA / AST: 20 U/L / GGT: x                 CULTURES:     Culture - Urine (collected 11-04-20 @ 22:17)  Source: .Urine Clean Catch (Midstream)  Preliminary Report (11-06-20 @ 03:11):    >100,000 CFU/ml Pseudomonas aeruginosa         RADIOLOGY    EXAM:  XR CHEST PORTABLE URGENT 1V                            PROCEDURE DATE:  11/04/2020          INTERPRETATION:  CLINICAL INDICATION: 80 years  Female with r/o pna.    COMPARISON: 10/19/2020    AP view of the chest demonstrates the lungs to be clear. There is no pleural effusion. There is no pneumothorax.    The heart is mildly enlarged. The aorta is atherosclerotic. The mediastinum and rayshawn cannot be assessed due to projection.    The pulmonary vasculature is normal.    Mild thoracic degenerative changes are present. There are degenerative changes of both shoulders.    IMPRESSION:    No acute infiltrate.    Mild cardiomegaly.            STARLA JEFFREY MD; Attending Radiologist  This document has been electronically signed. Nov 4 2020  2:28PM  --------------------------------------------------------------------------------------------------  < from: CT Angio Abdomen and Pelvis w/ IV Cont (11.02.20 @ 17:04) >    EXAM:  CT ANGIO ABD PELV (W)AW IC                            PROCEDURE DATE:  11/02/2020          INTERPRETATION:  CLINICAL INDICATION: 80 years  Female with gi bleed. Anemia. PE.    COMPARISON: CT abdomen and pelvis 10/8/2020 and CTA abdomen and pelvis 10/4/2020    PROCEDURE:  CT of the Abdomen and Pelvis was performed with and without intravenous contrast.  Precontrast, Arterial and Delayed phases were performed.  Intravenous contrast: 90 ml Omnipaque 350. 10 ml discarded.  Oral contrast: None.  Sagittal and coronal reformats were performed.    FINDINGS:  LOWER CHEST: Mild cardiomegaly.    LIVER: Within normal limits.  BILE DUCTS: Normal caliber.  GALLBLADDER: Within normal limits.  SPLEEN: Within normal limits.  PANCREAS: 2 subcentimetercysts in the pancreatic neck reidentified.  ADRENALS: Stable mildly thickened adrenal glands.  KIDNEYS/URETERS: 1.1 cm left upper pole renal cyst. Foci of left renal cortical scarring. Renal hypodensities too small to characterize.    BLADDER: Decompressed around a Mckeon catheter. Diffuse bladder wall thickening.  REPRODUCTIVE ORGANS: Unremarkable uterus.    BOWEL: No bowel obstruction. Appendix is normal. No extravasation of contrast into the bowel lumen.  PERITONEUM: No ascites.  VESSELS: Tortuous atherosclerotic aorta without aneurysm.  RETROPERITONEUM/LYMPH NODES: No lymphadenopathy.  ABDOMINAL WALL: Within normal limits.  BONES: Scoliosis and degenerative changes. Grade 1 L4-5 anterolisthesis.    IMPRESSION:    No CT evidence of active GI bleed.    Bladder wall thickening secondary to decompression and/or cystitis. Mckeon catheter.    Small pancreatic cysts reidentified. Consider follow-up MRI to evaluate for IPMN.              STARLA JEFFREY MD; Attending Radiologist  This document hasbeen electronically signed. Nov 2 2020  5:18PM      --------------------------------------------------------------------------------------------------  EXAM:  XR CHEST PORTABLE IMMED 1V                            PROCEDURE DATE:  10/31/2020          INTERPRETATION:  Portable chest radiograph    CLINICAL INFORMATION:   Weakness. Dementia.    TECHNIQUE:  Portable  AP view of the chest was obtained.    COMPARISON: 10/19/2020 chest available for review.    FINDINGS:  The lungs are clear of airspace consolidations or effusions. No pneumothorax.    The  heart is enlarged in transverse diameter. No hilar mass.  Atherosclerotic calcified intimal wall plaques within nondilated thoracic aorta   Visualized osseous structures are intact.        IMPRESSION:   No evidence of active chest disease.          ROS [ x ] unable to elicit

## 2020-11-06 NOTE — PROGRESS NOTE ADULT - PROBLEM SELECTOR PLAN 2
Plan: Preliminary urine cultures on 11/4 showed Pseudomonas aeruginosa, awaiting sensitivities  Cefepime 2000mg q8h and Flagyl 500mg q8h for aspiration PNA coverage  - Fever resolved  - Blood cultures pending Plan: Preliminary urine cultures on 11/4 showed Pseudomonas aeruginosa, awaiting sensitivities  -Continue with Cefepime 1 gm Q 12, DISCONTINUE Flagyl (as suspicion for Aspiration is low as per ID recs)  - Fever resolved  - Blood cultures pending    -To follow up Urine culture sensitivities, blood cultures

## 2020-11-06 NOTE — PROGRESS NOTE ADULT - PROBLEM SELECTOR PLAN 7
Plan: Pt has PMH of HLD  Home meds- aspirin and statin   Lipid profile 11/1: Triglycerides 211<H>  - c/w home meds.

## 2020-11-06 NOTE — PROGRESS NOTE ADULT - PROBLEM SELECTOR PLAN 4
Plan: Pt has PMH of HTN   /70  Home meds- Carvedilol 3.125mg BID, Amlodipine 10mg QD, Enalapril 2.5mg QD   - Elevated BP this AM (11/6), 10mg Amlodipine STAT administered  - Monitor vitals

## 2020-11-06 NOTE — DIETITIAN INITIAL EVALUATION ADULT. - PROBLEM SELECTOR PLAN 6
Pt was recently diagnosed and admitted in [facility] for PE ( Oct 2020)  Pt was admitted in ICU and started on Xarelto   Holding Xarelto for Gi bleed   F/u Hemo onc [facility]

## 2020-11-06 NOTE — PROGRESS NOTE ADULT - ASSESSMENT
80 year old woman from home, lives with son, walks with walker, Chronic mckeon (placed on recent admission) with PMH of DM2, HTN, HLD, dementia, recent diagnosis with PE (on Xarelto) admitted for weakness 2/2 to low Hb. 80 year old woman from home, lives with son, walks with walker, Chronic mckeon (placed on recent admission) with PMH of DM2, HTN, HLD, dementia, recent diagnosis with PE (on Xarelto) admitted for weakness due to low hemoglobin being managed for GI bleed and UTI with urine cultures growing Pseudomonas on IV antibiotics (Cefepime) Day 3 11/6.

## 2020-11-06 NOTE — DIETITIAN INITIAL EVALUATION ADULT. - PERTINENT MEDS FT
MEDICATIONS  (STANDING):  amLODIPine   Tablet 10 milliGRAM(s) Oral daily  artificial  tears Solution 1 Drop(s) Both EYES two times a day  ascorbic acid 500 milliGRAM(s) Oral daily  atorvastatin 20 milliGRAM(s) Oral at bedtime  cefepime   IVPB 1000 milliGRAM(s) IV Intermittent every 12 hours  enoxaparin Injectable 55 milliGRAM(s) SubCutaneous two times a day  insulin lispro (ADMELOG) corrective regimen sliding scale   SubCutaneous Before meals and at bedtime  lactated ringers. 1000 milliLiter(s) (50 mL/Hr) IV Continuous <Continuous>  latanoprost 0.005% Ophthalmic Solution 1 Drop(s) Both EYES at bedtime  pantoprazole    Tablet 40 milliGRAM(s) Oral before breakfast  QUEtiapine 25 milliGRAM(s) Oral at bedtime  timolol 0.25% Solution 1 Drop(s) Both EYES two times a day    MEDICATIONS  (PRN):  acetaminophen    Suspension .. 650 milliGRAM(s) Oral every 6 hours PRN Temp greater or equal to 38C (100.4F), Mild Pain (1 - 3)

## 2020-11-06 NOTE — PROGRESS NOTE ADULT - PROBLEM SELECTOR PLAN 1
Plan: Pt sent by PMD to the ED for low Hb 6.9 at his office.   Pt denies any bloody stool or any acute blood loss although is a poor historian   s/o incr fatigue and ambulatory intolerance  In the ED Hb- 6.2, FOBT +  s/p 2 units of PRBC in ED, Hb 10.7 (11/2)  CT Ab/Pelvis Angio 11/2 showed no signs of GI bleed  GI - Dr. Bentley, s/p colonoscopy 11/3 showed sessile polyp- not removed, diverticulosis and internal hemorrhoids  - Pt placed on 55mg IV BID Lovenox as per Heme/Onc  - Heme/Onc: Place IVC filter if pt has recurrent GIB Plan: Pt sent by PMD to the ED for low Hb 6.9 at his office.   Pt denies any bloody stool or any acute blood loss although is a poor historian   s/o incr fatigue and ambulatory intolerance  In the ED Hb- 6.2, FOBT +  s/p 2 units of PRBC in ED, Hb 10.7 (11/2)  CT Ab/Pelvis Angio 11/2 showed no signs of GI bleed  GI - Dr. Bentley, s/p colonoscopy 11/3 showed sessile polyp- not removed, diverticulosis and internal hemorrhoids  - Pt placed on 55mg IV BID Lovenox as per Heme/Onc  - Heme/Onc: Recommended to place IVC filter if pt has recurrent GIB

## 2020-11-06 NOTE — PROGRESS NOTE ADULT - SUBJECTIVE AND OBJECTIVE BOX
PGY-1 Progress Note discussed with attending    PAGER #: [[phone]] TILL 5:00 PM  PLEASE CONTACT ON CALL TEAM:   - On Call Team (Please refer to [place]) FROM 5:00 PM - 8:30PM  - Nightfloat Team FROM 8:30 -7:30 AM    CHIEF COMPLAINT & BRIEF HOSPITAL COURSE:  80 year old woman from home, lives with son, walks with walker, chronic mckeon (placed on recent admission) with PMH of DM2, HTN, HLD, dementia, recent diagnosis with PE (on Xarelto) admitted for weakness 2/2 to low Hb. Pt is AAOx2, mildly in distress because of the Mckeon, able to participate in discussion, but history limited due to Dementia.  Pt has no symptoms to endorse besides increased fatigue that have limited her ambulation and she spends more time laying on bed. Of note, pt was admitted on 10/19 post fall and was admitted to ICU for PE. During that admission, her Hb was low (6.9) and received 1 unit of pRBCs. Pt was made DNR with trial of intubation on that admission. In the ED, Hb 6.3, FOBT+, s/p 2 units of PRBC; CXR (10/31) showed no evidence of active chest disease. CT abd/pelvis angio on 11/2/2020 showed no evidence for GI bleed. Patient pulled out Mckeon on 11/2/2020 and it was replaced. Colonoscopy on 11/3/2020 showed 5mm sessile polyp which was NOT removed due to GI bleeding, scattered diverticula and internal hemorrhoids; patient subsequently started on clear liquid diet. Patient experienced chills on 11/4; UA was positive, showing cloudy urine with proteinuria, moderate leukocyte esterase and bacteria. Patient started on Rocephin 1000mg IV QD. CXR on 11/4 showed no acute infiltrate. Patient also experienced bilateral eye discomfort which led her to keep her eyes closed, prednisolone eye drops ordered for relief. Labs showed non-anion gap metabolic acidosis. GI stated no need for EGD, patient started on full dose Lovenox (55mg) BID as per Heme/Onc recommendations. Fever persisted into 11/5; Tylenol PRN q6h was switched to IV as patient started coughing this afternoon. Patient started on Cefepime and Flagyl for aspiration PNA/UTI, ofloxacin drops were ordered for eye infection on 11/5. Potassium was decreased (3.4) and KCl was administered.    INTERVAL HPI/OVERNIGHT EVENTS:   Temperature dropped overnight and patient is no longer febrile. Preliminary Ucx (11/4) showed Pseudomonas aeruginosa, currently awaiting sensitivities. Patient now on day 3 of abx for UTI, taking Cefepime 2g IV q8h and Flagyl 500mg IV q8h. BP was elevated this AM at 176/70, 10mg amlodipine STAT was administered. Na, K, P decreased this AM and were all repleted today, will monitor with f/u BNP.     REVIEW OF SYSTEMS:  CONSTITUTIONAL: No fever, weight loss, or fatigue  RESPIRATORY: No cough, wheezing, chills or hemoptysis; No shortness of breath  CARDIOVASCULAR: No chest pain, palpitations, dizziness, or leg swelling  GASTROINTESTINAL: No abdominal pain. No nausea, vomiting, or hematemesis. No constipation. No melena or hematochezia.  GENITOURINARY: No dysuria or hematuria. Mckeon in place.  NEUROLOGICAL: No headaches, + baseline memory loss from dementia.   SKIN: No itching, burning, rashes, or lesions     MEDICATIONS  (STANDING):  artificial  tears Solution 1 Drop(s) Both EYES two times a day  ascorbic acid 500 milliGRAM(s) Oral daily  atorvastatin 20 milliGRAM(s) Oral at bedtime  cefTRIAXone   IVPB      cefTRIAXone   IVPB 1000 milliGRAM(s) IV Intermittent every 24 hours  enoxaparin Injectable 55 milliGRAM(s) SubCutaneous two times a day  insulin lispro (ADMELOG) corrective regimen sliding scale   SubCutaneous Before meals and at bedtime  lactated ringers. 1000 milliLiter(s) (50 mL/Hr) IV Continuous <Continuous>  latanoprost 0.005% Ophthalmic Solution 1 Drop(s) Both EYES at bedtime  pantoprazole    Tablet 40 milliGRAM(s) Oral before breakfast  prednisoLONE acetate 1% Suspension 1 Drop(s) Both EYES three times a day  QUEtiapine 25 milliGRAM(s) Oral at bedtime  timolol 0.25% Solution 1 Drop(s) Both EYES two times a day    MEDICATIONS  (PRN):  acetaminophen    Suspension .. 650 milliGRAM(s) Oral every 6 hours PRN Temp greater or equal to 38C (100.4F), Mild Pain (1 - 3)    Vital Signs Last 24 Hrs  T(C): 37.1 (06 Nov 2020 09:24), Max: 38.4 (05 Nov 2020 15:59)  T(F): 98.8 (06 Nov 2020 09:24), Max: 101.2 (05 Nov 2020 15:59)  HR: 100 (06 Nov 2020 09:24) ()  BP: 152/70 (06 Nov 2020 09:24) (153/56 - 176/70)  BP(mean): --  RR: 28 (06 Nov 2020 09:24) (17 - 28)  SpO2: 100% (06 Nov 2020 09:24) (98% - 100%)    PHYSICAL EXAMINATION:  GENERAL: 80 year old F who appears stated age, calm, NAD  HEAD: Atraumatic, Normocephalic  EYES: Nontender to palpation over eyelids, no erythema of surrounding skin. Mild B/L nonpurulent crusting exudates, no conjunctival injection, no anisocoria  NECK: Supple, No JVD, Normal thyroid  CHEST/LUNG: Clear to auscultation B/L. Clear to percussion; No rales, rhonchi, wheezing, or rubs  HEART: +S1/S2, no S3/S4, Regular rate and rhythm; No murmurs, rubs, or gallops  ABDOMEN: Soft, Nontender, Nondistended; Bowel sounds present on all four quadrants  NERVOUS SYSTEM:  Alert & Oriented X2, requires frequent re-orientation  : Mckeon in place, clear straw-colored urine  EXTREMITIES:  2+ Peripheral Pulses, No clubbing, cyanosis, or edema  SKIN: Warm, dry PGY-1 Progress Note discussed with attending    PAGER #: [[phone]] TILL 5:00 PM  PLEASE CONTACT ON CALL TEAM:   - On Call Team (Please refer to [place]) FROM 5:00 PM - 8:30PM  - Nightfloat Team FROM 8:30 -7:30 AM    CHIEF COMPLAINT & BRIEF HOSPITAL COURSE:  80 year old woman from home, lives with son, walks with walker, chronic mckeon (placed on recent admission) with PMH of DM2, HTN, HLD, dementia, recent diagnosis with PE (on Xarelto) admitted for weakness 2/2 to low Hb. Pt is AAOx2, mildly in distress because of the Mckeon, able to participate in discussion, but history limited due to Dementia.  Pt has no symptoms to endorse besides increased fatigue that have limited her ambulation and she spends more time laying on bed. Of note, pt was admitted on 10/19 post fall and was admitted to ICU for PE. During that admission, her Hb was low (6.9) and received 1 unit of pRBCs. Pt was made DNR with trial of intubation on that admission. In the ED, Hb 6.3, FOBT+, s/p 2 units of PRBC; CXR (10/31) showed no evidence of active chest disease. CT abd/pelvis angio on 11/2/2020 showed no evidence for GI bleed. Patient pulled out Mckeon on 11/2/2020 and it was replaced. Colonoscopy on 11/3/2020 showed 5mm sessile polyp which was NOT removed due to GI bleeding, scattered diverticula and internal hemorrhoids; patient subsequently started on clear liquid diet. Patient experienced chills on 11/4; UA was positive, showing cloudy urine with proteinuria, moderate leukocyte esterase and bacteria. Patient started on Rocephin 1000mg IV QD. CXR on 11/4 showed no acute infiltrate. Patient also experienced bilateral eye discomfort which led her to keep her eyes closed, prednisolone eye drops ordered for relief. Labs showed non-anion gap metabolic acidosis. GI stated no need for EGD, patient started on full dose Lovenox (55mg) BID as per Heme/Onc recommendations. Fever persisted into 11/5; Tylenol PRN q6h was switched to IV as patient started coughing this afternoon. Patient started on Cefepime and Flagyl for aspiration PNA/UTI, ofloxacin drops were ordered for eye infection on 11/5. Potassium was decreased (3.4) and KCl was administered.    INTERVAL HPI/OVERNIGHT EVENTS:   Temperature dropped overnight and patient is no longer febrile. Preliminary Ucx (11/4) showed Pseudomonas aeruginosa, currently awaiting sensitivities. Patient now on day 3 of abx for UTI, taking Cefepime 2g IV q8h and Flagyl 500mg IV q8h. BP was elevated this AM at 176/70, 10mg amlodipine STAT was administered. Na, K, P decreased this AM and were all repleted today, will monitor with f/u BNP.     REVIEW OF SYSTEMS:  CONSTITUTIONAL: No fever, weight loss, or fatigue  RESPIRATORY: No cough, wheezing, chills or hemoptysis; No shortness of breath  CARDIOVASCULAR: No chest pain, palpitations, dizziness, or leg swelling  GASTROINTESTINAL: No abdominal pain. No nausea, vomiting, or hematemesis. No constipation. No melena or hematochezia.  GENITOURINARY: No dysuria or hematuria. Mckeon in place.  NEUROLOGICAL: No headaches, + baseline memory loss from dementia.   SKIN: No itching, burning, rashes, or lesions     MEDICATIONS  (STANDING):  artificial  tears Solution 1 Drop(s) Both EYES two times a day  ascorbic acid 500 milliGRAM(s) Oral daily  atorvastatin 20 milliGRAM(s) Oral at bedtime  cefTRIAXone   IVPB      cefTRIAXone   IVPB 1000 milliGRAM(s) IV Intermittent every 24 hours  enoxaparin Injectable 55 milliGRAM(s) SubCutaneous two times a day  insulin lispro (ADMELOG) corrective regimen sliding scale   SubCutaneous Before meals and at bedtime  lactated ringers. 1000 milliLiter(s) (50 mL/Hr) IV Continuous <Continuous>  latanoprost 0.005% Ophthalmic Solution 1 Drop(s) Both EYES at bedtime  pantoprazole    Tablet 40 milliGRAM(s) Oral before breakfast  prednisoLONE acetate 1% Suspension 1 Drop(s) Both EYES three times a day  QUEtiapine 25 milliGRAM(s) Oral at bedtime  timolol 0.25% Solution 1 Drop(s) Both EYES two times a day    MEDICATIONS  (PRN):  acetaminophen    Suspension .. 650 milliGRAM(s) Oral every 6 hours PRN Temp greater or equal to 38C (100.4F), Mild Pain (1 - 3)    Vital Signs Last 24 Hrs  T(C): 37.1 (06 Nov 2020 09:24), Max: 38.4 (05 Nov 2020 15:59)  T(F): 98.8 (06 Nov 2020 09:24), Max: 101.2 (05 Nov 2020 15:59)  HR: 100 (06 Nov 2020 09:24) ()  BP: 152/70 (06 Nov 2020 09:24) (153/56 - 176/70)  BP(mean): --  RR: 28 (06 Nov 2020 09:24) (17 - 28)  SpO2: 100% (06 Nov 2020 09:24) (98% - 100%)    PHYSICAL EXAMINATION:  GENERAL: 80 year old F who appears stated age, calm, NAD  HEAD: Atraumatic, Normocephalic  EYES: Nontender to palpation over eyelids, no erythema of surrounding skin. No conjunctival injection, no anisocoria  NECK: Supple, No JVD, Normal thyroid  CHEST/LUNG: Clear to auscultation B/L. Clear to percussion; No rales, rhonchi, wheezing, or rubs  HEART: +S1/S2, no S3/S4, Regular rate and rhythm; No murmurs, rubs, or gallops  ABDOMEN: Soft, Nontender, Nondistended; Bowel sounds present on all four quadrants  NERVOUS SYSTEM:  Alert & Oriented X2, requires frequent re-orientation  : Mckeon in place, clear straw-colored urine  EXTREMITIES:  2+ Peripheral Pulses, No clubbing, cyanosis, or edema  SKIN: Warm, dry                          11.2<L>   5.46  )-----------( 278      ( 06 Nov 2020 06:59 )             34.4<L>     11-05    134<L>  |  101  |  10  ----------------------------<  172<H>  3.3<L>   |  23  |  0.62    Ca    8.8          06 Nov 2020 06:59  Phos  2.1<L>   11-06  Mg    1.8         11-06    TPro  6.7  /  Alb  2.7<L>  /  TBili  0.6  /  DBili  x   /  AST  20  /  ALT  29  /  AlkPhos  97  11-06    LIVER FUNCTIONS - ( 06 Nov 2020 06:59 )  Alb: 2.7 g/dL / Pro: 6.7 g/dL / ALK PHOS: 97 U/L / ALT: 29 U/L DA / AST: 20 U/L / GGT: x             I&O's Summary    05 Nov 2020 07:01  -  05 Nov 2020 14:03  --------------------------------------------------------  IN: 0 mL / OUT: 400 mL / NET: -400 mL    05 Nov 2020 07:01  -  06 Nov 2020 07:00  --------------------------------------------------------  IN: 0 mL / OUT: 700 mL / NET: -700 mL      CAPILLARY BLOOD GLUCOSE    POCT Blood Glucose.: 181 mg/dL (06 Nov 2020 11:30)  POCT Blood Glucose.: 174 mg/dL (06 Nov 2020 08:12)  POCT Blood Glucose.: 186 mg/dL (05 Nov 2020 20:56)  POCT Blood Glucose.: 139 mg/dL (05 Nov 2020 16:49)  POCT Blood Glucose.: 138 mg/dL (05 Nov 2020 11:33)      RADIOLOGY & ADDITIONAL TESTS:     PGY-1 Progress Note discussed with attending    PAGER #: [[phone]] TILL 5:00 PM  PLEASE CONTACT ON CALL TEAM:   - On Call Team (Please refer to [place]) FROM 5:00 PM - 8:30PM  - Nightfloat Team FROM 8:30 -7:30 AM    CHIEF COMPLAINT & BRIEF HOSPITAL COURSE:  80 year old woman from home, lives with son, walks with walker, chronic mckeon (placed on recent admission) with PMH of DM2, HTN, HLD, dementia, recent diagnosis with PE (on Xarelto) admitted for weakness 2/2 to low Hb. Pt is AAOx2, mildly in distress because of the Mckeon, able to participate in discussion, but history limited due to Dementia.  Pt has no symptoms to endorse besides increased fatigue that have limited her ambulation and she spends more time laying on bed. Of note, pt was admitted on 10/19 post fall and was admitted to ICU for PE. During that admission, her Hb was low (6.9) and received 1 unit of pRBCs. Pt was made DNR with trial of intubation on that admission. In the ED, Hb 6.3, FOBT+, s/p 2 units of PRBC; CXR (10/31) showed no evidence of active chest disease. CT abd/pelvis angio on 11/2/2020 showed no evidence for GI bleed. Patient pulled out Mckeon on 11/2/2020 and it was replaced. Colonoscopy on 11/3/2020 showed 5mm sessile polyp which was NOT removed due to GI bleeding, scattered diverticula and internal hemorrhoids; patient subsequently started on clear liquid diet. Patient experienced chills on 11/4; UA was positive, showing cloudy urine with proteinuria, moderate leukocyte esterase and bacteria. Patient started on Rocephin 1000mg IV QD. CXR on 11/4 showed no acute infiltrate. Patient also experienced bilateral eye discomfort which led her to keep her eyes closed, prednisolone eye drops ordered for relief. Labs showed non-anion gap metabolic acidosis. GI stated no need for EGD, patient started on full dose Lovenox (55mg) BID as per Heme/Onc recommendations. Fever persisted into 11/5; Tylenol PRN q6h was switched to IV as patient started coughing this afternoon. Patient started on Cefepime and Flagyl for aspiration PNA/UTI, ofloxacin drops were ordered for eye infection on 11/5. Potassium was decreased (3.4) and KCl was administered.    INTERVAL HPI/OVERNIGHT EVENTS:   Temperature dropped overnight and patient is no longer febrile. Preliminary Ucx (11/4) showed Pseudomonas aeruginosa, currently awaiting sensitivities. Patient now on day 3 of abx for UTI, switched to Cefepime 1g IV q12h as patient is no longer showing signs of aspiration. BP was elevated this AM at 176/70, 10mg amlodipine STAT was administered. Na, K, P decreased this AM and were all repleted today, will monitor with f/u BNP.     REVIEW OF SYSTEMS:  CONSTITUTIONAL: No fever, weight loss, or fatigue  RESPIRATORY: No cough, wheezing, chills or hemoptysis; No shortness of breath  CARDIOVASCULAR: No chest pain, palpitations, dizziness, or leg swelling  GASTROINTESTINAL: No abdominal pain. No nausea, vomiting, or hematemesis. No constipation. No melena or hematochezia.  GENITOURINARY: No dysuria or hematuria. Mckeon in place.  NEUROLOGICAL: No headaches, + baseline memory loss from dementia.   SKIN: No itching, burning, rashes, or lesions     MEDICATIONS  (STANDING):  artificial  tears Solution 1 Drop(s) Both EYES two times a day  ascorbic acid 500 milliGRAM(s) Oral daily  atorvastatin 20 milliGRAM(s) Oral at bedtime  cefTRIAXone   IVPB      cefTRIAXone   IVPB 1000 milliGRAM(s) IV Intermittent every 24 hours  enoxaparin Injectable 55 milliGRAM(s) SubCutaneous two times a day  insulin lispro (ADMELOG) corrective regimen sliding scale   SubCutaneous Before meals and at bedtime  lactated ringers. 1000 milliLiter(s) (50 mL/Hr) IV Continuous <Continuous>  latanoprost 0.005% Ophthalmic Solution 1 Drop(s) Both EYES at bedtime  pantoprazole    Tablet 40 milliGRAM(s) Oral before breakfast  prednisoLONE acetate 1% Suspension 1 Drop(s) Both EYES three times a day  QUEtiapine 25 milliGRAM(s) Oral at bedtime  timolol 0.25% Solution 1 Drop(s) Both EYES two times a day    MEDICATIONS  (PRN):  acetaminophen    Suspension .. 650 milliGRAM(s) Oral every 6 hours PRN Temp greater or equal to 38C (100.4F), Mild Pain (1 - 3)    Vital Signs Last 24 Hrs  T(C): 37.1 (06 Nov 2020 09:24), Max: 38.4 (05 Nov 2020 15:59)  T(F): 98.8 (06 Nov 2020 09:24), Max: 101.2 (05 Nov 2020 15:59)  HR: 100 (06 Nov 2020 09:24) ()  BP: 152/70 (06 Nov 2020 09:24) (153/56 - 176/70)  BP(mean): --  RR: 28 (06 Nov 2020 09:24) (17 - 28)  SpO2: 100% (06 Nov 2020 09:24) (98% - 100%)    PHYSICAL EXAMINATION:  GENERAL: 80 year old F who appears stated age, calm, NAD  HEAD: Atraumatic, Normocephalic  EYES: Nontender to palpation over eyelids, no erythema of surrounding skin. No conjunctival injection, no anisocoria  NECK: Supple, No JVD, Normal thyroid  CHEST/LUNG: Clear to auscultation B/L. Clear to percussion; No rales, rhonchi, wheezing, or rubs  HEART: +S1/S2, no S3/S4, Regular rate and rhythm; No murmurs, rubs, or gallops  ABDOMEN: Soft, Nontender, Nondistended; Bowel sounds present on all four quadrants  NERVOUS SYSTEM:  Alert & Oriented X2, requires frequent re-orientation  : Mckeon in place, clear straw-colored urine  EXTREMITIES:  2+ Peripheral Pulses, No clubbing, cyanosis, or edema  SKIN: Warm, dry                          11.2<L>   5.46  )-----------( 278      ( 06 Nov 2020 06:59 )             34.4<L>     11-05    134<L>  |  101  |  10  ----------------------------<  172<H>  3.3<L>   |  23  |  0.62    Ca    8.8          06 Nov 2020 06:59  Phos  2.1<L>   11-06  Mg    1.8         11-06    TPro  6.7  /  Alb  2.7<L>  /  TBili  0.6  /  DBili  x   /  AST  20  /  ALT  29  /  AlkPhos  97  11-06    LIVER FUNCTIONS - ( 06 Nov 2020 06:59 )  Alb: 2.7 g/dL / Pro: 6.7 g/dL / ALK PHOS: 97 U/L / ALT: 29 U/L DA / AST: 20 U/L / GGT: x             I&O's Summary    05 Nov 2020 07:01  -  05 Nov 2020 14:03  --------------------------------------------------------  IN: 0 mL / OUT: 400 mL / NET: -400 mL    05 Nov 2020 07:01  -  06 Nov 2020 07:00  --------------------------------------------------------  IN: 0 mL / OUT: 700 mL / NET: -700 mL      CAPILLARY BLOOD GLUCOSE    POCT Blood Glucose.: 181 mg/dL (06 Nov 2020 11:30)  POCT Blood Glucose.: 174 mg/dL (06 Nov 2020 08:12)  POCT Blood Glucose.: 186 mg/dL (05 Nov 2020 20:56)  POCT Blood Glucose.: 139 mg/dL (05 Nov 2020 16:49)  POCT Blood Glucose.: 138 mg/dL (05 Nov 2020 11:33)      RADIOLOGY & ADDITIONAL TESTS:     PGY-1 Progress Note discussed with attending    PAGER #: [[phone]] TILL 5:00 PM  PLEASE CONTACT ON CALL TEAM:   - On Call Team (Please refer to [place]) FROM 5:00 PM - 8:30PM  - Nightfloat Team FROM 8:30 -7:30 AM    CHIEF COMPLAINT & BRIEF HOSPITAL COURSE:  80 year old woman from home, lives with son, walks with walker, chronic mckeon (placed on recent admission) with PMH of DM2, HTN, HLD, dementia, recent diagnosis with PE (on Xarelto) admitted for weakness 2/2 to low Hb. Pt is AAOx2, mildly in distress because of the Mckeon, able to participate in discussion, but history limited due to Dementia.  Pt has no symptoms to endorse besides increased fatigue that have limited her ambulation and she spends more time laying on bed. Of note, pt was admitted on 10/19 post fall and was admitted to ICU for PE. During that admission, her Hb was low (6.9) and received 1 unit of pRBCs. Pt was made DNR with trial of intubation on that admission. In the ED, Hb 6.3, FOBT+, s/p 2 units of PRBC; CXR (10/31) showed no evidence of active chest disease. CT abd/pelvis angio on 11/2/2020 showed no evidence for GI bleed. Patient pulled out Mckeon on 11/2/2020 and it was replaced. Colonoscopy on 11/3/2020 showed 5mm sessile polyp which was NOT removed due to GI bleeding, scattered diverticula and internal hemorrhoids; patient subsequently started on clear liquid diet. Patient experienced chills on 11/4; UA was positive, showing cloudy urine with proteinuria, moderate leukocyte esterase and bacteria. Patient started on Rocephin 1000mg IV QD. CXR on 11/4 showed no acute infiltrate. Patient also experienced bilateral eye discomfort which led her to keep her eyes closed, prednisolone eye drops ordered for relief. Labs showed non-anion gap metabolic acidosis. GI stated no need for EGD, patient started on full dose Lovenox (55mg) BID as per Heme/Onc recommendations. Fever persisted into 11/5; Tylenol PRN q6h was switched to IV as patient started coughing this afternoon. Patient started on Cefepime and Flagyl for aspiration PNA/UTI, ofloxacin drops were ordered for eye infection on 11/5. Potassium was decreased (3.4) and KCl was administered.    INTERVAL HPI/OVERNIGHT EVENTS: Communicated with patient in Mandarin in order to interview patient. Overnight, pt did not have any febrile episodes. Preliminary Ucx (11/4) showed Pseudomonas aeruginosa, currently awaiting sensitivities. Patient now on Day 3 of abx for UTI, switched to Cefepime 1g IV q12h as patient is no longer showing signs of aspiration. BP was elevated this AM at 176/70, 10mg amlodipine STAT was administered. Na, K, P decreased this AM and were all repleted today, will monitor with f/u BMP tomorrow AM. Oflaxacin eye drops and Steroid eye drops discontinued as pt no longer has symptoms or signs of conjunctivitis    REVIEW OF SYSTEMS:  CONSTITUTIONAL: No fever, weight loss, or fatigue  RESPIRATORY: No cough, wheezing, chills or hemoptysis; No shortness of breath  CARDIOVASCULAR: No chest pain, palpitations, dizziness, or leg swelling  GASTROINTESTINAL: No abdominal pain. No nausea, vomiting, or hematemesis. No constipation. No melena or hematochezia.  GENITOURINARY: No dysuria or hematuria. Mckeon in place.  NEUROLOGICAL: No headaches, + baseline memory loss from dementia.   SKIN: No itching, burning, rashes, or lesions     MEDICATIONS  (STANDING):  artificial  tears Solution 1 Drop(s) Both EYES two times a day  ascorbic acid 500 milliGRAM(s) Oral daily  atorvastatin 20 milliGRAM(s) Oral at bedtime  cefTRIAXone   IVPB      cefTRIAXone   IVPB 1000 milliGRAM(s) IV Intermittent every 24 hours  enoxaparin Injectable 55 milliGRAM(s) SubCutaneous two times a day  insulin lispro (ADMELOG) corrective regimen sliding scale   SubCutaneous Before meals and at bedtime  lactated ringers. 1000 milliLiter(s) (50 mL/Hr) IV Continuous <Continuous>  latanoprost 0.005% Ophthalmic Solution 1 Drop(s) Both EYES at bedtime  pantoprazole    Tablet 40 milliGRAM(s) Oral before breakfast  prednisoLONE acetate 1% Suspension 1 Drop(s) Both EYES three times a day  QUEtiapine 25 milliGRAM(s) Oral at bedtime  timolol 0.25% Solution 1 Drop(s) Both EYES two times a day    MEDICATIONS  (PRN):  acetaminophen    Suspension .. 650 milliGRAM(s) Oral every 6 hours PRN Temp greater or equal to 38C (100.4F), Mild Pain (1 - 3)    Vital Signs Last 24 Hrs  T(C): 37.1 (06 Nov 2020 09:24), Max: 38.4 (05 Nov 2020 15:59)  T(F): 98.8 (06 Nov 2020 09:24), Max: 101.2 (05 Nov 2020 15:59)  HR: 100 (06 Nov 2020 09:24) ()  BP: 152/70 (06 Nov 2020 09:24) (153/56 - 176/70)  BP(mean): --  RR: 28 (06 Nov 2020 09:24) (17 - 28)  SpO2: 100% (06 Nov 2020 09:24) (98% - 100%)    PHYSICAL EXAMINATION:  GENERAL: 80 year old F who appears stated age, calm, NAD  HEAD: Atraumatic, Normocephalic  EYES: Nontender to palpation over eyelids, no erythema of surrounding skin. No conjunctival injection, no anisocoria  NECK: Supple, No JVD, Normal thyroid  CHEST/LUNG: Clear to auscultation B/L. Clear to percussion; No rales, rhonchi, wheezing, or rubs  HEART: +S1/S2, no S3/S4, Regular rate and rhythm; No murmurs, rubs, or gallops  ABDOMEN: Soft, Nontender, Nondistended; Bowel sounds present on all four quadrants  NERVOUS SYSTEM:  Alert & Oriented X2, requires frequent re-orientation  : Mckeon in place, clear straw-colored urine  EXTREMITIES:  2+ Peripheral Pulses, No clubbing, cyanosis, or edema  SKIN: Warm, dry                          11.2<L>   5.46  )-----------( 278      ( 06 Nov 2020 06:59 )             34.4<L>     11-05    134<L>  |  101  |  10  ----------------------------<  172<H>  3.3<L>   |  23  |  0.62    Ca    8.8          06 Nov 2020 06:59  Phos  2.1<L>   11-06  Mg    1.8         11-06    TPro  6.7  /  Alb  2.7<L>  /  TBili  0.6  /  DBili  x   /  AST  20  /  ALT  29  /  AlkPhos  97  11-06    LIVER FUNCTIONS - ( 06 Nov 2020 06:59 )  Alb: 2.7 g/dL / Pro: 6.7 g/dL / ALK PHOS: 97 U/L / ALT: 29 U/L DA / AST: 20 U/L / GGT: x             I&O's Summary    05 Nov 2020 07:01  -  05 Nov 2020 14:03  --------------------------------------------------------  IN: 0 mL / OUT: 400 mL / NET: -400 mL    05 Nov 2020 07:01  -  06 Nov 2020 07:00  --------------------------------------------------------  IN: 0 mL / OUT: 700 mL / NET: -700 mL      CAPILLARY BLOOD GLUCOSE    POCT Blood Glucose.: 181 mg/dL (06 Nov 2020 11:30)  POCT Blood Glucose.: 174 mg/dL (06 Nov 2020 08:12)  POCT Blood Glucose.: 186 mg/dL (05 Nov 2020 20:56)  POCT Blood Glucose.: 139 mg/dL (05 Nov 2020 16:49)  POCT Blood Glucose.: 138 mg/dL (05 Nov 2020 11:33)      RADIOLOGY & ADDITIONAL TESTS:

## 2020-11-06 NOTE — PROGRESS NOTE ADULT - PROBLEM SELECTOR PLAN 6
Plan: Pt has PMH of DM    (11/6)  Home meds- Janumet 100-1000mg QD, Lipitor 20mg QD  A1C from 11/1 - 6.2  - c/w HSS  - Monitor FS

## 2020-11-06 NOTE — CONSULT NOTE ADULT - GASTROINTESTINAL DETAILS
nontender/no distention/no guarding/no masses palpable/bowel sounds normal/soft/no rigidity/no organomegaly

## 2020-11-06 NOTE — PROGRESS NOTE ADULT - PROBLEM SELECTOR PLAN 3
Plan: P/w 6.2, FOBT +  Anemia panel 11/2 : Ferritin 201<H>, Iron 314<H>, TIBC 345, Iron Saturation 91%<H>  - s/p 2 U pRBC  - Hb, Hct stable

## 2020-11-06 NOTE — DIETITIAN INITIAL EVALUATION ADULT. - PERTINENT LABORATORY DATA
11-06 Na134 mmol/L<L> Glu 172 mg/dL<H> K+ 3.3 mmol/L<L> Cr  0.62 mg/dL BUN 10 mg/dL   11-06 Phos 2.1 mg/dL<L>   11-06 Alb 2.7 g/dL<L>       11-01 Chol 151 mg/dL LDL --    HDL 52 mg/dL Trig 211 mg/dL<H>  11-01-20 @ 12:11 HgbA1C 6.2 [4.0 - 5.6]

## 2020-11-06 NOTE — PROGRESS NOTE ADULT - SUBJECTIVE AND OBJECTIVE BOX
Summary:   80y  Female      Subjective:     Events noted   Objective:    MEDICATIONS  (STANDING):  amLODIPine   Tablet 10 milliGRAM(s) Oral daily  artificial  tears Solution 1 Drop(s) Both EYES two times a day  ascorbic acid 500 milliGRAM(s) Oral daily  atorvastatin 20 milliGRAM(s) Oral at bedtime  cefepime   IVPB      cefepime   IVPB 2000 milliGRAM(s) IV Intermittent every 8 hours  enoxaparin Injectable 55 milliGRAM(s) SubCutaneous two times a day  insulin lispro (ADMELOG) corrective regimen sliding scale   SubCutaneous Before meals and at bedtime  lactated ringers. 1000 milliLiter(s) (50 mL/Hr) IV Continuous <Continuous>  latanoprost 0.005% Ophthalmic Solution 1 Drop(s) Both EYES at bedtime  metroNIDAZOLE    Tablet 500 milliGRAM(s) Oral every 8 hours  ofloxacin 0.3% Solution 1 Drop(s) Both EYES every 6 hours  pantoprazole    Tablet 40 milliGRAM(s) Oral before breakfast  prednisoLONE acetate 1% Suspension 1 Drop(s) Both EYES three times a day  QUEtiapine 25 milliGRAM(s) Oral at bedtime  timolol 0.25% Solution 1 Drop(s) Both EYES two times a day    MEDICATIONS  (PRN):  acetaminophen    Suspension .. 650 milliGRAM(s) Oral every 6 hours PRN Temp greater or equal to 38C (100.4F), Mild Pain (1 - 3)              Vital Signs Last 24 Hrs  T(C): 37.1 (06 Nov 2020 09:24), Max: 38.4 (05 Nov 2020 15:59)  T(F): 98.8 (06 Nov 2020 09:24), Max: 101.2 (05 Nov 2020 15:59)  HR: 100 (06 Nov 2020 09:24) (80 - 100)  BP: 152/70 (06 Nov 2020 09:24) (152/70 - 176/70)  BP(mean): --  RR: 28 (06 Nov 2020 09:24) (17 - 28)  SpO2: 100% (06 Nov 2020 09:24) (98% - 100%)      General:  Well developed, well nourished, alert and active, no pallor, NAD.  HEENT:    Normal appearance of conjunctiva, ears, nose, lips, oropharynx, and oral mucosa, anicteric.  Neck:  No masses, no asymmetry.  Lymph Nodes:  No lymphadenopathy.   Cardiovascular:  RRR normal S1/S2, no murmur.  Respiratory:  CTA B/L, normal respiratory effort.   Abdominal:   soft, no masses or tenderness, normoactive BS, NT/ND, no HSM.  Extremities:   No clubbing or cyanosis, normal capillary refill, no edema.   Skin:   No rash, jaundice, lesions, eczema.   Musculoskeletal:  No joint swelling, erythema or tenderness.   Neuro: No focal deficits.   Other:       LABS:                        11.2   5.46  )-----------( 278      ( 06 Nov 2020 06:59 )             34.4     11-06    134<L>  |  101  |  10  ----------------------------<  172<H>  3.3<L>   |  23  |  0.62    Ca    8.8      06 Nov 2020 06:59  Phos  2.1     11-06  Mg     1.8     11-06    TPro  6.7  /  Alb  2.7<L>  /  TBili  0.6  /  DBili  x   /  AST  20  /  ALT  29  /  AlkPhos  97  11-06          RADIOLOGY & ADDITIONAL TESTS:    · Note Type	Event Note  Colonoscopy Note      Colonoscopy Report  Indication: Gi Bleed   Referring:  Instrument:    Anesthesia: MAC  Consent:  Informed consent was obtained from the patient after providing any opportunity for questions  Procedure: After placing the patient in the left lateral decubitus position, the colonoscope was gently inserted into the rectum and advanced to the cecum. Color, texture, mucosa, and anatomy of the colon were carefully examined with the scope. The patient tolerated the procedure well. After completion of the exam, the patient was transferred to the recovery room.     Preparation:  Findings:     Anal Canal	Normal    Rectum	              Internal hemorrhoids (non bleeding) seen on retro-flexion     Sigmoid Colon 	Few scattered diverticula. 5 mm sessile polyp ( not removed)     Descending Colon	Normal    Splenic Flexure	Normal    Transverse Colon	5 mm sessile polyp ( not removed)     Hepatic Flexure	Normal    Ascending Colon	Few scattered small diverticula     Cecum	Normal    Ileo-cecal Valve	Normal    Ileum 	              Normal       Impression:  1-  Diverticulosis 2- No active bleeding 3- Colon polyp not removed in the setting of GI bleeding 4- No active bleeding     Recommendations:1- Advance diet -high fiber 2- Monitor CBC 3- Discharge planning                                     Attending:      Electronic Signatures:  Ashwin Orourke)  (Signed 03-Nov-2020 15:21)  	Authored: Note Type, Note      Last Updated: 03-Nov-2020 15:21 by Ashwin Orourke)

## 2020-11-06 NOTE — CONSULT NOTE ADULT - RS GEN PE MLT RESP DETAILS PC
no rales/no rhonchi/clear to auscultation bilaterally/breath sounds equal/no wheezes/good air movement

## 2020-11-06 NOTE — CONSULT NOTE ADULT - CONSTITUTIONAL DETAILS
distress due to pain/well-developed/restless and agitated, trying to pull mckeon out/well-groomed no distress/well-developed/restless and agitated, trying to pull mckeon out/well-groomed

## 2020-11-07 LAB
ANION GAP SERPL CALC-SCNC: 10 MMOL/L — SIGNIFICANT CHANGE UP (ref 5–17)
BUN SERPL-MCNC: 11 MG/DL — SIGNIFICANT CHANGE UP (ref 7–18)
CALCIUM SERPL-MCNC: 8.6 MG/DL — SIGNIFICANT CHANGE UP (ref 8.4–10.5)
CHLORIDE SERPL-SCNC: 103 MMOL/L — SIGNIFICANT CHANGE UP (ref 96–108)
CO2 SERPL-SCNC: 24 MMOL/L — SIGNIFICANT CHANGE UP (ref 22–31)
CREAT SERPL-MCNC: 0.61 MG/DL — SIGNIFICANT CHANGE UP (ref 0.5–1.3)
CULTURE RESULTS: SIGNIFICANT CHANGE UP
GLUCOSE BLDC GLUCOMTR-MCNC: 114 MG/DL — HIGH (ref 70–99)
GLUCOSE BLDC GLUCOMTR-MCNC: 134 MG/DL — HIGH (ref 70–99)
GLUCOSE BLDC GLUCOMTR-MCNC: 179 MG/DL — HIGH (ref 70–99)
GLUCOSE BLDC GLUCOMTR-MCNC: 189 MG/DL — HIGH (ref 70–99)
GLUCOSE SERPL-MCNC: 160 MG/DL — HIGH (ref 70–99)
HCT VFR BLD CALC: 31.3 % — LOW (ref 34.5–45)
HGB BLD-MCNC: 10.2 G/DL — LOW (ref 11.5–15.5)
MAGNESIUM SERPL-MCNC: 2 MG/DL — SIGNIFICANT CHANGE UP (ref 1.6–2.6)
MCHC RBC-ENTMCNC: 27.3 PG — SIGNIFICANT CHANGE UP (ref 27–34)
MCHC RBC-ENTMCNC: 32.6 GM/DL — SIGNIFICANT CHANGE UP (ref 32–36)
MCV RBC AUTO: 83.7 FL — SIGNIFICANT CHANGE UP (ref 80–100)
NRBC # BLD: 0 /100 WBCS — SIGNIFICANT CHANGE UP (ref 0–0)
PHOSPHATE SERPL-MCNC: 2.9 MG/DL — SIGNIFICANT CHANGE UP (ref 2.5–4.5)
PLATELET # BLD AUTO: 296 K/UL — SIGNIFICANT CHANGE UP (ref 150–400)
POTASSIUM SERPL-MCNC: 3.3 MMOL/L — LOW (ref 3.5–5.3)
POTASSIUM SERPL-SCNC: 3.3 MMOL/L — LOW (ref 3.5–5.3)
RBC # BLD: 3.74 M/UL — LOW (ref 3.8–5.2)
RBC # FLD: 15.4 % — HIGH (ref 10.3–14.5)
SODIUM SERPL-SCNC: 137 MMOL/L — SIGNIFICANT CHANGE UP (ref 135–145)
SPECIMEN SOURCE: SIGNIFICANT CHANGE UP
WBC # BLD: 4.73 K/UL — SIGNIFICANT CHANGE UP (ref 3.8–10.5)
WBC # FLD AUTO: 4.73 K/UL — SIGNIFICANT CHANGE UP (ref 3.8–10.5)

## 2020-11-07 PROCEDURE — 99232 SBSQ HOSP IP/OBS MODERATE 35: CPT | Mod: GC

## 2020-11-07 RX ORDER — POTASSIUM CHLORIDE 20 MEQ
20 PACKET (EA) ORAL
Refills: 0 | Status: COMPLETED | OUTPATIENT
Start: 2020-11-07 | End: 2020-11-08

## 2020-11-07 RX ADMIN — Medication 1 DROP(S): at 17:56

## 2020-11-07 RX ADMIN — LATANOPROST 1 DROP(S): 0.05 SOLUTION/ DROPS OPHTHALMIC; TOPICAL at 21:20

## 2020-11-07 RX ADMIN — PANTOPRAZOLE SODIUM 40 MILLIGRAM(S): 20 TABLET, DELAYED RELEASE ORAL at 05:33

## 2020-11-07 RX ADMIN — CEFEPIME 100 MILLIGRAM(S): 1 INJECTION, POWDER, FOR SOLUTION INTRAMUSCULAR; INTRAVENOUS at 05:52

## 2020-11-07 RX ADMIN — ENOXAPARIN SODIUM 55 MILLIGRAM(S): 100 INJECTION SUBCUTANEOUS at 23:20

## 2020-11-07 RX ADMIN — Medication 500 MILLIGRAM(S): at 11:23

## 2020-11-07 RX ADMIN — Medication 1: at 12:23

## 2020-11-07 RX ADMIN — ENOXAPARIN SODIUM 55 MILLIGRAM(S): 100 INJECTION SUBCUTANEOUS at 11:23

## 2020-11-07 RX ADMIN — Medication 1 DROP(S): at 05:34

## 2020-11-07 RX ADMIN — ATORVASTATIN CALCIUM 20 MILLIGRAM(S): 80 TABLET, FILM COATED ORAL at 21:19

## 2020-11-07 RX ADMIN — Medication 1: at 08:17

## 2020-11-07 RX ADMIN — CEFEPIME 100 MILLIGRAM(S): 1 INJECTION, POWDER, FOR SOLUTION INTRAMUSCULAR; INTRAVENOUS at 17:55

## 2020-11-07 RX ADMIN — AMLODIPINE BESYLATE 10 MILLIGRAM(S): 2.5 TABLET ORAL at 05:33

## 2020-11-07 RX ADMIN — Medication 20 MILLIEQUIVALENT(S): at 17:56

## 2020-11-07 RX ADMIN — QUETIAPINE FUMARATE 25 MILLIGRAM(S): 200 TABLET, FILM COATED ORAL at 21:20

## 2020-11-07 NOTE — PROGRESS NOTE ADULT - PROBLEM SELECTOR PLAN 2
Plan: Preliminary urine cultures on 11/4 showed Pseudomonas aeruginosa, awaiting sensitivities  -Continue with Cefepime 1 gm Q 12, DISCONTINUE Flagyl (as suspicion for Aspiration is low as per ID recs)  - Fever resolved  - Blood cultures pending    -To follow up Urine culture sensitivities, blood cultures Plan: Preliminary urine cultures on 11/4 showed Pseudomonas aeruginosa, awaiting sensitivities  -  DISCONTINUED Flagyl (as suspicion for Aspiration is low as per ID recs)  - Fever resolved  - Blood cultures pending  - To follow up Urine culture sensitivities, blood cultures  -Continue with Cefepime 1 gm Q 12 since 11/6

## 2020-11-07 NOTE — PROGRESS NOTE ADULT - PROBLEM SELECTOR PLAN 10
Plan: IMPROVE VTE Individual Risk Assessment    RISK                                                          Points  [] Previous VTE                                           3  [] Thrombophilia                                        2  [] Lower limb paralysis                               2   [] Current Cancer                                       2   [] Immobilization > 24 hrs                          1  [x] ICU/CCU stay > 24 hours                       1  [x Age > 60                                               1    IMPROVE VTE Score: 2  on Full dose Lovenox

## 2020-11-07 NOTE — PROGRESS NOTE ADULT - SUBJECTIVE AND OBJECTIVE BOX
PGY-1 Progress Note discussed with attending    PAGER #: [[phone]] TILL 5:00 PM  PLEASE CONTACT ON CALL TEAM:   - On Call Team (Please refer to [place]) FROM 5:00 PM - 8:30PM  - Nightfloat Team FROM 8:30 -7:30 AM    CHIEF COMPLAINT & BRIEF HOSPITAL COURSE:  80 year old woman from home, lives with son, walks with walker, chronic mckeon (placed on recent admission) with PMH of DM2, HTN, HLD, dementia, recent diagnosis with PE (on Xarelto) admitted for weakness 2/2 to low Hb. Pt is AAOx2, mildly in distress because of the Mckeon, able to participate in discussion, but history limited due to Dementia.  Pt has no symptoms to endorse besides increased fatigue that have limited her ambulation and she spends more time laying on bed. Of note, pt was admitted on 10/19 post fall and was admitted to ICU for PE. During that admission, her Hb was low (6.9) and received 1 unit of pRBCs. Pt was made DNR with trial of intubation on that admission. In the ED, Hb 6.3, FOBT+, s/p 2 units of PRBC; CXR (10/31) showed no evidence of active chest disease. CT abd/pelvis angio on 11/2/2020 showed no evidence for GI bleed. Patient pulled out Mckeon on 11/2/2020 and it was replaced. Colonoscopy on 11/3/2020 showed 5mm sessile polyp which was NOT removed due to GI bleeding, scattered diverticula and internal hemorrhoids; patient subsequently started on clear liquid diet. Patient experienced chills on 11/4; UA was positive, showing cloudy urine with proteinuria, moderate leukocyte esterase and bacteria. Patient started on Rocephin 1000mg IV QD. CXR on 11/4 showed no acute infiltrate. Patient also experienced bilateral eye discomfort which led her to keep her eyes closed, prednisolone eye drops ordered for relief. Labs showed non-anion gap metabolic acidosis. GI stated no need for EGD, patient started on full dose Lovenox (55mg) BID as per Heme/Onc recommendations. Fever persisted into 11/5; Tylenol PRN q6h was switched to IV as patient started coughing this afternoon. Patient started on Cefepime and Flagyl for aspiration PNA/UTI, ofloxacin drops were ordered for eye infection on 11/5. Potassium was decreased (3.4) and KCl was administered.    INTERVAL HPI/OVERNIGHT EVENTS: Communicated with patient in Mandarin in order to interview patient. Overnight, pt did not have any febrile episodes. Preliminary Ucx (11/4) showed Pseudomonas aeruginosa, currently awaiting sensitivities. Patient now on Day 3 of abx for UTI, switched to Cefepime 1g IV q12h as patient is no longer showing signs of aspiration. BP was elevated this AM at 176/70, 10mg amlodipine STAT was administered. Na, K, P decreased this AM and were all repleted today, will monitor with f/u BMP tomorrow AM. Oflaxacin eye drops and Steroid eye drops discontinued as pt no longer has symptoms or signs of conjunctivitis    INTERVAL HPI/OVERNIGHT EVENTS:       REVIEW OF SYSTEMS:  CONSTITUTIONAL: No fever, weight loss, or fatigue  RESPIRATORY: No cough, wheezing, chills or hemoptysis; No shortness of breath  CARDIOVASCULAR: No chest pain, palpitations, dizziness, or leg swelling  GASTROINTESTINAL: No abdominal pain. No nausea, vomiting, or hematemesis; No diarrhea or constipation. No melena or hematochezia.  GENITOURINARY: No dysuria or hematuria, urinary frequency  NEUROLOGICAL: No headaches, memory loss, loss of strength, numbness, or tremors  SKIN: No itching, burning, rashes, or lesions     MEDICATIONS  (STANDING):  amLODIPine   Tablet 10 milliGRAM(s) Oral daily  artificial  tears Solution 1 Drop(s) Both EYES two times a day  ascorbic acid 500 milliGRAM(s) Oral daily  atorvastatin 20 milliGRAM(s) Oral at bedtime  cefepime   IVPB 1000 milliGRAM(s) IV Intermittent every 12 hours  enoxaparin Injectable 55 milliGRAM(s) SubCutaneous two times a day  insulin lispro (ADMELOG) corrective regimen sliding scale   SubCutaneous Before meals and at bedtime  lactated ringers. 1000 milliLiter(s) (50 mL/Hr) IV Continuous <Continuous>  latanoprost 0.005% Ophthalmic Solution 1 Drop(s) Both EYES at bedtime  pantoprazole    Tablet 40 milliGRAM(s) Oral before breakfast  QUEtiapine 25 milliGRAM(s) Oral at bedtime  timolol 0.25% Solution 1 Drop(s) Both EYES two times a day    MEDICATIONS  (PRN):  acetaminophen    Suspension .. 650 milliGRAM(s) Oral every 6 hours PRN Temp greater or equal to 38C (100.4F), Mild Pain (1 - 3)      Vital Signs Last 24 Hrs  T(C): 37 (06 Nov 2020 23:30), Max: 37.3 (06 Nov 2020 15:52)  T(F): 98.6 (06 Nov 2020 23:30), Max: 99.1 (06 Nov 2020 15:52)  HR: 99 (06 Nov 2020 23:30) (98 - 100)  BP: 148/66 (06 Nov 2020 23:30) (144/69 - 152/70)  BP(mean): --  RR: 20 (06 Nov 2020 23:30) (20 - 28)  SpO2: 97% (06 Nov 2020 23:30) (97% - 100%)    PHYSICAL EXAMINATION:  GENERAL: NAD, well built  HEAD:  Atraumatic, Normocephalic  EYES:  conjunctiva and sclera clear  NECK: Supple, No JVD, Normal thyroid  CHEST/LUNG: Clear to auscultation. Clear to percussion bilaterally; No rales, rhonchi, wheezing, or rubs  HEART: Regular rate and rhythm; No murmurs, rubs, or gallops  ABDOMEN: Soft, Nontender, Nondistended; Bowel sounds present  NERVOUS SYSTEM:  Alert & Oriented X3,    EXTREMITIES:  2+ Peripheral Pulses, No clubbing, cyanosis, or edema  SKIN: warm dry                          10.2   4.73  )-----------( 296      ( 07 Nov 2020 06:46 )             31.3     11-07    137  |  103  |  11  ----------------------------<  160<H>  3.3<L>   |  24  |  0.61    Ca    8.6      07 Nov 2020 06:46  Phos  2.9     11-07  Mg     2.0     11-07    TPro  6.7  /  Alb  2.7<L>  /  TBili  0.6  /  DBili  x   /  AST  20  /  ALT  29  /  AlkPhos  97  11-06    LIVER FUNCTIONS - ( 06 Nov 2020 06:59 )  Alb: 2.7 g/dL / Pro: 6.7 g/dL / ALK PHOS: 97 U/L / ALT: 29 U/L DA / AST: 20 U/L / GGT: x                     Culture - Urine (collected 04 Nov 2020 22:17)  Source: .Urine Clean Catch (Midstream)  Preliminary Report (06 Nov 2020 03:11):    >100,000 CFU/ml Pseudomonas aeruginosa        I&O's Summary    06 Nov 2020 07:01  -  07 Nov 2020 07:00  --------------------------------------------------------  IN: 0 mL / OUT: 1100 mL / NET: -1100 mL          CAPILLARY BLOOD GLUCOSE  CAPILLARY BLOOD GLUCOSE      POCT Blood Glucose.: 179 mg/dL (07 Nov 2020 07:38)    CAPILLARY BLOOD GLUCOSE      POCT Blood Glucose.: 179 mg/dL (07 Nov 2020 07:38)  POCT Blood Glucose.: 148 mg/dL (06 Nov 2020 21:22)  POCT Blood Glucose.: 193 mg/dL (06 Nov 2020 16:42)  POCT Blood Glucose.: 181 mg/dL (06 Nov 2020 11:30)  POCT Blood Glucose.: 174 mg/dL (06 Nov 2020 08:12)      RADIOLOGY & ADDITIONAL TESTS:                   PGY-1 Progress Note discussed with attending    PAGER #: [[phone]] TILL 5:00 PM  PLEASE CONTACT ON CALL TEAM:   - On Call Team (Please refer to [place]) FROM 5:00 PM - 8:30PM  - Nightfloat Team FROM 8:30 -7:30 AM    CHIEF COMPLAINT & BRIEF HOSPITAL COURSE:  80 year old woman from home, lives with son, walks with walker, chronic mckeon (placed on recent admission) with PMH of DM2, HTN, HLD, dementia, recent diagnosis with PE (on Xarelto) admitted for weakness 2/2 to low Hb. Pt is AAOx2, mildly in distress because of the Mckeon, able to participate in discussion, but history limited due to Dementia.  Pt has no symptoms to endorse besides increased fatigue that have limited her ambulation and she spends more time laying on bed. Of note, pt was admitted on 10/19 post fall and was admitted to ICU for PE. During that admission, her Hb was low (6.9) and received 1 unit of pRBCs. Pt was made DNR with trial of intubation on that admission. In the ED, Hb 6.3, FOBT+, s/p 2 units of PRBC; CXR (10/31) showed no evidence of active chest disease. CT abd/pelvis angio on 11/2/2020 showed no evidence for GI bleed. Patient pulled out Mckeon on 11/2/2020 and it was replaced. Colonoscopy on 11/3/2020 showed 5mm sessile polyp which was NOT removed due to GI bleeding, scattered diverticula and internal hemorrhoids; patient subsequently started on clear liquid diet. Patient experienced chills on 11/4; UA was positive, showing cloudy urine with proteinuria, moderate leukocyte esterase and bacteria. Patient started on Rocephin 1000mg IV QD. CXR on 11/4 showed no acute infiltrate. Patient also experienced bilateral eye discomfort which led her to keep her eyes closed, prednisolone eye drops ordered for relief. Labs showed non-anion gap metabolic acidosis. GI stated no need for EGD, patient started on full dose Lovenox (55mg) BID as per Heme/Onc recommendations. Fever persisted into 11/5; Tylenol PRN q6h was switched to IV as patient started coughing this afternoon. Patient started on Cefepime and Flagyl for aspiration PNA/UTI, ofloxacin drops were ordered for eye infection on 11/5. Potassium was decreased (3.4) and KCl was administered. Preliminary Ucx (11/4) showed Pseudomonas aeruginosa, currently awaiting sensitivities. Switched to Cefepime 1g IV q 12h as patient is no longer showing signs of aspiration. Oflaxacin eye drops and Steroid eye drops discontinued 11/6 as pt no longer has symptoms or signs of conjunctivitis.     INTERVAL HPI/OVERNIGHT EVENTS:   Patient feels improved today. No fevers overnight. Eyes open, no discharge.     REVIEW OF SYSTEMS:  CONSTITUTIONAL: No fever, weight loss, or fatigue  RESPIRATORY: No cough, wheezing, chills or hemoptysis; No shortness of breath  CARDIOVASCULAR: No chest pain, palpitations, dizziness, or leg swelling  GASTROINTESTINAL: No abdominal pain. No nausea, vomiting, or hematemesis; No diarrhea or constipation. No melena or hematochezia.  GENITOURINARY: No dysuria or hematuria, urinary frequency  NEUROLOGICAL: + dementia. No headaches, loss of strength, numbness, or tremors  SKIN: No itching, burning, rashes, or lesions     MEDICATIONS  (STANDING):  amLODIPine   Tablet 10 milliGRAM(s) Oral daily  artificial  tears Solution 1 Drop(s) Both EYES two times a day  ascorbic acid 500 milliGRAM(s) Oral daily  atorvastatin 20 milliGRAM(s) Oral at bedtime  cefepime   IVPB 1000 milliGRAM(s) IV Intermittent every 12 hours  enoxaparin Injectable 55 milliGRAM(s) SubCutaneous two times a day  insulin lispro (ADMELOG) corrective regimen sliding scale   SubCutaneous Before meals and at bedtime  lactated ringers. 1000 milliLiter(s) (50 mL/Hr) IV Continuous <Continuous>  latanoprost 0.005% Ophthalmic Solution 1 Drop(s) Both EYES at bedtime  pantoprazole    Tablet 40 milliGRAM(s) Oral before breakfast  QUEtiapine 25 milliGRAM(s) Oral at bedtime  timolol 0.25% Solution 1 Drop(s) Both EYES two times a day    MEDICATIONS  (PRN):  acetaminophen    Suspension .. 650 milliGRAM(s) Oral every 6 hours PRN Temp greater or equal to 38C (100.4F), Mild Pain (1 - 3)      Vital Signs Last 24 Hrs  T(C): 37 (06 Nov 2020 23:30), Max: 37.3 (06 Nov 2020 15:52)  T(F): 98.6 (06 Nov 2020 23:30), Max: 99.1 (06 Nov 2020 15:52)  HR: 99 (06 Nov 2020 23:30) (98 - 100)  BP: 148/66 (06 Nov 2020 23:30) (144/69 - 152/70)  BP(mean): --  RR: 20 (06 Nov 2020 23:30) (20 - 28)  SpO2: 97% (06 Nov 2020 23:30) (97% - 100%)    PHYSICAL EXAMINATION:  GENERAL: 80 year old F who appears stated age, calm, NAD  HEAD: Atraumatic, Normocephalic  EYES: Nontender to palpation over eyelids, no erythema of surrounding skin. No conjunctival injection, no anisocoria, no exudates.   NECK: Supple, No JVD, Normal thyroid  CHEST/LUNG: Clear to auscultation B/L. Clear to percussion; No rales, rhonchi, wheezing, or rubs  HEART: +S1/S2, no S3/S4, Regular rate and rhythm; No murmurs, rubs, or gallops  ABDOMEN: Soft, Nontender, Nondistended; Bowel sounds present on all four quadrants  NERVOUS SYSTEM:  Alert & Oriented X2, requires frequent re-orientation  : Mckeon in place, clear straw-colored urine  EXTREMITIES:  2+ Peripheral Pulses, No clubbing, cyanosis, or edema  SKIN: Warm, dry                          10.2   4.73  )-----------( 296      ( 07 Nov 2020 06:46 )             31.3     11-07    137  |  103  |  11  ----------------------------<  160<H>  3.3<L>   |  24  |  0.61    Ca    8.6      07 Nov 2020 06:46  Phos  2.9     11-07  Mg     2.0     11-07    TPro  6.7  /  Alb  2.7<L>  /  TBili  0.6  /  DBili  x   /  AST  20  /  ALT  29  /  AlkPhos  97  11-06    LIVER FUNCTIONS - ( 06 Nov 2020 06:59 )  Alb: 2.7 g/dL / Pro: 6.7 g/dL / ALK PHOS: 97 U/L / ALT: 29 U/L DA / AST: 20 U/L / GGT: x                     Culture - Urine (collected 04 Nov 2020 22:17)  Source: .Urine Clean Catch (Midstream)  Preliminary Report (06 Nov 2020 03:11):    >100,000 CFU/ml Pseudomonas aeruginosa        I&O's Summary    06 Nov 2020 07:01  -  07 Nov 2020 07:00  --------------------------------------------------------  IN: 0 mL / OUT: 1100 mL / NET: -1100 mL          CAPILLARY BLOOD GLUCOSE  CAPILLARY BLOOD GLUCOSE      POCT Blood Glucose.: 179 mg/dL (07 Nov 2020 07:38)    CAPILLARY BLOOD GLUCOSE      POCT Blood Glucose.: 179 mg/dL (07 Nov 2020 07:38)  POCT Blood Glucose.: 148 mg/dL (06 Nov 2020 21:22)  POCT Blood Glucose.: 193 mg/dL (06 Nov 2020 16:42)  POCT Blood Glucose.: 181 mg/dL (06 Nov 2020 11:30)  POCT Blood Glucose.: 174 mg/dL (06 Nov 2020 08:12)      RADIOLOGY & ADDITIONAL TESTS:

## 2020-11-07 NOTE — PROGRESS NOTE ADULT - PROBLEM SELECTOR PLAN 1
Plan: Pt sent by PMD to the ED for low Hb 6.9 at his office.   Pt denies any bloody stool or any acute blood loss although is a poor historian   s/o incr fatigue and ambulatory intolerance  In the ED Hb- 6.2, FOBT +  s/p 2 units of PRBC in ED, Hb 10.7 (11/2)  CT Ab/Pelvis Angio 11/2 showed no signs of GI bleed  GI - Dr. Bentley, s/p colonoscopy 11/3 showed sessile polyp- not removed, diverticulosis and internal hemorrhoids  - Pt placed on 55mg IV BID Lovenox as per Heme/Onc  - Heme/Onc: Recommended to place IVC filter if pt has recurrent GIB

## 2020-11-07 NOTE — PROGRESS NOTE ADULT - PROBLEM SELECTOR PLAN 8
Plan: Pt was recently diagnosed and admitted in [facility] for PE (10/19)  Pt was admitted in ICU and started on Xarelto. Xarelto held on this admission due to GI bleed  - On Lovenox full dose (55mg) as per Heme/Onc, will consider IVC filter if pt has recurrent GIB

## 2020-11-07 NOTE — PROGRESS NOTE ADULT - ASSESSMENT
80 year old woman from home, lives with son, walks with walker, Chronic mckeon (placed on recent admission) with PMH of DM2, HTN, HLD, dementia, recent diagnosis with PE (on Xarelto) admitted for weakness due to low hemoglobin being managed for GI bleed and UTI with urine cultures growing Pseudomonas on IV antibiotics (Cefepime) Day 3 11/6.

## 2020-11-08 LAB
-  AMIKACIN: SIGNIFICANT CHANGE UP
-  AZTREONAM: SIGNIFICANT CHANGE UP
-  CEFEPIME: SIGNIFICANT CHANGE UP
-  CEFTAZIDIME: SIGNIFICANT CHANGE UP
-  CIPROFLOXACIN: SIGNIFICANT CHANGE UP
-  GENTAMICIN: SIGNIFICANT CHANGE UP
-  IMIPENEM: SIGNIFICANT CHANGE UP
-  LEVOFLOXACIN: SIGNIFICANT CHANGE UP
-  MEROPENEM: SIGNIFICANT CHANGE UP
-  PIPERACILLIN/TAZOBACTAM: SIGNIFICANT CHANGE UP
-  TOBRAMYCIN: SIGNIFICANT CHANGE UP
ANION GAP SERPL CALC-SCNC: 8 MMOL/L — SIGNIFICANT CHANGE UP (ref 5–17)
BUN SERPL-MCNC: 9 MG/DL — SIGNIFICANT CHANGE UP (ref 7–18)
CALCIUM SERPL-MCNC: 9 MG/DL — SIGNIFICANT CHANGE UP (ref 8.4–10.5)
CHLORIDE SERPL-SCNC: 105 MMOL/L — SIGNIFICANT CHANGE UP (ref 96–108)
CO2 SERPL-SCNC: 26 MMOL/L — SIGNIFICANT CHANGE UP (ref 22–31)
CREAT SERPL-MCNC: 0.57 MG/DL — SIGNIFICANT CHANGE UP (ref 0.5–1.3)
CULTURE RESULTS: SIGNIFICANT CHANGE UP
GLUCOSE BLDC GLUCOMTR-MCNC: 137 MG/DL — HIGH (ref 70–99)
GLUCOSE BLDC GLUCOMTR-MCNC: 142 MG/DL — HIGH (ref 70–99)
GLUCOSE BLDC GLUCOMTR-MCNC: 208 MG/DL — HIGH (ref 70–99)
GLUCOSE SERPL-MCNC: 160 MG/DL — HIGH (ref 70–99)
HCT VFR BLD CALC: 33.6 % — LOW (ref 34.5–45)
HGB BLD-MCNC: 10.8 G/DL — LOW (ref 11.5–15.5)
MAGNESIUM SERPL-MCNC: 2.1 MG/DL — SIGNIFICANT CHANGE UP (ref 1.6–2.6)
MCHC RBC-ENTMCNC: 27.1 PG — SIGNIFICANT CHANGE UP (ref 27–34)
MCHC RBC-ENTMCNC: 32.1 GM/DL — SIGNIFICANT CHANGE UP (ref 32–36)
MCV RBC AUTO: 84.4 FL — SIGNIFICANT CHANGE UP (ref 80–100)
METHOD TYPE: SIGNIFICANT CHANGE UP
NRBC # BLD: 0 /100 WBCS — SIGNIFICANT CHANGE UP (ref 0–0)
ORGANISM # SPEC MICROSCOPIC CNT: SIGNIFICANT CHANGE UP
ORGANISM # SPEC MICROSCOPIC CNT: SIGNIFICANT CHANGE UP
PHOSPHATE SERPL-MCNC: 2.9 MG/DL — SIGNIFICANT CHANGE UP (ref 2.5–4.5)
PLATELET # BLD AUTO: 341 K/UL — SIGNIFICANT CHANGE UP (ref 150–400)
POTASSIUM SERPL-MCNC: 3.5 MMOL/L — SIGNIFICANT CHANGE UP (ref 3.5–5.3)
POTASSIUM SERPL-SCNC: 3.5 MMOL/L — SIGNIFICANT CHANGE UP (ref 3.5–5.3)
RBC # BLD: 3.98 M/UL — SIGNIFICANT CHANGE UP (ref 3.8–5.2)
RBC # FLD: 15.1 % — HIGH (ref 10.3–14.5)
SODIUM SERPL-SCNC: 139 MMOL/L — SIGNIFICANT CHANGE UP (ref 135–145)
SPECIMEN SOURCE: SIGNIFICANT CHANGE UP
WBC # BLD: 4.18 K/UL — SIGNIFICANT CHANGE UP (ref 3.8–10.5)
WBC # FLD AUTO: 4.18 K/UL — SIGNIFICANT CHANGE UP (ref 3.8–10.5)

## 2020-11-08 PROCEDURE — 99232 SBSQ HOSP IP/OBS MODERATE 35: CPT

## 2020-11-08 RX ADMIN — PANTOPRAZOLE SODIUM 40 MILLIGRAM(S): 20 TABLET, DELAYED RELEASE ORAL at 05:54

## 2020-11-08 RX ADMIN — CEFEPIME 100 MILLIGRAM(S): 1 INJECTION, POWDER, FOR SOLUTION INTRAMUSCULAR; INTRAVENOUS at 05:55

## 2020-11-08 RX ADMIN — Medication 20 MILLIEQUIVALENT(S): at 05:55

## 2020-11-08 RX ADMIN — Medication 1: at 12:17

## 2020-11-08 RX ADMIN — CEFEPIME 100 MILLIGRAM(S): 1 INJECTION, POWDER, FOR SOLUTION INTRAMUSCULAR; INTRAVENOUS at 17:42

## 2020-11-08 RX ADMIN — Medication 2: at 08:49

## 2020-11-08 RX ADMIN — Medication 500 MILLIGRAM(S): at 12:19

## 2020-11-08 RX ADMIN — Medication 1 DROP(S): at 17:44

## 2020-11-08 RX ADMIN — Medication 1 DROP(S): at 05:55

## 2020-11-08 RX ADMIN — AMLODIPINE BESYLATE 10 MILLIGRAM(S): 2.5 TABLET ORAL at 05:54

## 2020-11-08 RX ADMIN — ENOXAPARIN SODIUM 55 MILLIGRAM(S): 100 INJECTION SUBCUTANEOUS at 12:18

## 2020-11-08 RX ADMIN — Medication 1 DROP(S): at 17:42

## 2020-11-08 NOTE — PROGRESS NOTE ADULT - ASSESSMENT
80 year old woman from home, lives with son, walks with walker, Chronic mckeon (placed on recent admission) with PMH of DM2, HTN, HLD, dementia, recent diagnosis with PE (on Xarelto) admitted for weakness due to low hemoglobin being managed for GI bleed and UTI with urine cultures growing Pseudomonas on IV antibiotics (Cefepime) Day 4 11/6. patient is afebrile, feeling better, no leucocytosis.      Plan:    GI bleed:  admiited for low HB 6.2, FOBT +  s/p 2 units of PRBC in ED  CT Ab/Pelvis Angio 11/2 showed negative  colonoscopy done by Dr. Bentley (11/3 showed sessile polyp, diverticulosis and internal hemorrhoids)  on 55mg IV BID Lovenox as per Heme/Onc, HB stable.   Heme/Onc: Recommended to place IVC filter if pt has recurrent GIB.    UTI; c/w iv cefepime day 4.  urine culture growing pseudomonas,       PE: diagnosed last admission   Xeralto was held 2/2 GI bleed.  GI okayed to restart on AC, patient on FD Lovenox    HTN  Type 2 DM  HLD  Dementia  Hb stable    Likely d/c tomorrow to home

## 2020-11-08 NOTE — PROGRESS NOTE ADULT - SUBJECTIVE AND OBJECTIVE BOX
PLEASE CONTACT ON CALL TEAM:   - On Call Team (Please refer to [place]) FROM 5:00 PM - 8:30PM  - Nightfloat Team FROM 8:30 -7:30 AM    INTERVAL HPI/OVERNIGHT EVENTS: Patient seen examined on bedside.  Ucx (11/4) showed Pseudomonas aeruginosa. Patient now on Day 4 of abx for UTI. stays afebrile. No new complaints.      REVIEW OF SYSTEMS:  CONSTITUTIONAL: No fever, weight loss, or fatigue  RESPIRATORY: No cough, wheezing, chills or hemoptysis; No shortness of breath  CARDIOVASCULAR: No chest pain, palpitations, dizziness, or leg swelling  GASTROINTESTINAL: No abdominal pain. No nausea, vomiting, or hematemesis. No constipation. No melena or hematochezia.  GENITOURINARY: No dysuria or hematuria. Dorado in place.  NEUROLOGICAL: No headaches, + baseline memory loss from dementia.   SKIN: No itching, burning, rashes, or lesions     MEDICATIONS  (STANDING):  artificial  tears Solution 1 Drop(s) Both EYES two times a day  ascorbic acid 500 milliGRAM(s) Oral daily  atorvastatin 20 milliGRAM(s) Oral at bedtime  cefTRIAXone   IVPB      cefTRIAXone   IVPB 1000 milliGRAM(s) IV Intermittent every 24 hours  enoxaparin Injectable 55 milliGRAM(s) SubCutaneous two times a day  insulin lispro (ADMELOG) corrective regimen sliding scale   SubCutaneous Before meals and at bedtime  lactated ringers. 1000 milliLiter(s) (50 mL/Hr) IV Continuous <Continuous>  latanoprost 0.005% Ophthalmic Solution 1 Drop(s) Both EYES at bedtime  pantoprazole    Tablet 40 milliGRAM(s) Oral before breakfast  prednisoLONE acetate 1% Suspension 1 Drop(s) Both EYES three times a day  QUEtiapine 25 milliGRAM(s) Oral at bedtime  timolol 0.25% Solution 1 Drop(s) Both EYES two times a day    MEDICATIONS  (PRN):  acetaminophen    Suspension .. 650 milliGRAM(s) Oral every 6 hours PRN Temp greater or equal to 38C (100.4F), Mild Pain (1 - 3)    Vital Signs Last 24 Hrs  T(C): 36.3 (08 Nov 2020 16:25), Max: 36.9 (08 Nov 2020 08:55)  T(F): 97.4 (08 Nov 2020 16:25), Max: 98.4 (08 Nov 2020 08:55)  HR: 93 (08 Nov 2020 16:25) (79 - 93)  BP: 149/84 (08 Nov 2020 16:25) (138/68 - 152/68)  BP(mean): 81 (08 Nov 2020 08:55) (81 - 81)  RR: 20 (08 Nov 2020 16:25) (18 - 20)  SpO2: 100% (08 Nov 2020 16:25) (98% - 100%)    PHYSICAL EXAMINATION:  GENERAL: 80 year old F who appears stated age, calm, NAD  HEAD: Atraumatic, Normocephalic  EYES: Nontender to palpation over eyelids, no erythema of surrounding skin. No conjunctival injection, no anisocoria  NECK: Supple, No JVD, Normal thyroid  CHEST/LUNG: Clear to auscultation B/L. Clear to percussion; No rales, rhonchi, wheezing, or rubs  HEART: +S1/S2, no S3/S4, Regular rate and rhythm; No murmurs, rubs, or gallops  ABDOMEN: Soft, Nontender, Nondistended; Bowel sounds present on all four quadrants  NERVOUS SYSTEM:  Alert & Oriented X2, requires frequent re-orientation  : Dorado in place, clear straw-colored urine  EXTREMITIES:  2+ Peripheral Pulses, No clubbing, cyanosis, or edema  SKIN: Warm, dry    Labs                                   10.8   4.18  )-----------( 341      ( 08 Nov 2020 07:21 )             33.6       11-08    139  |  105  |  9   ----------------------------<  160<H>  3.5   |  26  |  0.57    Ca    9.0      08 Nov 2020 07:21  Phos  2.9     11-08  Mg     2.1     11-08        LIVER FUNCTIONS - ( 06 Nov 2020 06:59 )  Alb: 2.7 g/dL / Pro: 6.7 g/dL / ALK PHOS: 97 U/L / ALT: 29 U/L DA / AST: 20 U/L / GGT: x                     RADIOLOGY & ADDITIONAL TESTS: reviewed

## 2020-11-09 LAB
ANION GAP SERPL CALC-SCNC: 9 MMOL/L — SIGNIFICANT CHANGE UP (ref 5–17)
BUN SERPL-MCNC: 8 MG/DL — SIGNIFICANT CHANGE UP (ref 7–18)
CALCIUM SERPL-MCNC: 8.9 MG/DL — SIGNIFICANT CHANGE UP (ref 8.4–10.5)
CHLORIDE SERPL-SCNC: 103 MMOL/L — SIGNIFICANT CHANGE UP (ref 96–108)
CO2 SERPL-SCNC: 27 MMOL/L — SIGNIFICANT CHANGE UP (ref 22–31)
CREAT SERPL-MCNC: 0.6 MG/DL — SIGNIFICANT CHANGE UP (ref 0.5–1.3)
GLUCOSE BLDC GLUCOMTR-MCNC: 149 MG/DL — HIGH (ref 70–99)
GLUCOSE BLDC GLUCOMTR-MCNC: 177 MG/DL — HIGH (ref 70–99)
GLUCOSE BLDC GLUCOMTR-MCNC: 177 MG/DL — HIGH (ref 70–99)
GLUCOSE BLDC GLUCOMTR-MCNC: 184 MG/DL — HIGH (ref 70–99)
GLUCOSE SERPL-MCNC: 159 MG/DL — HIGH (ref 70–99)
HCT VFR BLD CALC: 35.1 % — SIGNIFICANT CHANGE UP (ref 34.5–45)
HGB BLD-MCNC: 11.2 G/DL — LOW (ref 11.5–15.5)
MAGNESIUM SERPL-MCNC: 1.8 MG/DL — SIGNIFICANT CHANGE UP (ref 1.6–2.6)
MCHC RBC-ENTMCNC: 26.8 PG — LOW (ref 27–34)
MCHC RBC-ENTMCNC: 31.9 GM/DL — LOW (ref 32–36)
MCV RBC AUTO: 84 FL — SIGNIFICANT CHANGE UP (ref 80–100)
NRBC # BLD: 0 /100 WBCS — SIGNIFICANT CHANGE UP (ref 0–0)
PHOSPHATE SERPL-MCNC: 3.1 MG/DL — SIGNIFICANT CHANGE UP (ref 2.5–4.5)
PLATELET # BLD AUTO: 390 K/UL — SIGNIFICANT CHANGE UP (ref 150–400)
POTASSIUM SERPL-MCNC: 3.6 MMOL/L — SIGNIFICANT CHANGE UP (ref 3.5–5.3)
POTASSIUM SERPL-SCNC: 3.6 MMOL/L — SIGNIFICANT CHANGE UP (ref 3.5–5.3)
RBC # BLD: 4.18 M/UL — SIGNIFICANT CHANGE UP (ref 3.8–5.2)
RBC # FLD: 15 % — HIGH (ref 10.3–14.5)
SARS-COV-2 RNA SPEC QL NAA+PROBE: SIGNIFICANT CHANGE UP
SODIUM SERPL-SCNC: 139 MMOL/L — SIGNIFICANT CHANGE UP (ref 135–145)
WBC # BLD: 5.63 K/UL — SIGNIFICANT CHANGE UP (ref 3.8–10.5)
WBC # FLD AUTO: 5.63 K/UL — SIGNIFICANT CHANGE UP (ref 3.8–10.5)

## 2020-11-09 PROCEDURE — 99232 SBSQ HOSP IP/OBS MODERATE 35: CPT | Mod: GC

## 2020-11-09 RX ORDER — CARVEDILOL PHOSPHATE 80 MG/1
3.12 CAPSULE, EXTENDED RELEASE ORAL EVERY 12 HOURS
Refills: 0 | Status: DISCONTINUED | OUTPATIENT
Start: 2020-11-09 | End: 2020-11-10

## 2020-11-09 RX ORDER — OLANZAPINE 15 MG/1
2.5 TABLET, FILM COATED ORAL ONCE
Refills: 0 | Status: COMPLETED | OUTPATIENT
Start: 2020-11-09 | End: 2020-11-09

## 2020-11-09 RX ADMIN — Medication 1: at 17:15

## 2020-11-09 RX ADMIN — ENOXAPARIN SODIUM 55 MILLIGRAM(S): 100 INJECTION SUBCUTANEOUS at 22:00

## 2020-11-09 RX ADMIN — QUETIAPINE FUMARATE 25 MILLIGRAM(S): 200 TABLET, FILM COATED ORAL at 22:00

## 2020-11-09 RX ADMIN — CEFEPIME 100 MILLIGRAM(S): 1 INJECTION, POWDER, FOR SOLUTION INTRAMUSCULAR; INTRAVENOUS at 17:15

## 2020-11-09 RX ADMIN — AMLODIPINE BESYLATE 10 MILLIGRAM(S): 2.5 TABLET ORAL at 06:15

## 2020-11-09 RX ADMIN — CARVEDILOL PHOSPHATE 3.12 MILLIGRAM(S): 80 CAPSULE, EXTENDED RELEASE ORAL at 17:16

## 2020-11-09 RX ADMIN — Medication 500 MILLIGRAM(S): at 11:11

## 2020-11-09 RX ADMIN — Medication 1 DROP(S): at 17:15

## 2020-11-09 RX ADMIN — Medication 1: at 11:47

## 2020-11-09 RX ADMIN — ATORVASTATIN CALCIUM 20 MILLIGRAM(S): 80 TABLET, FILM COATED ORAL at 22:00

## 2020-11-09 RX ADMIN — ENOXAPARIN SODIUM 55 MILLIGRAM(S): 100 INJECTION SUBCUTANEOUS at 11:11

## 2020-11-09 RX ADMIN — Medication 1: at 22:01

## 2020-11-09 RX ADMIN — Medication 1 DROP(S): at 06:16

## 2020-11-09 RX ADMIN — PANTOPRAZOLE SODIUM 40 MILLIGRAM(S): 20 TABLET, DELAYED RELEASE ORAL at 06:15

## 2020-11-09 RX ADMIN — CEFEPIME 100 MILLIGRAM(S): 1 INJECTION, POWDER, FOR SOLUTION INTRAMUSCULAR; INTRAVENOUS at 06:15

## 2020-11-09 RX ADMIN — LATANOPROST 1 DROP(S): 0.05 SOLUTION/ DROPS OPHTHALMIC; TOPICAL at 22:01

## 2020-11-09 NOTE — PROGRESS NOTE ADULT - NSHPATTENDINGPLANDISCUSS_GEN_ALL_CORE
JORGE Burrell
Dr. Horacio Gastelum
Dr. Morris, Hematology, GI
Dr. Melly Merrill
Dr. Melly Merrill
Dr. Merrill
Dr. Horacio Gastelum
Dr. Merrill
Dr. Melly Merrill
RN, Dr. Merrill,

## 2020-11-09 NOTE — PROGRESS NOTE ADULT - RS GEN PE MLT RESP DETAILS PC
airway patent/breath sounds equal/clear to auscultation bilaterally breath sounds equal/good air movement/no rales/no wheezes/clear to auscultation bilaterally/no rhonchi

## 2020-11-09 NOTE — PROGRESS NOTE ADULT - SUBJECTIVE AND OBJECTIVE BOX
PGY-1 Progress Note discussed with attending    PAGER #: [[phone]] TILL 5:00 PM  PLEASE CONTACT ON CALL TEAM:   - On Call Team (Please refer to [place]) FROM 5:00 PM - 8:30PM  - Nightfloat Team FROM 8:30 -7:30 AM    CHIEF COMPLAINT & BRIEF HOSPITAL COURSE:  80 year old woman from home, lives with son, walks with walker, chronic mckeon (placed on recent admission) with PMH of DM2, HTN, HLD, dementia, recent diagnosis with PE (on Xarelto) admitted for weakness 2/2 to low Hb. Pt is AAOx2, mildly in distress because of the Mckeon, able to participate in discussion, but history limited due to Dementia.  Pt has no symptoms to endorse besides increased fatigue that have limited her ambulation and she spends more time laying on bed. Of note, pt was admitted on 10/19 post fall and was admitted to ICU for PE. During that admission, her Hb was low (6.9) and received 1 unit of pRBCs. Pt was made DNR with trial of intubation on that admission. In the ED, Hb 6.3, FOBT+, s/p 2 units of PRBC; CXR (10/31) showed no evidence of active chest disease. CT abd/pelvis angio on 11/2/2020 showed no evidence for GI bleed. Patient pulled out Mckeon on 11/2/2020 and it was replaced. Colonoscopy on 11/3/2020 showed 5mm sessile polyp which was NOT removed due to GI bleeding, scattered diverticula and internal hemorrhoids; patient subsequently started on clear liquid diet. Patient experienced chills on 11/4; UA was positive, showing cloudy urine with proteinuria, moderate leukocyte esterase and bacteria. Patient started on Rocephin 1000mg IV QD. CXR on 11/4 showed no acute infiltrate. Patient also experienced bilateral eye discomfort which led her to keep her eyes closed, prednisolone eye drops ordered for relief. Labs showed non-anion gap metabolic acidosis. GI stated no need for EGD, patient started on full dose Lovenox (55mg) BID as per Heme/Onc recommendations. Fever persisted into 11/5; Tylenol PRN q6h was switched to IV as patient started coughing this afternoon. Patient started on Cefepime and Flagyl for aspiration PNA/UTI, ofloxacin drops were ordered for eye infection on 11/5. Potassium was decreased (3.4) and KCl was administered. Preliminary Ucx (11/4) showed Pseudomonas aeruginosa, currently awaiting sensitivities. Switched to Cefepime 1g IV q 12h as patient is no longer showing signs of aspiration. Oflaxacin eye drops and Steroid eye drops discontinued 11/6 as pt no longer has symptoms or signs of conjunctivitis. BCx showing NGTD. UCx growing pseudomonas on Cefepime since 11/6.     INTERVAL HPI/OVERNIGHT EVENTS:   Patient feels much better today. No pain, pleasant mood. Working with PT walking without issue. Recs made for home PT. Planning for DC and ID recs for Abx on DC.    REVIEW OF SYSTEMS:  CONSTITUTIONAL: No fever, weight loss, or fatigue  RESPIRATORY: No cough, wheezing, chills or hemoptysis; No shortness of breath  CARDIOVASCULAR: No chest pain, palpitations, dizziness, or leg swelling  GASTROINTESTINAL: No abdominal pain. No nausea, vomiting, or hematemesis; No diarrhea or constipation. No melena or hematochezia.  GENITOURINARY: No dysuria or hematuria, urinary frequency  NEUROLOGICAL: No headaches, memory loss, loss of strength, numbness, or tremors  SKIN: No itching, burning, rashes, or lesions     MEDICATIONS  (STANDING):  amLODIPine   Tablet 10 milliGRAM(s) Oral daily  artificial  tears Solution 1 Drop(s) Both EYES two times a day  ascorbic acid 500 milliGRAM(s) Oral daily  atorvastatin 20 milliGRAM(s) Oral at bedtime  carvedilol 3.125 milliGRAM(s) Oral every 12 hours  cefepime   IVPB 1000 milliGRAM(s) IV Intermittent every 12 hours  enoxaparin Injectable 55 milliGRAM(s) SubCutaneous two times a day  insulin lispro (ADMELOG) corrective regimen sliding scale   SubCutaneous Before meals and at bedtime  lactated ringers. 1000 milliLiter(s) (50 mL/Hr) IV Continuous <Continuous>  latanoprost 0.005% Ophthalmic Solution 1 Drop(s) Both EYES at bedtime  pantoprazole    Tablet 40 milliGRAM(s) Oral before breakfast  QUEtiapine 25 milliGRAM(s) Oral at bedtime  timolol 0.25% Solution 1 Drop(s) Both EYES two times a day    MEDICATIONS  (PRN):  acetaminophen    Suspension .. 650 milliGRAM(s) Oral every 6 hours PRN Temp greater or equal to 38C (100.4F), Mild Pain (1 - 3)      Vital Signs Last 24 Hrs  T(C): 36.7 (09 Nov 2020 08:03), Max: 36.8 (08 Nov 2020 23:34)  T(F): 98.1 (09 Nov 2020 08:03), Max: 98.2 (08 Nov 2020 23:34)  HR: 83 (09 Nov 2020 08:03) (83 - 94)  BP: 152/67 (09 Nov 2020 08:03) (134/65 - 152/67)  BP(mean): --  RR: 16 (09 Nov 2020 08:03) (16 - 20)  SpO2: 99% (09 Nov 2020 08:03) (99% - 100%)    PHYSICAL EXAMINATION:  GENERAL: 80 year old F who appears stated age, calm, NAD  HEAD: Atraumatic, Normocephalic  EYES: Nontender to palpation over eyelids, no erythema of surrounding skin. No conjunctival injection, no anisocoria, no exudates.   NECK: Supple, No JVD, Normal thyroid  CHEST/LUNG: Clear to auscultation B/L. Clear to percussion; No rales, rhonchi, wheezing, or rubs  HEART: +S1/S2, no S3/S4, Regular rate and rhythm; No murmurs, rubs, or gallops  ABDOMEN: Soft, Nontender, Nondistended; Bowel sounds present on all four quadrants  NERVOUS SYSTEM:  Alert & Oriented X2, requires frequent re-orientation  : Mckeon in place, clear straw-colored urine  EXTREMITIES:  2+ Peripheral Pulses, No clubbing, cyanosis, or edema  SKIN: Warm, dry                          11.2   5.63  )-----------( 390      ( 09 Nov 2020 06:58 )             35.1     11-09    139  |  103  |  8   ----------------------------<  159<H>  3.6   |  27  |  0.60    Ca    8.9      09 Nov 2020 06:58  Phos  3.1     11-09  Mg     1.8     11-09        Culture - Blood (collected 06 Nov 2020 19:11)  Source: .Blood Blood-Peripheral  Preliminary Report (07 Nov 2020 20:01):    No growth to date.        I&O's Summary    08 Nov 2020 07:01  -  09 Nov 2020 07:00  --------------------------------------------------------  IN: 0 mL / OUT: 800 mL / NET: -800 mL          CAPILLARY BLOOD GLUCOSE  POCT Blood Glucose.: 177 mg/dL (09 Nov 2020 11:19)    CAPILLARY BLOOD GLUCOSE  POCT Blood Glucose.: 177 mg/dL (09 Nov 2020 11:19)  POCT Blood Glucose.: 149 mg/dL (09 Nov 2020 08:13)  POCT Blood Glucose.: 142 mg/dL (08 Nov 2020 21:05)  POCT Blood Glucose.: 137 mg/dL (08 Nov 2020 17:00)      RADIOLOGY & ADDITIONAL TESTS:

## 2020-11-09 NOTE — PROGRESS NOTE ADULT - PROBLEM SELECTOR PLAN 2
Plan: Preliminary urine cultures on 11/4 showed Pseudomonas aeruginosa, awaiting sensitivities  -  DISCONTINUED Flagyl (as suspicion for Aspiration is low as per ID recs)  - Fever resolved  - Blood cultures NGTD  - UCx pseudomonas  - Continue with Cefepime 1 gm Q 12 since 11/6  - f/u ID recs for Abx on DC

## 2020-11-09 NOTE — PROGRESS NOTE ADULT - GASTROINTESTINAL DETAILS
soft/no distention/nontender no distention/bowel sounds normal/no guarding/soft/no rigidity/nontender/no organomegaly

## 2020-11-09 NOTE — PROGRESS NOTE ADULT - SUBJECTIVE AND OBJECTIVE BOX
complete note to follow   · Subjective and Objective:   80 year old woman, from home, lives with son, walks with walker, Chronic mckeon ( placed on recent admission)  with PMH of DM2, HTN, HLD, dementia, recent diagnosis with PE (on xarelto) admitted for low Hb. Pt had a follow up appointment with her PMD and was informed about low Hb and recommended to get further evaluation. Pt is AAOX2, mildy in distress because of the Mckeon, able ot participate in discussion, but history limited due to Dementia.  Pt has no symptoms to endorse besides  increased fatigue that have limited her Ambulation and she spends more time laying on bed. Of note pt had fall risks and was admitted about 10 days ago post fall and was admitted to ICU for PE.  During that admission, her hgb was low (6.9) and received 1 unit of pRBCs  Pt was made DNR with trial of intubation on that admission. Pt denies any fever, bloody BM, Hemetemesis, abd pain, N/V/D, chest pain, SOB any sick contact.  Patient seen this afternoon. Wants to eat. RN at bedside.     Interval: Pt examined at bedside. No new complaints, pt is more energetic, awake, trying to remove her mckeon, pt is demented. Afebrile s/p B/L conjunctivitis.  Pt has known dementia.   Son at bedside    PMH/ PSH: as per hpi   Meds: reviewed in emr   Allergies: nkda   Social hist: No habits     ROS: unable to assess, pt has dementia and not oriented    Vitals:  Vital Signs Last 24 Hrs  T(C): 36.9 (09 Nov 2020 15:37), Max: 36.9 (09 Nov 2020 15:37)  T(F): 98.4 (09 Nov 2020 15:37), Max: 98.4 (09 Nov 2020 15:37)  HR: 88 (09 Nov 2020 15:37) (83 - 94)  BP: 140/84 (09 Nov 2020 15:37) (134/65 - 152/67)  BP(mean): --  ABP: --  ABP(mean): --  RR: 18 (09 Nov 2020 15:37) (16 - 18)  SpO2: 98% (09 Nov 2020 15:37) (98% - 100%)      Physical Exam:  Gen: NAD, lethargic, awake  Eyes: B/L discharge  CVS: s1s2   Resp: CTA B/L   GI: soft, non tender   : mckeon with clear yellow fluid  Ext: no c/c/e     Labs:    CBC Full  -  ( 09 Nov 2020 06:58 )  WBC Count : 5.63 K/uL  RBC Count : 4.18 M/uL  Hemoglobin : 11.2 g/dL  Hematocrit : 35.1 %  Platelet Count - Automated : 390 K/uL  Mean Cell Volume : 84.0 fl  Mean Cell Hemoglobin : 26.8 pg  Mean Cell Hemoglobin Concentration : 31.9 gm/dL  Auto Neutrophil # : x  Auto Lymphocyte # : x  Auto Monocyte # : x  Auto Eosinophil # : x  Auto Basophil # : x  Auto Neutrophil % : x  Auto Lymphocyte % : x  Auto Monocyte % : x  Auto Eosinophil % : x  Auto Basophil % : x    11-09    139  |  103  |  8   ----------------------------<  159<H>  3.6   |  27  |  0.60    Ca    8.9      09 Nov 2020 06:58  Phos  3.1     11-09  Mg     1.8     11-09          Radiology:    < from: CT Angio Chest w/ IV Cont (10.09.20 @ 14:45) >  IMPRESSION:  Acute pulmonary emboli in the right upper, middle and lower and left lower lobes.    There is no evidence of right heart strain or pulmonary infarct.      < from: CT Abdomen and Pelvis w/ Oral Cont and w/ IV Cont (10.08.20 @ 16:42) >  IMPRESSION:  Finding sat the right lung base suspicious for a pulmonary embolus, new since 10/4/2020.    Presacral edema and fluid of uncertain etiology not seen on the prior study.    No evidence of ascites or intestinal obstruction.    < end of copied text >

## 2020-11-09 NOTE — PROGRESS NOTE ADULT - PROBLEM SELECTOR PROBLEM 1
GIB (gastrointestinal bleeding)

## 2020-11-09 NOTE — PROGRESS NOTE ADULT - SUBJECTIVE AND OBJECTIVE BOX
Subjective and Objective: Patient admitted for LGIB and noted to have UTI.  Urine cultures are growing pseudomonas. patient is doing well and has no fevers.       MEDS  acetaminophen    Suspension .. 650 milliGRAM(s) Oral every 6 hours PRN  amLODIPine   Tablet 10 milliGRAM(s) Oral daily  artificial  tears Solution 1 Drop(s) Both EYES two times a day  ascorbic acid 500 milliGRAM(s) Oral daily  atorvastatin 20 milliGRAM(s) Oral at bedtime  carvedilol 3.125 milliGRAM(s) Oral every 12 hours  cefepime   IVPB 1000 milliGRAM(s) IV Intermittent every 12 hours  enoxaparin Injectable 55 milliGRAM(s) SubCutaneous two times a day  insulin lispro (ADMELOG) corrective regimen sliding scale   SubCutaneous Before meals and at bedtime  lactated ringers. 1000 milliLiter(s) IV Continuous <Continuous>  latanoprost 0.005% Ophthalmic Solution 1 Drop(s) Both EYES at bedtime  pantoprazole    Tablet 40 milliGRAM(s) Oral before breakfast  QUEtiapine 25 milliGRAM(s) Oral at bedtime  timolol 0.25% Solution 1 Drop(s) Both EYES two times a day      PHYSICAL EXAM:    Vital Signs Last 24 Hrs  T(C): 36.7 (09 Nov 2020 08:03), Max: 36.8 (08 Nov 2020 23:34)  T(F): 98.1 (09 Nov 2020 08:03), Max: 98.2 (08 Nov 2020 23:34)  HR: 83 (09 Nov 2020 08:03) (83 - 94)  BP: 152/67 (09 Nov 2020 08:03) (134/65 - 152/67)  BP(mean): --  RR: 16 (09 Nov 2020 08:03) (16 - 20)  SpO2: 99% (09 Nov 2020 08:03) (99% - 100    LABS/DIAGNOSTIC TESTS                            11.2   5.63  )-----------( 390      ( 09 Nov 2020 06:58 )             35.1       WBC Count: 5.63 K/uL (11-09 @ 06:58)  WBC Count: 4.18 K/uL (11-08 @ 07:21)  WBC Count: 4.73 K/uL (11-07 @ 06:46)      11-09    139  |  103  |  8   ----------------------------<  159<H>  3.6   |  27  |  0.60    Ca    8.9      09 Nov 2020 06:58  Phos  3.1     11-09  Mg     1.8     11-09                LACTATE:      CULTURES      Culture - Blood (collected 11-06-20 @ 19:11)  Source: .Blood Blood-Peripheral  Preliminary Report (11-07-20 @ 20:01):    No growth to date.    Culture - Urine (collected 11-04-20 @ 22:17)  Source: .Urine Clean Catch (Midstream)  Final Report (11-08-20 @ 07:56):    >100,000 CFU/ml Pseudomonas aeruginosa  Organism: Pseudomonas aeruginosa (11-08-20 @ 07:56)  Organism: Pseudomonas aeruginosa (11-08-20 @ 07:56)      -  Amikacin: S <=16      -  Aztreonam: S <=4      -  Cefepime: S <=2      -  Ceftazidime: S <=1      -  Ciprofloxacin: S <=0.25      -  Gentamicin: S 4      -  Imipenem: S <=1      -  Levofloxacin: S <=0.5      -  Meropenem: S <=1      -  Piperacillin/Tazobactam: S <=8      -  Tobramycin: S <=2      Method Type: BEN    Culture - Urine (collected 10-20-20 @ 06:35)  Source: .Urine Catheterized  Final Report (10-21-20 @ 10:58):    >=3 organisms. Probable collection contamination.    Culture - Urine (collected 10-12-20 @ 21:55)  Source: .Urine Clean Catch (Midstream)  Final Report (10-14-20 @ 17:16):    >100,000 CFU/ml Klebsiella variicola  Organism: Klebsiella variicola (10-14-20 @ 17:16)  Organism: Klebsiella variicola (10-14-20 @ 17:16)      -  Amikacin: S <=16      -  Amoxicillin/Clavulanic Acid: S <=8/4      -  Ampicillin: R 16 These ampicillin results predict results for amoxicillin      -  Ampicillin/Sulbactam: S <=4/2 Enterobacter, Citrobacter, and Serratia may develop resistance during prolonged therapy (3-4 days)      -  Aztreonam: S <=4      -  Cefazolin: S <=2      -  Cefepime: S <=2      -  Cefoxitin: S <=8      -  Ceftriaxone: S <=1 Enterobacter, Citrobacter, and Serratia may develop resistance during prolonged therapy      -  Ciprofloxacin: S <=0.25      -  Ertapenem: S <=0.5      -  Gentamicin: S <=2      -  Imipenem: S <=1      -  Levofloxacin: S <=0.5      -  Meropenem: S <=1      -  Nitrofurantoin: S <=32 Should not be used to treat pyelonephritis      -  Piperacillin/Tazobactam: S <=8      -  Tigecycline: S <=2      -  Tobramycin: S <=2      -  Trimethoprim/Sulfamethoxazole: S <=0.5/9.5      Method Type: BEN          RADIOLOGY               Subjective and Objective: Patient admitted for LGIB and noted to have UTI.  Urine cultures are growing pseudomonas. patient is doing well and has no fevers. She is awake , alert and talking though confused , her Pseudomonas is pansensitive.      MEDS  acetaminophen    Suspension .. 650 milliGRAM(s) Oral every 6 hours PRN  amLODIPine   Tablet 10 milliGRAM(s) Oral daily  artificial  tears Solution 1 Drop(s) Both EYES two times a day  ascorbic acid 500 milliGRAM(s) Oral daily  atorvastatin 20 milliGRAM(s) Oral at bedtime  carvedilol 3.125 milliGRAM(s) Oral every 12 hours  cefepime   IVPB 1000 milliGRAM(s) IV Intermittent every 12 hours  enoxaparin Injectable 55 milliGRAM(s) SubCutaneous two times a day  insulin lispro (ADMELOG) corrective regimen sliding scale   SubCutaneous Before meals and at bedtime  lactated ringers. 1000 milliLiter(s) IV Continuous <Continuous>  latanoprost 0.005% Ophthalmic Solution 1 Drop(s) Both EYES at bedtime  pantoprazole    Tablet 40 milliGRAM(s) Oral before breakfast  QUEtiapine 25 milliGRAM(s) Oral at bedtime  timolol 0.25% Solution 1 Drop(s) Both EYES two times a day          Vital Signs Last 24 Hrs  T(C): 36.7 (09 Nov 2020 08:03), Max: 36.8 (08 Nov 2020 23:34)  T(F): 98.1 (09 Nov 2020 08:03), Max: 98.2 (08 Nov 2020 23:34)  HR: 83 (09 Nov 2020 08:03) (83 - 94)  BP: 152/67 (09 Nov 2020 08:03) (134/65 - 152/67)  BP(mean): --  RR: 16 (09 Nov 2020 08:03) (16 - 20)  SpO2: 99% (09 Nov 2020 08:03) (99% - 100    LABS/DIAGNOSTIC TESTS                            11.2   5.63  )-----------( 390      ( 09 Nov 2020 06:58 )             35.1       WBC Count: 5.63 K/uL (11-09 @ 06:58)  WBC Count: 4.18 K/uL (11-08 @ 07:21)  WBC Count: 4.73 K/uL (11-07 @ 06:46)      11-09    139  |  103  |  8   ----------------------------<  159<H>  3.6   |  27  |  0.60    Ca    8.9      09 Nov 2020 06:58  Phos  3.1     11-09  Mg     1.8     11-09                LACTATE:      CULTURES      Culture - Blood (collected 11-06-20 @ 19:11)  Source: .Blood Blood-Peripheral  Preliminary Report (11-07-20 @ 20:01):    No growth to date.    Culture - Urine (collected 11-04-20 @ 22:17)  Source: .Urine Clean Catch (Midstream)  Final Report (11-08-20 @ 07:56):    >100,000 CFU/ml Pseudomonas aeruginosa  Organism: Pseudomonas aeruginosa (11-08-20 @ 07:56)  Organism: Pseudomonas aeruginosa (11-08-20 @ 07:56)      -  Amikacin: S <=16      -  Aztreonam: S <=4      -  Cefepime: S <=2      -  Ceftazidime: S <=1      -  Ciprofloxacin: S <=0.25      -  Gentamicin: S 4      -  Imipenem: S <=1      -  Levofloxacin: S <=0.5      -  Meropenem: S <=1      -  Piperacillin/Tazobactam: S <=8      -  Tobramycin: S <=2      Method Type: BEN    Culture - Urine (collected 10-20-20 @ 06:35)  Source: .Urine Catheterized  Final Report (10-21-20 @ 10:58):    >=3 organisms. Probable collection contamination.      RADIOLOGY

## 2020-11-09 NOTE — PROGRESS NOTE ADULT - REASON FOR ADMISSION
Low Hb

## 2020-11-09 NOTE — PROGRESS NOTE ADULT - PROBLEM SELECTOR PLAN 8
Plan: Pt was recently diagnosed and admitted in [facility] for PE (10/19)  Pt was admitted in ICU and started on Xarelto. Xarelto held on this admission due to GI bleed  - On Lovenox full dose (55mg) as per Heme/Onc, will consider IVC filter if pt has recurrent GIB

## 2020-11-09 NOTE — PROGRESS NOTE ADULT - PROBLEM SELECTOR PLAN 4
Plan: Pt has PMH of HTN   /70  Home meds- Carvedilol 3.125mg BID, Amlodipine 10mg QD, Enalapril 2.5mg QD   - Elevated BP this AM (11/6), 10mg Amlodipine STAT administered  - restarted home Carvedilol 3.125mg BID on 11/9  - Monitor vitals

## 2020-11-09 NOTE — PROGRESS NOTE ADULT - ATTENDING COMMENTS
I agree with the history / physical / assessment / plan of JORGE Burrell
I have examined the patient at bedside and reviewed patient's data and participated in the management of the patient along with Linda PATEL as well as hematology/med oncology faculty during the daily heme/onc case review. I reviewed pertinent clinical information, PE,  labs as well as A/P as outline above, in agreement and edited as appropriate.
I have examined the patient at bedside and reviewed patient's data and participated in the management of the patient along with Linda PATEL as well as hemotology/med oncology faculty consisting of Dr. Byron Zuñiga, Dr. ALEXSANDRA Fallon, Dr. ALEXSANDRA Meyers, Dr. Jordan Wheat, Dr. Peg Delaney, Dr. Aleksander Lawson as well as myself during the daily heme/onc case review. I reviewed pertinent clinical information, PE,  labs as well as A/P as outline above.
80 year old woman from home, lives with son, walks with walker, Chronic mckeon  with PMH of DM2, HTN, HLD, dementia, recent diagnosis with PE (on Xarelto) admitted for weakness due to low hemoglobin being managed for GI bleed this admission.  Currently being managed for UTI. urine cultures growing Pseudomonas on IV cefepime, pending c/s. Today patient is afebrile, feeling better, no leucocytosis.    Vital Signs Last 24 Hrs  T(C): 37.3 (06 Nov 2020 15:52), Max: 37.3 (06 Nov 2020 15:52)  T(F): 99.1 (06 Nov 2020 15:52), Max: 99.1 (06 Nov 2020 15:52)  HR: 98 (06 Nov 2020 15:52) (80 - 100)  BP: 144/69 (06 Nov 2020 15:52) (144/69 - 176/70)  BP(mean): --  RR: 20 (06 Nov 2020 15:52) (17 - 28)  SpO2: 100% (06 Nov 2020 15:52) (98% - 100%)                          11.2   5.46  )-----------( 278      ( 06 Nov 2020 06:59 )             34.4     11-06    134<L>  |  101  |  10  ----------------------------<  172<H>  3.3<L>   |  23  |  0.62    Ca    8.8      06 Nov 2020 06:59  Phos  2.1     11-06  Mg     1.8     11-06    TPro  6.7  /  Alb  2.7<L>  /  TBili  0.6  /  DBili  x   /  AST  20  /  ALT  29  /  AlkPhos  97  11-06        Plan:    GI bleed:  admiited for low HB 6.2, FOBT +  s/p 2 units of PRBC in ED  CT Ab/Pelvis Angio 11/2 showed negative  colonoscopy done by Dr. Bentley (11/3 showed sessile polyp, diverticulosis and internal hemorrhoids)  monitor HB.   on 55mg IV BID Lovenox as per Heme/Onc, HB stable. Heme/Onc: Recommended to place IVC filter if pt has recurrent GIB.    UTI; c/w iv cefepime  urine culture growing pseudomonas, pending c/s     PE: diagnosed last admission   Xeralto was held 2/2 GI blee.  GI okayed to restart on AC, patient on FD Lovenox  Hb stable    will d/c once urine c/s comes back.
Patient was examined and discussed with Dr. Morris and with Hematology and GI.      She is alert, cooperative  Vital Signs Last 24 Hrs  T(C): 37.2 (01 Nov 2020 15:58), Max: 37.2 (01 Nov 2020 15:58)  T(F): 99 (01 Nov 2020 15:58), Max: 99 (01 Nov 2020 15:58)  HR: 91 (01 Nov 2020 15:58) (78 - 96)  BP: 145/65 (01 Nov 2020 15:58) (126/57 - 163/64)  BP(mean): --  RR: 17 (01 Nov 2020 15:58) (15 - 18)  SpO2: 97% (01 Nov 2020 15:58) (97% - 100%)  Lungs, clear  Cor, RRR  Abdomen, soft  Labs, reviewed    IMP:  Anemia, iron deficiency.  GI bleed is likely          Recent pulmonary embolism, patient was taking xaralto  Plan: Hold xaralto, r/p tx          replace iron with iron sucrose
80 year old woman from home, lives with son, walks with walker, Chronic mckeon (placed on recent admission) with PMH of DM2, HTN, HLD, dementia, recent diagnosis with PE (on Xarelto) admitted for weakness due to low hemoglobin being managed for GI bleed and UTI with urine cultures growing pan sensitive Pseudomonas; Patient has chronic Mckeon. on IV antibiotics (Cefepime) Day 5. Dr. Saba on board. Patient is afebrile, feeling better, no leucocytosis.    Plan:  GI bleed: Hb stable   admiited for low HB 6.2, FOBT +  s/p 2 units of PRBC in ED  CT Ab/Pelvis Angio 11/2  negative  Colonoscopy done by Dr. Bentley (11/3 showed sessile polyp, diverticulosis and internal hemorrhoids)    Pseudomonas UTI; c/w iv cefepime day 5.  urine culture growing pan sensitive pseudomonas, discharge on Cipro 250 mg bid x 2 days.    PE: diagnosed last admission   Xeralto was held 2/2 GI bleed. Was resumed AC on 55mg IV BID Lovenox as per Heme/Onc, HB stable. Resume on xarelto on d/c.    d/c tomorrow to [facility], case management on board.
80 year old woman from home, lives with son, walks with walker, Chronic Dorado  with PMH of DM2, HTN, HLD, dementia, recent diagnosis with PE (on Xarelto) admitted for weakness due to low hemoglobin. colonoscopy done by Dr. Bentley (11/3 showed sessile polyp, diverticulosis and internal hemorrhoids). Hb stable. Has been resumed on Lovenox for AC. Currently being managed for UTI, urine cultures growing Pseudomonas on IV cefepime, pending c/s. Patient continues to stay afebrile, feeling better. c/w iv cefepime.    Vital Signs Last 24 Hrs  T(C): 36.4 (07 Nov 2020 15:58), Max: 37 (06 Nov 2020 23:30)  T(F): 97.5 (07 Nov 2020 15:58), Max: 98.6 (06 Nov 2020 23:30)  HR: 76 (07 Nov 2020 15:58) (76 - 99)  BP: 153/73 (07 Nov 2020 15:58) (142/58 - 153/73)  BP(mean): --  RR: 20 (07 Nov 2020 15:58) (19 - 20)  SpO2: 99% (07 Nov 2020 15:58) (97% - 99%)                                     10.2   4.73  )-----------( 296      ( 07 Nov 2020 06:46 )             31.3       11-07    137  |  103  |  11  ----------------------------<  160<H>  3.3<L>   |  24  |  0.61    Ca    8.6      07 Nov 2020 06:46  Phos  2.9     11-07  Mg     2.0     11-07    TPro  6.7  /  Alb  2.7<L>  /  TBili  0.6  /  DBili  x   /  AST  20  /  ALT  29  /  AlkPhos  97  11-06          Plan:    GI bleed:  admiited for low HB 6.2, FOBT +  s/p 2 units of PRBC in ED  CT Ab/Pelvis Angio 11/2 showed negative  colonoscopy done by Dr. Bentley (11/3 showed sessile polyp, diverticulosis and internal hemorrhoids)  EGD not recommended by GI.  on 55mg IV BID Lovenox as per Heme/Onc, HB stable.     UTI; c/w iv cefepime  urine culture growing pseudomonas, pending c/s     PE: diagnosed last admission   Xeralto was held 2/2 GI bleed  GI okayed to restart on AC, patient on FD Lovenox  Hb stable
80 year old female from home, lives with son, walks with walker, speaks cantonese, on chronic mckeon (placed on recent admission), PMH of DM2, HTN, HLD, dementia, glaucoma, recent diagnosis with PE (on Xarelto) admitted for weakness 2/2 to low Hb 6.3, FOBT+, s/p 2 units of PRBC; CXR (10/31) showed no evidence of active chest disease. GI on board. CT abd/pelvis angio on 11/2 showed no evidence for GI bleed.  Colonoscopy done today showed 5mm sessile polyp which was NOT removed due to GI bleeding, scattered diverticula and internal hemorrhoids. Clear liquid diet started. HB stable.  Plan for discussion about EGD and necessity for IVC filter with Heme/Onc.  Monitor Hb
pt seen and  examined with ID resident
80 year old woman from home, lives with son, walks with walker, Chronic mckeon  with PMH of DM2, HTN, HLD, dementia, recent diagnosis with PE (on Xarelto) admitted for weakness due to low hemoglobin being managed for GI bleed this admission.  Currently being managed for UTI. urine cultures growing Pseudomonas on IV cefepime, pending c/s. Today patient is afebrile, feeling better, no leucocytosis.    Vital Signs Last 24 Hrs  T(C): 37.3 (06 Nov 2020 15:52), Max: 37.3 (06 Nov 2020 15:52)  T(F): 99.1 (06 Nov 2020 15:52), Max: 99.1 (06 Nov 2020 15:52)  HR: 98 (06 Nov 2020 15:52) (80 - 100)  BP: 144/69 (06 Nov 2020 15:52) (144/69 - 176/70)  BP(mean): --  RR: 20 (06 Nov 2020 15:52) (17 - 28)  SpO2: 100% (06 Nov 2020 15:52) (98% - 100%)                          11.2   5.46  )-----------( 278      ( 06 Nov 2020 06:59 )             34.4     11-06    134<L>  |  101  |  10  ----------------------------<  172<H>  3.3<L>   |  23  |  0.62    Ca    8.8      06 Nov 2020 06:59  Phos  2.1     11-06  Mg     1.8     11-06    TPro  6.7  /  Alb  2.7<L>  /  TBili  0.6  /  DBili  x   /  AST  20  /  ALT  29  /  AlkPhos  97  11-06        Plan:    GI bleed:  admiited for low HB 6.2, FOBT +  s/p 2 units of PRBC in ED  CT Ab/Pelvis Angio 11/2 showed negative  colonoscopy done by Dr. Bentley (11/3 showed sessile polyp, diverticulosis and internal hemorrhoids)  monitor HB.   on 55mg IV BID Lovenox as per Heme/Onc, HB stable. Heme/Onc: Recommended to place IVC filter if pt has recurrent GIB.    UTI; c/w iv cefepime  urine culture growing pseudomonas, pending c/s     PE: diagnosed last admission   Xeralto was held 2/2 GI blee.  GI okayed to restart on AC, patient on FD Lovenox  Hb stable    will d/c once urine c/s comes back.
80 year old woman from home, lives with son, walks with walker, chronic mckeon (placed on recent admission) with PMH of DM2, HTN, HLD, dementia, recent diagnosis with PE (on Xarelto) admitted for weakness 2/2 to low Hb. In the ED, Hb 6.3, FOBT+, s/p 2 units of PRBC; CXR (10/31) showed no evidence of active chest disease. CT abd/pelvis angio on 11/2/2020 showed no evidence for GI bleed. Patient pulled out Mckeon on 11/2/2020 and it was replaced.   Colonoscopy on 11/3/2020 showed 5mm sessile polyp which was NOT removed, scattered diverticula and internal hemorrhoids.    GI recommended against for EGD now, GI okay to patient to start on AC. started on full dose Lovenox (55mg) BID as per Heme/onc. recommendations. Will monitor Hb.     Patient experienced chills this AM, low grade fever (11/4/2020) and UA positive, showing cloudy urine with proteinuria, moderate leukocyte esterase, and bacteruria; patient started empirically on Rocephin 1000mg IV QD. CXR ordered on 11/4/2020 showed no acute infiltrate.
80 year old woman from home, lives with son, walks with walker, chronic mckeon (placed on recent admission) with PMH of DM2, HTN, HLD, dementia, recent diagnosis with PE (on Xarelto) admitted for weakness 2/2 to low Hb. Colonoscopy on 11/3/2020 showed 5mm sessile polyp which was NOT removed due to GI bleeding, scattered diverticula and internal hemorrhoids.   GI was consulted patient underwent colonoscopy on 11/3/2020 showed 5mm sessile polyp which was NOT removed due to GI bleeding, scattered diverticula and internal hemorrhoids. GI recommended against EGD and okay to restart on AC. Restarted her full dose on Lovenox and HB stable. Hem/ Onc On board.    Had high grade fever yesterday, no leucocytosis, UA positive for LE +, bacteruria. urine c/s sent, started empirically on iv cefepime and flagyl for possible aspiration pneumonia coverage.     Vital Signs Last 24 Hrs  T(C): 37.6 (05 Nov 2020 17:45), Max: 39.1 (04 Nov 2020 19:42)  T(F): 99.7 (05 Nov 2020 17:45), Max: 102.3 (04 Nov 2020 19:42)  HR: 94 (05 Nov 2020 15:59) (77 - 113)  BP: 157/53 (05 Nov 2020 15:59) (138/99 - 166/54)  BP(mean): --  RR: 18 (05 Nov 2020 15:59) (17 - 20)  SpO2: 100% (05 Nov 2020 15:59) (98% - 100%)                            11.3   7.27  )-----------( 290      ( 05 Nov 2020 07:16 )             35.7     11-05    136  |  107  |  14  ----------------------------<  135<H>  3.4<L>   |  21<L>  |  0.79    Ca    8.9      05 Nov 2020 07:16  Phos  4.0     11-05  Mg     2.3     11-05    TPro  6.5  /  Alb  2.7<L>  /  TBili  0.8  /  DBili  x   /  AST  18  /  ALT  26  /  AlkPhos  93  11-05
Patient was examined and discussed with Dr. Merrill.     She was alert, agitated, uncomfortable with restraints, Needed Cantonese-speaking company to calm her.    Vital Signs Last 24 Hrs  T(C): 36.9 (11-02-20 @ 16:06), Max: 36.9 (11-02-20 @ 16:06)  T(F): 98.4 (11-02-20 @ 16:06), Max: 98.4 (11-02-20 @ 16:06)  HR: 94 (11-02-20 @ 16:06) (89 - 109)  BP: 150/68 (11-02-20 @ 16:06) (106/74 - 150/68)  BP(mean): --  RR: 18 (11-02-20 @ 16:06) (17 - 18)  SpO2: 98% (11-02-20 @ 16:06) (97% - 98%)  Lungs, clear  Cor, RRR  Patient removed Dorado with her foot.  Dorado was replaced.                         10.8   8.68  )-----------( 448      ( 02 Nov 2020 07:23 )             33.6   11-02    140  |  106  |  9   ----------------------------<  148<H>  3.8   |  24  |  0.68    Ca    9.1      02 Nov 2020 07:23  Phos  4.1     11-02  Mg     1.9     11-02    TPro  6.1  /  Alb  x   /  TBili  x   /  DBili  x   /  AST  x   /  ALT  x   /  AlkPhos  x   11-02  r< from: CT Angio Abdomen and Pelvis w/ IV Cont (11.02.20 @ 17:04) >      No CT evidence of active GI bleed.    Bladder wall thickening secondary to decompression and/or cystitis. Dorado catheter.    Small pancreatic cysts reidentified. Consider follow-up MRI to evaluate for IPMN.    < end of copied text >    IMP:  Elderly woman, DM, THTN, HLD, dementia, recent dx PE.  Was on xaralto, had substantial blood loss leading to anemia requiring tx.           Chronic indwelling Dorado, failed TOV in the past.  Patient removed Dorado with her foot.  Now replaced.           Blood loss, likely GI, though angio does not show a source.   Plan: Continue iron sucrose.  Colonoscopy in AM.           Will try to avoid restraints.

## 2020-11-09 NOTE — PROGRESS NOTE ADULT - PROBLEM SELECTOR PLAN 9
Plan: Pt and son agreed for DNR and trial of intubation on previous admission documented 10/21.  - plan for DC to HOME WITH HOME PT

## 2020-11-09 NOTE — PROGRESS NOTE ADULT - ASSESSMENT
Assessment and Plan:   · Assessment	    # Anemia:   Hgb stable  # GI bleed: FOBT +   # Iron def (labs from last admission):   Off note patient had anemia and FOBT + last admission on 10/10/20, repeat negative on 10/19/20. Xarelto was held and then restarted.   Appropriate response to PRBC tx.   Normal b12, folate, creat, LFTs, bili.   appreciate GI consult  s/p colonoscopy- 1-  Diverticulosis 2- No active bleeding 3- Colon polyp not removed in the setting of GI bleeding  Recs:   -no bleeding on colonoscopy, GI states no indication for EGD and safe to restart a/c  -await colonoscopy pathology  -continue lovenox full dose, change to Xarelto when ready for discharge  -s/p Venofer 200 mg daily x 4 doses   -1U PRBC tx for Hb < 7 or symptomatic  -conservative management    # PE:   Recently diagnosed on 10/9/20. Started on Xarelto. Now with FOBT + anemia.   10/8-10/9: CTA Chest and CT A/P: with no evidence of malignancy reported   10/11/20: USB b/l LE: negative for DVT   Recs:  -continue full-dose lovenox, a/c safe as per GI , plan to change to Xarelto at discharge  -Monitor H/H q 12 hours (Hgb improved to 11.3)  -Consider IVC filter if patient has recurrent GIB    D/c planning to [facility]    Discussed with Pt's Son Jevon Armas at bedside, his contact is [phone]  Thank you for the referral. Will continue to monitor the patient.  Please call with any questions [phone]  Above reviewed with Attending Dr. Pastor

## 2020-11-10 ENCOUNTER — TRANSCRIPTION ENCOUNTER (OUTPATIENT)
Age: 80
End: 2020-11-10

## 2020-11-10 VITALS
TEMPERATURE: 99 F | OXYGEN SATURATION: 99 % | HEART RATE: 78 BPM | SYSTOLIC BLOOD PRESSURE: 117 MMHG | RESPIRATION RATE: 17 BRPM | DIASTOLIC BLOOD PRESSURE: 50 MMHG

## 2020-11-10 LAB
ANION GAP SERPL CALC-SCNC: 7 MMOL/L — SIGNIFICANT CHANGE UP (ref 5–17)
BUN SERPL-MCNC: 12 MG/DL — SIGNIFICANT CHANGE UP (ref 7–18)
CALCIUM SERPL-MCNC: 8.9 MG/DL — SIGNIFICANT CHANGE UP (ref 8.4–10.5)
CHLORIDE SERPL-SCNC: 104 MMOL/L — SIGNIFICANT CHANGE UP (ref 96–108)
CO2 SERPL-SCNC: 25 MMOL/L — SIGNIFICANT CHANGE UP (ref 22–31)
CREAT SERPL-MCNC: 0.84 MG/DL — SIGNIFICANT CHANGE UP (ref 0.5–1.3)
GLUCOSE BLDC GLUCOMTR-MCNC: 154 MG/DL — HIGH (ref 70–99)
GLUCOSE BLDC GLUCOMTR-MCNC: 266 MG/DL — HIGH (ref 70–99)
GLUCOSE SERPL-MCNC: 216 MG/DL — HIGH (ref 70–99)
HCT VFR BLD CALC: 35.7 % — SIGNIFICANT CHANGE UP (ref 34.5–45)
HGB BLD-MCNC: 11.1 G/DL — LOW (ref 11.5–15.5)
MAGNESIUM SERPL-MCNC: 2.2 MG/DL — SIGNIFICANT CHANGE UP (ref 1.6–2.6)
MCHC RBC-ENTMCNC: 27 PG — SIGNIFICANT CHANGE UP (ref 27–34)
MCHC RBC-ENTMCNC: 31.1 GM/DL — LOW (ref 32–36)
MCV RBC AUTO: 86.9 FL — SIGNIFICANT CHANGE UP (ref 80–100)
NRBC # BLD: 0 /100 WBCS — SIGNIFICANT CHANGE UP (ref 0–0)
PHOSPHATE SERPL-MCNC: 3.5 MG/DL — SIGNIFICANT CHANGE UP (ref 2.5–4.5)
PLATELET # BLD AUTO: 436 K/UL — HIGH (ref 150–400)
POTASSIUM SERPL-MCNC: 3.8 MMOL/L — SIGNIFICANT CHANGE UP (ref 3.5–5.3)
POTASSIUM SERPL-SCNC: 3.8 MMOL/L — SIGNIFICANT CHANGE UP (ref 3.5–5.3)
RBC # BLD: 4.11 M/UL — SIGNIFICANT CHANGE UP (ref 3.8–5.2)
RBC # FLD: 15.9 % — HIGH (ref 10.3–14.5)
SODIUM SERPL-SCNC: 136 MMOL/L — SIGNIFICANT CHANGE UP (ref 135–145)
WBC # BLD: 5.8 K/UL — SIGNIFICANT CHANGE UP (ref 3.8–10.5)
WBC # FLD AUTO: 5.8 K/UL — SIGNIFICANT CHANGE UP (ref 3.8–10.5)

## 2020-11-10 PROCEDURE — 86901 BLOOD TYPING SEROLOGIC RH(D): CPT

## 2020-11-10 PROCEDURE — 85652 RBC SED RATE AUTOMATED: CPT

## 2020-11-10 PROCEDURE — 97116 GAIT TRAINING THERAPY: CPT

## 2020-11-10 PROCEDURE — 83935 ASSAY OF URINE OSMOLALITY: CPT

## 2020-11-10 PROCEDURE — 86923 COMPATIBILITY TEST ELECTRIC: CPT

## 2020-11-10 PROCEDURE — 36415 COLL VENOUS BLD VENIPUNCTURE: CPT

## 2020-11-10 PROCEDURE — 85730 THROMBOPLASTIN TIME PARTIAL: CPT

## 2020-11-10 PROCEDURE — 84560 ASSAY OF URINE/URIC ACID: CPT

## 2020-11-10 PROCEDURE — 36430 TRANSFUSION BLD/BLD COMPNT: CPT

## 2020-11-10 PROCEDURE — 82728 ASSAY OF FERRITIN: CPT

## 2020-11-10 PROCEDURE — 82009 KETONE BODYS QUAL: CPT

## 2020-11-10 PROCEDURE — 74174 CTA ABD&PLVS W/CONTRAST: CPT

## 2020-11-10 PROCEDURE — 81001 URINALYSIS AUTO W/SCOPE: CPT

## 2020-11-10 PROCEDURE — 84165 PROTEIN E-PHORESIS SERUM: CPT

## 2020-11-10 PROCEDURE — 83550 IRON BINDING TEST: CPT

## 2020-11-10 PROCEDURE — 82272 OCCULT BLD FECES 1-3 TESTS: CPT

## 2020-11-10 PROCEDURE — 84145 PROCALCITONIN (PCT): CPT

## 2020-11-10 PROCEDURE — 96374 THER/PROPH/DIAG INJ IV PUSH: CPT

## 2020-11-10 PROCEDURE — 83036 HEMOGLOBIN GLYCOSYLATED A1C: CPT

## 2020-11-10 PROCEDURE — 87186 SC STD MICRODIL/AGAR DIL: CPT

## 2020-11-10 PROCEDURE — 84484 ASSAY OF TROPONIN QUANT: CPT

## 2020-11-10 PROCEDURE — 84480 ASSAY TRIIODOTHYRONINE (T3): CPT

## 2020-11-10 PROCEDURE — 87086 URINE CULTURE/COLONY COUNT: CPT

## 2020-11-10 PROCEDURE — 97161 PT EVAL LOW COMPLEX 20 MIN: CPT

## 2020-11-10 PROCEDURE — 93005 ELECTROCARDIOGRAM TRACING: CPT

## 2020-11-10 PROCEDURE — 87635 SARS-COV-2 COVID-19 AMP PRB: CPT

## 2020-11-10 PROCEDURE — U0003: CPT

## 2020-11-10 PROCEDURE — 84550 ASSAY OF BLOOD/URIC ACID: CPT

## 2020-11-10 PROCEDURE — 83540 ASSAY OF IRON: CPT

## 2020-11-10 PROCEDURE — 84100 ASSAY OF PHOSPHORUS: CPT

## 2020-11-10 PROCEDURE — 83615 LACTATE (LD) (LDH) ENZYME: CPT

## 2020-11-10 PROCEDURE — 82436 ASSAY OF URINE CHLORIDE: CPT

## 2020-11-10 PROCEDURE — 84133 ASSAY OF URINE POTASSIUM: CPT

## 2020-11-10 PROCEDURE — 85610 PROTHROMBIN TIME: CPT

## 2020-11-10 PROCEDURE — 84443 ASSAY THYROID STIM HORMONE: CPT

## 2020-11-10 PROCEDURE — 82010 KETONE BODYS QUAN: CPT

## 2020-11-10 PROCEDURE — 85027 COMPLETE CBC AUTOMATED: CPT

## 2020-11-10 PROCEDURE — 97530 THERAPEUTIC ACTIVITIES: CPT

## 2020-11-10 PROCEDURE — 80048 BASIC METABOLIC PNL TOTAL CA: CPT

## 2020-11-10 PROCEDURE — 84436 ASSAY OF TOTAL THYROXINE: CPT

## 2020-11-10 PROCEDURE — 86850 RBC ANTIBODY SCREEN: CPT

## 2020-11-10 PROCEDURE — 82607 VITAMIN B-12: CPT

## 2020-11-10 PROCEDURE — 83880 ASSAY OF NATRIURETIC PEPTIDE: CPT

## 2020-11-10 PROCEDURE — 86900 BLOOD TYPING SEROLOGIC ABO: CPT

## 2020-11-10 PROCEDURE — 83605 ASSAY OF LACTIC ACID: CPT

## 2020-11-10 PROCEDURE — 82570 ASSAY OF URINE CREATININE: CPT

## 2020-11-10 PROCEDURE — 82746 ASSAY OF FOLIC ACID SERUM: CPT

## 2020-11-10 PROCEDURE — 82962 GLUCOSE BLOOD TEST: CPT

## 2020-11-10 PROCEDURE — 84155 ASSAY OF PROTEIN SERUM: CPT

## 2020-11-10 PROCEDURE — 99239 HOSP IP/OBS DSCHRG MGMT >30: CPT

## 2020-11-10 PROCEDURE — 99285 EMERGENCY DEPT VISIT HI MDM: CPT

## 2020-11-10 PROCEDURE — P9040: CPT

## 2020-11-10 PROCEDURE — 87040 BLOOD CULTURE FOR BACTERIA: CPT

## 2020-11-10 PROCEDURE — 85045 AUTOMATED RETICULOCYTE COUNT: CPT

## 2020-11-10 PROCEDURE — 80053 COMPREHEN METABOLIC PANEL: CPT

## 2020-11-10 PROCEDURE — 80061 LIPID PANEL: CPT

## 2020-11-10 PROCEDURE — 83735 ASSAY OF MAGNESIUM: CPT

## 2020-11-10 PROCEDURE — 85025 COMPLETE CBC W/AUTO DIFF WBC: CPT

## 2020-11-10 PROCEDURE — 71045 X-RAY EXAM CHEST 1 VIEW: CPT

## 2020-11-10 PROCEDURE — 83010 ASSAY OF HAPTOGLOBIN QUANT: CPT

## 2020-11-10 PROCEDURE — 84300 ASSAY OF URINE SODIUM: CPT

## 2020-11-10 RX ORDER — PANTOPRAZOLE SODIUM 20 MG/1
1 TABLET, DELAYED RELEASE ORAL
Qty: 0 | Refills: 0 | DISCHARGE
Start: 2020-11-10

## 2020-11-10 RX ORDER — CLONAZEPAM 1 MG
1 TABLET ORAL
Qty: 0 | Refills: 0 | DISCHARGE

## 2020-11-10 RX ORDER — OLANZAPINE 15 MG/1
2.5 TABLET, FILM COATED ORAL ONCE
Refills: 0 | Status: COMPLETED | OUTPATIENT
Start: 2020-11-10 | End: 2020-11-10

## 2020-11-10 RX ORDER — CIPROFLOXACIN LACTATE 400MG/40ML
1 VIAL (ML) INTRAVENOUS
Qty: 4 | Refills: 0
Start: 2020-11-10 | End: 2020-11-11

## 2020-11-10 RX ADMIN — Medication 1: at 08:28

## 2020-11-10 RX ADMIN — Medication 1 DROP(S): at 06:31

## 2020-11-10 RX ADMIN — PANTOPRAZOLE SODIUM 40 MILLIGRAM(S): 20 TABLET, DELAYED RELEASE ORAL at 06:31

## 2020-11-10 RX ADMIN — OLANZAPINE 2.5 MILLIGRAM(S): 15 TABLET, FILM COATED ORAL at 15:59

## 2020-11-10 RX ADMIN — Medication 500 MILLIGRAM(S): at 12:03

## 2020-11-10 RX ADMIN — AMLODIPINE BESYLATE 10 MILLIGRAM(S): 2.5 TABLET ORAL at 06:31

## 2020-11-10 RX ADMIN — CEFEPIME 100 MILLIGRAM(S): 1 INJECTION, POWDER, FOR SOLUTION INTRAMUSCULAR; INTRAVENOUS at 06:30

## 2020-11-10 RX ADMIN — Medication 3: at 12:02

## 2020-11-10 RX ADMIN — ENOXAPARIN SODIUM 55 MILLIGRAM(S): 100 INJECTION SUBCUTANEOUS at 12:03

## 2020-11-10 RX ADMIN — CARVEDILOL PHOSPHATE 3.12 MILLIGRAM(S): 80 CAPSULE, EXTENDED RELEASE ORAL at 06:31

## 2020-11-10 NOTE — DISCHARGE NOTE PROVIDER - HOSPITAL COURSE
80 year old woman from home, lives with son, walks with walker, chronic mckeon (placed on recent admission) with PMH of DM2, HTN, HLD, dementia, recent diagnosis with PE (on Xarelto) admitted for weakness 2/2 to low Hb. Pt is AAOx2, mildly in distress because of the Mckeon, able to participate in discussion, but history limited due to Dementia.  Pt has no symptoms to endorse besides increased fatigue that have limited her ambulation and she spends more time laying on bed. Of note, pt was admitted on 10/19 post fall and was admitted to ICU for PE. During that admission, her Hb was low (6.9) and received 1 unit of pRBCs. Pt was made DNR with trial of intubation on that admission. In the ED, Hb 6.3, FOBT+, s/p 2 units of PRBC; CXR (10/31) showed no evidence of active chest disease. CT abd/pelvis angio on 11/2/2020 showed no evidence for GI bleed. Patient pulled out Mckeon on 11/2/2020 and it was replaced. Colonoscopy on 11/3/2020 showed 5mm sessile polyp which was NOT removed due to GI bleeding, scattered diverticula and internal hemorrhoids; patient subsequently started on clear liquid diet. Patient experienced chills on 11/4; UA was positive, showing cloudy urine with proteinuria, moderate leukocyte esterase and bacteria. Patient started on Rocephin 1000mg IV QD. CXR on 11/4 showed no acute infiltrate. Patient also experienced bilateral eye discomfort which led her to keep her eyes closed, prednisolone eye drops ordered for relief. Labs showed non-anion gap metabolic acidosis. GI stated no need for EGD, patient started on full dose Lovenox (55mg) BID as per Heme/Onc recommendations. Fever persisted into 11/5; Tylenol PRN q6h was switched to IV as patient started coughing this afternoon. Patient started on Cefepime and Flagyl for aspiration PNA/UTI, ofloxacin drops were ordered for eye infection on 11/5. Potassium was decreased (3.4) and KCl was administered. Preliminary Ucx (11/4) showed Pseudomonas aeruginosa. Switched to Cefepime 1g IV q 12h as patient is no longer showing signs of aspiration. Oflaxacin eye drops and Steroid eye drops discontinued 11/6 as pt no longer has symptoms or signs of conjunctivitis. BCx showing NGTD. UCx growing pseudomonas on Cefepime since 11/6. For Discharge to [facility] with 2 days of Cipro 250mgs po bid x 2 days. Will DC home on Xarelto.

## 2020-11-10 NOTE — DISCHARGE NOTE PROVIDER - ATTENDING COMMENTS
Patient seen and examined this morning. Hemoglobin remains stable. Medically stable to discharge to [facility].    A/P  GI bleed-self resolved s/p Colonoscopy 11/03 sessile polyp, diverticulosis ,H/H stable  Pseudomonas UTI  B/L PE  Anemia  Dementia  Urinary retention  HTN  T2DM  HLD    Discharge on Xarelto per hematology recommendation  Advised completion of antibiotics for UTI.

## 2020-11-10 NOTE — DISCHARGE NOTE NURSING/CASE MANAGEMENT/SOCIAL WORK - PATIENT PORTAL LINK FT
You can access the FollowMyHealth Patient Portal offered by [facility] by registering at the following website: [email or web address]. By joining [employer]’s FollowMyHealth portal, you will also be able to view your health information using other applications (apps) compatible with our system.

## 2020-11-10 NOTE — DISCHARGE NOTE PROVIDER - CARE PROVIDER_API CALL
DUNCAN KENNEDY DO  Family Practice  [address] [facility] [address] [place] [id number]  Phone: [phone]  Fax: [phone]  Follow Up Time:

## 2020-11-10 NOTE — DISCHARGE NOTE PROVIDER - NSDCCPCAREPLAN_GEN_ALL_CORE_FT
PRINCIPAL DISCHARGE DIAGNOSIS  Diagnosis: GIB (gastrointestinal bleeding)  Assessment and Plan of Treatment:       SECONDARY DISCHARGE DIAGNOSES  Diagnosis: DM (diabetes mellitus)  Assessment and Plan of Treatment: DM (diabetes mellitus)    Diagnosis: UTI (urinary tract infection)  Assessment and Plan of Treatment: UTI (urinary tract infection)     PRINCIPAL DISCHARGE DIAGNOSIS  Diagnosis: GIB (gastrointestinal bleeding)  Assessment and Plan of Treatment: You were admitted to the hospital because of weakness likely to have been caused by a bleed in your gastrointestinal tract. Upon admission, your hemoglobin was low and a fecal occult blood test (FOBT) was positive for blood; you then received 1 unit of packed red blood cells in the Emergency Department. A CT angiogram of your abdomen and pelvis did not show any evidence for a GI bleed. A colonoscopy showed a 5mm sessile polyp which was not removed due to GI bleeding, scattered diverticula and internal hemorrhoids. As per recommendations by the Hematology/Oncology team, you were started on Lovenox 55mg, administered by a injection into your abdomen. During your hospital stay, your hemoglobin and hematocrit gradually stabilized.      SECONDARY DISCHARGE DIAGNOSES  Diagnosis: DM (diabetes mellitus)  Assessment and Plan of Treatment: Throughout your hospital stay, we have been managing your blood sugars by giving you 1-3 units of insulin sliding scale. We also located the medication that you take at home for this condition (Janumet XR 100mg-1000mg oral tablet extended release), and we will be discharging you on that meducation so that you can continue to keep your diabetes under control outside of the hospital.    Diagnosis: UTI (urinary tract infection)  Assessment and Plan of Treatment: On November 4, 2020, you started to get chills and fever; a urinalysis showed that there was bacteria in your urine (Pseudomonas aeruginosa). We consulted Infectious Disease and you were started on Rocephin administered by IV. Your symptoms resolved and you completed your IV antibiotics. We are discharging you with two days of Ciprofloxacin, which you will take at home.    Diagnosis: Hypertension  Assessment and Plan of Treatment: We held some of your blood pressure medications that you take at home (Amlodipine 10mg daily, Carvedilol 3.125mg twice a day). If your blood pressure runs high, please call your primary care doctor to restart some of your medications that may have been held. Please follow up with your primary care doctor in making sure that your blood pressure stays under control.    Diagnosis: Pulmonary embolism  Assessment and Plan of Treatment: You have a history of pulmonary embolism and were taking Xarelto (blood thinner) for it, which was held during this hospital stay because of your GI bleed. Your hemoglobin and hematocrit have now stabilized and we restarted you on Xarelto. We will be discharging you on this medication so you can continue to take this at home. If you do have recurrent bleeding in your GI tract, you may need to have an IVC filter placed, because of your previous history with a pulmonary embolism.

## 2020-11-11 LAB
CULTURE RESULTS: SIGNIFICANT CHANGE UP
SPECIMEN SOURCE: SIGNIFICANT CHANGE UP

## 2020-11-16 LAB — GLUCOSE BLDC GLUCOMTR-MCNC: 178 MG/DL — HIGH (ref 70–99)

## 2020-11-28 LAB
CULTURE RESULTS: SIGNIFICANT CHANGE UP
SPECIMEN SOURCE: SIGNIFICANT CHANGE UP

## 2020-11-30 NOTE — PHYSICAL THERAPY INITIAL EVALUATION ADULT - LEVEL OF INDEPENDENCE: GAIT, REHAB EVAL
Marcus spoke with patient.  Surgery rescheduled to 12/10/20.    minimum assist (75% patients effort)

## 2020-12-23 NOTE — PHYSICAL THERAPY INITIAL EVALUATION ADULT - LEVEL OF INDEPENDENCE: STAND/SIT, REHAB EVAL
89 y/o woman with pmh of ckd, htn, chf, hld, htn, dm, p/w bradycardia and lethargy. Patient found to be hyponatremic. Palliative consulted for goals of care.   
pt poor historian ( and [place], despite interpretor use) , family not available, info per ED chart   Pt is a 89 y/o woman, reportedly A&OX3, with pmh of ckd, htn, chf, hld, htn, dm, pw bradycardia and lethargy.   reports pt has been complaining of weakness, malaise, mild cp and sob. pt has been receiving diuretics and nitro from family. ems found pt to have bradycardia in 40s, junctional, received .5 atropine w/ improvement to 70s. denies f/c.   per family pt did not have formal MOLST however pts HCP stated to ER pt is dnr/dni ( no order placed: needs to be confirmed...)  pt with sig medical and cardiac hx with bradycardia and hyponatremia.  unknown pt meds at home.  renal eval for hypo ?dehydration  echo  no further bradycardia on tele  avoid av blocking agents  tsh  keep hgb>7
89y F with HF, CKD3 p/w BECKY, hyponatremia, hyperkalemia          #Hyponatremia  - Pt with ? hypovolemic hyponatremia as Na improved with IVF, received 480cc total of NS. Na 108 on admission to now 119 in less than 24hrs. Stopped NS and started D5W at 100cc/hr. Would not give HTS in future as with eGFR~10 you will not have a strong response to ADH and SIADH is extremely unlikely with such eGFR  - Monitor UOP closely, if not drastic increase in UOP to >100cc/hr please check a stat BMP  - For now monitor BMP q 6hrs  - No need for fluid restriction at this time  - Check TTE  - Do not overcorrect Na more than 6-8meq/24hrs     #BECKY  - Suspect patient likely with pre-renal vs. ATN vs. HF exacerbation (less likely). Clinically does not appear volume overloaded on exam but arrived with bradycardia, hyperkalemia, BECKY on CKD which could have been in setting of excessive medication (coreq/torsemide/bob).  - Improved slightly with IVF, urine NA <35 which could be consistent with pre-renal vs. HF   - Recommend to hold further IVF as NA rapidly correcting  - Would c/w [employer], monitor UOP closely, monitor BMP q 6hrs for today  - Repeat urine Na  - Check TTE  - Dose medications to eGFR<10    #Hyperkalemia  - Resolved at this time. bradycardia improved. Hold further spironolactone     
89 yo Mandarin Speaking hard of hearing Female with history of CHF, CKD III, DM, HTN BIBEMS from home for decreased lethargy, po intake, slower to respond and anuria since yesterday found to be bradycardic with junctional rhythm and severely hyponatremic to 108 either hypovolemic vs euvolemia    #recommendations  At this time not a MICU candidate  patient is not confused, not with seizure like activity, not in distress or with FND at this time, ao 3  after discussion with family patient would like to be DNR/ DNI and would not want invasive procedures like central line placed at this time, primary team should complete MOLST form for official documentation  would give 100 cc of 1.5% of hypertonic saline at this time, repeat labs 4-6 hrs, start salt tabs   infectious workup  standard hyponatremia workup  would consult Nephro, would obtain urine lytes   bradycardia may be influenced by irregular electrolytes although with 2 recent admissions to [facility] for HF exacerbations as per private cardiologist Dr. Scott [phone] although was bradycardic previously as he decreased the dose of coreg few days ago, does not have recent labs, and was deferring cath due to age,  would consult cardiology at this time  admit to tele    Nick Christian PGY2
moderate assist (50% patients effort)

## 2021-02-05 NOTE — ED ADULT NURSE NOTE - DOES PATIENT HAVE ADVANCE DIRECTIVE
No Post-Care Instructions: I reviewed with the patient in detail post-care instructions. Patient is not to engage in any heavy lifting, exercise, or swimming for the next 14 days. Should the patient develop any fevers, chills, bleeding, severe pain patient will contact the office immediately.

## 2021-05-05 NOTE — PROGRESS NOTE ADULT - PROBLEM SELECTOR PROBLEM 1
Sleep Apnea  Sleep apnea affects breathing during sleep. It causes breathing to stop for a short time or to become shallow. It can also increase the risk of:  · Heart attack.  · Stroke.  · Being very overweight (obese).  · Diabetes.  · Heart failure.  · Irregular heartbeat.  The goal of treatment is to help you breathe normally again.  What are the causes?  There are three kinds of sleep apnea:  · Obstructive sleep apnea. This is caused by a blocked or collapsed airway.  · Central sleep apnea. This happens when the brain does not send the right signals to the muscles that control breathing.  · Mixed sleep apnea. This is a combination of obstructive and central sleep apnea.  The most common cause of this condition is a collapsed or blocked airway. This can happen if:  · Your throat muscles are too relaxed.  · Your tongue and tonsils are too large.  · You are overweight.  · Your airway is too small.  What increases the risk?  · Being overweight.  · Smoking.  · Having a small airway.  · Being older.  · Being male.  · Drinking alcohol.  · Taking medicines to calm yourself (sedatives or tranquilizers).  · Having family members with the condition.  What are the signs or symptoms?  · Trouble staying asleep.  · Being sleepy or tired during the day.  · Getting angry a lot.  · Loud snoring.  · Headaches in the morning.  · Not being able to focus your mind (concentrate).  · Forgetting things.  · Less interest in sex.  · Mood swings.  · Personality changes.  · Feelings of sadness (depression).  · Waking up a lot during the night to pee (urinate).  · Dry mouth.  · Sore throat.  How is this diagnosed?  · Your medical history.  · A physical exam.  · A test that is done when you are sleeping (sleep study). The test is most often done in a sleep lab but may also be done at home.  How is this treated?    · Sleeping on your side.  · Using a medicine to get rid of mucus in your nose (decongestant).  · Avoiding the use of alcohol, 
medicines to help you relax, or certain pain medicines (narcotics).  · Losing weight, if needed.  · Changing your diet.  · Not smoking.  · Using a machine to open your airway while you sleep, such as:  ? An oral appliance. This is a mouthpiece that shifts your lower jaw forward.  ? A CPAP device. This device blows air through a mask when you breathe out (exhale).  ? An EPAP device. This has valves that you put in each nostril.  ? A BPAP device. This device blows air through a mask when you breathe in (inhale) and breathe out.  · Having surgery if other treatments do not work.  It is important to get treatment for sleep apnea. Without treatment, it can lead to:  · High blood pressure.  · Coronary artery disease.  · In men, not being able to have an erection (impotence).  · Reduced thinking ability.  Follow these instructions at home:  Lifestyle  · Make changes that your doctor recommends.  · Eat a healthy diet.  · Lose weight if needed.  · Avoid alcohol, medicines to help you relax, and some pain medicines.  · Do not use any products that contain nicotine or tobacco, such as cigarettes, e-cigarettes, and chewing tobacco. If you need help quitting, ask your doctor.  General instructions  · Take over-the-counter and prescription medicines only as told by your doctor.  · If you were given a machine to use while you sleep, use it only as told by your doctor.  · If you are having surgery, make sure to tell your doctor you have sleep apnea. You may need to bring your device with you.  · Keep all follow-up visits as told by your doctor. This is important.  Contact a doctor if:  · The machine that you were given to use during sleep bothers you or does not seem to be working.  · You do not get better.  · You get worse.  Get help right away if:  · Your chest hurts.  · You have trouble breathing in enough air.  · You have an uncomfortable feeling in your back, arms, or stomach.  · You have trouble talking.  · One side of your 
body feels weak.  · A part of your face is hanging down.  These symptoms may be an emergency. Do not wait to see if the symptoms will go away. Get medical help right away. Call your local emergency services (911 in the [place] Do not drive yourself to the hospital.  Summary  · This condition affects breathing during sleep.  · The most common cause is a collapsed or blocked airway.  · The goal of treatment is to help you breathe normally while you sleep.  This information is not intended to replace advice given to you by your health care provider. Make sure you discuss any questions you have with your health care provider.  Document Released: 09/26/2009 Document Revised: 10/04/2019 Document Reviewed: 08/13/2019  Elsevier Patient Education © 2020 [employer]    
Encephalopathy
Encephalopathy

## 2021-05-06 NOTE — PHYSICAL THERAPY INITIAL EVALUATION ADULT - PERTINENT HX OF CURRENT PROBLEM, REHAB EVAL
Strips are no longer covered through [facility] through [employer]. But, D can be billed through [facility] in [facility] or [facility] They quoted her $10 per box at [facility]. Send to [facility] in [facility]. Patient admitted from home due to a fall and chest pain; patient lives w/ son and ambulates w/ RW; patient w/ h/o HTN, DM, HLD, Dementia

## 2021-10-13 NOTE — PROGRESS NOTE ADULT - PROBLEM SELECTOR PROBLEM 9
Medication Therapy Management (MTM) Encounter    ASSESSMENT:                            Medication Adherence/Access: No issues identified    Parkinson's Disease:  discussed importance of exercise, in addition to dopamine replacement. She would like to continue working on increasing yoga and Big and Loud therapy and will use extra carbidopa-levodopa tablet as needed before activities.  We discussed that the next medication change could be to increase carbidopa-levodopa to 1.5 tablets 3 times/day when she is having an increase in motor symptoms (ie: tremor, stiffness of muscles, slowness of movement).     RBD: discussed that dose of melatonin could be increased to up to 10-12 mg nightly     Cervical disc disorder/arthritis pain: improved     Vertigo: stable     Vitamins/supplements: stable     PLAN:                            1. You may increase melatonin to 5 mg nightly for vivid dreams. This medication may be increased further up to 10-12 mg nightly if needed to control dream enactment.    2. We discussed that you may continue to use carbidopa-levodopa 1 tablet as needed. If it seems you are needing the extra tablet more often, or overall symptoms are affecting you more (ie: tremor, stiffness of muscles, slowness of movement), we may increase carbidopa-levodopa to 1.5 tablets 3 times/day. Please contact us if your symptoms are changing and we can discuss this.     Follow-up: Return in about 1 year (around 10/13/2022) for Medication Therapy Management.      SUBJECTIVE/OBJECTIVE:                          Ne Padilla is a 63 year old female contacted via secure video for a follow-up visit. She was referred to me from Dr. Lujan.  Today's visit is a follow-up MTM visit from 4/30/21     Reason for visit: follow up on medications.    Allergies/ADRs: Reviewed in chart  Past Medical History: Reviewed in chart  Tobacco: She reports that she has never smoked. She has never used smokeless tobacco.  Alcohol: 1-3 beverages /  "week    Medication Adherence/Access: no issues reported    Parkinson's Disease:  Current medications include: Carbidopa-levodopa  mg 1 tablet 3 times/day. She has been taking an extra tablet periodically as needed for tremor, particularly when she has more activities going on. She is walking daily but admits to not doing yoga or Big and Loud therapy as often lately. She was limited by bunion surgery on her foot recently. She would like to discuss future adjustments to carbidopa-levodopa.     RBD: taking melatonin 2.5 mg nightly. Reports this has been helpful for vivid dreams - intensity of dreams has lessened. Her  tells her she is not thrashing as much. She is interested in trying a higher dose of melatonin.     Cervical disc disorder/arthritis pain: Taking duloxetine 60 mg, gabapentin 600 mg 3 times/day, and glucosamine twice daily. Meloxicam and acetaminophen are as needed. She has taken meloxicam 2-3 times in the last 6 months. She will take acetaminophen if experiencing pain/soreness after activity (ie: while walking a lot in [place] recently). She has stopped Tizanidine. States pain is stable and she is pleased she has been able to get off of some of her pain medications.     Vertigo: Taking meclizine 12.5 mg as needed when she feels vertigo \"coming on\" or if she is going to be in a place that could trigger vertigo (ie: windy roads).     Vitamins/supplements:   Calcium 600 mg/vitamin D3 400 units daily - for bone health  Vitamin D 4000 units daily - for bone health  Magnesium glycinate twice a day - for muscles   Probiotic (Nature's Bounty) - has improved bloating     Today's Vitals: There were no vitals taken for this visit.  ----------------    I spent 23 minutes with this patient today. All changes were made via collaborative practice agreement with Dr. Lujan. A copy of the visit note was provided to the patient's referring provider.    The patient was sent via [employer] a summary of these " recommendations.     Pat Perez (Roy), Pharm.D.  Medication Therapy Management Pharmacist  [facility]    Telemedicine Visit Details  Type of service:  Video Conference via [employer]  Start Time: 8:29 AM  End Time: 8:52 AM  Originating Location (patient location): Home  Distant Location (provider location):  [facility]     Medication Therapy Recommendations  RBD (REM behavioral disorder)    Current Medication: Melatonin 2.5 MG CHEW   Rationale: Dose too low - Dosage too low - Effectiveness   Recommendation: Increase Dose - Melatonin 5 MG Chew   Status: Accepted - no CPA Needed                 Hyperlipidemia

## 2021-11-28 ENCOUNTER — INPATIENT (INPATIENT)
Facility: HOSPITAL | Age: 81
LOS: 3 days | Discharge: NURSING, REHAB OR LONG-TERM CARE | DRG: 884 | End: 2021-12-02
Attending: INTERNAL MEDICINE | Admitting: INTERNAL MEDICINE
Payer: MEDICARE

## 2021-11-28 VITALS
WEIGHT: 127.87 LBS | TEMPERATURE: 98 F | HEIGHT: 60 IN | SYSTOLIC BLOOD PRESSURE: 137 MMHG | RESPIRATION RATE: 18 BRPM | DIASTOLIC BLOOD PRESSURE: 81 MMHG | HEART RATE: 106 BPM

## 2021-11-28 DIAGNOSIS — F03.91 UNSPECIFIED DEMENTIA WITH BEHAVIORAL DISTURBANCE: ICD-10-CM

## 2021-11-28 DIAGNOSIS — Z86.711 PERSONAL HISTORY OF PULMONARY EMBOLISM: ICD-10-CM

## 2021-11-28 DIAGNOSIS — R45.1 RESTLESSNESS AND AGITATION: ICD-10-CM

## 2021-11-28 DIAGNOSIS — I10 ESSENTIAL (PRIMARY) HYPERTENSION: ICD-10-CM

## 2021-11-28 DIAGNOSIS — Z29.9 ENCOUNTER FOR PROPHYLACTIC MEASURES, UNSPECIFIED: ICD-10-CM

## 2021-11-28 DIAGNOSIS — Z78.9 OTHER SPECIFIED HEALTH STATUS: Chronic | ICD-10-CM

## 2021-11-28 DIAGNOSIS — E11.9 TYPE 2 DIABETES MELLITUS WITHOUT COMPLICATIONS: ICD-10-CM

## 2021-11-28 LAB
ALBUMIN SERPL ELPH-MCNC: 3.3 G/DL — LOW (ref 3.5–5)
ALP SERPL-CCNC: 62 U/L — SIGNIFICANT CHANGE UP (ref 40–120)
ALT FLD-CCNC: 22 U/L DA — SIGNIFICANT CHANGE UP (ref 10–60)
ANION GAP SERPL CALC-SCNC: 8 MMOL/L — SIGNIFICANT CHANGE UP (ref 5–17)
APPEARANCE UR: CLEAR — SIGNIFICANT CHANGE UP
AST SERPL-CCNC: 29 U/L — SIGNIFICANT CHANGE UP (ref 10–40)
BASOPHILS # BLD AUTO: 0.04 K/UL — SIGNIFICANT CHANGE UP (ref 0–0.2)
BASOPHILS NFR BLD AUTO: 0.5 % — SIGNIFICANT CHANGE UP (ref 0–2)
BILIRUB SERPL-MCNC: 0.5 MG/DL — SIGNIFICANT CHANGE UP (ref 0.2–1.2)
BILIRUB UR-MCNC: NEGATIVE — SIGNIFICANT CHANGE UP
BUN SERPL-MCNC: 18 MG/DL — SIGNIFICANT CHANGE UP (ref 7–18)
CALCIUM SERPL-MCNC: 8.7 MG/DL — SIGNIFICANT CHANGE UP (ref 8.4–10.5)
CHLORIDE SERPL-SCNC: 106 MMOL/L — SIGNIFICANT CHANGE UP (ref 96–108)
CK SERPL-CCNC: 115 U/L — SIGNIFICANT CHANGE UP (ref 21–215)
CO2 SERPL-SCNC: 21 MMOL/L — LOW (ref 22–31)
COLOR SPEC: YELLOW — SIGNIFICANT CHANGE UP
CREAT SERPL-MCNC: 0.96 MG/DL — SIGNIFICANT CHANGE UP (ref 0.5–1.3)
DIFF PNL FLD: NEGATIVE — SIGNIFICANT CHANGE UP
EOSINOPHIL # BLD AUTO: 0.19 K/UL — SIGNIFICANT CHANGE UP (ref 0–0.5)
EOSINOPHIL NFR BLD AUTO: 2.5 % — SIGNIFICANT CHANGE UP (ref 0–6)
GLUCOSE SERPL-MCNC: 124 MG/DL — HIGH (ref 70–99)
GLUCOSE UR QL: NEGATIVE — SIGNIFICANT CHANGE UP
HCT VFR BLD CALC: 42.2 % — SIGNIFICANT CHANGE UP (ref 34.5–45)
HGB BLD-MCNC: 14 G/DL — SIGNIFICANT CHANGE UP (ref 11.5–15.5)
IMM GRANULOCYTES NFR BLD AUTO: 0.4 % — SIGNIFICANT CHANGE UP (ref 0–1.5)
KETONES UR-MCNC: NEGATIVE — SIGNIFICANT CHANGE UP
LEUKOCYTE ESTERASE UR-ACNC: ABNORMAL
LYMPHOCYTES # BLD AUTO: 1.22 K/UL — SIGNIFICANT CHANGE UP (ref 1–3.3)
LYMPHOCYTES # BLD AUTO: 15.8 % — SIGNIFICANT CHANGE UP (ref 13–44)
MCHC RBC-ENTMCNC: 28.5 PG — SIGNIFICANT CHANGE UP (ref 27–34)
MCHC RBC-ENTMCNC: 33.2 GM/DL — SIGNIFICANT CHANGE UP (ref 32–36)
MCV RBC AUTO: 85.8 FL — SIGNIFICANT CHANGE UP (ref 80–100)
MONOCYTES # BLD AUTO: 0.43 K/UL — SIGNIFICANT CHANGE UP (ref 0–0.9)
MONOCYTES NFR BLD AUTO: 5.6 % — SIGNIFICANT CHANGE UP (ref 2–14)
NEUTROPHILS # BLD AUTO: 5.82 K/UL — SIGNIFICANT CHANGE UP (ref 1.8–7.4)
NEUTROPHILS NFR BLD AUTO: 75.2 % — SIGNIFICANT CHANGE UP (ref 43–77)
NITRITE UR-MCNC: NEGATIVE — SIGNIFICANT CHANGE UP
NRBC # BLD: 0 /100 WBCS — SIGNIFICANT CHANGE UP (ref 0–0)
PH UR: 5 — SIGNIFICANT CHANGE UP (ref 5–8)
PLATELET # BLD AUTO: 278 K/UL — SIGNIFICANT CHANGE UP (ref 150–400)
POTASSIUM SERPL-MCNC: 4.8 MMOL/L — SIGNIFICANT CHANGE UP (ref 3.5–5.3)
POTASSIUM SERPL-SCNC: 4.8 MMOL/L — SIGNIFICANT CHANGE UP (ref 3.5–5.3)
PROT SERPL-MCNC: 7.2 G/DL — SIGNIFICANT CHANGE UP (ref 6–8.3)
PROT UR-MCNC: 15
RBC # BLD: 4.92 M/UL — SIGNIFICANT CHANGE UP (ref 3.8–5.2)
RBC # FLD: 13.6 % — SIGNIFICANT CHANGE UP (ref 10.3–14.5)
SARS-COV-2 RNA SPEC QL NAA+PROBE: SIGNIFICANT CHANGE UP
SODIUM SERPL-SCNC: 135 MMOL/L — SIGNIFICANT CHANGE UP (ref 135–145)
SP GR SPEC: 1.01 — SIGNIFICANT CHANGE UP (ref 1.01–1.02)
TROPONIN I, HIGH SENSITIVITY RESULT: 5 NG/L — SIGNIFICANT CHANGE UP
UROBILINOGEN FLD QL: NEGATIVE — SIGNIFICANT CHANGE UP
WBC # BLD: 7.73 K/UL — SIGNIFICANT CHANGE UP (ref 3.8–10.5)
WBC # FLD AUTO: 7.73 K/UL — SIGNIFICANT CHANGE UP (ref 3.8–10.5)

## 2021-11-28 PROCEDURE — 99285 EMERGENCY DEPT VISIT HI MDM: CPT

## 2021-11-28 PROCEDURE — 71045 X-RAY EXAM CHEST 1 VIEW: CPT | Mod: 26

## 2021-11-28 PROCEDURE — 70450 CT HEAD/BRAIN W/O DYE: CPT | Mod: 26,MA

## 2021-11-28 RX ORDER — CARVEDILOL PHOSPHATE 80 MG/1
3.12 CAPSULE, EXTENDED RELEASE ORAL EVERY 12 HOURS
Refills: 0 | Status: DISCONTINUED | OUTPATIENT
Start: 2021-11-28 | End: 2021-12-02

## 2021-11-28 RX ORDER — LANOLIN ALCOHOL/MO/W.PET/CERES
5 CREAM (GRAM) TOPICAL AT BEDTIME
Refills: 0 | Status: DISCONTINUED | OUTPATIENT
Start: 2021-11-28 | End: 2021-11-29

## 2021-11-28 RX ORDER — AMLODIPINE BESYLATE 2.5 MG/1
10 TABLET ORAL DAILY
Refills: 0 | Status: DISCONTINUED | OUTPATIENT
Start: 2021-11-29 | End: 2021-12-02

## 2021-11-28 RX ORDER — FERROUS SULFATE 325(65) MG
325 TABLET ORAL
Refills: 0 | Status: DISCONTINUED | OUTPATIENT
Start: 2021-11-28 | End: 2021-12-02

## 2021-11-28 RX ORDER — ASCORBIC ACID 60 MG
500 TABLET,CHEWABLE ORAL DAILY
Refills: 0 | Status: DISCONTINUED | OUTPATIENT
Start: 2021-11-28 | End: 2021-12-02

## 2021-11-28 RX ORDER — HEPARIN SODIUM 5000 [USP'U]/ML
5000 INJECTION INTRAVENOUS; SUBCUTANEOUS EVERY 8 HOURS
Refills: 0 | Status: DISCONTINUED | OUTPATIENT
Start: 2021-11-28 | End: 2021-12-02

## 2021-11-28 RX ORDER — LATANOPROST 0.05 MG/ML
1 SOLUTION/ DROPS OPHTHALMIC; TOPICAL AT BEDTIME
Refills: 0 | Status: DISCONTINUED | OUTPATIENT
Start: 2021-11-28 | End: 2021-12-02

## 2021-11-28 RX ORDER — QUETIAPINE FUMARATE 200 MG/1
1 TABLET, FILM COATED ORAL
Qty: 0 | Refills: 0 | DISCHARGE

## 2021-11-28 RX ORDER — QUETIAPINE FUMARATE 200 MG/1
50 TABLET, FILM COATED ORAL AT BEDTIME
Refills: 0 | Status: DISCONTINUED | OUTPATIENT
Start: 2021-11-28 | End: 2021-12-02

## 2021-11-28 RX ORDER — ASPIRIN/CALCIUM CARB/MAGNESIUM 324 MG
81 TABLET ORAL DAILY
Refills: 0 | Status: DISCONTINUED | OUTPATIENT
Start: 2021-11-28 | End: 2021-11-30

## 2021-11-28 RX ORDER — HALOPERIDOL DECANOATE 100 MG/ML
2 INJECTION INTRAMUSCULAR ONCE
Refills: 0 | Status: DISCONTINUED | OUTPATIENT
Start: 2021-11-28 | End: 2021-11-28

## 2021-11-28 RX ORDER — PANTOPRAZOLE SODIUM 20 MG/1
40 TABLET, DELAYED RELEASE ORAL
Refills: 0 | Status: DISCONTINUED | OUTPATIENT
Start: 2021-11-28 | End: 2021-12-02

## 2021-11-28 RX ORDER — INSULIN LISPRO 100/ML
VIAL (ML) SUBCUTANEOUS
Refills: 0 | Status: DISCONTINUED | OUTPATIENT
Start: 2021-11-28 | End: 2021-12-02

## 2021-11-28 RX ORDER — DEXTROSE 50 % IN WATER 50 %
25 SYRINGE (ML) INTRAVENOUS ONCE
Refills: 0 | Status: DISCONTINUED | OUTPATIENT
Start: 2021-11-28 | End: 2021-12-02

## 2021-11-28 RX ORDER — ATORVASTATIN CALCIUM 80 MG/1
20 TABLET, FILM COATED ORAL AT BEDTIME
Refills: 0 | Status: DISCONTINUED | OUTPATIENT
Start: 2021-11-28 | End: 2021-12-02

## 2021-11-28 RX ADMIN — QUETIAPINE FUMARATE 50 MILLIGRAM(S): 200 TABLET, FILM COATED ORAL at 21:16

## 2021-11-28 NOTE — H&P ADULT - NSHPLABSRESULTS_GEN_ALL_CORE
LABS:                        14.0   7.73  )-----------( 278      ( 28 Nov 2021 16:51 )             42.2     11-28    135  |  106  |  18  ----------------------------<  124<H>  4.8   |  21<L>  |  0.96    Ca    8.7      28 Nov 2021 16:51    TPro  7.2  /  Alb  3.3<L>  /  TBili  0.5  /  DBili  x   /  AST  29  /  ALT  22  /  AlkPhos  62  11-28        LIVER FUNCTIONS - ( 28 Nov 2021 16:51 )  Alb: 3.3 g/dL / Pro: 7.2 g/dL / ALK PHOS: 62 U/L / ALT: 22 U/L DA / AST: 29 U/L / GGT: x           < from: CT Head No Cont (11.28.21 @ 18:06) >      EXAM:  CT BRAIN                            PROCEDURE DATE:  11/28/2021          INTERPRETATION:  Clinical indication: Agitation.    Multiple axial sections were performed from base skull to vertex without contrast enhancement. Coronal and sagittal destructions were performed as well    This exam is compared prior head CT performed on October 20, 2020.    Parenchymal volume loss and chronic microvascular changes are again seen and unchanged    Evaluation of the osseous structures with the appropriate window appears normal    The visualized paranasal sinuses mastoid and middle ear regions appear clear.    IMPRESSION: No significant change when allowing for differences in technique.    --- End of Report ---    < end of copied text >

## 2021-11-28 NOTE — CHART NOTE - NSCHARTNOTEFT_GEN_A_CORE
Call by nursing supervisor and RN to see pt for agitation    Pt seen in bed in hallway lying in bed relatively calm occasionally changing side lying position, moving her bed linens from off her feet, looking in her jacket pocket and asking for her money. RN acknowledged [employer] was not contacted to identify pt's needs; call placed to language bank by RN (Turkish speaking) pt is Cantonese speaking; undersigned call pt's family to explore if any "money" was taken home by them; spoke with Mr Eliud and female who later came on the phone and informed undersigned that no money was taken; they spoke with patient  pt was relatively calm during the conversation. Family reports that patient takes meds for sleeping at home but cannot recall the name of the drug.     A/P  81 year old Woman brought in from home for worsening agitation in the setting of dementia admitted for further eval  -seroquel given in in ED at ~9:30 pm  - will dose with melatonin 5mg now  - enhanced supervision  - pt calm in bed in the hallway  - will not given benzo's at this time, given the possibility of worsening agitation with benzo's in the elderly; and haldol increases the risk of QT prolongation/arrythmia with concurrent dosing with seroquel  - monitor for now as pt is mildly calm, noncombative, no agitation witnessed at this time  - if agitation re-occurs will place on 1:1  - she likely will need behavioral health input for assessment for psychotropic  - plan d/w primary RN/4nort team.

## 2021-11-28 NOTE — H&P ADULT - NSICDXPASTMEDICALHX_GEN_ALL_CORE_FT
PAST MEDICAL HISTORY:  Dementia     DM (diabetes mellitus)     HTN (hypertension)     HTN (hypertension)     Hyperlipidemia     Pulmonary embolism dx Oct 2020 was on xarelto

## 2021-11-28 NOTE — ED ADULT NURSE REASSESSMENT NOTE - NS ED NURSE REASSESS COMMENT FT1
Pt resting in bed, as per baseline. Pt arouses to verbal and tactile stimuli. Enhanced supervision maintained as per order. Pt observed, no acute distress noted, pt near nursing station.

## 2021-11-28 NOTE — ED PROVIDER NOTE - CLINICAL SUMMARY MEDICAL DECISION MAKING FREE TEXT BOX
[address]   Progress Note and Procedure Note      Stacie Payne  MEDICAL RECORD NUMBER:  [id number]  AGE: 76 y.o. GENDER: female  : 1940  EPISODE DATE:  2018    Subjective:     Chief Complaint   Patient presents with    Wound Check     Follow up Wounds left and right feet         HISTORY of PRESENT ILLNESS HPI     Stacie Payne is a 76 y.o. female who presents today for wound/ulcer evaluation. History of Wound Context: patient present with a left dorsal foot wound that she relates that she has had on and off for the last 3 years. It started as an area with a scab that she picked off and it has continued eversince. Patient presents today for first post-op visit. She relates that she thought that the ACE bandages were too tight and wishes not have them reapplied. Wound/Ulcer Pain Timing/Severity: none  Quality of pain: N/A  Severity:  0 / 10   Modifying Factors: None  Associated Signs/Symptoms: none    Ulcer Identification:  Ulcer Type: undetermined  Contributing Factors: diabetes    Wound: N/A        PAST MEDICAL HISTORY        Diagnosis Date    Arthritis     Cancer ([facility]     uterine    Diabetes mellitus ([facility]     Pre DM    GI bleed     Hyperlipidemia     Thyroid disease        PAST SURGICAL HISTORY    Past Surgical History:   Procedure Laterality Date    BACK SURGERY      CATARACT REMOVAL         FAMILY HISTORY    History reviewed. No pertinent family history.     SOCIAL HISTORY    Social History   Substance Use Topics    Smoking status: Never Smoker    Smokeless tobacco: Never Used    Alcohol use No       ALLERGIES    Allergies   Allergen Reactions    Sulfa Antibiotics Swelling       MEDICATIONS    Current Outpatient Prescriptions on File Prior to Encounter   Medication Sig Dispense Refill    acetaminophen (TYLENOL) 500 MG tablet Take 500 mg by mouth every 6 hours as needed for Pain      Calcium Carb-Cholecalciferol 600-100 MG-UNIT CAPS Take by mouth      patient with agitation at home, unable to be safely managed. plan for labs to evaluation for occult infection, then admit for placement

## 2021-11-28 NOTE — ED ADULT NURSE REASSESSMENT NOTE - NS ED NURSE REASSESS COMMENT FT1
Patient alert and verbal, remains in stable condition. No episodes of agitations noted. Patient ambulated to the bathroom with assistance, voided freely. Bladder scan 30cc. No c/o pain/dicomfort verbalized.

## 2021-11-28 NOTE — H&P ADULT - PROBLEM SELECTOR PLAN 1
CT head no acute changes  - cont home meds Quietiapine QHS and Aricept  - fall precautions  - enhanced supervision  - assist with feeds  - soft diet  SW, CM and PT consulted

## 2021-11-28 NOTE — ED PROVIDER NOTE - OBJECTIVE STATEMENT
Patient with dementia Hypertension HLD here with agitation over past few days. 2 days ago patient became upset and threw a lot of things in the kitchen breaking things. today when son, who is the caretaker was trying to take her blood pressure she was trying to take an extra dose of her medication and became upset at the son. Patient lives with son and daughter in law. No recent illness, no vomiting diarrhea fever. Patient denies any pain, denies urinary symptoms. Son feels patient is not safe and is unable to be safely managed at home and requires placement. history obtained using Cantonese

## 2021-11-28 NOTE — H&P ADULT - PROBLEM SELECTOR PLAN 2
was Dx in Oct 1 year ago  was on Xarelto, not currently   please confirm meds  - cont heparin sc and asa

## 2021-11-28 NOTE — ED ADULT NURSE NOTE - ED STAT RN HANDOFF DETAILS
Report given to CHESTER Marr. Pt resting in bed, no acute distress noted, no shortness of breath indicated.

## 2021-11-28 NOTE — H&P ADULT - NSHPPHYSICALEXAM_GEN_ALL_CORE
PHYSICAL EXAMINATION:  GENERAL: NAD, well built, RA  HEAD:  Atraumatic, Normocephalic  EYES:  conjunctiva and sclera clear  NECK: Supple, No JVD, Normal thyroid  CHEST/LUNG: Clear to auscultation. Clear to percussion bilaterally; No rales, rhonchi, wheezing, or rubs  HEART: Regular rate and rhythm; No murmurs, rubs, or gallops  ABDOMEN: Soft, Nontender, Nondistended; Bowel sounds present  NERVOUS SYSTEM:  Alert & Oriented X0  EXTREMITIES:  2+ Peripheral Pulses, No clubbing, cyanosis, or edema  SKIN: warm dry

## 2021-11-28 NOTE — H&P ADULT - ASSESSMENT
81 yo F, from home, lives with son, billie w/ assist + walker, prior mckeon  for retention, DM2, HTN, HLD, dementia and PE (10/2021 was on xarelto) p/w dementia with progressive behavior disturbance for placement.     PLEASE CALL Pharmacy. I confirmed meds w/ fam but unsure about if she is taking xarelto, no bottle at home.  Pharmacy closed.

## 2021-11-28 NOTE — H&P ADULT - HISTORY OF PRESENT ILLNESS
81 yo F, from home, lives with son, billie w/ assist + walker, prior mckeon  for retention, DM2, HTN, HLD, dementia and PE (10/2021 was on xarelto) who was sent in by family for progressive behavioral distrubances over past month. Was previously only occasionally throwing a cup and remote but now is throwing things all day and is hard to control. Still compliant with medications. Family would like placement. Patient unable to provide any meaningful history 2/2 advanced dementia. Is calm pleasant on exam. Placed on Enhanced supervision. 79 yo F, from home, lives with son, billie w/ assist + walker, prior mckeon  for retention, DM2, HTN, HLD, dementia and PE (10/2020 was on xarelto) who was sent in by family for progressive behavioral distrubances over past month. Was previously only occasionally throwing a cup and remote but now is throwing things all day and is hard to control. Still compliant with medications. Family would like placement. Patient unable to provide any meaningful history 2/2 advanced dementia. Is calm pleasant on exam. Placed on Enhanced supervision.

## 2021-11-28 NOTE — ED ADULT NURSE NOTE - OBJECTIVE STATEMENT
Pt BIBA for AMS.  As per son, 2 days ago patient became upset and threw a lot of things in the kitchen breaking things. Today son was trying to take her blood pressure and giver her medication and pt became upset at the son.  Upon assessment, pt is drowsy, arouses to verbal and tactile stimuli. No acute distress noted, denies chest pain, no shortness of breath indicated.

## 2021-11-29 DIAGNOSIS — F03.91 UNSPECIFIED DEMENTIA WITH BEHAVIORAL DISTURBANCE: ICD-10-CM

## 2021-11-29 LAB
A1C WITH ESTIMATED AVERAGE GLUCOSE RESULT: 10.6 % — HIGH (ref 4–5.6)
ALBUMIN SERPL ELPH-MCNC: 3.2 G/DL — LOW (ref 3.5–5)
ALP SERPL-CCNC: 61 U/L — SIGNIFICANT CHANGE UP (ref 40–120)
ALT FLD-CCNC: 20 U/L DA — SIGNIFICANT CHANGE UP (ref 10–60)
ANION GAP SERPL CALC-SCNC: 8 MMOL/L — SIGNIFICANT CHANGE UP (ref 5–17)
AST SERPL-CCNC: 12 U/L — SIGNIFICANT CHANGE UP (ref 10–40)
BASOPHILS # BLD AUTO: 0.03 K/UL — SIGNIFICANT CHANGE UP (ref 0–0.2)
BASOPHILS NFR BLD AUTO: 0.5 % — SIGNIFICANT CHANGE UP (ref 0–2)
BILIRUB SERPL-MCNC: 0.5 MG/DL — SIGNIFICANT CHANGE UP (ref 0.2–1.2)
BUN SERPL-MCNC: 17 MG/DL — SIGNIFICANT CHANGE UP (ref 7–18)
CALCIUM SERPL-MCNC: 8.9 MG/DL — SIGNIFICANT CHANGE UP (ref 8.4–10.5)
CHLORIDE SERPL-SCNC: 106 MMOL/L — SIGNIFICANT CHANGE UP (ref 96–108)
CO2 SERPL-SCNC: 25 MMOL/L — SIGNIFICANT CHANGE UP (ref 22–31)
COVID-19 NUCLEOCAPSID GAM AB INTERP: NEGATIVE — SIGNIFICANT CHANGE UP
COVID-19 NUCLEOCAPSID TOTAL GAM ANTIBODY RESULT: 0.07 INDEX — SIGNIFICANT CHANGE UP
COVID-19 SPIKE DOMAIN AB INTERP: POSITIVE
COVID-19 SPIKE DOMAIN ANTIBODY RESULT: >250 U/ML — HIGH
CREAT SERPL-MCNC: 0.86 MG/DL — SIGNIFICANT CHANGE UP (ref 0.5–1.3)
EOSINOPHIL # BLD AUTO: 0.19 K/UL — SIGNIFICANT CHANGE UP (ref 0–0.5)
EOSINOPHIL NFR BLD AUTO: 2.9 % — SIGNIFICANT CHANGE UP (ref 0–6)
ESTIMATED AVERAGE GLUCOSE: 258 MG/DL — HIGH (ref 68–114)
GLUCOSE BLDC GLUCOMTR-MCNC: 126 MG/DL — HIGH (ref 70–99)
GLUCOSE BLDC GLUCOMTR-MCNC: 146 MG/DL — HIGH (ref 70–99)
GLUCOSE BLDC GLUCOMTR-MCNC: 201 MG/DL — HIGH (ref 70–99)
GLUCOSE SERPL-MCNC: 118 MG/DL — HIGH (ref 70–99)
HCT VFR BLD CALC: 40.8 % — SIGNIFICANT CHANGE UP (ref 34.5–45)
HGB BLD-MCNC: 13.3 G/DL — SIGNIFICANT CHANGE UP (ref 11.5–15.5)
IMM GRANULOCYTES NFR BLD AUTO: 0.3 % — SIGNIFICANT CHANGE UP (ref 0–1.5)
LYMPHOCYTES # BLD AUTO: 1.58 K/UL — SIGNIFICANT CHANGE UP (ref 1–3.3)
LYMPHOCYTES # BLD AUTO: 24.2 % — SIGNIFICANT CHANGE UP (ref 13–44)
MAGNESIUM SERPL-MCNC: 4.8 MG/DL — HIGH (ref 1.6–2.6)
MCHC RBC-ENTMCNC: 28 PG — SIGNIFICANT CHANGE UP (ref 27–34)
MCHC RBC-ENTMCNC: 32.6 GM/DL — SIGNIFICANT CHANGE UP (ref 32–36)
MCV RBC AUTO: 85.9 FL — SIGNIFICANT CHANGE UP (ref 80–100)
MONOCYTES # BLD AUTO: 0.51 K/UL — SIGNIFICANT CHANGE UP (ref 0–0.9)
MONOCYTES NFR BLD AUTO: 7.8 % — SIGNIFICANT CHANGE UP (ref 2–14)
NEUTROPHILS # BLD AUTO: 4.21 K/UL — SIGNIFICANT CHANGE UP (ref 1.8–7.4)
NEUTROPHILS NFR BLD AUTO: 64.3 % — SIGNIFICANT CHANGE UP (ref 43–77)
NRBC # BLD: 0 /100 WBCS — SIGNIFICANT CHANGE UP (ref 0–0)
PHOSPHATE SERPL-MCNC: 4 MG/DL — SIGNIFICANT CHANGE UP (ref 2.5–4.5)
PLATELET # BLD AUTO: 291 K/UL — SIGNIFICANT CHANGE UP (ref 150–400)
POTASSIUM SERPL-MCNC: 4.1 MMOL/L — SIGNIFICANT CHANGE UP (ref 3.5–5.3)
POTASSIUM SERPL-SCNC: 4.1 MMOL/L — SIGNIFICANT CHANGE UP (ref 3.5–5.3)
PROT SERPL-MCNC: 7 G/DL — SIGNIFICANT CHANGE UP (ref 6–8.3)
RBC # BLD: 4.75 M/UL — SIGNIFICANT CHANGE UP (ref 3.8–5.2)
RBC # FLD: 13.4 % — SIGNIFICANT CHANGE UP (ref 10.3–14.5)
SARS-COV-2 IGG+IGM SERPL QL IA: 0.07 INDEX — SIGNIFICANT CHANGE UP
SARS-COV-2 IGG+IGM SERPL QL IA: >250 U/ML — HIGH
SARS-COV-2 IGG+IGM SERPL QL IA: NEGATIVE — SIGNIFICANT CHANGE UP
SARS-COV-2 IGG+IGM SERPL QL IA: POSITIVE
SODIUM SERPL-SCNC: 139 MMOL/L — SIGNIFICANT CHANGE UP (ref 135–145)
WBC # BLD: 6.54 K/UL — SIGNIFICANT CHANGE UP (ref 3.8–10.5)
WBC # FLD AUTO: 6.54 K/UL — SIGNIFICANT CHANGE UP (ref 3.8–10.5)

## 2021-11-29 RX ORDER — HALOPERIDOL DECANOATE 100 MG/ML
1 INJECTION INTRAMUSCULAR ONCE
Refills: 0 | Status: COMPLETED | OUTPATIENT
Start: 2021-11-29 | End: 2021-11-29

## 2021-11-29 RX ADMIN — Medication 2: at 11:07

## 2021-11-29 RX ADMIN — HALOPERIDOL DECANOATE 1 MILLIGRAM(S): 100 INJECTION INTRAMUSCULAR at 14:54

## 2021-11-29 RX ADMIN — HEPARIN SODIUM 5000 UNIT(S): 5000 INJECTION INTRAVENOUS; SUBCUTANEOUS at 14:44

## 2021-11-29 RX ADMIN — HEPARIN SODIUM 5000 UNIT(S): 5000 INJECTION INTRAVENOUS; SUBCUTANEOUS at 07:04

## 2021-11-29 RX ADMIN — Medication 325 MILLIGRAM(S): at 17:55

## 2021-11-29 RX ADMIN — Medication 5 MILLIGRAM(S): at 00:06

## 2021-11-29 RX ADMIN — QUETIAPINE FUMARATE 50 MILLIGRAM(S): 200 TABLET, FILM COATED ORAL at 22:47

## 2021-11-29 RX ADMIN — Medication 325 MILLIGRAM(S): at 07:04

## 2021-11-29 RX ADMIN — Medication 500 MILLIGRAM(S): at 11:07

## 2021-11-29 RX ADMIN — CARVEDILOL PHOSPHATE 3.12 MILLIGRAM(S): 80 CAPSULE, EXTENDED RELEASE ORAL at 07:05

## 2021-11-29 RX ADMIN — Medication 1 DROP(S): at 07:04

## 2021-11-29 RX ADMIN — CARVEDILOL PHOSPHATE 3.12 MILLIGRAM(S): 80 CAPSULE, EXTENDED RELEASE ORAL at 17:55

## 2021-11-29 RX ADMIN — PANTOPRAZOLE SODIUM 40 MILLIGRAM(S): 20 TABLET, DELAYED RELEASE ORAL at 07:04

## 2021-11-29 RX ADMIN — ATORVASTATIN CALCIUM 20 MILLIGRAM(S): 80 TABLET, FILM COATED ORAL at 22:47

## 2021-11-29 RX ADMIN — Medication 1 DROP(S): at 17:40

## 2021-11-29 RX ADMIN — AMLODIPINE BESYLATE 10 MILLIGRAM(S): 2.5 TABLET ORAL at 07:05

## 2021-11-29 RX ADMIN — Medication 81 MILLIGRAM(S): at 11:08

## 2021-11-29 RX ADMIN — HEPARIN SODIUM 5000 UNIT(S): 5000 INJECTION INTRAVENOUS; SUBCUTANEOUS at 22:48

## 2021-11-29 NOTE — PHYSICAL THERAPY INITIAL EVALUATION ADULT - ASSISTIVE DEVICE FOR TRANSFER: CHAIR/BED, REHAB EVAL
03/31/2017  Bashir Ramos is a 80 y.o., male.    [facility] Anesthesia Evaluation    I have reviewed the Patient Summary Reports.    I have reviewed the Nursing Notes.   I have reviewed the Medications.     Review of Systems  Anesthesia Hx:  No problems with previous Anesthesia  History of prior surgery of interest to airway management or planning: Denies Family Hx of Anesthesia complications.   Denies Personal Hx of Anesthesia complications.   Social:  Non-Smoker    Cardiovascular:   Exercise tolerance: good Hypertension Denies CAD.       Pulmonary:  Pulmonary Normal    Renal/:  Renal/ Normal     Hepatic/GI:  Hepatic/GI Normal    Endocrine:  Endocrine Normal        Physical Exam  General:  Well nourished    Airway/Jaw/Neck:  Airway Findings: Mouth Opening: Normal Tongue: Normal  Mallampati: I  TM Distance: Normal, at least 6 cm  Jaw/Neck Findings:  Neck ROM: Normal ROM      Dental:  Dental Findings: Upper Dentures   Chest/Lungs:  Chest/Lungs Findings: Clear to auscultation, Normal Respiratory Rate     Heart/Vascular:  Heart Findings: Rate: Normal  Rhythm: Regular Rhythm        Mental Status:  Mental Status Findings:  Cooperative, Alert and Oriented         Anesthesia Plan  Type of Anesthesia, risks & benefits discussed:  Anesthesia Type:  MAC, general  Patient's Preference:   Intra-op Monitoring Plan:   Intra-op Monitoring Plan Comments:   Post Op Pain Control Plan:   Post Op Pain Control Plan Comments:   Induction:   IV  Beta Blocker:  Patient is not currently on a Beta-Blocker (No further documentation required).       Informed Consent: Patient understands risks and agrees with Anesthesia plan.  Questions answered. Anesthesia consent signed with patient.  ASA Score: 2     Day of Surgery Review of History & Physical:    H&P update referred to the surgeon.         Ready For Surgery From Anesthesia Perspective.  rolling walker

## 2021-11-29 NOTE — PHYSICAL THERAPY INITIAL EVALUATION ADULT - GENERAL OBSERVATIONS, REHAB EVAL
Pt. received supine in bed, NAD, cooperative and motivated during eval.  Pt. A&O x 1. Pt is orientated to her name but is unaware of her , the date or where she is. Pt speaks cantonese .

## 2021-11-29 NOTE — PHYSICAL THERAPY INITIAL EVALUATION ADULT - ACTIVE RANGE OF MOTION EXAMINATION, REHAB EVAL
L SH fl 120/Right UE Active ROM was WNL (within normal limits)/bilateral lower extremity Active ROM was WNL (within normal limits)

## 2021-11-29 NOTE — PHYSICAL THERAPY INITIAL EVALUATION ADULT - DIAGNOSIS, PT EVAL
Overall decline in functional mobility due to dementia progression causing generalized weakness, decreased balance and endurance.

## 2021-11-29 NOTE — PHYSICAL THERAPY INITIAL EVALUATION ADULT - PASSIVE RANGE OF MOTION EXAMINATION, REHAB EVAL
Right UE Passive ROM was WNL (within normal limits)/bilateral lower extremity Passive ROM was WNL L SH fl 120/Right UE Passive ROM was WNL (within normal limits)/bilateral lower extremity Passive ROM was WNL

## 2021-11-29 NOTE — PROGRESS NOTE ADULT - PROBLEM SELECTOR PLAN 1
Wanders around with periods of agitation  - CT head no acute changes  - Cont home meds Quietiapine QHS and Aricept  - Required Haldol 1mg PO x 1 today  - Fall precautions  - Soft diet  -SW/CM consulted  -PT consulted

## 2021-11-29 NOTE — PHYSICAL THERAPY INITIAL EVALUATION ADULT - PERTINENT HX OF CURRENT PROBLEM, REHAB EVAL
Pt has a PMH of Dementia, HTN, DM2, HLD, PE, Progressive behavioral disturbances. Pt was admitted for Restlessness of Dementia progression.

## 2021-11-30 DIAGNOSIS — Z02.9 ENCOUNTER FOR ADMINISTRATIVE EXAMINATIONS, UNSPECIFIED: ICD-10-CM

## 2021-11-30 LAB
GLUCOSE BLDC GLUCOMTR-MCNC: 134 MG/DL — HIGH (ref 70–99)
GLUCOSE BLDC GLUCOMTR-MCNC: 136 MG/DL — HIGH (ref 70–99)
GLUCOSE BLDC GLUCOMTR-MCNC: 193 MG/DL — HIGH (ref 70–99)

## 2021-11-30 RX ORDER — TIMOLOL 0.5 %
1 DROPS OPHTHALMIC (EYE)
Refills: 0 | Status: DISCONTINUED | OUTPATIENT
Start: 2021-11-30 | End: 2021-12-02

## 2021-11-30 RX ORDER — NITROGLYCERIN 6.5 MG
1 CAPSULE, EXTENDED RELEASE ORAL
Qty: 0 | Refills: 0 | DISCHARGE

## 2021-11-30 RX ADMIN — Medication 1 DROP(S): at 06:48

## 2021-11-30 RX ADMIN — HEPARIN SODIUM 5000 UNIT(S): 5000 INJECTION INTRAVENOUS; SUBCUTANEOUS at 14:00

## 2021-11-30 RX ADMIN — Medication 325 MILLIGRAM(S): at 06:49

## 2021-11-30 RX ADMIN — CARVEDILOL PHOSPHATE 3.12 MILLIGRAM(S): 80 CAPSULE, EXTENDED RELEASE ORAL at 06:49

## 2021-11-30 RX ADMIN — Medication 500 MILLIGRAM(S): at 11:29

## 2021-11-30 RX ADMIN — HEPARIN SODIUM 5000 UNIT(S): 5000 INJECTION INTRAVENOUS; SUBCUTANEOUS at 23:10

## 2021-11-30 RX ADMIN — HEPARIN SODIUM 5000 UNIT(S): 5000 INJECTION INTRAVENOUS; SUBCUTANEOUS at 06:48

## 2021-11-30 RX ADMIN — Medication 1: at 12:05

## 2021-11-30 RX ADMIN — ATORVASTATIN CALCIUM 20 MILLIGRAM(S): 80 TABLET, FILM COATED ORAL at 23:10

## 2021-11-30 RX ADMIN — PANTOPRAZOLE SODIUM 40 MILLIGRAM(S): 20 TABLET, DELAYED RELEASE ORAL at 06:49

## 2021-11-30 RX ADMIN — AMLODIPINE BESYLATE 10 MILLIGRAM(S): 2.5 TABLET ORAL at 06:49

## 2021-11-30 RX ADMIN — Medication 81 MILLIGRAM(S): at 11:29

## 2021-11-30 RX ADMIN — Medication 325 MILLIGRAM(S): at 17:06

## 2021-11-30 RX ADMIN — QUETIAPINE FUMARATE 50 MILLIGRAM(S): 200 TABLET, FILM COATED ORAL at 23:09

## 2021-11-30 RX ADMIN — CARVEDILOL PHOSPHATE 3.12 MILLIGRAM(S): 80 CAPSULE, EXTENDED RELEASE ORAL at 17:06

## 2021-11-30 RX ADMIN — Medication 1 DROP(S): at 17:06

## 2021-11-30 NOTE — PROGRESS NOTE ADULT - PROBLEM SELECTOR PLAN 2
Meds confirmed with pharmacy  Xarelto completed in June 2021  c/w ASA and heparin sc Meds confirmed with pharmacy  Xarelto completed in June 2021  c/w heparin sc Meds confirmed with pharmacy, not taking NOAC  c/w heparin sc  HemOnc QMA

## 2021-11-30 NOTE — PROGRESS NOTE ADULT - PROBLEM SELECTOR PLAN 1
worsening advanced dementia with behavioral distrubances  CT head no acute changes  c/w seroquel  Fall precautions  Soft diet  SW/CM consulted  Psych Dr. [place] worsening advanced dementia with behavioral distrubances  CT head no acute changes  c/w seroquel  Fall precautions  Soft diet  repeat AM labs weekly  SW/CM consulted  Psych Dr. [place] worsening advanced dementia with behavioral disturbances  CT head no acute changes  c/w Seroquel  Fall precautions  Soft diet  repeat AM labs weekly  SW/CM consulted  Psych Dr. [place]

## 2021-11-30 NOTE — DOWNTIME INTERRUPTION NOTE - WHICH MANUAL FORMS INITIATED?
Documentation hold for POC and A&I flowsheet. See paper record for additional documentation recorded during downtime.

## 2021-12-01 ENCOUNTER — TRANSCRIPTION ENCOUNTER (OUTPATIENT)
Age: 81
End: 2021-12-01

## 2021-12-01 LAB
-  AMIKACIN: SIGNIFICANT CHANGE UP
-  AMOXICILLIN/CLAVULANIC ACID: SIGNIFICANT CHANGE UP
-  AMPICILLIN/SULBACTAM: SIGNIFICANT CHANGE UP
-  AMPICILLIN: SIGNIFICANT CHANGE UP
-  AZTREONAM: SIGNIFICANT CHANGE UP
-  CEFAZOLIN: SIGNIFICANT CHANGE UP
-  CEFEPIME: SIGNIFICANT CHANGE UP
-  CEFOXITIN: SIGNIFICANT CHANGE UP
-  CEFTRIAXONE: SIGNIFICANT CHANGE UP
-  CIPROFLOXACIN: SIGNIFICANT CHANGE UP
-  ERTAPENEM: SIGNIFICANT CHANGE UP
-  GENTAMICIN: SIGNIFICANT CHANGE UP
-  IMIPENEM: SIGNIFICANT CHANGE UP
-  LEVOFLOXACIN: SIGNIFICANT CHANGE UP
-  MEROPENEM: SIGNIFICANT CHANGE UP
-  NITROFURANTOIN: SIGNIFICANT CHANGE UP
-  PIPERACILLIN/TAZOBACTAM: SIGNIFICANT CHANGE UP
-  TIGECYCLINE: SIGNIFICANT CHANGE UP
-  TOBRAMYCIN: SIGNIFICANT CHANGE UP
-  TRIMETHOPRIM/SULFAMETHOXAZOLE: SIGNIFICANT CHANGE UP
CULTURE RESULTS: SIGNIFICANT CHANGE UP
GLUCOSE BLDC GLUCOMTR-MCNC: 125 MG/DL — HIGH (ref 70–99)
GLUCOSE BLDC GLUCOMTR-MCNC: 147 MG/DL — HIGH (ref 70–99)
GLUCOSE BLDC GLUCOMTR-MCNC: 173 MG/DL — HIGH (ref 70–99)
GLUCOSE BLDC GLUCOMTR-MCNC: 193 MG/DL — HIGH (ref 70–99)
METHOD TYPE: SIGNIFICANT CHANGE UP
ORGANISM # SPEC MICROSCOPIC CNT: SIGNIFICANT CHANGE UP
ORGANISM # SPEC MICROSCOPIC CNT: SIGNIFICANT CHANGE UP
SPECIMEN SOURCE: SIGNIFICANT CHANGE UP

## 2021-12-01 PROCEDURE — 93970 EXTREMITY STUDY: CPT | Mod: 26

## 2021-12-01 RX ORDER — LATANOPROST 0.05 MG/ML
1 SOLUTION/ DROPS OPHTHALMIC; TOPICAL
Qty: 0 | Refills: 0 | DISCHARGE
Start: 2021-12-01

## 2021-12-01 RX ORDER — TIMOLOL 0.5 %
1 DROPS OPHTHALMIC (EYE)
Qty: 0 | Refills: 0 | DISCHARGE
Start: 2021-12-01

## 2021-12-01 RX ORDER — CARVEDILOL PHOSPHATE 80 MG/1
3.12 CAPSULE, EXTENDED RELEASE ORAL ONCE
Refills: 0 | Status: COMPLETED | OUTPATIENT
Start: 2021-12-01 | End: 2021-12-01

## 2021-12-01 RX ORDER — AMLODIPINE BESYLATE 2.5 MG/1
10 TABLET ORAL ONCE
Refills: 0 | Status: COMPLETED | OUTPATIENT
Start: 2021-12-01 | End: 2021-12-01

## 2021-12-01 RX ORDER — DONEPEZIL HYDROCHLORIDE 10 MG/1
1 TABLET, FILM COATED ORAL
Qty: 0 | Refills: 0 | DISCHARGE

## 2021-12-01 RX ORDER — PANTOPRAZOLE SODIUM 20 MG/1
1 TABLET, DELAYED RELEASE ORAL
Qty: 0 | Refills: 0 | DISCHARGE
Start: 2021-12-01

## 2021-12-01 RX ORDER — ASCORBIC ACID 60 MG
1 TABLET,CHEWABLE ORAL
Qty: 0 | Refills: 0 | DISCHARGE
Start: 2021-12-01

## 2021-12-01 RX ORDER — ASCORBIC ACID 60 MG
1 TABLET,CHEWABLE ORAL
Qty: 0 | Refills: 0 | DISCHARGE

## 2021-12-01 RX ADMIN — LATANOPROST 1 DROP(S): 0.05 SOLUTION/ DROPS OPHTHALMIC; TOPICAL at 22:10

## 2021-12-01 RX ADMIN — AMLODIPINE BESYLATE 10 MILLIGRAM(S): 2.5 TABLET ORAL at 11:52

## 2021-12-01 RX ADMIN — Medication 1 DROP(S): at 17:29

## 2021-12-01 RX ADMIN — CARVEDILOL PHOSPHATE 3.12 MILLIGRAM(S): 80 CAPSULE, EXTENDED RELEASE ORAL at 11:51

## 2021-12-01 RX ADMIN — HEPARIN SODIUM 5000 UNIT(S): 5000 INJECTION INTRAVENOUS; SUBCUTANEOUS at 14:46

## 2021-12-01 RX ADMIN — Medication 1 DROP(S): at 17:30

## 2021-12-01 RX ADMIN — QUETIAPINE FUMARATE 50 MILLIGRAM(S): 200 TABLET, FILM COATED ORAL at 22:10

## 2021-12-01 RX ADMIN — Medication 1: at 16:51

## 2021-12-01 RX ADMIN — CARVEDILOL PHOSPHATE 3.12 MILLIGRAM(S): 80 CAPSULE, EXTENDED RELEASE ORAL at 17:29

## 2021-12-01 RX ADMIN — Medication 500 MILLIGRAM(S): at 11:52

## 2021-12-01 RX ADMIN — ATORVASTATIN CALCIUM 20 MILLIGRAM(S): 80 TABLET, FILM COATED ORAL at 22:10

## 2021-12-01 RX ADMIN — HEPARIN SODIUM 5000 UNIT(S): 5000 INJECTION INTRAVENOUS; SUBCUTANEOUS at 22:10

## 2021-12-01 RX ADMIN — Medication 325 MILLIGRAM(S): at 17:29

## 2021-12-01 RX ADMIN — Medication 1: at 08:40

## 2021-12-01 NOTE — PROGRESS NOTE ADULT - PROBLEM SELECTOR PLAN 1
worsening advanced dementia with behavioral disturbances  CT head no acute changes  c/w Seroquel  Fall precautions  Soft diet  repeat AM labs weekly  SW/CM consulted  Psych Dr. [place]

## 2021-12-01 NOTE — PROGRESS NOTE ADULT - PROBLEM SELECTOR PLAN 2
Meds confirmed with pharmacy, not taking NOAC  Unsure management for PE   unable to get in contact with primary care physician  Maria Fernanda FERNANDEZ Meds confirmed with pharmacy, not taking NOAC  f/u B/L doppler for DVT-if positive will need IVC filter due to history of GIB  HemOnc QMA

## 2021-12-01 NOTE — DISCHARGE NOTE PROVIDER - NSDCCPCAREPLAN_GEN_ALL_CORE_FT
PRINCIPAL DISCHARGE DIAGNOSIS  Diagnosis: Dementia with behavioral disturbance  Assessment and Plan of Treatment: Please continue taking your medication as prescribed. If you have any questions or concerns about your medication please direct them to your prescribing Healthcare Provider.        SECONDARY DISCHARGE DIAGNOSES  Diagnosis: Personal history of PE (pulmonary embolism)  Assessment and Plan of Treatment: Your pharmacist has verified that you are not a NOAC. Please follow up with your Primary Care Physician for further management as outpatient.    Diagnosis: DM (diabetes mellitus)  Assessment and Plan of Treatment: Please continue taking your medication as prescribed. If you have any questions or concerns about your medication please direct them to your prescribing Healthcare Provider.  When your blood sugar is elevated you may experience:  -excessive thirst, fatigue, hunger or sweating  -nausea or vomiting  -bedwetting or excessive urination  -blurred vision, fast heart rate, headache  Please monitor signs of Hypoglycemia (low Blood Sugar) Blood Sugar <70. Pleae closely monitor your finger stick to tightly manage your blood sugar. You blood sugar should be between  mg/dl. Please follow up with your [facility] physician in a week.  Please follow up with Endocrinology in 2 weekss.      Diagnosis: HTN (hypertension)  Assessment and Plan of Treatment: WHAT YOU NEED TO KNOW:  Hypertension is high blood pressure. Your blood pressure is the force of your blood moving against the walls of your arteries. Hypertension causes your blood pressure to get so high that your heart has to work much harder than normal. This can damage your heart. The cause of hypertension may not be known. This is called essential or primary hypertension. Hypertension caused by another medical condition, such as kidney disease, is called secondary hypertension.  DISCHARGE INSTRUCTIONS:  Call your local emergency number (911 in the [place]) or have someone call if:  You have chest pain.  You have any of the following signs of a heart attack:  Squeezing, pressure, or pain in your chest  You may also have any of the following:  Discomfort or pain in your back, neck, jaw, stomach, or arm  Shortness of breath  Nausea or vomiting  Lightheadedness or a sudden cold sweat  You become confused or have trouble speaking.  You suddenly feel lightheaded or have trouble breathing.  Seek care immediately if:  You have a severe headache or vision loss.  You have weakness in an arm or leg.  Please continue taking your medication as prescribed. If you have any questions or concerns about your medication please direct them to your prescribing Healthcare Provider.  Please follow up with Primary Care Physician in 1 week for futher management.       PRINCIPAL DISCHARGE DIAGNOSIS  Diagnosis: Dementia with behavioral disturbance  Assessment and Plan of Treatment: Please continue taking your medication as prescribed. If you have any questions or concerns about your medication please direct them to your prescribing Healthcare Provider.        SECONDARY DISCHARGE DIAGNOSES  Diagnosis: Personal history of PE (pulmonary embolism)  Assessment and Plan of Treatment: Your pharmacist has verified that you are not a NOAC.   Hematologist Onconcologist recommending [id number]    Diagnosis: DM (diabetes mellitus)  Assessment and Plan of Treatment: Please continue taking your medication as prescribed. If you have any questions or concerns about your medication please direct them to your prescribing Healthcare Provider.  When your blood sugar is elevated you may experience:  -excessive thirst, fatigue, hunger or sweating  -nausea or vomiting  -bedwetting or excessive urination  -blurred vision, fast heart rate, headache  Please monitor signs of Hypoglycemia (low Blood Sugar) Blood Sugar <70. Pleae closely monitor your finger stick to tightly manage your blood sugar. You blood sugar should be between  mg/dl. Please follow up with your [facility] physician in a week.  Please follow up with Endocrinology in 2 weekss.      Diagnosis: HTN (hypertension)  Assessment and Plan of Treatment: WHAT YOU NEED TO KNOW:  Hypertension is high blood pressure. Your blood pressure is the force of your blood moving against the walls of your arteries. Hypertension causes your blood pressure to get so high that your heart has to work much harder than normal. This can damage your heart. The cause of hypertension may not be known. This is called essential or primary hypertension. Hypertension caused by another medical condition, such as kidney disease, is called secondary hypertension.  DISCHARGE INSTRUCTIONS:  Call your local emergency number (911 in the [place]) or have someone call if:  You have chest pain.  You have any of the following signs of a heart attack:  Squeezing, pressure, or pain in your chest  You may also have any of the following:  Discomfort or pain in your back, neck, jaw, stomach, or arm  Shortness of breath  Nausea or vomiting  Lightheadedness or a sudden cold sweat  You become confused or have trouble speaking.  You suddenly feel lightheaded or have trouble breathing.  Seek care immediately if:  You have a severe headache or vision loss.  You have weakness in an arm or leg.  Please continue taking your medication as prescribed. If you have any questions or concerns about your medication please direct them to your prescribing Healthcare Provider.  Please follow up with Primary Care Physician in 1 week for futher management.       PRINCIPAL DISCHARGE DIAGNOSIS  Diagnosis: Dementia with behavioral disturbance  Assessment and Plan of Treatment: Please continue taking your medication as prescribed. If you have any questions or concerns about your medication please direct them to your prescribing Healthcare Provider.        SECONDARY DISCHARGE DIAGNOSES  Diagnosis: Personal history of PE (pulmonary embolism)  Assessment and Plan of Treatment: Your pharmacist has verified that you are not a NOAC.   Doppler study restuling negative for B/L LE DVT  Hematologist Onconcologist recommending monitor for DVT and PE.  Please follow up with your [facility] physician for further management.    Diagnosis: DM (diabetes mellitus)  Assessment and Plan of Treatment: Please continue taking your medication as prescribed. If you have any questions or concerns about your medication please direct them to your prescribing Healthcare Provider.  When your blood sugar is elevated you may experience:  -excessive thirst, fatigue, hunger or sweating  -nausea or vomiting  -bedwetting or excessive urination  -blurred vision, fast heart rate, headache  Please monitor signs of Hypoglycemia (low Blood Sugar) Blood Sugar <70. Pleae closely monitor your finger stick to tightly manage your blood sugar. You blood sugar should be between  mg/dl. Please follow up with your [facility] physician in a week.  Please follow up with Endocrinology in 2 weekss.      Diagnosis: HTN (hypertension)  Assessment and Plan of Treatment: WHAT YOU NEED TO KNOW:  Hypertension is high blood pressure. Your blood pressure is the force of your blood moving against the walls of your arteries. Hypertension causes your blood pressure to get so high that your heart has to work much harder than normal. This can damage your heart. The cause of hypertension may not be known. This is called essential or primary hypertension. Hypertension caused by another medical condition, such as kidney disease, is called secondary hypertension.  DISCHARGE INSTRUCTIONS:  Call your local emergency number (911 in the [place]) or have someone call if:  You have chest pain.  You have any of the following signs of a heart attack:  Squeezing, pressure, or pain in your chest  You may also have any of the following:  Discomfort or pain in your back, neck, jaw, stomach, or arm  Shortness of breath  Nausea or vomiting  Lightheadedness or a sudden cold sweat  You become confused or have trouble speaking.  You suddenly feel lightheaded or have trouble breathing.  Seek care immediately if:  You have a severe headache or vision loss.  You have weakness in an arm or leg.  Please continue taking your medication as prescribed. If you have any questions or concerns about your medication please direct them to your prescribing Healthcare Provider.  Please follow up with Primary Care Physician in 1 week for futher management.

## 2021-12-01 NOTE — DISCHARGE NOTE PROVIDER - HOSPITAL COURSE
Patient is a 80 year old Female, from home, lives with son, ambulates w/ assist + walker, urinary retention, DM2, HTN, HLD, dementia and PE (10/2021 was on xarelto). Patient presented to ED due to progressive behavior disturbance secondary to worsening dementia. Patient agitated at times, wanders around with steady gate. Patient family unable to care for patient at home due to progressively worsening dementia. Patient admitted to medicine for worsening advanced dementia with behavior disturbances. CT head no acute changes.    Please note that this a brief summary of hospital course please refer to daily progress notes and consult notes for full course and events. Patient seen and examined at bedside, discussed with medical attending. Patient medically cleared for discharge to LTC.      Patient is a 80 year old Female, from home, lives with son, ambulates w/ assist + walker, urinary retention, DM2, HTN, HLD, dementia and PE (10/2021 was on xarelto). Patient presented to ED due to progressive behavior disturbance secondary to worsening dementia. Patient agitated at times, wanders around with steady gate. Patient family unable to care for patient at home due to progressively worsening dementia. Patient admitted to medicine for worsening advanced dementia with behavior disturbances. CT head no acute changes.  B/L doppler study done, negative for DVT, will need observation as starting DOAC be more of risk than benefit, HemOnc consulted.   Please note that this a brief summary of hospital course please refer to daily progress notes and consult notes for full course and events. Patient seen and examined at bedside, discussed with medical attending. Patient medically cleared for discharge to LTC.

## 2021-12-01 NOTE — PROGRESS NOTE ADULT - ASSESSMENT
Patient is a 80 year old Female, from home, lives with son, ambulates w/ assist + walker, urinary retention, DM2, HTN, HLD, dementia and PE (10/2021 was on xarelto), GIB. Patient presented to ED due to progressive behavior disturbance secondary to worsening dementia. Patient agitated at times, wanders around with steady gate. Patient family unable to care for patient at home due to progressively worsening dementia. Patient admitted to medicine for worsening advanced dementia with behavior disturbances.    
Patient is a 80 year old Female, from home, lives with son, ambulates w/ assist + walker, urinary retention, DM2, HTN, HLD, dementia and PE (10/2021 was on xarelto). Patient presented to ED due to progressive behavior disturbance secondary to worsening dementia. Patient agitated at times, wanders around with steady gate. Patient family unable to care for patient at home due to progressively worsening dementia. Patient admitted to medicine for worsening advanced dementia with behavior disturbances.    
81 yo F, from home, lives with son, billie w/ assist + walker, prior mckeon  for retention, DM2, HTN, HLD, dementia and PE (10/2021 was on xarelto) p/w dementia with progressive behavior disturbance for placement.   Pt. noted agitated at times, wanders around with steady gate.  Spoke to pt.'s son and DIL, updated them re pt.'s condition.  They both are interested in LTC for pt. CM following.

## 2021-12-01 NOTE — CONSULT NOTE ADULT - ATTENDING COMMENTS
I have examined the patient at bedside and reviewed patient's data and participated in the management of the patient along with Linda PATEL as well as hemotology/med oncology faculty consisting of Dr. Byron Zuñiga, Dr. JOSE LUIS Pastor, Dr. ALEXSANDRA Meyers, Dr. Jordan Wheat, Dr. Peg Delaney, Dr. Aleksander Lawson as well as myself during the daily heme/onc case review. I reviewed pertinent clinical information, PE,  labs as well as A/P as outline above.    Recommend no anticoagulation as risks of bleeding outweigh benefits of recurrent thrombosis. Doppler study just completed is negative for DVT. Recommend observation at this time.

## 2021-12-01 NOTE — PROGRESS NOTE ADULT - PROBLEM SELECTOR PROBLEM 1
Dementia with behavioral disturbance

## 2021-12-01 NOTE — CONSULT NOTE ADULT - SUBJECTIVE AND OBJECTIVE BOX
Reason for Admission: Behavioral disturbance in dementia  History of Present Illness:   79 yo F, from home, lives with son, billie w/ assist + walker, prior mckeon  for retention, DM2, HTN, HLD, dementia and PE (10/2020 was on xarelto) who was sent in by family for progressive behavioral distrubances over past month. Was previously only occasionally throwing a cup and remote but now is throwing things all day and is hard to control. Still compliant with medications. Family would like placement. Patient unable to provide any meaningful history 2/2 advanced dementia. Is calm pleasant on exam. Placed on Enhanced supervision.        Review of Systems: ROS Unable to obtain      Allergies and Intolerances:        Allergies:  	Allergy Status Unknown:     Home Medications:   * Incomplete Medication History as of 28-Nov-2021 20:14 documented in Structured Notes  · 	pantoprazole 40 mg oral delayed release tablet: Last Dose Taken:  , 1 tab(s) orally once a day (before a meal)  · 	latanoprost 0.005% ophthalmic solution: Last Dose Taken:  , 1 drop(s) to each affected eye once a day (at bedtime)  · 	timolol maleate 0.25% ophthalmic solution: Last Dose Taken:  , 1 drop(s) to each affected eye 2 times a day  · 	ocular lubricant ophthalmic solution: Last Dose Taken:  , 1 drop(s) to each affected eye 2 times a day  · 	aspirin 81 mg oral tablet, chewable: Last Dose Taken:  , 1 tab(s) orally once a day  · 	atorvastatin 20 mg oral tablet: Last Dose Taken:  , 1 tab(s) orally once a day (at bedtime)  · 	rivaroxaban 20 mg oral tablet: Last Dose Taken:  , 1 tab(s) orally once a day (in the evening)  · 	ferrous sulfate 325 mg (65 mg elemental iron) oral tablet: Last Dose Taken:  , 1 tab(s) orally 2 times a day  · 	QUEtiapine 50 mg oral tablet, extended release: Last Dose Taken:  , 1 tab(s) orally once a day (in the evening)  · 	amLODIPine 10 mg oral tablet: Last Dose Taken:  , 1 tab(s) orally once a day  · 	carvedilol 3.125 mg oral tablet: Last Dose Taken:  , 1 tab(s) orally 2 times a day  · 	Aricept 5 mg oral tablet: Last Dose Taken:  , 1 tab(s) orally once a day (at bedtime)  · 	Nitrostat 0.4 mg sublingual tablet: Last Dose Taken:  , 1 tab(s) sublingual , As Needed  · 	Janumet  mg-1000 mg oral tablet, extended release: Last Dose Taken:  , 1 tab(s) orally once a day (in the evening)  · 	Vitamin C 500 mg oral capsule: Last Dose Taken:  , 1 cap(s) orally once a day with food    .    Patient History:    Past Medical, Past Surgical, and Family History:  PAST MEDICAL HISTORY:  Dementia     DM (diabetes mellitus)     HTN (hypertension)     HTN (hypertension)     Hyperlipidemia     Pulmonary embolism dx Oct 2020 was on xarelto.     PAST SURGICAL HISTORY:  No pertinent past surgical history.     FAMILY HISTORY:  FH: hypertension, father and mother.     Social History:  Social History (marital status, living situation, occupation, tobacco use, alcohol and drug use, and sexual history): from home  fam wants NH      Vital Signs Last 24 Hrs  T(C): 36.4 (01 Dec 2021 05:03), Max: 37.2 (30 Nov 2021 14:31)  T(F): 97.5 (01 Dec 2021 05:03), Max: 98.9 (30 Nov 2021 14:31)  HR: 80 (01 Dec 2021 05:03) (72 - 80)  BP: 137/81 (01 Dec 2021 05:03) (129/91 - 172/72)  BP(mean): --  RR: 17 (01 Dec 2021 05:03) (16 - 18)  SpO2: 96% (01 Dec 2021 05:03) (96% - 99%)    GEN: NAD; A and O x 2, pt laying on the opposite side of the bed  LUNGS: CTA B/L  HEART: S1 S2  ABDOMEN: soft, non-tender, non-distended, + BS  EXTREMITIES: no edema      LABS:    Culture - Urine (collected 28 Nov 2021 22:56)  Source: Clean Catch Clean Catch (Midstream)  Final Report (01 Dec 2021 09:22):    10,000 - 49,000 CFU/mL Escherichia coli    Normal Urogenital sylvain present  Organism: Escherichia coli (01 Dec 2021 09:22)  Organism: Escherichia coli (01 Dec 2021 09:22)      COVID-19 PCR: NotDetec (28 Nov 2021 16:51)    < from: CT Head No Cont (11.28.21 @ 18:06) >    EXAM:  CT BRAIN                            PROCEDURE DATE:  11/28/2021          INTERPRETATION:  Clinical indication: Agitation.    Multiple axial sections were performed from base skull to vertex without contrast enhancement. Coronal and sagittal destructions were performed as well    This exam is compared prior head CT performed on October 20, 2020.    Parenchymal volume loss and chronic microvascular changes are again seen and unchanged    Evaluation of the osseous structures with the appropriate window appears normal    The visualized paranasal sinuses mastoid and middle ear regions appear clear.    IMPRESSION: No significant change when allowing for differences in technique.    < end of copied text >       Reason for Admission: Behavioral disturbance in dementia  History of Present Illness:   81 yo F, from home, lives with son, billie w/ assist + walker, prior mckeon  for retention, DM2, HTN, HLD, dementia and PE (10/2020 was on xarelto) who was sent in by family for progressive behavioral distrubances over past month. Was previously only occasionally throwing a cup and remote but now is throwing things all day and is hard to control. Still compliant with medications. Family would like placement. Patient unable to provide any meaningful history 2/2 advanced dementia. Is calm pleasant on exam. Placed on Enhanced supervision.    Review of Systems: ROS Unable to obtain  #[id number]      Allergies and Intolerances:        Allergies:  	Allergy Status Unknown:     Home Medications:   * Incomplete Medication History as of 28-Nov-2021 20:14 documented in Structured Notes  · 	pantoprazole 40 mg oral delayed release tablet: Last Dose Taken:  , 1 tab(s) orally once a day (before a meal)  · 	latanoprost 0.005% ophthalmic solution: Last Dose Taken:  , 1 drop(s) to each affected eye once a day (at bedtime)  · 	timolol maleate 0.25% ophthalmic solution: Last Dose Taken:  , 1 drop(s) to each affected eye 2 times a day  · 	ocular lubricant ophthalmic solution: Last Dose Taken:  , 1 drop(s) to each affected eye 2 times a day  · 	aspirin 81 mg oral tablet, chewable: Last Dose Taken:  , 1 tab(s) orally once a day  · 	atorvastatin 20 mg oral tablet: Last Dose Taken:  , 1 tab(s) orally once a day (at bedtime)  · 	rivaroxaban 20 mg oral tablet: Last Dose Taken:  , 1 tab(s) orally once a day (in the evening)  · 	ferrous sulfate 325 mg (65 mg elemental iron) oral tablet: Last Dose Taken:  , 1 tab(s) orally 2 times a day  · 	QUEtiapine 50 mg oral tablet, extended release: Last Dose Taken:  , 1 tab(s) orally once a day (in the evening)  · 	amLODIPine 10 mg oral tablet: Last Dose Taken:  , 1 tab(s) orally once a day  · 	carvedilol 3.125 mg oral tablet: Last Dose Taken:  , 1 tab(s) orally 2 times a day  · 	Aricept 5 mg oral tablet: Last Dose Taken:  , 1 tab(s) orally once a day (at bedtime)  · 	Nitrostat 0.4 mg sublingual tablet: Last Dose Taken:  , 1 tab(s) sublingual , As Needed  · 	Janumet  mg-1000 mg oral tablet, extended release: Last Dose Taken:  , 1 tab(s) orally once a day (in the evening)  · 	Vitamin C 500 mg oral capsule: Last Dose Taken:  , 1 cap(s) orally once a day with food    .    Patient History:    Past Medical, Past Surgical, and Family History:  PAST MEDICAL HISTORY:  Dementia     DM (diabetes mellitus)     HTN (hypertension)     HTN (hypertension)     Hyperlipidemia     Pulmonary embolism dx Oct 2020 was on xarelto.     PAST SURGICAL HISTORY:  No pertinent past surgical history.     FAMILY HISTORY:  FH: hypertension, father and mother.     Social History:  Social History (marital status, living situation, occupation, tobacco use, alcohol and drug use, and sexual history): from home  fam wants NH      Vital Signs Last 24 Hrs  T(C): 36.4 (01 Dec 2021 05:03), Max: 37.2 (30 Nov 2021 14:31)  T(F): 97.5 (01 Dec 2021 05:03), Max: 98.9 (30 Nov 2021 14:31)  HR: 80 (01 Dec 2021 05:03) (72 - 80)  BP: 137/81 (01 Dec 2021 05:03) (129/91 - 172/72)  BP(mean): --  RR: 17 (01 Dec 2021 05:03) (16 - 18)  SpO2: 96% (01 Dec 2021 05:03) (96% - 99%)    GEN: NAD; A and O x 2, pt laying on the opposite side of the bed  LUNGS: CTA B/L  HEART: S1 S2  ABDOMEN: soft, non-tender, non-distended, + BS  EXTREMITIES: no edema      LABS:    Culture - Urine (collected 28 Nov 2021 22:56)  Source: Clean Catch Clean Catch (Midstream)  Final Report (01 Dec 2021 09:22):    10,000 - 49,000 CFU/mL Escherichia coli    Normal Urogenital sylvain present  Organism: Escherichia coli (01 Dec 2021 09:22)  Organism: Escherichia coli (01 Dec 2021 09:22)      COVID-19 PCR: NotDetec (28 Nov 2021 16:51)    < from: CT Head No Cont (11.28.21 @ 18:06) >    EXAM:  CT BRAIN                            PROCEDURE DATE:  11/28/2021          INTERPRETATION:  Clinical indication: Agitation.    Multiple axial sections were performed from base skull to vertex without contrast enhancement. Coronal and sagittal destructions were performed as well    This exam is compared prior head CT performed on October 20, 2020.    Parenchymal volume loss and chronic microvascular changes are again seen and unchanged    Evaluation of the osseous structures with the appropriate window appears normal    The visualized paranasal sinuses mastoid and middle ear regions appear clear.    IMPRESSION: No significant change when allowing for differences in technique.    < end of copied text >

## 2021-12-01 NOTE — DISCHARGE NOTE PROVIDER - NSDCMRMEDTOKEN_GEN_ALL_CORE_FT
amLODIPine 10 mg oral tablet: 1 tab(s) orally once a day  ascorbic acid 500 mg oral tablet: 1 tab(s) orally once a day  atorvastatin 20 mg oral tablet: 1 tab(s) orally once a day (at bedtime)  carvedilol 3.125 mg oral tablet: 1 tab(s) orally 2 times a day  ferrous sulfate 325 mg (65 mg elemental iron) oral tablet: 1 tab(s) orally 2 times a day  Janumet  mg-1000 mg oral tablet, extended release: 1 tab(s) orally once a day (in the evening)  latanoprost 0.005% ophthalmic solution: 1 drop(s) to each affected eye once a day (at bedtime)  ocular lubricant ophthalmic solution: 1 drop(s) to each affected eye 2 times a day  pantoprazole 40 mg oral delayed release tablet: 1 tab(s) orally once a day (before a meal)  QUEtiapine 50 mg oral tablet, extended release: 1 tab(s) orally once a day (in the evening)  timolol maleate 0.25% ophthalmic solution: 1 drop(s) to each affected eye 2 times a day

## 2021-12-01 NOTE — DISCHARGE NOTE PROVIDER - CARE PROVIDER_API CALL
DUNCAN KENNEDY DO  Family Practice  [address] [facility] [address] [place] [id number]  Phone: [phone]  Fax: [phone]  Follow Up Time: 1 week

## 2021-12-01 NOTE — PROGRESS NOTE ADULT - PROBLEM SELECTOR PROBLEM 2
Personal history of PE (pulmonary embolism)

## 2021-12-01 NOTE — PROGRESS NOTE ADULT - PROBLEM SELECTOR PLAN 3
cont home meds with parameters  controlled  c/w amlodipine and carvedilol
cont home meds with parameters  amlodipine and carvedilol
cont home meds with parameters  controlled  c/w amlodipine and carvedilol

## 2021-12-01 NOTE — CONSULT NOTE ADULT - ASSESSMENT
complete note to follow    #Hx of PE in 10/2020 - unclear etiology  pt was Dx with PE in 10/2020 and was treated with xarelto, not eliquis d/t GIB, pt did not f/u as outpt since that admit  now p/w behavioral disturbances, worsening dementia  will contact pt's family to see if pt f/u another Hematology group   81 yo F, from home, lives with son, billie w/ assist + walker, prior mckeon  for retention, DM2, HTN, HLD, dementia and PE (10/2020 was on xarelto) who was sent in by family for progressive behavioral distrubances over past month. Was previously only occasionally throwing a cup and remote but now is throwing things all day and is hard to control. Still compliant with medications. Family would like placement. Patient unable to provide any meaningful history 2/2 advanced dementia. Is calm pleasant on exam. Placed on Enhanced supervision.    #Hx of PE in 10/2020 - unclear if provoked or unprovoked  pt was Dx with PE in 10/2020 and was treated with xarelto (unclear if she continued 1-2 months of tx), not eliquis d/t GIB, pt did not f/u as outpt since that admit  now p/w behavioral disturbances, worsening dementia  I spoke with the pt's PMD Dr. Zhang and the pt has been off a/c at least 11 months and was not eval by another Hematology group  pt did have a repeat GIB in 03/2021 off a/c  H/H normal during this admit  Rec's:  -pt is not a candidate for a/c d/t hx of GIB x 2  -given pt is somewhat sedentary and hx of PE would recommend doppler B/L LE  -if doppler + recommend IVC filter  -if doppler - no a/c or intervention recommended    Thank you for the referral. Will continue to monitor the patient.  Please call with any questions [phone]  Above reviewed with Attending Dr. Fallon  [facility]  [address], [address], [place], [place]  [phone]

## 2021-12-02 ENCOUNTER — TRANSCRIPTION ENCOUNTER (OUTPATIENT)
Age: 81
End: 2021-12-02

## 2021-12-02 VITALS
OXYGEN SATURATION: 98 % | TEMPERATURE: 98 F | DIASTOLIC BLOOD PRESSURE: 63 MMHG | SYSTOLIC BLOOD PRESSURE: 129 MMHG | HEART RATE: 75 BPM | RESPIRATION RATE: 18 BRPM

## 2021-12-02 LAB
ANION GAP SERPL CALC-SCNC: 8 MMOL/L — SIGNIFICANT CHANGE UP (ref 5–17)
BUN SERPL-MCNC: 19 MG/DL — HIGH (ref 7–18)
CALCIUM SERPL-MCNC: 9.5 MG/DL — SIGNIFICANT CHANGE UP (ref 8.4–10.5)
CHLORIDE SERPL-SCNC: 104 MMOL/L — SIGNIFICANT CHANGE UP (ref 96–108)
CO2 SERPL-SCNC: 26 MMOL/L — SIGNIFICANT CHANGE UP (ref 22–31)
CREAT SERPL-MCNC: 0.92 MG/DL — SIGNIFICANT CHANGE UP (ref 0.5–1.3)
GLUCOSE BLDC GLUCOMTR-MCNC: 150 MG/DL — HIGH (ref 70–99)
GLUCOSE BLDC GLUCOMTR-MCNC: 200 MG/DL — HIGH (ref 70–99)
GLUCOSE SERPL-MCNC: 166 MG/DL — HIGH (ref 70–99)
HCT VFR BLD CALC: 44 % — SIGNIFICANT CHANGE UP (ref 34.5–45)
HGB BLD-MCNC: 14.1 G/DL — SIGNIFICANT CHANGE UP (ref 11.5–15.5)
MCHC RBC-ENTMCNC: 27.6 PG — SIGNIFICANT CHANGE UP (ref 27–34)
MCHC RBC-ENTMCNC: 32 GM/DL — SIGNIFICANT CHANGE UP (ref 32–36)
MCV RBC AUTO: 86.1 FL — SIGNIFICANT CHANGE UP (ref 80–100)
NRBC # BLD: 0 /100 WBCS — SIGNIFICANT CHANGE UP (ref 0–0)
PLATELET # BLD AUTO: 280 K/UL — SIGNIFICANT CHANGE UP (ref 150–400)
POTASSIUM SERPL-MCNC: 4 MMOL/L — SIGNIFICANT CHANGE UP (ref 3.5–5.3)
POTASSIUM SERPL-SCNC: 4 MMOL/L — SIGNIFICANT CHANGE UP (ref 3.5–5.3)
RBC # BLD: 5.11 M/UL — SIGNIFICANT CHANGE UP (ref 3.8–5.2)
RBC # FLD: 13.4 % — SIGNIFICANT CHANGE UP (ref 10.3–14.5)
SARS-COV-2 RNA SPEC QL NAA+PROBE: SIGNIFICANT CHANGE UP
SODIUM SERPL-SCNC: 138 MMOL/L — SIGNIFICANT CHANGE UP (ref 135–145)
WBC # BLD: 5.02 K/UL — SIGNIFICANT CHANGE UP (ref 3.8–10.5)
WBC # FLD AUTO: 5.02 K/UL — SIGNIFICANT CHANGE UP (ref 3.8–10.5)

## 2021-12-02 RX ADMIN — Medication 1 DROP(S): at 05:34

## 2021-12-02 RX ADMIN — Medication 1: at 08:05

## 2021-12-02 RX ADMIN — HEPARIN SODIUM 5000 UNIT(S): 5000 INJECTION INTRAVENOUS; SUBCUTANEOUS at 13:46

## 2021-12-02 RX ADMIN — HEPARIN SODIUM 5000 UNIT(S): 5000 INJECTION INTRAVENOUS; SUBCUTANEOUS at 05:34

## 2021-12-02 RX ADMIN — CARVEDILOL PHOSPHATE 3.12 MILLIGRAM(S): 80 CAPSULE, EXTENDED RELEASE ORAL at 05:34

## 2021-12-02 RX ADMIN — Medication 500 MILLIGRAM(S): at 11:38

## 2021-12-02 RX ADMIN — PANTOPRAZOLE SODIUM 40 MILLIGRAM(S): 20 TABLET, DELAYED RELEASE ORAL at 05:34

## 2021-12-02 RX ADMIN — AMLODIPINE BESYLATE 10 MILLIGRAM(S): 2.5 TABLET ORAL at 05:34

## 2021-12-02 RX ADMIN — Medication 325 MILLIGRAM(S): at 05:34

## 2021-12-02 NOTE — DISCHARGE NOTE NURSING/CASE MANAGEMENT/SOCIAL WORK - PATIENT PORTAL LINK FT
You can access the FollowMyHealth Patient Portal offered by [facility] by registering at the following website: [email or web address]. By joining [facility]’s FollowMyHealth portal, you will also be able to view your health information using other applications (apps) compatible with our system.

## 2021-12-02 NOTE — PROGRESS NOTE ADULT - SUBJECTIVE AND OBJECTIVE BOX
INTERVAL HPI/OVERNIGHT EVENTS:  Patient seen,events noticed,awake,  VITAL SIGNS:  T(F): 97.5 (12-01-21 @ 05:03)  HR: 80 (12-01-21 @ 05:03)  BP: 137/81 (12-01-21 @ 05:03)  RR: 17 (12-01-21 @ 05:03)  SpO2: 96% (12-01-21 @ 05:03)  Wt(kg): --    PHYSICAL EXAM:  awake,poor historian  Constitutional:  Eyes:  ENMT:perrla  Neck:  Respiratory:clear  Cardiovascular:s1s2,m-none  Gastrointestinal:soft,bs pos  Extremities:mild varicosity  Vascular:  Neurological:no focal deficit  Musculoskeletal:    MEDICATIONS  (STANDING):  amLODIPine   Tablet 10 milliGRAM(s) Oral daily  amLODIPine   Tablet 10 milliGRAM(s) Oral once  artificial  tears Solution 1 Drop(s) Both EYES two times a day  ascorbic acid 500 milliGRAM(s) Oral daily  atorvastatin 20 milliGRAM(s) Oral at bedtime  carvedilol 3.125 milliGRAM(s) Oral once  carvedilol 3.125 milliGRAM(s) Oral every 12 hours  dextrose 50% Injectable 25 Gram(s) IV Push once  ferrous    sulfate 325 milliGRAM(s) Oral two times a day  heparin   Injectable 5000 Unit(s) SubCutaneous every 8 hours  insulin lispro (ADMELOG) corrective regimen sliding scale   SubCutaneous three times a day before meals  latanoprost 0.005% Ophthalmic Solution 1 Drop(s) Both EYES at bedtime  pantoprazole    Tablet 40 milliGRAM(s) Oral before breakfast  QUEtiapine 50 milliGRAM(s) Oral at bedtime  timolol 0.25% Solution 1 Drop(s) Both EYES two times a day    MEDICATIONS  (PRN):      Allergies    Allergy Status Unknown    Intolerances          Assessment and Plan:   · Assessment	  79 yo F, from home, lives with son, billie w/ assist + walker, prior mckeon  for retention, DM2, HTN, HLD, dementia and PE (10/2021 was on xarelto) p/w dementia with progressive behavior disturbance for placement.   Pt. noted agitated at times, wanders around with steady gate.  Spoke to pt.'s son and DIL, updated them re pt.'s condition.  They both are interested in LTC for pt. CM following.           Problem/Plan - 1:  ·  Problem: Dementia with behavioral disturbance.   ·  Plan: Wanders around with periods of agitation  - Cont home meds Quietiapine QHS and Aricept  - Fall precautions  - Soft diet  -SW/CM consulted  -PT consult appret for placement  -pending for nh placement     Problem/Plan - 2:  ·  Problem: Personal history of PE (pulmonary embolism).   ·  Plan: Meds confirmed with pharmacy  -Xarelto d/c'd 06/21  -Cont ASA  - cont heparin sc.     Problem/Plan - 3:  ·  Problem: HTN (hypertension).   ·  Plan: cont home meds with parameters  amlodipine and carvedilol.     Problem/Plan - 4:  ·  Problem: DM (diabetes mellitus).   ·  Plan: ss insulin  diet soft diabetic dash.     Problem/Plan - 5:  ·  Problem: Prophylactic measure.   ·  Plan: heparin sc ppx  protonix.     Problem/Plan - 6:  ·  Problem: Senile dementia, with behavioral disturbance.           
  INTERVAL HPI/OVERNIGHT EVENTS:  Patient seen,events noticed,episodic agitation  VITAL SIGNS:  T(F): 97.7 (21 @ 05:12)  HR: 70 (21 @ 05:12)  BP: 134/54 (21 @ 05:12)  RR: 17 (21 @ 05:12)  SpO2: 99% (21 @ 05:12)  Wt(kg): --    PHYSICAL EXAM:  awake,confused  Constitutional:  Eyes:  ENMT:perrla  Neck:  Respiratory:clear  Cardiovascular:s1s2,m-none  Gastrointestinal:soft,bs pos  Extremities:mild varicosity  Vascular:  Neurological:no focal deficit  Musculoskeletal:    MEDICATIONS  (STANDING):  amLODIPine   Tablet 10 milliGRAM(s) Oral daily  artificial  tears Solution 1 Drop(s) Both EYES two times a day  ascorbic acid 500 milliGRAM(s) Oral daily  aspirin  chewable 81 milliGRAM(s) Oral daily  atorvastatin 20 milliGRAM(s) Oral at bedtime  carvedilol 3.125 milliGRAM(s) Oral every 12 hours  dextrose 50% Injectable 25 Gram(s) IV Push once  ferrous    sulfate 325 milliGRAM(s) Oral two times a day  heparin   Injectable 5000 Unit(s) SubCutaneous every 8 hours  insulin lispro (ADMELOG) corrective regimen sliding scale   SubCutaneous three times a day before meals  latanoprost 0.005% Ophthalmic Solution 1 Drop(s) Both EYES at bedtime  pantoprazole    Tablet 40 milliGRAM(s) Oral before breakfast  QUEtiapine 50 milliGRAM(s) Oral at bedtime    MEDICATIONS  (PRN):      Allergies    Allergy Status Unknown    Intolerances        LABS:                        13.3   6.54  )-----------( 291      ( 2021 08:35 )             40.8         139  |  106  |  17  ----------------------------<  118<H>  4.1   |  25  |  0.86    Ca    8.9      2021 08:35  Phos  4.0       Mg     4.8         TPro  7.0  /  Alb  3.2<L>  /  TBili  0.5  /  DBili  x   /  AST  12  /  ALT  20  /  AlkPhos  61        Urinalysis Basic - ( 2021 20:35 )    Color: Yellow / Appearance: Clear / S.015 / pH: x  Gluc: x / Ketone: Negative  / Bili: Negative / Urobili: Negative   Blood: x / Protein: 15 / Nitrite: Negative   Leuk Esterase: Trace / RBC: 0-2 /HPF / WBC 3-5 /HPF   Sq Epi: x / Non Sq Epi: Few /HPF / Bacteria: Trace /HPF        RADIOLOGY & ADDITIONAL TESTS:      Assessment and Plan:   · Assessment	  79 yo F, from home, lives with son, billie w/ assist + walker, prior mckeon  for retention, DM2, HTN, HLD, dementia and PE (10/2021 was on xarelto) p/w dementia with progressive behavior disturbance for placement.   Pt. noted agitated at times, wanders around with steady gate.  Spoke to pt.'s son and DIL, updated them re pt.'s condition.  They both are interested in LTC for pt. CM following.           Problem/Plan - 1:  ·  Problem: Dementia with behavioral disturbance.   ·  Plan: Wanders around with periods of agitation  - Cont home meds Quietiapine QHS and Aricept  - Fall precautions  - Soft diet  -SW/CM consulted  -PT consult appret for placement     Problem/Plan - 2:  ·  Problem: Personal history of PE (pulmonary embolism).   ·  Plan: Meds confirmed with pharmacy  -Xarelto d/c'd   -Cont ASA  - cont heparin sc.     Problem/Plan - 3:  ·  Problem: HTN (hypertension).   ·  Plan: cont home meds with parameters  amlodipine and carvedilol.     Problem/Plan - 4:  ·  Problem: DM (diabetes mellitus).   ·  Plan: ss insulin  diet soft diabetic dash.     Problem/Plan - 5:  ·  Problem: Prophylactic measure.   ·  Plan: heparin sc ppx  protonix.     Problem/Plan - 6:  ·  Problem: Senile dementia, with behavioral disturbance.       
INTERVAL HPI/OVERNIGHT EVENTS:  Patient seen,events noticed,stable,awake,poor historian  VITAL SIGNS:  T(F): 98.5 (12-02-21 @ 05:25)  HR: 72 (12-02-21 @ 05:25)  BP: 132/80 (12-02-21 @ 05:25)  RR: 17 (12-02-21 @ 05:25)  SpO2: 97% (12-02-21 @ 05:25)  Wt(kg): --    PHYSICAL EXAM:  awake,confused  Constitutional:  Eyes:  ENMT:perrla  Neck:  Respiratory:clear  Cardiovascular:s1s2  Gastrointestinal:soft,bs pos  Extremities:  Vascular:  Neurological:no focal deficit  Musculoskeletal:    MEDICATIONS  (STANDING):  amLODIPine   Tablet 10 milliGRAM(s) Oral daily  artificial  tears Solution 1 Drop(s) Both EYES two times a day  ascorbic acid 500 milliGRAM(s) Oral daily  atorvastatin 20 milliGRAM(s) Oral at bedtime  carvedilol 3.125 milliGRAM(s) Oral every 12 hours  dextrose 50% Injectable 25 Gram(s) IV Push once  ferrous    sulfate 325 milliGRAM(s) Oral two times a day  heparin   Injectable 5000 Unit(s) SubCutaneous every 8 hours  insulin lispro (ADMELOG) corrective regimen sliding scale   SubCutaneous three times a day before meals  latanoprost 0.005% Ophthalmic Solution 1 Drop(s) Both EYES at bedtime  pantoprazole    Tablet 40 milliGRAM(s) Oral before breakfast  QUEtiapine 50 milliGRAM(s) Oral at bedtime  timolol 0.25% Solution 1 Drop(s) Both EYES two times a day    MEDICATIONS  (PRN):      Allergies    Allergy Status Unknown    Intolerances        LABS:                        14.1   5.02  )-----------( 280      ( 02 Dec 2021 07:37 )             44.0     12-02    138  |  104  |  19<H>  ----------------------------<  166<H>  4.0   |  26  |  0.92    Ca    9.5      02 Dec 2021 07:37            RADIOLOGY & ADDITIONAL TESTS:      Assessment and Plan:   · Assessment	  81 yo F, from home, lives with son, ambu w/ assist + walker, prior mckeon  for retention, DM2, HTN, HLD, dementia and PE (10/2021 was on xarelto) p/w dementia with progressive behavior disturbance for placement.   Pt. noted agitated at times, wanders around with steady gate.  Spoke to pt.'s son and DIL, updated them re pt.'s condition.  They both are interested in LTC for pt. CM following.           Problem/Plan - 1:  ·  Problem: Dementia with behavioral disturbance.   ·  Plan: Wanders around with periods of agitation  - Cont home meds Quietiapine QHS and Aricept  - Fall precautions  - Soft diet  -SW/CM consulted  -PT consult appret for placement  -pending for nh placement     Problem/Plan - 2:  ·  Problem: Personal history of PE (pulmonary embolism).   ·  Plan: Meds confirmed with pharmacy  -Xarelto d/c'd 06/21  -Cont ASA  - cont heparin sc.     Problem/Plan - 3:  ·  Problem: HTN (hypertension).   ·  Plan: cont home meds with parameters  amlodipine and carvedilol.     Problem/Plan - 4:  ·  Problem: DM (diabetes mellitus).   ·  Plan: ss insulin  diet soft diabetic dash.     Problem/Plan - 5:  ·  Problem: Prophylactic measure.   ·  Plan: heparin sc ppx  protonix.     Problem/Plan - 6:  ·  Problem: Senile dementia, with behavioral disturbance.           
NP Note discussed with  Primary Attending    Patient is a 81y old  Female who presents with a chief complaint of Behavioral disturbance in dementia (2021 20:15)      INTERVAL HPI/OVERNIGHT EVENTS: no new complaints    MEDICATIONS  (STANDING):  amLODIPine   Tablet 10 milliGRAM(s) Oral daily  artificial  tears Solution 1 Drop(s) Both EYES two times a day  ascorbic acid 500 milliGRAM(s) Oral daily  aspirin  chewable 81 milliGRAM(s) Oral daily  atorvastatin 20 milliGRAM(s) Oral at bedtime  carvedilol 3.125 milliGRAM(s) Oral every 12 hours  dextrose 50% Injectable 25 Gram(s) IV Push once  ferrous    sulfate 325 milliGRAM(s) Oral two times a day  heparin   Injectable 5000 Unit(s) SubCutaneous every 8 hours  insulin lispro (ADMELOG) corrective regimen sliding scale   SubCutaneous three times a day before meals  latanoprost 0.005% Ophthalmic Solution 1 Drop(s) Both EYES at bedtime  pantoprazole    Tablet 40 milliGRAM(s) Oral before breakfast  QUEtiapine 50 milliGRAM(s) Oral at bedtime    MEDICATIONS  (PRN):      __________________________________________________  REVIEW OF SYSTEMS:    CONSTITUTIONAL: No fever,   EYES: no acute visual disturbances  NECK: No pain or stiffness  RESPIRATORY: No cough; No shortness of breath  CARDIOVASCULAR: No chest pain, no palpitations  GASTROINTESTINAL: No pain. No nausea or vomiting; No diarrhea   NEUROLOGICAL: No headache or numbness, no tremors  MUSCULOSKELETAL: No joint pain, no muscle pain  GENITOURINARY: no dysuria, no frequency, no hesitancy  PSYCHIATRY: no depression , no anxiety  ALL OTHER  ROS negative        Vital Signs Last 24 Hrs  T(C): 36.7 (2021 11:51), Max: 36.7 (2021 15:16)  T(F): 98 (2021 11:51), Max: 98.1 (2021 05:00)  HR: 77 (2021 11:51) (77 - 106)  BP: 142/73 (2021 11:51) (128/66 - 142/73)  BP(mean): --  RR: 17 (2021 11:51) (17 - 18)  SpO2: 98% (2021 11:51) (95% - 98%)    ________________________________________________  PHYSICAL EXAM:  well developed, confused, agitated at times  GENERAL: NAD  HEENT: Normocephalic;  conjunctivae and sclerae clear; moist mucous membranes;   NECK : supple  CHEST/LUNG: Clear to auscultation bilaterally with good air entry   HEART: S1 S2  regular; no murmurs, gallops or rubs  ABDOMEN: Soft, Nontender, Nondistended; Bowel sounds present  EXTREMITIES: no cyanosis; no edema; no calf tenderness  SKIN: warm and dry; no rash  NERVOUS SYSTEM:  Awake and alert; Oriented  to place, person and time ; no new deficits    _________________________________________________  LABS:                        13.3   6.54  )-----------( 291      ( 2021 08:35 )             40.8         139  |  106  |  17  ----------------------------<  118<H>  4.1   |  25  |  0.86    Ca    8.9      2021 08:35  Phos  4.0       Mg     4.8         TPro  7.0  /  Alb  3.2<L>  /  TBili  0.5  /  DBili  x   /  AST  12  /  ALT  20  /  AlkPhos  61        Urinalysis Basic - ( 2021 20:35 )    Color: Yellow / Appearance: Clear / S.015 / pH: x  Gluc: x / Ketone: Negative  / Bili: Negative / Urobili: Negative   Blood: x / Protein: 15 / Nitrite: Negative   Leuk Esterase: Trace / RBC: 0-2 /HPF / WBC 3-5 /HPF   Sq Epi: x / Non Sq Epi: Few /HPF / Bacteria: Trace /HPF      CAPILLARY BLOOD GLUCOSE      POCT Blood Glucose.: 201 mg/dL (2021 10:55)  POCT Blood Glucose.: 126 mg/dL (2021 08:21)      RADIOLOGY & ADDITIONAL TESTS:    CT Head No Cont (21 @ 18:06) >  EXAM:  CT BRAIN                            PROCEDURE DATE:  2021          INTERPRETATION:  Clinical indication: Agitation.    Multiple axial sections were performed from base skull to vertex without contrast enhancement. Coronal and sagittal destructions were performed as well    This exam is compared prior head CT performed on 2020.    Parenchymal volume loss and chronic microvascular changes are again seen and unchanged    Evaluation of the osseous structures with the appropriate window appears normal    The visualized paranasal sinuses mastoid and middle ear regions appear clear.    IMPRESSION: No significant change when allowing for differences in technique.    --- End of Report ---    < end of copied text >      Imaging Personally Reviewed:  YES/NO    Consultant(s) Notes Reviewed:   YES/ No    Care Discussed with Consultants :     Plan of care was discussed with patient and /or primary care giver; all questions and concerns were addressed and care was aligned with patient's wishes.    
NP Note discussed with  Primary Attending    Patient is a 81y old  Female who presents with a chief complaint of Behavioral disturbance in dementia (01 Dec 2021 12:29)      INTERVAL HPI/OVERNIGHT EVENTS: Patient seen and examined at bedside. Patient Cantonese speaking  ID# [id number]    MEDICATIONS  (STANDING):  amLODIPine   Tablet 10 milliGRAM(s) Oral daily  artificial  tears Solution 1 Drop(s) Both EYES two times a day  ascorbic acid 500 milliGRAM(s) Oral daily  atorvastatin 20 milliGRAM(s) Oral at bedtime  carvedilol 3.125 milliGRAM(s) Oral every 12 hours  dextrose 50% Injectable 25 Gram(s) IV Push once  ferrous    sulfate 325 milliGRAM(s) Oral two times a day  heparin   Injectable 5000 Unit(s) SubCutaneous every 8 hours  insulin lispro (ADMELOG) corrective regimen sliding scale   SubCutaneous three times a day before meals  latanoprost 0.005% Ophthalmic Solution 1 Drop(s) Both EYES at bedtime  pantoprazole    Tablet 40 milliGRAM(s) Oral before breakfast  QUEtiapine 50 milliGRAM(s) Oral at bedtime  timolol 0.25% Solution 1 Drop(s) Both EYES two times a day    MEDICATIONS  (PRN):      __________________________________________________  REVIEW OF SYSTEMS:    Unable to assess, poor historian      Vital Signs Last 24 Hrs  T(C): 36.4 (01 Dec 2021 05:03), Max: 37.2 (30 Nov 2021 14:31)  T(F): 97.5 (01 Dec 2021 05:03), Max: 98.9 (30 Nov 2021 14:31)  HR: 80 (01 Dec 2021 05:03) (72 - 80)  BP: 137/81 (01 Dec 2021 05:03) (129/91 - 172/72)  BP(mean): --  RR: 17 (01 Dec 2021 05:03) (16 - 18)  SpO2: 96% (01 Dec 2021 05:03) (96% - 99%)    ________________________________________________  PHYSICAL EXAM:  GENERAL: NAD  HEENT: Normocephalic;  conjunctivae and sclerae clear; moist mucous membranes;   NECK : supple  CHEST/LUNG: Clear to auscultation bilaterally with good air entry   HEART: S1 S2  regular; no murmurs, gallops or rubs  ABDOMEN: Soft, Nontender, Nondistended; Bowel sounds present  EXTREMITIES: no cyanosis; no edema; no calf tenderness  SKIN: warm and dry; no rash  NERVOUS SYSTEM:  Awake and alert; confused, flat affect    _________________________________________________  LABS:      CAPILLARY BLOOD GLUCOSE      POCT Blood Glucose.: 125 mg/dL (01 Dec 2021 11:54)  POCT Blood Glucose.: 193 mg/dL (01 Dec 2021 07:56)  POCT Blood Glucose.: 136 mg/dL (30 Nov 2021 21:56)        RADIOLOGY & ADDITIONAL TESTS:  < from: CT Head No Cont (11.28.21 @ 18:06) >    EXAM:  CT BRAIN                            PROCEDURE DATE:  11/28/2021          INTERPRETATION:  Clinical indication: Agitation.    Multiple axial sections were performed from base skull to vertex without contrast enhancement. Coronal and sagittal destructions were performed as well    This exam is compared prior head CT performed on October 20, 2020.    Parenchymal volume loss and chronic microvascular changes are again seen and unchanged    Evaluation of the osseous structures with the appropriate window appears normal    The visualized paranasal sinuses mastoid and middle ear regions appear clear.    IMPRESSION: No significant change when allowing for differences in technique.      < end of copied text >    Imaging  Reviewed:  YES  Consultant(s) Notes Reviewed:   YES      Plan of care was discussed with patient and /or primary care giver; all questions and concerns were addressed 
NP Note discussed with  Primary Attending    Patient is a 81y old  Female who presents with a chief complaint of Behavioral disturbance in dementia (2021 13:45)      INTERVAL HPI/OVERNIGHT EVENTS: Patient seen and examined at bedside. Patient Cantonese speaking  ID#[id number].    MEDICATIONS  (STANDING):  amLODIPine   Tablet 10 milliGRAM(s) Oral daily  artificial  tears Solution 1 Drop(s) Both EYES two times a day  ascorbic acid 500 milliGRAM(s) Oral daily  aspirin  chewable 81 milliGRAM(s) Oral daily  atorvastatin 20 milliGRAM(s) Oral at bedtime  carvedilol 3.125 milliGRAM(s) Oral every 12 hours  dextrose 50% Injectable 25 Gram(s) IV Push once  ferrous    sulfate 325 milliGRAM(s) Oral two times a day  heparin   Injectable 5000 Unit(s) SubCutaneous every 8 hours  insulin lispro (ADMELOG) corrective regimen sliding scale   SubCutaneous three times a day before meals  latanoprost 0.005% Ophthalmic Solution 1 Drop(s) Both EYES at bedtime  pantoprazole    Tablet 40 milliGRAM(s) Oral before breakfast  QUEtiapine 50 milliGRAM(s) Oral at bedtime    MEDICATIONS  (PRN):      __________________________________________________  REVIEW OF SYSTEMS:    CONSTITUTIONAL: No fever,   EYES: no acute visual disturbances  NECK: No pain or stiffness  RESPIRATORY: No cough; No shortness of breath  CARDIOVASCULAR: No chest pain, no palpitations  GASTROINTESTINAL: No pain. No nausea or vomiting; No diarrhea   NEUROLOGICAL: No headache or numbness, no tremors  MUSCULOSKELETAL: No joint pain, no muscle pain  GENITOURINARY: no dysuria, no frequency, no hesitancy  PSYCHIATRY: no depression , no anxiety  ALL OTHER  ROS negative        Vital Signs Last 24 Hrs  T(C): 37.2 (2021 14:31), Max: 37.2 (2021 14:31)  T(F): 98.9 (2021 14:31), Max: 98.9 (2021 14:31)  HR: 72 (2021 14:31) (70 - 91)  BP: 129/91 (2021 14:31) (129/91 - 153/83)  BP(mean): --  RR: 18 (2021 14:31) (16 - 18)  SpO2: 97% (2021 14:31) (97% - 99%)    ________________________________________________  PHYSICAL EXAM:  GENERAL: NAD  HEENT: Normocephalic;  conjunctivae and sclerae clear; moist mucous membranes;   NECK : supple  CHEST/LUNG: Clear to auscultation bilaterally with good air entry   HEART: S1 S2  regular; no murmurs, gallops or rubs  ABDOMEN: Soft, Nontender, Nondistended; Bowel sounds present  EXTREMITIES: no cyanosis; no edema; no calf tenderness  SKIN: warm and dry; no rash  NERVOUS SYSTEM:  Awake and alert; confused    _________________________________________________  LABS:                        13.3   6.54  )-----------( 291      ( 2021 08:35 )             40.8         139  |  106  |  17  ----------------------------<  118<H>  4.1   |  25  |  0.86    Ca    8.9      2021 08:35  Phos  4.0       Mg     4.8         TPro  7.0  /  Alb  3.2<L>  /  TBili  0.5  /  DBili  x   /  AST  12  /  ALT  20  /  AlkPhos  61        Urinalysis Basic - ( 2021 20:35 )    Color: Yellow / Appearance: Clear / S.015 / pH: x  Gluc: x / Ketone: Negative  / Bili: Negative / Urobili: Negative   Blood: x / Protein: 15 / Nitrite: Negative   Leuk Esterase: Trace / RBC: 0-2 /HPF / WBC 3-5 /HPF   Sq Epi: x / Non Sq Epi: Few /HPF / Bacteria: Trace /HPF      CAPILLARY BLOOD GLUCOSE      POCT Blood Glucose.: 193 mg/dL (2021 11:54)  POCT Blood Glucose.: 134 mg/dL (2021 08:03)        RADIOLOGY & ADDITIONAL TESTS:  < from: CT Head No Cont (21 @ 18:06) >    EXAM:  CT BRAIN                            PROCEDURE DATE:  2021          INTERPRETATION:  Clinical indication: Agitation.    Multiple axial sections were performed from base skull to vertex without contrast enhancement. Coronal and sagittal destructions were performed as well    This exam is compared prior head CT performed on 2020.    Parenchymal volume loss and chronic microvascular changes are again seen and unchanged    Evaluation of the osseous structures with the appropriate window appears normal    The visualized paranasal sinuses mastoid and middle ear regions appear clear.    IMPRESSION: No significant change when allowing for differences in technique.      < end of copied text >  < from: Xray Chest 1 View-PORTABLE IMMEDIATE (Xray Chest 1 View-PORTABLE IMMEDIATE .) (21 @ 16:12) >    EXAM:  XR CHEST PORTABLE IMMED 1V                            PROCEDURE DATE:  2021          INTERPRETATION:  AP chest on 2021 at 4:06 PM. Patient has agitation.    Heart magnified by technique. Lungs remain clear.    Significant bilateral shoulder degeneration again noted.    Chest is similar to 2020.    IMPRESSION: No acute finding or change.    < end of copied text >    Imaging  Reviewed:  YES    Consultant(s) Notes Reviewed:   YES      Plan of care was discussed with patient and /or primary care giver; all questions and concerns were addressed

## 2021-12-02 NOTE — DISCHARGE NOTE NURSING/CASE MANAGEMENT/SOCIAL WORK - NSDCPEFALRISK_GEN_ALL_CORE
For information on Fall & Injury Prevention, visit: [email or web address] OR  [email or web address] OR  https://www.cdc.gov/steadi/patient.html

## 2021-12-02 NOTE — DISCHARGE NOTE NURSING/CASE MANAGEMENT/SOCIAL WORK - NSDPFAC_GEN_ALL_CORE
[facility] [address], [place] [address] [phone]

## 2021-12-02 NOTE — PROGRESS NOTE ADULT - REASON FOR ADMISSION
Behavioral disturbance in dementia

## 2021-12-22 PROCEDURE — 87186 SC STD MICRODIL/AGAR DIL: CPT

## 2021-12-22 PROCEDURE — 80053 COMPREHEN METABOLIC PANEL: CPT

## 2021-12-22 PROCEDURE — 97161 PT EVAL LOW COMPLEX 20 MIN: CPT

## 2021-12-22 PROCEDURE — 82550 ASSAY OF CK (CPK): CPT

## 2021-12-22 PROCEDURE — 93005 ELECTROCARDIOGRAM TRACING: CPT

## 2021-12-22 PROCEDURE — 81001 URINALYSIS AUTO W/SCOPE: CPT

## 2021-12-22 PROCEDURE — 82962 GLUCOSE BLOOD TEST: CPT

## 2021-12-22 PROCEDURE — 71045 X-RAY EXAM CHEST 1 VIEW: CPT

## 2021-12-22 PROCEDURE — 36415 COLL VENOUS BLD VENIPUNCTURE: CPT

## 2021-12-22 PROCEDURE — 83036 HEMOGLOBIN GLYCOSYLATED A1C: CPT

## 2021-12-22 PROCEDURE — 70450 CT HEAD/BRAIN W/O DYE: CPT | Mod: MA

## 2021-12-22 PROCEDURE — 86769 SARS-COV-2 COVID-19 ANTIBODY: CPT

## 2021-12-22 PROCEDURE — 80048 BASIC METABOLIC PNL TOTAL CA: CPT

## 2021-12-22 PROCEDURE — 99285 EMERGENCY DEPT VISIT HI MDM: CPT | Mod: 25

## 2021-12-22 PROCEDURE — 83735 ASSAY OF MAGNESIUM: CPT

## 2021-12-22 PROCEDURE — 87635 SARS-COV-2 COVID-19 AMP PRB: CPT

## 2021-12-22 PROCEDURE — 85027 COMPLETE CBC AUTOMATED: CPT

## 2021-12-22 PROCEDURE — 84484 ASSAY OF TROPONIN QUANT: CPT

## 2021-12-22 PROCEDURE — 85025 COMPLETE CBC W/AUTO DIFF WBC: CPT

## 2021-12-22 PROCEDURE — 93970 EXTREMITY STUDY: CPT

## 2021-12-22 PROCEDURE — 87086 URINE CULTURE/COLONY COUNT: CPT

## 2021-12-22 PROCEDURE — 84100 ASSAY OF PHOSPHORUS: CPT

## 2022-02-15 NOTE — H&P ADULT - PROBLEM/PLAN-1
Orthopedic Joint Progress Note    February 15, 2022  Admit Date: 2022  Admit Diagnosis: Primary osteoarthritis of left shoulder [M19.012]  Localized osteoarthritis of left shoulder [M19.012]    1 Day Post-Op    Subjective:     [address] alert, oriented and  at bedside. Appreciate Pulmonary help. C/O moderate pain left shoulder. Review of Systems: Pertinent items are noted in HPI. Objective:     PT/OT:     PATIENT MOBILITY                           Vital Signs:    Blood pressure (!) 80/55, pulse 64, temperature 97.9 °F (36.6 °C), resp. rate 14, height 5' 3\" (1.6 m), weight 58.9 kg (129 lb 12.8 oz), SpO2 96 %, not currently breastfeeding.   Temp (24hrs), Av.1 °F (36.7 °C), Min:97.4 °F (36.3 °C), Max:98.6 °F (37 °C)      Pain Control:   Pain Assessment  Pain Scale 1: Numeric (0 - 10)  Pain Intensity 1: 6  Pain Location 1: Arm  Pain Orientation 1: Left  Pain Description 1: Aching  Pain Intervention(s) 1: Medication (see MAR)    Meds:  Current Facility-Administered Medications   Medication Dose Route Frequency    multivitamin, tx-iron-ca-min (THERA-M w/ IRON) tablet 1 Tablet  1 Tablet Oral DAILY    oxybutynin chloride XL (DITROPAN XL) tablet 5 mg  5 mg Oral DAILY    pantoprazole (PROTONIX) tablet 40 mg  40 mg Oral DAILY    PARoxetine (PAXIL) tablet 10 mg  10 mg Oral DAILY    0.9% sodium chloride infusion  75 mL/hr IntraVENous CONTINUOUS    sodium chloride (NS) flush 5-40 mL  5-40 mL IntraVENous Q8H    sodium chloride (NS) flush 5-40 mL  5-40 mL IntraVENous PRN    naloxone (NARCAN) injection 0.4 mg  0.4 mg IntraVENous Q10MIN PRN    HYDROcodone-acetaminophen (NORCO)  mg tablet 1 Tablet  1 Tablet Oral Q4H PRN    HYDROmorphone (DILAUDID) injection 1 mg  1 mg IntraVENous Q3H PRN    aspirin delayed-release tablet 81 mg  81 mg Oral BID    senna-docusate (PERICOLACE) 8.6-50 mg per tablet 1 Tablet  1 Tablet Oral BID PRN    LORazepam (ATIVAN) tablet 1 mg  1 mg Oral BID    ketorolac (TORADOL) injection 15 mg  15 mg IntraVENous Q6H PRN    benzocaine-menthol (CEPACOL) lozenge  1 Lozenge Oral Q2H PRN        LAB:    No results found for: INR, INREXT, INREXT  Lab Results   Component Value Date/Time    HGB 8.8 (L) 02/15/2022 03:32 AM    HGB 11.7 02/09/2022 02:39 PM    HGB 13.0 08/18/2021 11:13 AM       Wound Closed incision/surgical site Foot Right (Active)   Number of days: 3693       Wound Back Posterior (Active)   Number of days: 3304       Wound Foot Right (Active)   Number of days: 2902       Wound Foot Left (Active)   Number of days: 1838       Incision 08/25/21 Neck (Active)   Number of days: 174       Incision 02/14/22 Arm Left (Active)   Dressing Status Clean;Dry; Intact 02/14/22 1925   Number of days: 1         Physical Exam:  No significant changes. NVI left hand. Sling appears too large. Will switch to smaller size. Discussed with PT. Kevon Hackett still in place    Assessment:      Principal Problem:    Osteoarthritis of left shoulder (2/13/2022)    Active Problems:    Localized osteoarthritis of left shoulder (2/14/2022)      Pneumothorax on left (2/14/2022)         Plan:pulmonary managing PTX. Continue with PT left shoulder.      Continue PT/OT/Rehab  Consult:PT, OT, Pulmonary DISPLAY PLAN FREE TEXT

## 2022-02-23 NOTE — PATIENT PROFILE ADULT - TOBACCO USE
What Is The Reason For Today's Visit?: Skin Lesion How Severe Are Your Spot(S)?: moderate Never smoker

## 2022-05-09 NOTE — PROGRESS NOTE ADULT - PROBLEM SELECTOR PROBLEM 6
Sedation Post Procedure Note    Patient Name: Jing Juarez   YOB: 1998  Room/Bed: Room/bed info not found  Medical Record Number: [id number]  Date: 5/9/2022   Time: 1:45 PM         Physicians/Assistants: Anna Mirza MD, MD    Procedure Performed:  CT guided LLL pulmonary nodule biopsy    Post-Sedation Vital Signs:  Vitals:    05/09/22 1324   BP: 135/84   Pulse: 114   Resp: 10   Temp:    SpO2: 99%      Vital signs were reviewed and were stable after the procedure (see flow sheet for vitals)            Complications: tiny non expanding pneumothorax    Electronically signed by Anna Mirza MD on 5/9/2022 at 1:45 PM Hyperlipidemia

## 2022-06-06 ENCOUNTER — INPATIENT (INPATIENT)
Facility: HOSPITAL | Age: 82
LOS: 6 days | Discharge: NURSING, REHAB OR LONG-TERM CARE | DRG: 683 | End: 2022-06-13
Attending: STUDENT IN AN ORGANIZED HEALTH CARE EDUCATION/TRAINING PROGRAM | Admitting: STUDENT IN AN ORGANIZED HEALTH CARE EDUCATION/TRAINING PROGRAM
Payer: MEDICARE

## 2022-06-06 VITALS
SYSTOLIC BLOOD PRESSURE: 138 MMHG | HEART RATE: 86 BPM | HEIGHT: 60 IN | DIASTOLIC BLOOD PRESSURE: 92 MMHG | OXYGEN SATURATION: 100 % | RESPIRATION RATE: 20 BRPM

## 2022-06-06 DIAGNOSIS — R55 SYNCOPE AND COLLAPSE: ICD-10-CM

## 2022-06-06 DIAGNOSIS — Z78.9 OTHER SPECIFIED HEALTH STATUS: Chronic | ICD-10-CM

## 2022-06-06 LAB
ALBUMIN SERPL ELPH-MCNC: 3.8 G/DL — SIGNIFICANT CHANGE UP (ref 3.5–5)
ALP SERPL-CCNC: 75 U/L — SIGNIFICANT CHANGE UP (ref 40–120)
ALT FLD-CCNC: 23 U/L DA — SIGNIFICANT CHANGE UP (ref 10–60)
ANION GAP SERPL CALC-SCNC: 6 MMOL/L — SIGNIFICANT CHANGE UP (ref 5–17)
APPEARANCE UR: CLEAR — SIGNIFICANT CHANGE UP
AST SERPL-CCNC: 32 U/L — SIGNIFICANT CHANGE UP (ref 10–40)
BACTERIA # UR AUTO: ABNORMAL /HPF
BASOPHILS # BLD AUTO: 0.05 K/UL — SIGNIFICANT CHANGE UP (ref 0–0.2)
BASOPHILS NFR BLD AUTO: 0.5 % — SIGNIFICANT CHANGE UP (ref 0–2)
BILIRUB SERPL-MCNC: 0.5 MG/DL — SIGNIFICANT CHANGE UP (ref 0.2–1.2)
BILIRUB UR-MCNC: NEGATIVE — SIGNIFICANT CHANGE UP
BUN SERPL-MCNC: 38 MG/DL — HIGH (ref 7–18)
CALCIUM SERPL-MCNC: 10.5 MG/DL — SIGNIFICANT CHANGE UP (ref 8.4–10.5)
CHLORIDE SERPL-SCNC: 110 MMOL/L — HIGH (ref 96–108)
CO2 SERPL-SCNC: 23 MMOL/L — SIGNIFICANT CHANGE UP (ref 22–31)
COLOR SPEC: YELLOW — SIGNIFICANT CHANGE UP
CREAT SERPL-MCNC: 1.28 MG/DL — SIGNIFICANT CHANGE UP (ref 0.5–1.3)
DIFF PNL FLD: NEGATIVE — SIGNIFICANT CHANGE UP
EGFR: 41 ML/MIN/1.73M2 — LOW
EOSINOPHIL # BLD AUTO: 0.19 K/UL — SIGNIFICANT CHANGE UP (ref 0–0.5)
EOSINOPHIL NFR BLD AUTO: 1.9 % — SIGNIFICANT CHANGE UP (ref 0–6)
EPI CELLS # UR: ABNORMAL /HPF
GLUCOSE SERPL-MCNC: 114 MG/DL — HIGH (ref 70–99)
GLUCOSE UR QL: NEGATIVE — SIGNIFICANT CHANGE UP
HCT VFR BLD CALC: 32.1 % — LOW (ref 34.5–45)
HGB BLD-MCNC: 10.1 G/DL — LOW (ref 11.5–15.5)
IMM GRANULOCYTES NFR BLD AUTO: 0.5 % — SIGNIFICANT CHANGE UP (ref 0–1.5)
KETONES UR-MCNC: NEGATIVE — SIGNIFICANT CHANGE UP
LEUKOCYTE ESTERASE UR-ACNC: ABNORMAL
LYMPHOCYTES # BLD AUTO: 1.65 K/UL — SIGNIFICANT CHANGE UP (ref 1–3.3)
LYMPHOCYTES # BLD AUTO: 16.3 % — SIGNIFICANT CHANGE UP (ref 13–44)
MCHC RBC-ENTMCNC: 26.4 PG — LOW (ref 27–34)
MCHC RBC-ENTMCNC: 31.5 GM/DL — LOW (ref 32–36)
MCV RBC AUTO: 84 FL — SIGNIFICANT CHANGE UP (ref 80–100)
MONOCYTES # BLD AUTO: 0.74 K/UL — SIGNIFICANT CHANGE UP (ref 0–0.9)
MONOCYTES NFR BLD AUTO: 7.3 % — SIGNIFICANT CHANGE UP (ref 2–14)
NEUTROPHILS # BLD AUTO: 7.45 K/UL — HIGH (ref 1.8–7.4)
NEUTROPHILS NFR BLD AUTO: 73.5 % — SIGNIFICANT CHANGE UP (ref 43–77)
NITRITE UR-MCNC: NEGATIVE — SIGNIFICANT CHANGE UP
NRBC # BLD: 0 /100 WBCS — SIGNIFICANT CHANGE UP (ref 0–0)
PH UR: 6 — SIGNIFICANT CHANGE UP (ref 5–8)
PLATELET # BLD AUTO: 315 K/UL — SIGNIFICANT CHANGE UP (ref 150–400)
POTASSIUM SERPL-MCNC: 4.8 MMOL/L — SIGNIFICANT CHANGE UP (ref 3.5–5.3)
POTASSIUM SERPL-SCNC: 4.8 MMOL/L — SIGNIFICANT CHANGE UP (ref 3.5–5.3)
PROT SERPL-MCNC: 7.4 G/DL — SIGNIFICANT CHANGE UP (ref 6–8.3)
PROT UR-MCNC: 15
RBC # BLD: 3.82 M/UL — SIGNIFICANT CHANGE UP (ref 3.8–5.2)
RBC # FLD: 13.8 % — SIGNIFICANT CHANGE UP (ref 10.3–14.5)
RBC CASTS # UR COMP ASSIST: SIGNIFICANT CHANGE UP /HPF (ref 0–2)
SODIUM SERPL-SCNC: 139 MMOL/L — SIGNIFICANT CHANGE UP (ref 135–145)
SP GR SPEC: 1.01 — SIGNIFICANT CHANGE UP (ref 1.01–1.02)
TROPONIN I, HIGH SENSITIVITY RESULT: 6.8 NG/L — SIGNIFICANT CHANGE UP
UROBILINOGEN FLD QL: NEGATIVE — SIGNIFICANT CHANGE UP
WBC # BLD: 10.13 K/UL — SIGNIFICANT CHANGE UP (ref 3.8–10.5)
WBC # FLD AUTO: 10.13 K/UL — SIGNIFICANT CHANGE UP (ref 3.8–10.5)
WBC UR QL: ABNORMAL /HPF (ref 0–5)

## 2022-06-06 PROCEDURE — 72170 X-RAY EXAM OF PELVIS: CPT | Mod: 26

## 2022-06-06 PROCEDURE — 99285 EMERGENCY DEPT VISIT HI MDM: CPT

## 2022-06-06 PROCEDURE — 99223 1ST HOSP IP/OBS HIGH 75: CPT

## 2022-06-06 PROCEDURE — 73562 X-RAY EXAM OF KNEE 3: CPT | Mod: 26,LT

## 2022-06-06 PROCEDURE — 71045 X-RAY EXAM CHEST 1 VIEW: CPT | Mod: 26

## 2022-06-06 PROCEDURE — 70450 CT HEAD/BRAIN W/O DYE: CPT | Mod: 26,MA

## 2022-06-06 PROCEDURE — 93010 ELECTROCARDIOGRAM REPORT: CPT

## 2022-06-06 RX ORDER — MIDAZOLAM HYDROCHLORIDE 1 MG/ML
1 INJECTION, SOLUTION INTRAMUSCULAR; INTRAVENOUS ONCE
Refills: 0 | Status: DISCONTINUED | OUTPATIENT
Start: 2022-06-06 | End: 2022-06-06

## 2022-06-06 RX ORDER — HALOPERIDOL DECANOATE 100 MG/ML
2.5 INJECTION INTRAMUSCULAR ONCE
Refills: 0 | Status: COMPLETED | OUTPATIENT
Start: 2022-06-06 | End: 2022-06-06

## 2022-06-06 RX ADMIN — HALOPERIDOL DECANOATE 2.5 MILLIGRAM(S): 100 INJECTION INTRAMUSCULAR at 18:35

## 2022-06-06 NOTE — ED PROVIDER NOTE - OBJECTIVE STATEMENT
82F PMH of HTN, HLD, DM, dementia p/w possible near syncope. Has been in her usual state of health. Was walking with son when she suddenly lowered herself to the ground. Son is unsure if she fell or almost passed out. N 82F PMH of HTN, HLD, DM, dementia p/w possible near syncope. Has been in her usual state of health. Was walking with grandson when she suddenly lowered herself to the ground. Son at bedside unsure if LOC. Pt alert but not cooperating with history taking.

## 2022-06-06 NOTE — H&P ADULT - ATTENDING COMMENTS
Vital Signs Last 24 Hrs  T(C): --  T(F): --  HR: 86 (06 Jun 2022 17:32) (86 - 86)  BP: 138/92 (06 Jun 2022 17:32) (138/92 - 138/92)  BP(mean): --  RR: 20 (06 Jun 2022 17:32) (20 - 20)  SpO2: 100% (06 Jun 2022 17:32) (100% - 100%) Pt seen at bedside  Case discussed with MAR.   82 year old woman with PMH of HLD, HTN, DM2, dementia. Known to our service from prior hospital admissions.   She was brought in today on account of suspected pre-syncope. She was walking with her grandson before lowering herself to the ground. There was no LOC or any display of distress from patient who is limited cognitively from dementia.       Vital Signs Last 24 Hrs  T(C): --  T(F): --  HR: 86 (2022 17:32) (86 - 86)  BP: 138/92 (2022 17:32) (138/92 - 138/92)  BP(mean): --  RR: 20 (2022 17:32) (20 - 20)  SpO2: 100% (2022 17:32) (100% - 100%)    Labs                         10.1   10.13 )-----------( 315      ( 2022 18:40 )             32.1     06-06    139  |  110<H>  |  38<H>  ----------------------------<  114<H>  4.8   |  23  |  1.28    Ca    10.5      2022 18:40    TPro  7.4  /  Alb  3.8  /  TBili  0.5  /  DBili  x   /  AST  32  /  ALT  23  /  AlkPhos  75  06-06      Urinalysis Basic - ( 2022 20:45 )    Color: Yellow / Appearance: Clear / S.015 / pH: x  Gluc: x / Ketone: Negative  / Bili: Negative / Urobili: Negative   Blood: x / Protein: 15 / Nitrite: Negative   Leuk Esterase: Moderate / RBC: 0-2 /HPF / WBC 6-10 /HPF   Sq Epi: x / Non Sq Epi: Occasional /HPF / Bacteria: Few /HPF    Head CT - unremarkable   EKG -     Impression   82 year old woman with hx as above with presentation as above suggestive of pre-syncope. Unsure what other symptoms were present but patient's clinical status and work up so far are unremarkable.  Scheduled for discharge but family opting for admission/ observation overnight.   Will admit to telemetry and observe    A/P   1- Suspected pre-syncope   etiology unclear- vasovagal vs orthostatic  2- KIMBERLY likely pre-renal   3. Suspected UTI     Admit to tele for observation   Serial troponin; EKG repeat only if symptoms recur or troponin assay increases.  Orthostatic vital signs   Gentle IVF hydration with isotonic fluids  Check urine sodium and osmolality   Urine culture   Empiric antibiotics with IV ceftriaxone  Resume home meds

## 2022-06-06 NOTE — H&P ADULT - PROBLEM SELECTOR PLAN 4
p/w BUN Cr 38/ 1.28 ratio c/w pre-renal  baseline cr 0.9  - start gentle diuresis  - renal US  - urine lytes

## 2022-06-06 NOTE — H&P ADULT - PROBLEM SELECTOR PLAN 2
question of pre-syncope as patient lowered herself to ground  CT head no acute pathology  EKG -  - admit to tele  - f/u orthostatic vitals

## 2022-06-06 NOTE — ED ADULT NURSE NOTE - NS ED NURSE PATIENT LEFT UNIT TIME
No signif signs, symptoms or concerns except hip pains. Tdap given. Advice appropriate to gestational age reviewed including pertinent Checklist items. Discussed condition, tests and care plan including RBA. Problem list updated.   A/P:  Bernice was seen today for prenatal care.    Diagnoses and all orders for this visit:    Need for Tdap vaccination  -     TDAP VACCINE (ADACEL)    Rubella non-immune status, antepartum    Encounter for supervision of normal first pregnancy in third trimester  -     TDAP VACCINE (ADACEL)        Eddie Richards MD          07:36

## 2022-06-06 NOTE — H&P ADULT - ASSESSMENT
83 yo F, from home, lives with son, billie w/ assist + walker, prior mckeon  for retention, DM2, HTN, HLD, dementia w/ behavior disturbance and PE (10/2021 was on xarelto) p/w left leg pain after walking all day. Scheduled for discharge but family opting for admission/ observation overnight.   Will admit to telemetry and observe. Admit to tele.

## 2022-06-06 NOTE — H&P ADULT - NSHPPHYSICALEXAM_GEN_ALL_CORE
PHYSICAL EXAMINATION:  GENERAL: NAD, well built on RA  HEAD:  Atraumatic, Normocephalic  EYES:  conjunctiva and sclera clear  NECK: Supple, No JVD,  CHEST/LUNG: Clear to auscultation No rales, rhonchi, wheezing, or rubs  HEART: Regular rate and rhythm; No murmurs, rubs, or gallops  ABDOMEN: Soft, Nontender, Nondistended; Bowel sounds present  NERVOUS SYSTEM:  Alert & Oriented X0, lethargic, not follow commands.    EXTREMITIES:  2+ Peripheral Pulses, No clubbing, cyanosis, or edema. LEFT anterior calf tender to touch, soft, no skin changes.  SKIN: warm dry

## 2022-06-06 NOTE — ED ADULT NURSE NOTE - OBJECTIVE STATEMENT
pt is here for altered mental status.  As per son, found sitting on sidewalk, h/x dementia, agitated at this time.

## 2022-06-06 NOTE — H&P ADULT - HISTORY OF PRESENT ILLNESS
83 yo F, from home, lives with son, billie w/ assist + walker, prior mckeon  for retention, DM2, HTN, HLD, dementia w/ behavior disturbance and PE (10/2021 was on xarelto) p/w left leg pain after walking all day. Son at bedside said that patient has been in usual state of health and today from 10 am and onwards spent the day walking with her walker. No falls or injury. After walking many hours she developed acute left leg pain in the anterior shin and posterior calf so she lowered herself on to ground to sit. No swelling or skin changes. Has had similar pain in leg in the past but son doesn't know what was the cause then. No LOC or other complaints.

## 2022-06-06 NOTE — ED PROVIDER NOTE - CLINICAL SUMMARY MEDICAL DECISION MAKING FREE TEXT BOX
Possible syncopal episode. Pt well appearing, slightly confused and agitated, neuro intact, no signs of trauma. Will assess for traumatic injury and causes of syncope including infection, ACS, electrolyte abnormalities.

## 2022-06-06 NOTE — ED PROVIDER NOTE - PROGRESS NOTE DETAILS
Pt now calm and cooperative. Ambulating well with assistance. Son would prefer that the pt be observed overnight. Singed out to hospitalist/MAR to f/u CT read which appears normal to me.

## 2022-06-06 NOTE — H&P ADULT - PROBLEM SELECTOR PLAN 5
hx dementia w/ behavior disturbance  - resume home meds: seroquel 50 QHS  - haldol PRN for acute agitation

## 2022-06-06 NOTE — H&P ADULT - NSHPLABSRESULTS_GEN_ALL_CORE
LABS:                        10.1   10.13 )-----------( 315      ( 2022 18:40 )             32.1     06-06    139  |  110<H>  |  38<H>  ----------------------------<  114<H>  4.8   |  23  |  1.28    Ca    10.5      2022 18:40    TPro  7.4  /  Alb  3.8  /  TBili  0.5  /  DBili  x   /  AST  32  /  ALT  23  /  AlkPhos  75  06-06    Urinalysis Basic - ( 2022 20:45 )    Color: Yellow / Appearance: Clear / S.015 / pH: x  Gluc: x / Ketone: Negative  / Bili: Negative / Urobili: Negative   Blood: x / Protein: 15 / Nitrite: Negative   Leuk Esterase: Moderate / RBC: 0-2 /HPF / WBC 6-10 /HPF   Sq Epi: x / Non Sq Epi: Occasional /HPF / Bacteria: Few /HPF    LIVER FUNCTIONS - ( 2022 18:40 )  Alb: 3.8 g/dL / Pro: 7.4 g/dL / ALK PHOS: 75 U/L / ALT: 23 U/L DA / AST: 32 U/L / GGT: x

## 2022-06-06 NOTE — H&P ADULT - PROBLEM SELECTOR PLAN 3
UA positive, unable to assess sxs as dementia limits   - start CFTX IVPB x 3 days  - gentle IVF  - watch for retention as has hx of needing mckeon  - f/u bladder scan  - f/u urine lytes, Angel Luis, Cr and Osmol

## 2022-06-07 DIAGNOSIS — Z02.9 ENCOUNTER FOR ADMINISTRATIVE EXAMINATIONS, UNSPECIFIED: ICD-10-CM

## 2022-06-07 DIAGNOSIS — E11.9 TYPE 2 DIABETES MELLITUS WITHOUT COMPLICATIONS: ICD-10-CM

## 2022-06-07 DIAGNOSIS — F03.90 UNSPECIFIED DEMENTIA, UNSPECIFIED SEVERITY, WITHOUT BEHAVIORAL DISTURBANCE, PSYCHOTIC DISTURBANCE, MOOD DISTURBANCE, AND ANXIETY: ICD-10-CM

## 2022-06-07 DIAGNOSIS — N39.0 URINARY TRACT INFECTION, SITE NOT SPECIFIED: ICD-10-CM

## 2022-06-07 DIAGNOSIS — N17.9 ACUTE KIDNEY FAILURE, UNSPECIFIED: ICD-10-CM

## 2022-06-07 DIAGNOSIS — M79.605 PAIN IN LEFT LEG: ICD-10-CM

## 2022-06-07 DIAGNOSIS — Z29.9 ENCOUNTER FOR PROPHYLACTIC MEASURES, UNSPECIFIED: ICD-10-CM

## 2022-06-07 DIAGNOSIS — E78.5 HYPERLIPIDEMIA, UNSPECIFIED: ICD-10-CM

## 2022-06-07 DIAGNOSIS — R55 SYNCOPE AND COLLAPSE: ICD-10-CM

## 2022-06-07 DIAGNOSIS — I10 ESSENTIAL (PRIMARY) HYPERTENSION: ICD-10-CM

## 2022-06-07 PROBLEM — I26.99 OTHER PULMONARY EMBOLISM WITHOUT ACUTE COR PULMONALE: Chronic | Status: ACTIVE | Noted: 2021-11-28

## 2022-06-07 LAB
CK SERPL-CCNC: 187 U/L — SIGNIFICANT CHANGE UP (ref 21–215)
CULTURE RESULTS: SIGNIFICANT CHANGE UP
GLUCOSE BLDC GLUCOMTR-MCNC: 126 MG/DL — HIGH (ref 70–99)
GLUCOSE BLDC GLUCOMTR-MCNC: 147 MG/DL — HIGH (ref 70–99)
GLUCOSE BLDC GLUCOMTR-MCNC: 160 MG/DL — HIGH (ref 70–99)
GLUCOSE BLDC GLUCOMTR-MCNC: 200 MG/DL — HIGH (ref 70–99)
OSMOLALITY SERPL: 297 MOSMOL/KG — SIGNIFICANT CHANGE UP (ref 280–301)
SARS-COV-2 RNA SPEC QL NAA+PROBE: SIGNIFICANT CHANGE UP
SPECIMEN SOURCE: SIGNIFICANT CHANGE UP

## 2022-06-07 PROCEDURE — 93923 UPR/LXTR ART STDY 3+ LVLS: CPT | Mod: 26

## 2022-06-07 PROCEDURE — 99233 SBSQ HOSP IP/OBS HIGH 50: CPT

## 2022-06-07 RX ORDER — ENOXAPARIN SODIUM 100 MG/ML
40 INJECTION SUBCUTANEOUS EVERY 24 HOURS
Refills: 0 | Status: DISCONTINUED | OUTPATIENT
Start: 2022-06-07 | End: 2022-06-13

## 2022-06-07 RX ORDER — LIDOCAINE 4 G/100G
2 CREAM TOPICAL EVERY 24 HOURS
Refills: 0 | Status: DISCONTINUED | OUTPATIENT
Start: 2022-06-07 | End: 2022-06-10

## 2022-06-07 RX ORDER — FERROUS SULFATE 325(65) MG
325 TABLET ORAL
Refills: 0 | Status: DISCONTINUED | OUTPATIENT
Start: 2022-06-07 | End: 2022-06-13

## 2022-06-07 RX ORDER — INSULIN LISPRO 100/ML
VIAL (ML) SUBCUTANEOUS
Refills: 0 | Status: DISCONTINUED | OUTPATIENT
Start: 2022-06-07 | End: 2022-06-13

## 2022-06-07 RX ORDER — DEXTROSE 50 % IN WATER 50 %
15 SYRINGE (ML) INTRAVENOUS ONCE
Refills: 0 | Status: DISCONTINUED | OUTPATIENT
Start: 2022-06-07 | End: 2022-06-13

## 2022-06-07 RX ORDER — ACETAMINOPHEN 500 MG
650 TABLET ORAL EVERY 6 HOURS
Refills: 0 | Status: DISCONTINUED | OUTPATIENT
Start: 2022-06-07 | End: 2022-06-10

## 2022-06-07 RX ORDER — ACETAMINOPHEN 500 MG
1000 TABLET ORAL ONCE
Refills: 0 | Status: COMPLETED | OUTPATIENT
Start: 2022-06-07 | End: 2022-06-07

## 2022-06-07 RX ORDER — DEXTROSE 50 % IN WATER 50 %
25 SYRINGE (ML) INTRAVENOUS ONCE
Refills: 0 | Status: DISCONTINUED | OUTPATIENT
Start: 2022-06-07 | End: 2022-06-13

## 2022-06-07 RX ORDER — SENNA PLUS 8.6 MG/1
2 TABLET ORAL AT BEDTIME
Refills: 0 | Status: DISCONTINUED | OUTPATIENT
Start: 2022-06-07 | End: 2022-06-13

## 2022-06-07 RX ORDER — GLUCAGON INJECTION, SOLUTION 0.5 MG/.1ML
1 INJECTION, SOLUTION SUBCUTANEOUS ONCE
Refills: 0 | Status: DISCONTINUED | OUTPATIENT
Start: 2022-06-07 | End: 2022-06-13

## 2022-06-07 RX ORDER — SODIUM CHLORIDE 9 MG/ML
1000 INJECTION, SOLUTION INTRAVENOUS
Refills: 0 | Status: DISCONTINUED | OUTPATIENT
Start: 2022-06-07 | End: 2022-06-13

## 2022-06-07 RX ORDER — LATANOPROST 0.05 MG/ML
1 SOLUTION/ DROPS OPHTHALMIC; TOPICAL AT BEDTIME
Refills: 0 | Status: DISCONTINUED | OUTPATIENT
Start: 2022-06-07 | End: 2022-06-13

## 2022-06-07 RX ORDER — QUETIAPINE FUMARATE 200 MG/1
50 TABLET, FILM COATED ORAL AT BEDTIME
Refills: 0 | Status: DISCONTINUED | OUTPATIENT
Start: 2022-06-07 | End: 2022-06-10

## 2022-06-07 RX ORDER — AMLODIPINE BESYLATE 2.5 MG/1
10 TABLET ORAL DAILY
Refills: 0 | Status: DISCONTINUED | OUTPATIENT
Start: 2022-06-07 | End: 2022-06-13

## 2022-06-07 RX ORDER — ASCORBIC ACID 60 MG
500 TABLET,CHEWABLE ORAL DAILY
Refills: 0 | Status: DISCONTINUED | OUTPATIENT
Start: 2022-06-07 | End: 2022-06-13

## 2022-06-07 RX ORDER — PANTOPRAZOLE SODIUM 20 MG/1
40 TABLET, DELAYED RELEASE ORAL
Refills: 0 | Status: DISCONTINUED | OUTPATIENT
Start: 2022-06-07 | End: 2022-06-13

## 2022-06-07 RX ORDER — ATORVASTATIN CALCIUM 80 MG/1
20 TABLET, FILM COATED ORAL AT BEDTIME
Refills: 0 | Status: DISCONTINUED | OUTPATIENT
Start: 2022-06-07 | End: 2022-06-13

## 2022-06-07 RX ORDER — CARVEDILOL PHOSPHATE 80 MG/1
3.12 CAPSULE, EXTENDED RELEASE ORAL EVERY 12 HOURS
Refills: 0 | Status: DISCONTINUED | OUTPATIENT
Start: 2022-06-07 | End: 2022-06-13

## 2022-06-07 RX ORDER — TIMOLOL 0.5 %
1 DROPS OPHTHALMIC (EYE)
Refills: 0 | Status: DISCONTINUED | OUTPATIENT
Start: 2022-06-07 | End: 2022-06-13

## 2022-06-07 RX ORDER — DEXTROSE 50 % IN WATER 50 %
12.5 SYRINGE (ML) INTRAVENOUS ONCE
Refills: 0 | Status: DISCONTINUED | OUTPATIENT
Start: 2022-06-07 | End: 2022-06-13

## 2022-06-07 RX ORDER — CEFTRIAXONE 500 MG/1
1000 INJECTION, POWDER, FOR SOLUTION INTRAMUSCULAR; INTRAVENOUS EVERY 24 HOURS
Refills: 0 | Status: COMPLETED | OUTPATIENT
Start: 2022-06-07 | End: 2022-06-09

## 2022-06-07 RX ADMIN — Medication 400 MILLIGRAM(S): at 01:50

## 2022-06-07 RX ADMIN — Medication 1 DROP(S): at 05:53

## 2022-06-07 RX ADMIN — ATORVASTATIN CALCIUM 20 MILLIGRAM(S): 80 TABLET, FILM COATED ORAL at 22:20

## 2022-06-07 RX ADMIN — LIDOCAINE 2 PATCH: 4 CREAM TOPICAL at 13:57

## 2022-06-07 RX ADMIN — LATANOPROST 1 DROP(S): 0.05 SOLUTION/ DROPS OPHTHALMIC; TOPICAL at 22:19

## 2022-06-07 RX ADMIN — CEFTRIAXONE 100 MILLIGRAM(S): 500 INJECTION, POWDER, FOR SOLUTION INTRAMUSCULAR; INTRAVENOUS at 05:53

## 2022-06-07 RX ADMIN — Medication 1000 MILLIGRAM(S): at 02:20

## 2022-06-07 RX ADMIN — LIDOCAINE 2 PATCH: 4 CREAM TOPICAL at 01:50

## 2022-06-07 RX ADMIN — QUETIAPINE FUMARATE 50 MILLIGRAM(S): 200 TABLET, FILM COATED ORAL at 22:20

## 2022-06-07 RX ADMIN — Medication 325 MILLIGRAM(S): at 05:52

## 2022-06-07 RX ADMIN — Medication 1: at 14:08

## 2022-06-07 RX ADMIN — LIDOCAINE 2 PATCH: 4 CREAM TOPICAL at 09:20

## 2022-06-07 RX ADMIN — AMLODIPINE BESYLATE 10 MILLIGRAM(S): 2.5 TABLET ORAL at 05:52

## 2022-06-07 RX ADMIN — SENNA PLUS 2 TABLET(S): 8.6 TABLET ORAL at 22:20

## 2022-06-07 RX ADMIN — Medication 500 MILLIGRAM(S): at 14:09

## 2022-06-07 RX ADMIN — Medication 1 DROP(S): at 19:10

## 2022-06-07 RX ADMIN — SODIUM CHLORIDE 80 MILLILITER(S): 9 INJECTION, SOLUTION INTRAVENOUS at 14:09

## 2022-06-07 RX ADMIN — CARVEDILOL PHOSPHATE 3.12 MILLIGRAM(S): 80 CAPSULE, EXTENDED RELEASE ORAL at 05:52

## 2022-06-07 RX ADMIN — ENOXAPARIN SODIUM 40 MILLIGRAM(S): 100 INJECTION SUBCUTANEOUS at 05:52

## 2022-06-07 RX ADMIN — Medication 325 MILLIGRAM(S): at 19:09

## 2022-06-07 RX ADMIN — SODIUM CHLORIDE 80 MILLILITER(S): 9 INJECTION, SOLUTION INTRAVENOUS at 01:51

## 2022-06-07 RX ADMIN — PANTOPRAZOLE SODIUM 40 MILLIGRAM(S): 20 TABLET, DELAYED RELEASE ORAL at 07:29

## 2022-06-07 RX ADMIN — CARVEDILOL PHOSPHATE 3.12 MILLIGRAM(S): 80 CAPSULE, EXTENDED RELEASE ORAL at 19:09

## 2022-06-07 NOTE — PATIENT PROFILE ADULT - FALL HARM RISK - HARM RISK INTERVENTIONS
Assistance OOB with selected safe patient handling equipment/Communicate Risk of Fall with Harm to all staff/Discuss with provider need for PT consult/Monitor for mental status changes/Monitor gait and stability/Move patient closer to nurses' station/Provide patient with walking aids - walker, cane, crutches/Reinforce activity limits and safety measures with patient and family/Reorient to person, place and time as needed/Tailored Fall Risk Interventions/Toileting schedule using arm’s reach rule for commode and bathroom/Use of alarms - bed, chair and/or voice tab/Visual Cue: Yellow wristband and red socks/Bed in lowest position, wheels locked, appropriate side rails in place/Call bell, personal items and telephone in reach/Instruct patient to call for assistance before getting out of bed or chair/Non-slip footwear when patient is out of bed/[place] to call system/Physically safe environment - no spills, clutter or unnecessary equipment/Purposeful Proactive Rounding/Room/bathroom lighting operational, light cord in reach

## 2022-06-07 NOTE — PROGRESS NOTE ADULT - PROBLEM SELECTOR PLAN 2
question of pre-syncope as patient lowered herself to ground  CT head no acute pathology  EKG -  - admit to tele  - f/u orthostatic vitals No LOC or Synope   CT head no acute pathology  EKG -wnl

## 2022-06-07 NOTE — PATIENT PROFILE ADULT - LANGUAGE ASSISTANCE NEEDED
reading glasses/Partially impaired: cannot see medication labels or newsprint, but can see obstacles in path, and the surrounding layout; can count fingers at arm's length
Yes-Patient/Caregiver accepts free interpretation services...

## 2022-06-07 NOTE — PROGRESS NOTE ADULT - SUBJECTIVE AND OBJECTIVE BOX
NP Note discussed with  primary attending    Patient is a 82y old  Female who presents with a chief complaint of leg pain (2022 16:13)      INTERVAL HPI/OVERNIGHT EVENTS:  Son denies HA, dizziness, SOB, nausea or vomiting.  States pt's fine.  Cantramose -[facility] #[id number].    MEDICATIONS  (STANDING):  amLODIPine   Tablet 10 milliGRAM(s) Oral daily  artificial  tears Solution 1 Drop(s) Both EYES two times a day  ascorbic acid 500 milliGRAM(s) Oral daily  atorvastatin 20 milliGRAM(s) Oral at bedtime  carvedilol 3.125 milliGRAM(s) Oral every 12 hours  cefTRIAXone   IVPB 1000 milliGRAM(s) IV Intermittent every 24 hours  dextrose 5%. 1000 milliLiter(s) (100 mL/Hr) IV Continuous <Continuous>  dextrose 5%. 1000 milliLiter(s) (50 mL/Hr) IV Continuous <Continuous>  dextrose 50% Injectable 25 Gram(s) IV Push once  dextrose 50% Injectable 12.5 Gram(s) IV Push once  dextrose 50% Injectable 25 Gram(s) IV Push once  enoxaparin Injectable 40 milliGRAM(s) SubCutaneous every 24 hours  ferrous    sulfate 325 milliGRAM(s) Oral two times a day  glucagon  Injectable 1 milliGRAM(s) IntraMuscular once  insulin lispro (ADMELOG) corrective regimen sliding scale   SubCutaneous three times a day before meals  lactated ringers. 1000 milliLiter(s) (80 mL/Hr) IV Continuous <Continuous>  latanoprost 0.005% Ophthalmic Solution 1 Drop(s) Both EYES at bedtime  lidocaine   4% Patch 2 Patch Transdermal every 24 hours  pantoprazole    Tablet 40 milliGRAM(s) Oral before breakfast  QUEtiapine 50 milliGRAM(s) Oral at bedtime  senna 2 Tablet(s) Oral at bedtime  timolol 0.25% Solution 1 Drop(s) Both EYES two times a day    MEDICATIONS  (PRN):  acetaminophen     Tablet .. 650 milliGRAM(s) Oral every 6 hours PRN Mild Pain (1 - 3), Moderate Pain (4 - 6)  dextrose Oral Gel 15 Gram(s) Oral once PRN Blood Glucose LESS THAN 70 milliGRAM(s)/deciliter      __________________________________________________  REVIEW OF SYSTEMS:    CONSTITUTIONAL: No fever  EYES: No acute visual disturbances  NECK: No pain or stiffness  RESPIRATORY: No cough; No shortness of breath  CARDIOVASCULAR: No chest pain, no palpitations  GASTROINTESTINAL: No pain. No nausea or vomiting.  No diarrhea   NEUROLOGICAL: No headache or numbness, no tremors  MUSCULOSKELETAL: No joint pain, no muscle pain  GENITOURINARY: No dysuria, no frequency, no hesitancy  PSYCHIATRY: No depression , no anxiety  ALL OTHER  ROS negative        Vital Signs Last 24 Hrs  T(C): 36.8 (2022 11:30), Max: 36.8 (2022 11:30)  T(F): 98.2 (2022 11:30), Max: 98.2 (2022 11:30)  HR: 73 (2022 11:30) (73 - 90)  BP: 128/60 (2022 11:30) (128/60 - 185/81)  RR: 18 (2022 11:30) (18 - 19)  SpO2: 96% (2022 11:30) (96% - 97%)    ________________________________________________  PHYSICAL EXAM:  GENERAL: NAD  HEENT: Normocephalic;  conjunctivae and sclerae clear; moist mucous membranes;   NECK : Supple  CHEST/LUNG: Clear to auscultation bilaterally with good air entry   HEART: S1 S2  regular; no murmurs, gallops or rubs  ABDOMEN: Soft, Nontender, Nondistended; Bowel sounds present x 4 quad  EXTREMITIES: No cyanosis; no edema; no calf tenderness.  (+) Intermittent RLE   SKIN: Warm and dry; no rash  NERVOUS SYSTEM:  Awake and alert; not oriented  to place, person and time ; no new deficits    _________________________________________________  LABS:                        10.1   1013 )-----------( 315      ( 2022 18:40 )             32.1     06-06    139  |  110<H>  |  38<H>  ----------------------------<  114<H>  4.8   |  23  |  1.28    Ca    10.5      2022 18:40    TPro  7.4  /  Alb  3.8  /  TBili  0.5  /  DBili  x   /  AST  32  /  ALT  23  /  AlkPhos  75  -      Urinalysis Basic - ( 2022 20:45 )    Color: Yellow / Appearance: Clear / S.015 / pH: x  Gluc: x / Ketone: Negative  / Bili: Negative / Urobili: Negative   Blood: x / Protein: 15 / Nitrite: Negative   Leuk Esterase: Moderate / RBC: 0-2 /HPF / WBC 6-10 /HPF   Sq Epi: x / Non Sq Epi: Occasional /HPF / Bacteria: Few /HPF      CAPILLARY BLOOD GLUCOSE      POCT Blood Glucose.: 126 mg/dL (2022 17:10)  POCT Blood Glucose.: 200 mg/dL (2022 14:06)  POCT Blood Glucose.: 160 mg/dL (2022 11:50)  POCT Blood Glucose.: 147 mg/dL (2022 07:36)  POCT Blood Glucose.: 120 mg/dL (2022 18:00)        RADIOLOGY & ADDITIONAL TESTS:    Imaging Personally Reviewed:  YES    Consultant(s) Notes Reviewed:   YES    Care Discussed with Consultants :     Plan of care was discussed with patient and /or primary care giver; all questions and concerns were addressed and care was aligned with patient's wishes.     NP Note discussed with  primary attending    Patient is a 82y old  Female who presents with a chief complaint of leg pain (2022 16:13)      INTERVAL HPI/OVERNIGHT EVENTS:  Son denies HA, dizziness, SOB, nausea or vomiting.  States pt's fine.  Cantramose -[facility] #[id number].    MEDICATIONS  (STANDING):  amLODIPine   Tablet 10 milliGRAM(s) Oral daily  artificial  tears Solution 1 Drop(s) Both EYES two times a day  ascorbic acid 500 milliGRAM(s) Oral daily  atorvastatin 20 milliGRAM(s) Oral at bedtime  carvedilol 3.125 milliGRAM(s) Oral every 12 hours  cefTRIAXone   IVPB 1000 milliGRAM(s) IV Intermittent every 24 hours  dextrose 5%. 1000 milliLiter(s) (100 mL/Hr) IV Continuous <Continuous>  dextrose 5%. 1000 milliLiter(s) (50 mL/Hr) IV Continuous <Continuous>  dextrose 50% Injectable 25 Gram(s) IV Push once  dextrose 50% Injectable 12.5 Gram(s) IV Push once  dextrose 50% Injectable 25 Gram(s) IV Push once  enoxaparin Injectable 40 milliGRAM(s) SubCutaneous every 24 hours  ferrous    sulfate 325 milliGRAM(s) Oral two times a day  glucagon  Injectable 1 milliGRAM(s) IntraMuscular once  insulin lispro (ADMELOG) corrective regimen sliding scale   SubCutaneous three times a day before meals  lactated ringers. 1000 milliLiter(s) (80 mL/Hr) IV Continuous <Continuous>  latanoprost 0.005% Ophthalmic Solution 1 Drop(s) Both EYES at bedtime  lidocaine   4% Patch 2 Patch Transdermal every 24 hours  pantoprazole    Tablet 40 milliGRAM(s) Oral before breakfast  QUEtiapine 50 milliGRAM(s) Oral at bedtime  senna 2 Tablet(s) Oral at bedtime  timolol 0.25% Solution 1 Drop(s) Both EYES two times a day    MEDICATIONS  (PRN):  acetaminophen     Tablet .. 650 milliGRAM(s) Oral every 6 hours PRN Mild Pain (1 - 3), Moderate Pain (4 - 6)  dextrose Oral Gel 15 Gram(s) Oral once PRN Blood Glucose LESS THAN 70 milliGRAM(s)/deciliter      __________________________________________________  REVIEW OF SYSTEMS:    CONSTITUTIONAL: No fever  EYES: No acute visual disturbances  NECK: No pain or stiffness  RESPIRATORY: No cough; No shortness of breath  CARDIOVASCULAR: No chest pain, no palpitations  GASTROINTESTINAL: No pain. No nausea or vomiting.  No diarrhea   NEUROLOGICAL: No headache or numbness, no tremors  MUSCULOSKELETAL: No joint pain, no muscle pain  GENITOURINARY: No dysuria, no frequency, no hesitancy  PSYCHIATRY: No depression , no anxiety  ALL OTHER  ROS negative        Vital Signs Last 24 Hrs  T(C): 36.8 (2022 11:30), Max: 36.8 (2022 11:30)  T(F): 98.2 (2022 11:30), Max: 98.2 (2022 11:30)  HR: 73 (2022 11:30) (73 - 90)  BP: 128/60 (2022 11:30) (128/60 - 185/81)  RR: 18 (2022 11:30) (18 - 19)  SpO2: 96% (2022 11:30) (96% - 97%)    ________________________________________________  PHYSICAL EXAM:  GENERAL: NAD  HEENT: Normocephalic;  conjunctivae and sclerae clear; moist mucous membranes;   NECK : Supple  CHEST/LUNG: Clear to auscultation bilaterally with good air entry   HEART: S1 S2  regular; no murmurs, gallops or rubs  ABDOMEN: Soft, Nontender, Nondistended; Bowel sounds present x 4 quad  EXTREMITIES: No cyanosis; no edema; no calf tenderness.  (+) Intermittent LLE   SKIN: Warm and dry; no rash  NERVOUS SYSTEM:  Awake and alert; not oriented  to place, person and time ; no new deficits    _________________________________________________  LABS:                        10.1   1013 )-----------( 315      ( 2022 18:40 )             32.1     06-06    139  |  110<H>  |  38<H>  ----------------------------<  114<H>  4.8   |  23  |  1.28    Ca    10.5      2022 18:40    TPro  7.4  /  Alb  3.8  /  TBili  0.5  /  DBili  x   /  AST  32  /  ALT  23  /  AlkPhos  75  -      Urinalysis Basic - ( 2022 20:45 )    Color: Yellow / Appearance: Clear / S.015 / pH: x  Gluc: x / Ketone: Negative  / Bili: Negative / Urobili: Negative   Blood: x / Protein: 15 / Nitrite: Negative   Leuk Esterase: Moderate / RBC: 0-2 /HPF / WBC 6-10 /HPF   Sq Epi: x / Non Sq Epi: Occasional /HPF / Bacteria: Few /HPF      CAPILLARY BLOOD GLUCOSE      POCT Blood Glucose.: 126 mg/dL (2022 17:10)  POCT Blood Glucose.: 200 mg/dL (2022 14:06)  POCT Blood Glucose.: 160 mg/dL (2022 11:50)  POCT Blood Glucose.: 147 mg/dL (2022 07:36)  POCT Blood Glucose.: 120 mg/dL (2022 18:00)        RADIOLOGY & ADDITIONAL TESTS:    Imaging Personally Reviewed:  YES    Consultant(s) Notes Reviewed:   YES    Care Discussed with Consultants :     Plan of care was discussed with patient and /or primary care giver; all questions and concerns were addressed and care was aligned with patient's wishes.

## 2022-06-07 NOTE — PROGRESS NOTE ADULT - PROBLEM SELECTOR PLAN 1
Sudden leg pain after walking all day   Maintain fall precautions  - f/u cpk  6/7 JAIRO vascular study   - f/u XR tib/fib to r/o and final read on XR knee  - pain control lido patch, tylenol  PT consulted Sudden leg pain after walking all day   Continue w/ lidocaine patch & Tylenol  Maintain fall precautions  CK wnl  6/7 JAIRO vascular study   XR tib/fib pending-f/u recults   PT consult pending-f/u recommendations

## 2022-06-07 NOTE — PATIENT PROFILE ADULT - ABUSE/NEGLECT DETAILS
patient was found on sidewalk. In talking to son it is unclear how she got out of the house with someone home.

## 2022-06-07 NOTE — PATIENT PROFILE ADULT - STATED REASON FOR ADMISSION
Admitted for altered mental status. Found sitting on sidewalk. YohanLarry Kline at bedside stated that she left the house and that her grandson was home with her.

## 2022-06-07 NOTE — PROGRESS NOTE ADULT - PROBLEM SELECTOR PLAN 5
hx dementia w/ behavior disturbance  - resume home meds: seroquel 50 QHS  - haldol PRN for acute agitation H/o dementia w/ behavior disturbance  C/w Seroquel 50 QHS  C/ w Haldol PRN for acute agitation H/o dementia w/ behavior disturbance.  Enhanced supervision.    C/w Seroquel 50 QHS  C/ w Haldol PRN for acute agitation

## 2022-06-07 NOTE — PATIENT PROFILE ADULT - NS PRO AD NO ADVANCE DIRECTIVE
-- Continue Corticosporin   -- Culture today    -- Follow-up next week in clinic   -- Low threshold for CT and ENT consult    Patient Education     When Your Child Has  Swimmer s Ear    If your child spends a lot of time in the water and is having ear pain, he or she may have developed swimmer's ear (otitis externa). It is a skin infection that happens in the ear canal, between the opening of the ear and the eardrum. When the ear canal becomes too moist, bacteria can grow. This causes pain, swelling, and redness in the ear canal.  Who is at risk for swimmer s ear?  Children are more likely to get swimmer s ear if they:    Swim or lie down in a bathtub or hot tub    Clean their ear canals roughly. This causes tiny cuts or scratches that easily get infected.    Have ear canals that are naturally narrow    Have excess earwax that traps fluid in the ear canal  What are the symptoms of swimmer s ear?   The most common symptoms of swimmer s ear are:    Ear pain, especially when pulling on the earlobe or when chewing    Redness or swelling in the ear canal or near the ear    Itching in the ear    Drainage from the ear    Feeling like water is in the ear    Fever    Problems hearing  How is swimmer s ear diagnosed?  The healthcare provider will examine your child. He or she will also ask questions to help rule out other causes of ear pain. The healthcare provider will look for:    Redness and swelling in the ear canal    Drainage from the ear canal    Pain when moving the earlobe  How is swimmer s ear treated?  To treat your child s ear, the healthcare provider may recommend:    Medicines such as antibiotic ear drops or a pain reliever that is put in the ear. Antibiotic medicine taken by mouth (orally) is not recommended.    Over-the-counter pain relievers such as acetaminophen and ibuprofen. Don't give ibuprofen to infants younger than 6 months of age or to children who are dehydrated or constantly vomiting. Don t give  your child aspirin to relieve a fever. Using aspirin to treat a fever in children could cause a serious condition called Reye syndrome.  How can you prevent swimmer s ear?  Ask your child's healthcare provider about using the following to help prevent swimmer s ear:    After your child has been in the water, have your child tilt his or her head to each side to help any water drain out. You can also dry his or her ear canal using a blow dryer. Use a low air and cool setting. Hold the dryer at least 12 inches from your child s head. Wave the dryer slowly back and forth--don t hold it still. You may also gently pull the earlobe down and slightly backward to allow the air to reach the ear canal.    Use a tissue to gently draw water out of the ear. Your child s healthcare provider can show you how.    Use over-the-counter ear drops if the healthcare provider suggests this. These help dry out the inside of your child s ear. Smaller children may need to lie down on a couch or bed for a short time to keep the drops inside the ear canal.    Gently clean your child s ear canal. Don't use cotton swabs.  When to call your child s healthcare provider  Call your child's healthcare provider if your child has any of the following:    Increased pain redness, or swelling of the outer ear    Ear pain, redness, or swelling that does not go away with treatment    Fever (see Fever and children, below)     Fever and children  Always use a digital thermometer to check your child s temperature. Never use a mercury thermometer.  For infants and toddlers, be sure to use a rectal thermometer correctly. A rectal thermometer may accidentally poke a hole in (perforate) the rectum. It may also pass on germs from the stool. Always follow the product maker s directions for proper use. If you don t feel comfortable taking a rectal temperature, use another method. When you talk to your child s healthcare provider, tell him or her which method you used  to take your child s temperature.  Here are guidelines for fever temperature. Ear temperatures aren t accurate before 6 months of age. Don t take an oral temperature until your child is at least 4 years old.  Infant under 3 months old:    Ask your child s healthcare provider how you should take the temperature.    Rectal or forehead (temporal artery) temperature of 100.4 F (38 C) or higher, or as directed by the provider    Armpit temperature of 99 F (37.2 C) or higher, or as directed by the provider  Child age 3 to 36 months:    Rectal, forehead (temporal artery), or ear temperature of 102 F (38.9 C) or higher, or as directed by the provider    Armpit temperature of 101 F (38.3 C) or higher, or as directed by the provider  Child of any age:    Repeated temperature of 104 F (40 C) or higher, or as directed by the provider    Fever that lasts more than 24 hours in a child under 2 years old. Or a fever that lasts for 3 days in a child 2 years or older.  Date Last Reviewed: 11/1/2016 2000-2019 [employer]. [address] [place] [id number]. All rights reserved. This information is not intended as a substitute for professional medical care. Always follow your healthcare professional's instructions.            unable to assess due to altered mental status./No

## 2022-06-07 NOTE — PROGRESS NOTE ADULT - PROBLEM SELECTOR PLAN 3
UA positive, unable to assess sxs as dementia limits   - start CFTX IVPB x 3 days  - gentle IVF  - watch for retention as has hx of needing mckeon  - f/u bladder scan  - f/u urine lytes, Angel Luis, Cr and Osmol (+) UA positive  S/p IVF  C/w Ceftriaxone, D1/3  Pt voiding no need for mckeon at this time

## 2022-06-07 NOTE — PROGRESS NOTE ADULT - PROBLEM SELECTOR PLAN 6
resume BP meds : amlodipine 10, carvedilol 3.125 BID w/ hold parameters BP stable   C/w Amlodipine & Carvedilol

## 2022-06-07 NOTE — PROGRESS NOTE ADULT - NS ATTEND AMEND GEN_ALL_CORE FT
82F with PMH of DM2, HTN, HLD, dementia (A/Ox1) w/ behavior disturbance and PE (10/2021 was on xarelto) p/w left leg pain after walking all day. Admit for UTI, pre-syncope, ambulatory dysfunction, KIMBERLY.    #UTI  #Pre-syncope - vasovagal vs orthostatic  #Leg pain s/p ambulation  #KIMBERLY likely pre-renal   #Suspected UTI   - Ceftriaxone  - Orthostatic vital signs  - troponin negative  - f/u LE US  - IVF hydration with isotonic fluids  - f/u urine sodium and osmolality   - f/u Urine culture   - home meds  - Telemetry  - PT, SW

## 2022-06-07 NOTE — PROGRESS NOTE ADULT - PROBLEM SELECTOR PLAN 4
p/w BUN Cr 38/ 1.28 ratio c/w pre-renal  baseline cr 0.9  - start gentle diuresis  - renal US  - urine lytes p/w BUN Cr 38/ 1.28 ratio c/w pre-renal  baseline cr 0.9  C/w IVF  May need renal US if creatinine up trends  Admission urine studies reordered for AM-f/u results   BMP in AM-f/u results

## 2022-06-08 DIAGNOSIS — Z20.822 CONTACT WITH AND (SUSPECTED) EXPOSURE TO COVID-19: ICD-10-CM

## 2022-06-08 LAB
A1C WITH ESTIMATED AVERAGE GLUCOSE RESULT: 6.8 % — HIGH (ref 4–5.6)
ALBUMIN SERPL ELPH-MCNC: 3.3 G/DL — LOW (ref 3.5–5)
ALP SERPL-CCNC: 79 U/L — SIGNIFICANT CHANGE UP (ref 40–120)
ALT FLD-CCNC: 18 U/L DA — SIGNIFICANT CHANGE UP (ref 10–60)
ANION GAP SERPL CALC-SCNC: 8 MMOL/L — SIGNIFICANT CHANGE UP (ref 5–17)
AST SERPL-CCNC: 16 U/L — SIGNIFICANT CHANGE UP (ref 10–40)
BILIRUB SERPL-MCNC: 0.5 MG/DL — SIGNIFICANT CHANGE UP (ref 0.2–1.2)
BUN SERPL-MCNC: 14 MG/DL — SIGNIFICANT CHANGE UP (ref 7–18)
CALCIUM SERPL-MCNC: 9.3 MG/DL — SIGNIFICANT CHANGE UP (ref 8.4–10.5)
CHLORIDE SERPL-SCNC: 106 MMOL/L — SIGNIFICANT CHANGE UP (ref 96–108)
CO2 SERPL-SCNC: 24 MMOL/L — SIGNIFICANT CHANGE UP (ref 22–31)
CREAT SERPL-MCNC: 0.82 MG/DL — SIGNIFICANT CHANGE UP (ref 0.5–1.3)
EGFR: 70 ML/MIN/1.73M2 — SIGNIFICANT CHANGE UP
ESTIMATED AVERAGE GLUCOSE: 148 MG/DL — HIGH (ref 68–114)
GLUCOSE BLDC GLUCOMTR-MCNC: 112 MG/DL — HIGH (ref 70–99)
GLUCOSE BLDC GLUCOMTR-MCNC: 120 MG/DL — HIGH (ref 70–99)
GLUCOSE BLDC GLUCOMTR-MCNC: 138 MG/DL — HIGH (ref 70–99)
GLUCOSE BLDC GLUCOMTR-MCNC: 157 MG/DL — HIGH (ref 70–99)
GLUCOSE SERPL-MCNC: 161 MG/DL — HIGH (ref 70–99)
HCT VFR BLD CALC: 34.7 % — SIGNIFICANT CHANGE UP (ref 34.5–45)
HGB BLD-MCNC: 11.1 G/DL — LOW (ref 11.5–15.5)
MAGNESIUM SERPL-MCNC: 2 MG/DL — SIGNIFICANT CHANGE UP (ref 1.6–2.6)
MCHC RBC-ENTMCNC: 26.6 PG — LOW (ref 27–34)
MCHC RBC-ENTMCNC: 32 GM/DL — SIGNIFICANT CHANGE UP (ref 32–36)
MCV RBC AUTO: 83 FL — SIGNIFICANT CHANGE UP (ref 80–100)
NRBC # BLD: 0 /100 WBCS — SIGNIFICANT CHANGE UP (ref 0–0)
PHOSPHATE SERPL-MCNC: 2.6 MG/DL — SIGNIFICANT CHANGE UP (ref 2.5–4.5)
PLATELET # BLD AUTO: 325 K/UL — SIGNIFICANT CHANGE UP (ref 150–400)
POTASSIUM SERPL-MCNC: 3.5 MMOL/L — SIGNIFICANT CHANGE UP (ref 3.5–5.3)
POTASSIUM SERPL-SCNC: 3.5 MMOL/L — SIGNIFICANT CHANGE UP (ref 3.5–5.3)
PROT SERPL-MCNC: 7.2 G/DL — SIGNIFICANT CHANGE UP (ref 6–8.3)
RBC # BLD: 4.18 M/UL — SIGNIFICANT CHANGE UP (ref 3.8–5.2)
RBC # FLD: 13.5 % — SIGNIFICANT CHANGE UP (ref 10.3–14.5)
SARS-COV-2 RNA SPEC QL NAA+PROBE: SIGNIFICANT CHANGE UP
SODIUM SERPL-SCNC: 138 MMOL/L — SIGNIFICANT CHANGE UP (ref 135–145)
WBC # BLD: 9.89 K/UL — SIGNIFICANT CHANGE UP (ref 3.8–10.5)
WBC # FLD AUTO: 9.89 K/UL — SIGNIFICANT CHANGE UP (ref 3.8–10.5)

## 2022-06-08 PROCEDURE — 99233 SBSQ HOSP IP/OBS HIGH 50: CPT | Mod: GC

## 2022-06-08 RX ORDER — DONEPEZIL HYDROCHLORIDE 10 MG/1
10 TABLET, FILM COATED ORAL AT BEDTIME
Refills: 0 | Status: DISCONTINUED | OUTPATIENT
Start: 2022-06-08 | End: 2022-06-13

## 2022-06-08 RX ORDER — ESCITALOPRAM OXALATE 10 MG/1
10 TABLET, FILM COATED ORAL DAILY
Refills: 0 | Status: DISCONTINUED | OUTPATIENT
Start: 2022-06-08 | End: 2022-06-13

## 2022-06-08 RX ORDER — ACETAMINOPHEN 500 MG
1000 TABLET ORAL ONCE
Refills: 0 | Status: COMPLETED | OUTPATIENT
Start: 2022-06-08 | End: 2022-06-08

## 2022-06-08 RX ORDER — AMLODIPINE BESYLATE 2.5 MG/1
1 TABLET ORAL
Qty: 0 | Refills: 0 | DISCHARGE

## 2022-06-08 RX ORDER — SITAGLIPTIN AND METFORMIN HYDROCHLORIDE 500; 50 MG/1; MG/1
1 TABLET, FILM COATED ORAL
Qty: 0 | Refills: 0 | DISCHARGE

## 2022-06-08 RX ORDER — OLANZAPINE 15 MG/1
2.5 TABLET, FILM COATED ORAL ONCE
Refills: 0 | Status: COMPLETED | OUTPATIENT
Start: 2022-06-08 | End: 2022-06-08

## 2022-06-08 RX ORDER — GABAPENTIN 400 MG/1
100 CAPSULE ORAL THREE TIMES A DAY
Refills: 0 | Status: DISCONTINUED | OUTPATIENT
Start: 2022-06-08 | End: 2022-06-09

## 2022-06-08 RX ORDER — CARVEDILOL PHOSPHATE 80 MG/1
1 CAPSULE, EXTENDED RELEASE ORAL
Qty: 0 | Refills: 0 | DISCHARGE

## 2022-06-08 RX ORDER — DONEPEZIL HYDROCHLORIDE 10 MG/1
1 TABLET, FILM COATED ORAL
Qty: 0 | Refills: 0 | DISCHARGE

## 2022-06-08 RX ADMIN — ENOXAPARIN SODIUM 40 MILLIGRAM(S): 100 INJECTION SUBCUTANEOUS at 06:15

## 2022-06-08 RX ADMIN — Medication 1 DROP(S): at 19:07

## 2022-06-08 RX ADMIN — Medication 1 DROP(S): at 06:16

## 2022-06-08 RX ADMIN — LIDOCAINE 2 PATCH: 4 CREAM TOPICAL at 06:14

## 2022-06-08 RX ADMIN — PANTOPRAZOLE SODIUM 40 MILLIGRAM(S): 20 TABLET, DELAYED RELEASE ORAL at 08:18

## 2022-06-08 RX ADMIN — GABAPENTIN 100 MILLIGRAM(S): 400 CAPSULE ORAL at 22:49

## 2022-06-08 RX ADMIN — Medication 400 MILLIGRAM(S): at 13:30

## 2022-06-08 RX ADMIN — CEFTRIAXONE 100 MILLIGRAM(S): 500 INJECTION, POWDER, FOR SOLUTION INTRAMUSCULAR; INTRAVENOUS at 06:18

## 2022-06-08 RX ADMIN — LATANOPROST 1 DROP(S): 0.05 SOLUTION/ DROPS OPHTHALMIC; TOPICAL at 22:48

## 2022-06-08 RX ADMIN — Medication 1: at 08:17

## 2022-06-08 RX ADMIN — SENNA PLUS 2 TABLET(S): 8.6 TABLET ORAL at 22:49

## 2022-06-08 RX ADMIN — QUETIAPINE FUMARATE 50 MILLIGRAM(S): 200 TABLET, FILM COATED ORAL at 22:49

## 2022-06-08 RX ADMIN — CARVEDILOL PHOSPHATE 3.12 MILLIGRAM(S): 80 CAPSULE, EXTENDED RELEASE ORAL at 06:15

## 2022-06-08 RX ADMIN — Medication 500 MILLIGRAM(S): at 13:31

## 2022-06-08 RX ADMIN — OLANZAPINE 2.5 MILLIGRAM(S): 15 TABLET, FILM COATED ORAL at 10:03

## 2022-06-08 RX ADMIN — LIDOCAINE 2 PATCH: 4 CREAM TOPICAL at 18:13

## 2022-06-08 RX ADMIN — Medication 325 MILLIGRAM(S): at 19:08

## 2022-06-08 RX ADMIN — Medication 325 MILLIGRAM(S): at 06:16

## 2022-06-08 RX ADMIN — LIDOCAINE 2 PATCH: 4 CREAM TOPICAL at 08:40

## 2022-06-08 RX ADMIN — AMLODIPINE BESYLATE 10 MILLIGRAM(S): 2.5 TABLET ORAL at 06:16

## 2022-06-08 RX ADMIN — Medication 1000 MILLIGRAM(S): at 13:55

## 2022-06-08 RX ADMIN — Medication 1 MILLIGRAM(S): at 02:47

## 2022-06-08 RX ADMIN — ATORVASTATIN CALCIUM 20 MILLIGRAM(S): 80 TABLET, FILM COATED ORAL at 22:49

## 2022-06-08 RX ADMIN — DONEPEZIL HYDROCHLORIDE 10 MILLIGRAM(S): 10 TABLET, FILM COATED ORAL at 22:49

## 2022-06-08 RX ADMIN — CARVEDILOL PHOSPHATE 3.12 MILLIGRAM(S): 80 CAPSULE, EXTENDED RELEASE ORAL at 19:08

## 2022-06-08 NOTE — PROGRESS NOTE ADULT - PROBLEM SELECTOR PLAN 3
- patient exposed to COVID  - c/w isolation precautions  - currently asymptomatic, saturating well on RA  - supportive measures, if needed

## 2022-06-08 NOTE — PROGRESS NOTE ADULT - SUBJECTIVE AND OBJECTIVE BOX
PGY1 Progress Note discussed with attending     PAGER #: [[phone]] TILL 5:00 PM  PLEASE CONTACT ON CALL TEAM:  - On Call Team (Please refer to [place]) FROM 5:00 PM - 8:30PM  - Nightfloat Team FROM 8:30 -7:30 AM    CHIEF COMPLAINT & BRIEF HOSPITAL COURSE:    INTERVAL HPI/OVERNIGHT EVENTS:       REVIEW OF SYSTEMS:  CONSTITUTIONAL: No fever, weight loss, or fatigue  RESPIRATORY: No cough, wheezing, chills or hemoptysis; No shortness of breath  CARDIOVASCULAR: No chest pain, palpitations, dizziness, or leg swelling  GASTROINTESTINAL: No abdominal pain. No nausea, vomiting, or hematemesis; No diarrhea or constipation. No melena or hematochezia.  GENITOURINARY: No dysuria or hematuria, urinary frequency  NEUROLOGICAL: No headaches, memory loss, loss of strength, numbness, or tremors  SKIN: No itching, burning, rashes, or lesions   MEDICATIONS  (STANDING):  amLODIPine   Tablet 10 milliGRAM(s) Oral daily  artificial  tears Solution 1 Drop(s) Both EYES two times a day  ascorbic acid 500 milliGRAM(s) Oral daily  atorvastatin 20 milliGRAM(s) Oral at bedtime  carvedilol 3.125 milliGRAM(s) Oral every 12 hours  cefTRIAXone   IVPB 1000 milliGRAM(s) IV Intermittent every 24 hours  dextrose 5%. 1000 milliLiter(s) (100 mL/Hr) IV Continuous <Continuous>  dextrose 5%. 1000 milliLiter(s) (50 mL/Hr) IV Continuous <Continuous>  dextrose 50% Injectable 25 Gram(s) IV Push once  dextrose 50% Injectable 12.5 Gram(s) IV Push once  dextrose 50% Injectable 25 Gram(s) IV Push once  enoxaparin Injectable 40 milliGRAM(s) SubCutaneous every 24 hours  ferrous    sulfate 325 milliGRAM(s) Oral two times a day  glucagon  Injectable 1 milliGRAM(s) IntraMuscular once  insulin lispro (ADMELOG) corrective regimen sliding scale   SubCutaneous three times a day before meals  lactated ringers. 1000 milliLiter(s) (80 mL/Hr) IV Continuous <Continuous>  latanoprost 0.005% Ophthalmic Solution 1 Drop(s) Both EYES at bedtime  lidocaine   4% Patch 2 Patch Transdermal every 24 hours  pantoprazole    Tablet 40 milliGRAM(s) Oral before breakfast  QUEtiapine 50 milliGRAM(s) Oral at bedtime  senna 2 Tablet(s) Oral at bedtime  timolol 0.25% Solution 1 Drop(s) Both EYES two times a day    MEDICATIONS  (PRN):  acetaminophen     Tablet .. 650 milliGRAM(s) Oral every 6 hours PRN Mild Pain (1 - 3), Moderate Pain (4 - 6)  dextrose Oral Gel 15 Gram(s) Oral once PRN Blood Glucose LESS THAN 70 milliGRAM(s)/deciliter    Vital Signs Last 24 Hrs  T(C): 36.3 (08 Jun 2022 04:46), Max: 37.3 (07 Jun 2022 18:09)  T(F): 97.4 (08 Jun 2022 04:46), Max: 99.2 (07 Jun 2022 18:09)  HR: 82 (08 Jun 2022 04:46) (65 - 94)  BP: 152/82 (08 Jun 2022 04:46) (105/64 - 179/81)  BP(mean): --  RR: 18 (08 Jun 2022 04:46) (16 - 18)  SpO2: 98% (08 Jun 2022 04:46) (95% - 98%)    PHYSICAL EXAMINATION:  GENERAL: NAD, well built  HEAD:  Atraumatic, Normocephalic  EYES:  conjunctiva and sclera clear  NECK: Supple, No JVD, Normal thyroid  CHEST/LUNG: Clear to auscultation. Clear to percussion bilaterally; No rales, rhonchi, wheezing, or rubs  HEART: Regular rate and rhythm; No murmurs, rubs, or gallops  ABDOMEN: Soft, Nontender, Nondistended; Bowel sounds present  NERVOUS SYSTEM:  Alert & Oriented X3,    EXTREMITIES:  2+ Peripheral Pulses, No clubbing, cyanosis, or edema  SKIN: warm dry                          10.1   10.13 )-----------( 315      ( 06 Jun 2022 18:40 )             32.1     06-06    139  |  110<H>  |  38<H>  ----------------------------<  114<H>  4.8   |  23  |  1.28    Ca    10.5      06 Jun 2022 18:40    TPro  7.4  /  Alb  3.8  /  TBili  0.5  /  DBili  x   /  AST  32  /  ALT  23  /  AlkPhos  75  06-06    LIVER FUNCTIONS - ( 06 Jun 2022 18:40 )  Alb: 3.8 g/dL / Pro: 7.4 g/dL / ALK PHOS: 75 U/L / ALT: 23 U/L DA / AST: 32 U/L / GGT: x           CARDIAC MARKERS ( 07 Jun 2022 05:53 )  x     / x     / 187 U/L / x     / x              CAPILLARY BLOOD GLUCOSE      RADIOLOGY & ADDITIONAL TESTS:                   PGY1 Progress Note discussed with attending     PAGER #: [[phone]] TILL 5:00 PM  PLEASE CONTACT ON CALL TEAM:  - On Call Team (Please refer to [place]) FROM 5:00 PM - 8:30PM  - Nightfloat Team FROM 8:30 -7:30 AM    CHIEF COMPLAINT & BRIEF HOSPITAL COURSE:    INTERVAL HPI/OVERNIGHT EVENTS: No acute events noted overnight. Patient still remains agitated. [employer]  Janet ID [id number] used, patient only knows name. Can not assess any further. Son updated at bedside.       REVIEW OF SYSTEMS:  Unable to assess    MEDICATIONS  (STANDING):  amLODIPine   Tablet 10 milliGRAM(s) Oral daily  artificial  tears Solution 1 Drop(s) Both EYES two times a day  ascorbic acid 500 milliGRAM(s) Oral daily  atorvastatin 20 milliGRAM(s) Oral at bedtime  carvedilol 3.125 milliGRAM(s) Oral every 12 hours  cefTRIAXone   IVPB 1000 milliGRAM(s) IV Intermittent every 24 hours  dextrose 5%. 1000 milliLiter(s) (100 mL/Hr) IV Continuous <Continuous>  dextrose 5%. 1000 milliLiter(s) (50 mL/Hr) IV Continuous <Continuous>  dextrose 50% Injectable 25 Gram(s) IV Push once  dextrose 50% Injectable 12.5 Gram(s) IV Push once  dextrose 50% Injectable 25 Gram(s) IV Push once  enoxaparin Injectable 40 milliGRAM(s) SubCutaneous every 24 hours  ferrous    sulfate 325 milliGRAM(s) Oral two times a day  glucagon  Injectable 1 milliGRAM(s) IntraMuscular once  insulin lispro (ADMELOG) corrective regimen sliding scale   SubCutaneous three times a day before meals  lactated ringers. 1000 milliLiter(s) (80 mL/Hr) IV Continuous <Continuous>  latanoprost 0.005% Ophthalmic Solution 1 Drop(s) Both EYES at bedtime  lidocaine   4% Patch 2 Patch Transdermal every 24 hours  pantoprazole    Tablet 40 milliGRAM(s) Oral before breakfast  QUEtiapine 50 milliGRAM(s) Oral at bedtime  senna 2 Tablet(s) Oral at bedtime  timolol 0.25% Solution 1 Drop(s) Both EYES two times a day    MEDICATIONS  (PRN):  acetaminophen     Tablet .. 650 milliGRAM(s) Oral every 6 hours PRN Mild Pain (1 - 3), Moderate Pain (4 - 6)  dextrose Oral Gel 15 Gram(s) Oral once PRN Blood Glucose LESS THAN 70 milliGRAM(s)/deciliter    Vital Signs Last 24 Hrs  T(C): 36.3 (08 Jun 2022 04:46), Max: 37.3 (07 Jun 2022 18:09)  T(F): 97.4 (08 Jun 2022 04:46), Max: 99.2 (07 Jun 2022 18:09)  HR: 82 (08 Jun 2022 04:46) (65 - 94)  BP: 152/82 (08 Jun 2022 04:46) (105/64 - 179/81)  BP(mean): --  RR: 18 (08 Jun 2022 04:46) (16 - 18)  SpO2: 98% (08 Jun 2022 04:46) (95% - 98%)    PHYSICAL EXAMINATION:  GENERAL: NAD, thin   HEAD:  Atraumatic, Normocephalic  EYES:  conjunctiva and sclera clear  NECK: Supple, No JVD, Normal thyroid  CHEST/LUNG: Clear to auscultation. No rales, rhonchi, wheezing, or rubs  HEART: Regular rate and rhythm; No murmurs, rubs, or gallops  ABDOMEN: Soft, Nontender, Nondistended; Bowel sounds present  NERVOUS SYSTEM:  Alert & Oriented X1 , unable to assess, noted to have tremors and rigidity   EXTREMITIES:  2+ Peripheral Pulses, No clubbing, cyanosis, or edema, lidocaine patch noted on left knee   SKIN: warm dry                          10.1   10.13 )-----------( 315      ( 06 Jun 2022 18:40 )             32.1     06-06    139  |  110<H>  |  38<H>  ----------------------------<  114<H>  4.8   |  23  |  1.28    Ca    10.5      06 Jun 2022 18:40    TPro  7.4  /  Alb  3.8  /  TBili  0.5  /  DBili  x   /  AST  32  /  ALT  23  /  AlkPhos  75  06-06    LIVER FUNCTIONS - ( 06 Jun 2022 18:40 )  Alb: 3.8 g/dL / Pro: 7.4 g/dL / ALK PHOS: 75 U/L / ALT: 23 U/L DA / AST: 32 U/L / GGT: x           CARDIAC MARKERS ( 07 Jun 2022 05:53 )  x     / x     / 187 U/L / x     / x              CAPILLARY BLOOD GLUCOSE      RADIOLOGY & ADDITIONAL TESTS:

## 2022-06-08 NOTE — PROGRESS NOTE ADULT - PROBLEM SELECTOR PLAN 2
Sudden leg pain after walking all day   can be 2/2 osteoarthritis   Continue w/ lidocaine patch & Tylenol  resume gabapentin will do 100mg TID (patient on 300mg TID)   Maintain fall precautions  CK wnl  JAIRO vascular study WNL, no clots as per vascular tech, (discontinued venous doppler)   xray of knee showing degenerative changes   f/u PT consult

## 2022-06-08 NOTE — PROGRESS NOTE ADULT - PROBLEM SELECTOR PLAN 1
H/o dementia w/ behavior disturbance.    prior admission patient seen by neuro had lumbar puncture for concern for encephalitis, all workup at the time was normal   medications obtained by son   patient is on seroque 50mg, donepezil 10mg   will resume home medications  reorientation as necessary

## 2022-06-09 DIAGNOSIS — R33.9 RETENTION OF URINE, UNSPECIFIED: ICD-10-CM

## 2022-06-09 LAB
ALBUMIN SERPL ELPH-MCNC: 3.3 G/DL — LOW (ref 3.5–5)
ALP SERPL-CCNC: 79 U/L — SIGNIFICANT CHANGE UP (ref 40–120)
ALT FLD-CCNC: 18 U/L DA — SIGNIFICANT CHANGE UP (ref 10–60)
ANION GAP SERPL CALC-SCNC: 9 MMOL/L — SIGNIFICANT CHANGE UP (ref 5–17)
AST SERPL-CCNC: 17 U/L — SIGNIFICANT CHANGE UP (ref 10–40)
BILIRUB SERPL-MCNC: 0.5 MG/DL — SIGNIFICANT CHANGE UP (ref 0.2–1.2)
BUN SERPL-MCNC: 11 MG/DL — SIGNIFICANT CHANGE UP (ref 7–18)
CALCIUM SERPL-MCNC: 9.8 MG/DL — SIGNIFICANT CHANGE UP (ref 8.4–10.5)
CHLORIDE SERPL-SCNC: 107 MMOL/L — SIGNIFICANT CHANGE UP (ref 96–108)
CO2 SERPL-SCNC: 25 MMOL/L — SIGNIFICANT CHANGE UP (ref 22–31)
CREAT SERPL-MCNC: 0.74 MG/DL — SIGNIFICANT CHANGE UP (ref 0.5–1.3)
EGFR: 80 ML/MIN/1.73M2 — SIGNIFICANT CHANGE UP
GLUCOSE BLDC GLUCOMTR-MCNC: 109 MG/DL — HIGH (ref 70–99)
GLUCOSE BLDC GLUCOMTR-MCNC: 160 MG/DL — HIGH (ref 70–99)
GLUCOSE BLDC GLUCOMTR-MCNC: 174 MG/DL — HIGH (ref 70–99)
GLUCOSE BLDC GLUCOMTR-MCNC: 92 MG/DL — SIGNIFICANT CHANGE UP (ref 70–99)
GLUCOSE SERPL-MCNC: 139 MG/DL — HIGH (ref 70–99)
HCT VFR BLD CALC: 39.4 % — SIGNIFICANT CHANGE UP (ref 34.5–45)
HGB BLD-MCNC: 12.4 G/DL — SIGNIFICANT CHANGE UP (ref 11.5–15.5)
MAGNESIUM SERPL-MCNC: 2.5 MG/DL — SIGNIFICANT CHANGE UP (ref 1.6–2.6)
MCHC RBC-ENTMCNC: 26.5 PG — LOW (ref 27–34)
MCHC RBC-ENTMCNC: 31.5 GM/DL — LOW (ref 32–36)
MCV RBC AUTO: 84.2 FL — SIGNIFICANT CHANGE UP (ref 80–100)
NRBC # BLD: 0 /100 WBCS — SIGNIFICANT CHANGE UP (ref 0–0)
OSMOLALITY UR: 410 MOS/KG — SIGNIFICANT CHANGE UP (ref 50–1200)
PHOSPHATE SERPL-MCNC: 3.1 MG/DL — SIGNIFICANT CHANGE UP (ref 2.5–4.5)
PLATELET # BLD AUTO: 314 K/UL — SIGNIFICANT CHANGE UP (ref 150–400)
POTASSIUM SERPL-MCNC: 3.7 MMOL/L — SIGNIFICANT CHANGE UP (ref 3.5–5.3)
POTASSIUM SERPL-SCNC: 3.7 MMOL/L — SIGNIFICANT CHANGE UP (ref 3.5–5.3)
PROT SERPL-MCNC: 7.2 G/DL — SIGNIFICANT CHANGE UP (ref 6–8.3)
RBC # BLD: 4.68 M/UL — SIGNIFICANT CHANGE UP (ref 3.8–5.2)
RBC # FLD: 13.8 % — SIGNIFICANT CHANGE UP (ref 10.3–14.5)
SODIUM SERPL-SCNC: 141 MMOL/L — SIGNIFICANT CHANGE UP (ref 135–145)
WBC # BLD: 11.16 K/UL — HIGH (ref 3.8–10.5)
WBC # FLD AUTO: 11.16 K/UL — HIGH (ref 3.8–10.5)

## 2022-06-09 PROCEDURE — 99233 SBSQ HOSP IP/OBS HIGH 50: CPT | Mod: GC

## 2022-06-09 RX ORDER — SODIUM CHLORIDE 9 MG/ML
1000 INJECTION, SOLUTION INTRAVENOUS
Refills: 0 | Status: DISCONTINUED | OUTPATIENT
Start: 2022-06-09 | End: 2022-06-13

## 2022-06-09 RX ORDER — GABAPENTIN 400 MG/1
100 CAPSULE ORAL AT BEDTIME
Refills: 0 | Status: DISCONTINUED | OUTPATIENT
Start: 2022-06-09 | End: 2022-06-13

## 2022-06-09 RX ADMIN — CARVEDILOL PHOSPHATE 3.12 MILLIGRAM(S): 80 CAPSULE, EXTENDED RELEASE ORAL at 06:50

## 2022-06-09 RX ADMIN — Medication 1 DROP(S): at 18:24

## 2022-06-09 RX ADMIN — SENNA PLUS 2 TABLET(S): 8.6 TABLET ORAL at 22:46

## 2022-06-09 RX ADMIN — SODIUM CHLORIDE 60 MILLILITER(S): 9 INJECTION, SOLUTION INTRAVENOUS at 16:44

## 2022-06-09 RX ADMIN — LIDOCAINE 2 PATCH: 4 CREAM TOPICAL at 19:01

## 2022-06-09 RX ADMIN — QUETIAPINE FUMARATE 50 MILLIGRAM(S): 200 TABLET, FILM COATED ORAL at 22:45

## 2022-06-09 RX ADMIN — LATANOPROST 1 DROP(S): 0.05 SOLUTION/ DROPS OPHTHALMIC; TOPICAL at 22:55

## 2022-06-09 RX ADMIN — Medication 1: at 08:26

## 2022-06-09 RX ADMIN — CEFTRIAXONE 100 MILLIGRAM(S): 500 INJECTION, POWDER, FOR SOLUTION INTRAMUSCULAR; INTRAVENOUS at 06:49

## 2022-06-09 RX ADMIN — Medication 1 DROP(S): at 06:48

## 2022-06-09 RX ADMIN — ATORVASTATIN CALCIUM 20 MILLIGRAM(S): 80 TABLET, FILM COATED ORAL at 22:45

## 2022-06-09 RX ADMIN — GABAPENTIN 100 MILLIGRAM(S): 400 CAPSULE ORAL at 06:50

## 2022-06-09 RX ADMIN — LIDOCAINE 2 PATCH: 4 CREAM TOPICAL at 08:19

## 2022-06-09 RX ADMIN — CARVEDILOL PHOSPHATE 3.12 MILLIGRAM(S): 80 CAPSULE, EXTENDED RELEASE ORAL at 18:26

## 2022-06-09 RX ADMIN — Medication 500 MILLIGRAM(S): at 12:19

## 2022-06-09 RX ADMIN — GABAPENTIN 100 MILLIGRAM(S): 400 CAPSULE ORAL at 22:45

## 2022-06-09 RX ADMIN — ENOXAPARIN SODIUM 40 MILLIGRAM(S): 100 INJECTION SUBCUTANEOUS at 06:49

## 2022-06-09 RX ADMIN — Medication 1 DROP(S): at 19:02

## 2022-06-09 RX ADMIN — AMLODIPINE BESYLATE 10 MILLIGRAM(S): 2.5 TABLET ORAL at 06:50

## 2022-06-09 RX ADMIN — PANTOPRAZOLE SODIUM 40 MILLIGRAM(S): 20 TABLET, DELAYED RELEASE ORAL at 06:49

## 2022-06-09 RX ADMIN — Medication 325 MILLIGRAM(S): at 18:26

## 2022-06-09 RX ADMIN — Medication 325 MILLIGRAM(S): at 07:02

## 2022-06-09 RX ADMIN — LIDOCAINE 2 PATCH: 4 CREAM TOPICAL at 06:49

## 2022-06-09 RX ADMIN — DONEPEZIL HYDROCHLORIDE 10 MILLIGRAM(S): 10 TABLET, FILM COATED ORAL at 22:45

## 2022-06-09 RX ADMIN — ESCITALOPRAM OXALATE 10 MILLIGRAM(S): 10 TABLET, FILM COATED ORAL at 12:19

## 2022-06-09 NOTE — PROGRESS NOTE ADULT - PROBLEM SELECTOR PLAN 1
H/o dementia w/ behavior disturbance.    prior admission patient seen by neuro had lumbar puncture for concern for encephalitis, all workup at the time was normal   medications obtained by son   c/w seroquel 50mg, donepezil 10mg   Will ttirate down gabapentin to 100mg qd at bedtime due to being drowsy   reorientation as necessary

## 2022-06-09 NOTE — PHYSICAL THERAPY INITIAL EVALUATION ADULT - REFERRING PHYSICIAN, REHAB EVAL
Problem: Patient Care Overview  Goal: Plan of Care/Patient Progress Review  Outcome: No Change  VSS, afebrile overnight.  Reports pain with movement, declines interventions beyond rest and repositioning.  LLE continues to be red, hot, and edematous.  Redness within markings to leg.  Elevated on pillows.  Up SBA with walker.  ID following.  Will monitor.         Melly Merrill

## 2022-06-09 NOTE — PHYSICAL THERAPY INITIAL EVALUATION ADULT - PERTINENT HX OF CURRENT PROBLEM, REHAB EVAL
Pt. admitted for left leg pain after walking all day. US Doppler performed - Lower extremity arterial vascular disease is not identified. X-rays negative for fracture as well.

## 2022-06-09 NOTE — PROGRESS NOTE ADULT - PROBLEM SELECTOR PLAN 3
- patient noted to be retaining urine on bladder scan 835 cc  - will straight cath x1  - f/u bladder scans

## 2022-06-09 NOTE — PHYSICAL THERAPY INITIAL EVALUATION ADULT - GENERAL OBSERVATIONS, REHAB EVAL
Pt. received supine in bed, NAD, confused and restless during eval. Pt. speaks Cantonese and video  services was utilized ([employer] ID #[id number]). Pt. was drowsy but arousable and was only able to answer one question (where she is and replied "in the hospital").

## 2022-06-09 NOTE — PROGRESS NOTE ADULT - PROBLEM SELECTOR PLAN 2
Sudden leg pain after walking all day   can be 2/2 osteoarthritis   Continue w/ lidocaine patch & Tylenol  resume gabapentin will do 100mg TID (patient on 300mg TID)   Maintain fall precautions  CK wnl  JAIRO vascular study WNL, no clots as per vascular tech, (discontinued venous doppler)   PT recommending MARIAA

## 2022-06-09 NOTE — PROGRESS NOTE ADULT - SUBJECTIVE AND OBJECTIVE BOX
PGY1 Progress Note discussed with attending     PAGER #: [[phone]] TILL 5:00 PM  PLEASE CONTACT ON CALL TEAM:  - On Call Team (Please refer to [place]) FROM 5:00 PM - 8:30PM  - Nightfloat Team FROM 8:30 -7:30 AM    CHIEF COMPLAINT & BRIEF HOSPITAL COURSE:    INTERVAL HPI/OVERNIGHT EVENTS:       REVIEW OF SYSTEMS:  CONSTITUTIONAL: No fever, weight loss, or fatigue  RESPIRATORY: No cough, wheezing, chills or hemoptysis; No shortness of breath  CARDIOVASCULAR: No chest pain, palpitations, dizziness, or leg swelling  GASTROINTESTINAL: No abdominal pain. No nausea, vomiting, or hematemesis; No diarrhea or constipation. No melena or hematochezia.  GENITOURINARY: No dysuria or hematuria, urinary frequency  NEUROLOGICAL: No headaches, memory loss, loss of strength, numbness, or tremors  SKIN: No itching, burning, rashes, or lesions   MEDICATIONS  (STANDING):  amLODIPine   Tablet 10 milliGRAM(s) Oral daily  artificial  tears Solution 1 Drop(s) Both EYES two times a day  ascorbic acid 500 milliGRAM(s) Oral daily  atorvastatin 20 milliGRAM(s) Oral at bedtime  carvedilol 3.125 milliGRAM(s) Oral every 12 hours  dextrose 5%. 1000 milliLiter(s) (100 mL/Hr) IV Continuous <Continuous>  dextrose 5%. 1000 milliLiter(s) (50 mL/Hr) IV Continuous <Continuous>  dextrose 50% Injectable 25 Gram(s) IV Push once  dextrose 50% Injectable 12.5 Gram(s) IV Push once  dextrose 50% Injectable 25 Gram(s) IV Push once  donepezil 10 milliGRAM(s) Oral at bedtime  enoxaparin Injectable 40 milliGRAM(s) SubCutaneous every 24 hours  escitalopram 10 milliGRAM(s) Oral daily  ferrous    sulfate 325 milliGRAM(s) Oral two times a day  gabapentin 100 milliGRAM(s) Oral three times a day  glucagon  Injectable 1 milliGRAM(s) IntraMuscular once  insulin lispro (ADMELOG) corrective regimen sliding scale   SubCutaneous three times a day before meals  lactated ringers. 1000 milliLiter(s) (80 mL/Hr) IV Continuous <Continuous>  latanoprost 0.005% Ophthalmic Solution 1 Drop(s) Both EYES at bedtime  lidocaine   4% Patch 2 Patch Transdermal every 24 hours  pantoprazole    Tablet 40 milliGRAM(s) Oral before breakfast  QUEtiapine 50 milliGRAM(s) Oral at bedtime  senna 2 Tablet(s) Oral at bedtime  timolol 0.25% Solution 1 Drop(s) Both EYES two times a day    MEDICATIONS  (PRN):  acetaminophen     Tablet .. 650 milliGRAM(s) Oral every 6 hours PRN Mild Pain (1 - 3), Moderate Pain (4 - 6)  dextrose Oral Gel 15 Gram(s) Oral once PRN Blood Glucose LESS THAN 70 milliGRAM(s)/deciliter    Vital Signs Last 24 Hrs  T(C): 36.5 (09 Jun 2022 05:57), Max: 37.4 (08 Jun 2022 13:03)  T(F): 97.7 (09 Jun 2022 05:57), Max: 99.3 (08 Jun 2022 13:03)  HR: 91 (09 Jun 2022 05:57) (81 - 101)  BP: 148/71 (09 Jun 2022 05:57) (139/85 - 155/75)  BP(mean): --  RR: 18 (09 Jun 2022 05:57) (18 - 18)  SpO2: 99% (09 Jun 2022 05:57) (96% - 99%)    PHYSICAL EXAMINATION:  GENERAL: NAD, well built  HEAD:  Atraumatic, Normocephalic  EYES:  conjunctiva and sclera clear  NECK: Supple, No JVD, Normal thyroid  CHEST/LUNG: Clear to auscultation. Clear to percussion bilaterally; No rales, rhonchi, wheezing, or rubs  HEART: Regular rate and rhythm; No murmurs, rubs, or gallops  ABDOMEN: Soft, Nontender, Nondistended; Bowel sounds present  NERVOUS SYSTEM:  Alert & Oriented X3,    EXTREMITIES:  2+ Peripheral Pulses, No clubbing, cyanosis, or edema  SKIN: warm dry                          12.4   11.16 )-----------( 314      ( 09 Jun 2022 06:22 )             39.4     06-09    141  |  107  |  11  ----------------------------<  139<H>  3.7   |  25  |  0.74    Ca    9.8      09 Jun 2022 06:22  Phos  3.1     06-09  Mg     2.5     06-09    TPro  7.2  /  Alb  3.3<L>  /  TBili  0.5  /  DBili  x   /  AST  17  /  ALT  18  /  AlkPhos  79  06-09    LIVER FUNCTIONS - ( 09 Jun 2022 06:22 )  Alb: 3.3 g/dL / Pro: 7.2 g/dL / ALK PHOS: 79 U/L / ALT: 18 U/L DA / AST: 17 U/L / GGT: x                   CAPILLARY BLOOD GLUCOSE      RADIOLOGY & ADDITIONAL TESTS:                   PGY1 Progress Note discussed with attending     PAGER #: [[phone]] TILL 5:00 PM  PLEASE CONTACT ON CALL TEAM:  - On Call Team (Please refer to [place]) FROM 5:00 PM - 8:30PM  - Nightfloat Team FROM 8:30 -7:30 AM    CHIEF COMPLAINT & BRIEF HOSPITAL COURSE:    INTERVAL HPI/OVERNIGHT EVENTS: No acute events noted overnight. Patient was not agitated. Patient is Cantonese speaking female  ID [id number]. Patient denies chest pain or any symptoms. Patient very sleepy. Son and grandson updated. Patient had visitors who looked through the door.       REVIEW OF SYSTEMS:  CONSTITUTIONAL: No fever, weight loss, or fatigue  RESPIRATORY: No cough, wheezing, chills or hemoptysis; No shortness of breath  CARDIOVASCULAR: No chest pain, palpitations, dizziness, or leg swelling  GASTROINTESTINAL: No abdominal pain. No nausea, vomiting, or hematemesis; No diarrhea or constipation. No melena or hematochezia.  GENITOURINARY: No dysuria or hematuria, urinary frequency  NEUROLOGICAL: No headaches, memory loss, loss of strength, numbness, or tremors  SKIN: No itching, burning, rashes, or lesions     MEDICATIONS  (STANDING):  amLODIPine   Tablet 10 milliGRAM(s) Oral daily  artificial  tears Solution 1 Drop(s) Both EYES two times a day  ascorbic acid 500 milliGRAM(s) Oral daily  atorvastatin 20 milliGRAM(s) Oral at bedtime  carvedilol 3.125 milliGRAM(s) Oral every 12 hours  dextrose 5%. 1000 milliLiter(s) (100 mL/Hr) IV Continuous <Continuous>  dextrose 5%. 1000 milliLiter(s) (50 mL/Hr) IV Continuous <Continuous>  dextrose 50% Injectable 25 Gram(s) IV Push once  dextrose 50% Injectable 12.5 Gram(s) IV Push once  dextrose 50% Injectable 25 Gram(s) IV Push once  donepezil 10 milliGRAM(s) Oral at bedtime  enoxaparin Injectable 40 milliGRAM(s) SubCutaneous every 24 hours  escitalopram 10 milliGRAM(s) Oral daily  ferrous    sulfate 325 milliGRAM(s) Oral two times a day  gabapentin 100 milliGRAM(s) Oral three times a day  glucagon  Injectable 1 milliGRAM(s) IntraMuscular once  insulin lispro (ADMELOG) corrective regimen sliding scale   SubCutaneous three times a day before meals  lactated ringers. 1000 milliLiter(s) (80 mL/Hr) IV Continuous <Continuous>  latanoprost 0.005% Ophthalmic Solution 1 Drop(s) Both EYES at bedtime  lidocaine   4% Patch 2 Patch Transdermal every 24 hours  pantoprazole    Tablet 40 milliGRAM(s) Oral before breakfast  QUEtiapine 50 milliGRAM(s) Oral at bedtime  senna 2 Tablet(s) Oral at bedtime  timolol 0.25% Solution 1 Drop(s) Both EYES two times a day    MEDICATIONS  (PRN):  acetaminophen     Tablet .. 650 milliGRAM(s) Oral every 6 hours PRN Mild Pain (1 - 3), Moderate Pain (4 - 6)  dextrose Oral Gel 15 Gram(s) Oral once PRN Blood Glucose LESS THAN 70 milliGRAM(s)/deciliter    Vital Signs Last 24 Hrs  T(C): 36.5 (09 Jun 2022 05:57), Max: 37.4 (08 Jun 2022 13:03)  T(F): 97.7 (09 Jun 2022 05:57), Max: 99.3 (08 Jun 2022 13:03)  HR: 91 (09 Jun 2022 05:57) (81 - 101)  BP: 148/71 (09 Jun 2022 05:57) (139/85 - 155/75)  BP(mean): --  RR: 18 (09 Jun 2022 05:57) (18 - 18)  SpO2: 99% (09 Jun 2022 05:57) (96% - 99%)    PHYSICAL EXAMINATION:  GENERAL: NAD, thin   HEAD:  Atraumatic, Normocephalic  EYES:  conjunctiva and sclera clear  NECK: Supple, No JVD, Normal thyroid  CHEST/LUNG: Clear to auscultation. No rales, rhonchi, wheezing, or rubs  HEART: Regular rate and rhythm; No murmurs, rubs, or gallops  ABDOMEN: Soft, Nontender, Nondistended; Bowel sounds present  NERVOUS SYSTEM:  Alert & Oriented X1 , unable to assess, noted to have tremors and rigidity   EXTREMITIES:  2+ Peripheral Pulses, No clubbing, cyanosis, or edema, lidocaine patch noted on left knee   SKIN: warm dry                          12.4   11.16 )-----------( 314      ( 09 Jun 2022 06:22 )             39.4     06-09    141  |  107  |  11  ----------------------------<  139<H>  3.7   |  25  |  0.74    Ca    9.8      09 Jun 2022 06:22  Phos  3.1     06-09  Mg     2.5     06-09    TPro  7.2  /  Alb  3.3<L>  /  TBili  0.5  /  DBili  x   /  AST  17  /  ALT  18  /  AlkPhos  79  06-09    LIVER FUNCTIONS - ( 09 Jun 2022 06:22 )  Alb: 3.3 g/dL / Pro: 7.2 g/dL / ALK PHOS: 79 U/L / ALT: 18 U/L DA / AST: 17 U/L / GGT: x                   CAPILLARY BLOOD GLUCOSE      RADIOLOGY & ADDITIONAL TESTS:

## 2022-06-10 LAB
ALBUMIN SERPL ELPH-MCNC: 2.7 G/DL — LOW (ref 3.5–5)
ALP SERPL-CCNC: 68 U/L — SIGNIFICANT CHANGE UP (ref 40–120)
ALT FLD-CCNC: 19 U/L DA — SIGNIFICANT CHANGE UP (ref 10–60)
ANION GAP SERPL CALC-SCNC: 6 MMOL/L — SIGNIFICANT CHANGE UP (ref 5–17)
AST SERPL-CCNC: 16 U/L — SIGNIFICANT CHANGE UP (ref 10–40)
BILIRUB SERPL-MCNC: 0.4 MG/DL — SIGNIFICANT CHANGE UP (ref 0.2–1.2)
BUN SERPL-MCNC: 18 MG/DL — SIGNIFICANT CHANGE UP (ref 7–18)
CALCIUM SERPL-MCNC: 9 MG/DL — SIGNIFICANT CHANGE UP (ref 8.4–10.5)
CHLORIDE SERPL-SCNC: 107 MMOL/L — SIGNIFICANT CHANGE UP (ref 96–108)
CO2 SERPL-SCNC: 25 MMOL/L — SIGNIFICANT CHANGE UP (ref 22–31)
CREAT ?TM UR-MCNC: 148 MG/DL — SIGNIFICANT CHANGE UP
CREAT SERPL-MCNC: 0.81 MG/DL — SIGNIFICANT CHANGE UP (ref 0.5–1.3)
EGFR: 72 ML/MIN/1.73M2 — SIGNIFICANT CHANGE UP
GLUCOSE BLDC GLUCOMTR-MCNC: 119 MG/DL — HIGH (ref 70–99)
GLUCOSE BLDC GLUCOMTR-MCNC: 125 MG/DL — HIGH (ref 70–99)
GLUCOSE BLDC GLUCOMTR-MCNC: 135 MG/DL — HIGH (ref 70–99)
GLUCOSE BLDC GLUCOMTR-MCNC: 152 MG/DL — HIGH (ref 70–99)
GLUCOSE SERPL-MCNC: 139 MG/DL — HIGH (ref 70–99)
HCT VFR BLD CALC: 34.4 % — LOW (ref 34.5–45)
HGB BLD-MCNC: 10.9 G/DL — LOW (ref 11.5–15.5)
MAGNESIUM SERPL-MCNC: 2.4 MG/DL — SIGNIFICANT CHANGE UP (ref 1.6–2.6)
MCHC RBC-ENTMCNC: 26.3 PG — LOW (ref 27–34)
MCHC RBC-ENTMCNC: 31.7 GM/DL — LOW (ref 32–36)
MCV RBC AUTO: 83.1 FL — SIGNIFICANT CHANGE UP (ref 80–100)
NRBC # BLD: 0 /100 WBCS — SIGNIFICANT CHANGE UP (ref 0–0)
PHOSPHATE SERPL-MCNC: 3.6 MG/DL — SIGNIFICANT CHANGE UP (ref 2.5–4.5)
PLATELET # BLD AUTO: 292 K/UL — SIGNIFICANT CHANGE UP (ref 150–400)
POTASSIUM SERPL-MCNC: 3.2 MMOL/L — LOW (ref 3.5–5.3)
POTASSIUM SERPL-SCNC: 3.2 MMOL/L — LOW (ref 3.5–5.3)
PROT SERPL-MCNC: 6.2 G/DL — SIGNIFICANT CHANGE UP (ref 6–8.3)
RBC # BLD: 4.14 M/UL — SIGNIFICANT CHANGE UP (ref 3.8–5.2)
RBC # FLD: 13.8 % — SIGNIFICANT CHANGE UP (ref 10.3–14.5)
SODIUM SERPL-SCNC: 138 MMOL/L — SIGNIFICANT CHANGE UP (ref 135–145)
SODIUM UR-SCNC: 28 MMOL/L — SIGNIFICANT CHANGE UP
WBC # BLD: 5.48 K/UL — SIGNIFICANT CHANGE UP (ref 3.8–10.5)
WBC # FLD AUTO: 5.48 K/UL — SIGNIFICANT CHANGE UP (ref 3.8–10.5)

## 2022-06-10 PROCEDURE — 99233 SBSQ HOSP IP/OBS HIGH 50: CPT | Mod: GC

## 2022-06-10 RX ORDER — LIDOCAINE 4 G/100G
2 CREAM TOPICAL EVERY 24 HOURS
Refills: 0 | Status: DISCONTINUED | OUTPATIENT
Start: 2022-06-10 | End: 2022-06-10

## 2022-06-10 RX ORDER — LIDOCAINE 4 G/100G
2 CREAM TOPICAL EVERY 24 HOURS
Refills: 0 | Status: DISCONTINUED | OUTPATIENT
Start: 2022-06-10 | End: 2022-06-13

## 2022-06-10 RX ORDER — ACETAMINOPHEN 500 MG
650 TABLET ORAL EVERY 6 HOURS
Refills: 0 | Status: DISCONTINUED | OUTPATIENT
Start: 2022-06-10 | End: 2022-06-13

## 2022-06-10 RX ORDER — QUETIAPINE FUMARATE 200 MG/1
25 TABLET, FILM COATED ORAL AT BEDTIME
Refills: 0 | Status: DISCONTINUED | OUTPATIENT
Start: 2022-06-10 | End: 2022-06-13

## 2022-06-10 RX ORDER — POTASSIUM CHLORIDE 20 MEQ
40 PACKET (EA) ORAL ONCE
Refills: 0 | Status: COMPLETED | OUTPATIENT
Start: 2022-06-10 | End: 2022-06-10

## 2022-06-10 RX ADMIN — Medication 650 MILLIGRAM(S): at 13:45

## 2022-06-10 RX ADMIN — Medication 40 MILLIEQUIVALENT(S): at 12:43

## 2022-06-10 RX ADMIN — ESCITALOPRAM OXALATE 10 MILLIGRAM(S): 10 TABLET, FILM COATED ORAL at 12:26

## 2022-06-10 RX ADMIN — Medication 650 MILLIGRAM(S): at 17:46

## 2022-06-10 RX ADMIN — PANTOPRAZOLE SODIUM 40 MILLIGRAM(S): 20 TABLET, DELAYED RELEASE ORAL at 05:51

## 2022-06-10 RX ADMIN — Medication 325 MILLIGRAM(S): at 05:50

## 2022-06-10 RX ADMIN — LIDOCAINE 2 PATCH: 4 CREAM TOPICAL at 02:03

## 2022-06-10 RX ADMIN — Medication 1: at 08:44

## 2022-06-10 RX ADMIN — CARVEDILOL PHOSPHATE 3.12 MILLIGRAM(S): 80 CAPSULE, EXTENDED RELEASE ORAL at 17:14

## 2022-06-10 RX ADMIN — Medication 500 MILLIGRAM(S): at 12:36

## 2022-06-10 RX ADMIN — Medication 1 DROP(S): at 17:18

## 2022-06-10 RX ADMIN — Medication 1 DROP(S): at 05:55

## 2022-06-10 RX ADMIN — ENOXAPARIN SODIUM 40 MILLIGRAM(S): 100 INJECTION SUBCUTANEOUS at 05:50

## 2022-06-10 RX ADMIN — CARVEDILOL PHOSPHATE 3.12 MILLIGRAM(S): 80 CAPSULE, EXTENDED RELEASE ORAL at 05:50

## 2022-06-10 RX ADMIN — Medication 650 MILLIGRAM(S): at 17:16

## 2022-06-10 RX ADMIN — ATORVASTATIN CALCIUM 20 MILLIGRAM(S): 80 TABLET, FILM COATED ORAL at 21:36

## 2022-06-10 RX ADMIN — LIDOCAINE 2 PATCH: 4 CREAM TOPICAL at 21:29

## 2022-06-10 RX ADMIN — AMLODIPINE BESYLATE 10 MILLIGRAM(S): 2.5 TABLET ORAL at 05:50

## 2022-06-10 RX ADMIN — DONEPEZIL HYDROCHLORIDE 10 MILLIGRAM(S): 10 TABLET, FILM COATED ORAL at 21:35

## 2022-06-10 RX ADMIN — QUETIAPINE FUMARATE 25 MILLIGRAM(S): 200 TABLET, FILM COATED ORAL at 21:36

## 2022-06-10 RX ADMIN — LIDOCAINE 2 PATCH: 4 CREAM TOPICAL at 12:48

## 2022-06-10 RX ADMIN — GABAPENTIN 100 MILLIGRAM(S): 400 CAPSULE ORAL at 21:37

## 2022-06-10 RX ADMIN — LATANOPROST 1 DROP(S): 0.05 SOLUTION/ DROPS OPHTHALMIC; TOPICAL at 21:40

## 2022-06-10 RX ADMIN — Medication 650 MILLIGRAM(S): at 12:45

## 2022-06-10 RX ADMIN — LIDOCAINE 2 PATCH: 4 CREAM TOPICAL at 08:47

## 2022-06-10 RX ADMIN — Medication 325 MILLIGRAM(S): at 17:14

## 2022-06-10 RX ADMIN — SENNA PLUS 2 TABLET(S): 8.6 TABLET ORAL at 21:38

## 2022-06-10 NOTE — CHART NOTE - NSCHARTNOTEFT_GEN_A_CORE
endorsed by covering team to f/u  on TOV. Pt was bladder scanned showing 190 cc. Will repeat bladder scan at 5:30 AM . endorsed by covering team to f/u  on TOV. Pt was bladder scanned  at midnight showing 190 cc.  Repeat bladder scan at 5:30 AM showed 400 cc.  Requested RN to do straight cath.

## 2022-06-10 NOTE — PROGRESS NOTE ADULT - SUBJECTIVE AND OBJECTIVE BOX
PGY1 Progress Note discussed with attending     PAGER #: [[phone]] TILL 5:00 PM  PLEASE CONTACT ON CALL TEAM:  - On Call Team (Please refer to [place]) FROM 5:00 PM - 8:30PM  - Nightfloat Team FROM 8:30 -7:30 AM    CHIEF COMPLAINT & BRIEF HOSPITAL COURSE:    INTERVAL HPI/OVERNIGHT EVENTS:       REVIEW OF SYSTEMS:  CONSTITUTIONAL: No fever, weight loss, or fatigue  RESPIRATORY: No cough, wheezing, chills or hemoptysis; No shortness of breath  CARDIOVASCULAR: No chest pain, palpitations, dizziness, or leg swelling  GASTROINTESTINAL: No abdominal pain. No nausea, vomiting, or hematemesis; No diarrhea or constipation. No melena or hematochezia.  GENITOURINARY: No dysuria or hematuria, urinary frequency  NEUROLOGICAL: No headaches, memory loss, loss of strength, numbness, or tremors  SKIN: No itching, burning, rashes, or lesions   MEDICATIONS  (STANDING):  acetaminophen     Tablet .. 650 milliGRAM(s) Oral every 6 hours  amLODIPine   Tablet 10 milliGRAM(s) Oral daily  artificial  tears Solution 1 Drop(s) Both EYES two times a day  ascorbic acid 500 milliGRAM(s) Oral daily  atorvastatin 20 milliGRAM(s) Oral at bedtime  carvedilol 3.125 milliGRAM(s) Oral every 12 hours  dextrose 5%. 1000 milliLiter(s) (100 mL/Hr) IV Continuous <Continuous>  dextrose 5%. 1000 milliLiter(s) (50 mL/Hr) IV Continuous <Continuous>  dextrose 5%. 1000 milliLiter(s) (60 mL/Hr) IV Continuous <Continuous>  dextrose 50% Injectable 25 Gram(s) IV Push once  dextrose 50% Injectable 12.5 Gram(s) IV Push once  dextrose 50% Injectable 25 Gram(s) IV Push once  donepezil 10 milliGRAM(s) Oral at bedtime  enoxaparin Injectable 40 milliGRAM(s) SubCutaneous every 24 hours  escitalopram 10 milliGRAM(s) Oral daily  ferrous    sulfate 325 milliGRAM(s) Oral two times a day  gabapentin 100 milliGRAM(s) Oral at bedtime  glucagon  Injectable 1 milliGRAM(s) IntraMuscular once  insulin lispro (ADMELOG) corrective regimen sliding scale   SubCutaneous three times a day before meals  lactated ringers. 1000 milliLiter(s) (80 mL/Hr) IV Continuous <Continuous>  latanoprost 0.005% Ophthalmic Solution 1 Drop(s) Both EYES at bedtime  lidocaine   4% Patch 2 Patch Transdermal every 24 hours  pantoprazole    Tablet 40 milliGRAM(s) Oral before breakfast  potassium chloride    Tablet ER 40 milliEquivalent(s) Oral once  QUEtiapine 25 milliGRAM(s) Oral at bedtime  senna 2 Tablet(s) Oral at bedtime  timolol 0.25% Solution 1 Drop(s) Both EYES two times a day    MEDICATIONS  (PRN):  dextrose Oral Gel 15 Gram(s) Oral once PRN Blood Glucose LESS THAN 70 milliGRAM(s)/deciliter    Vital Signs Last 24 Hrs  T(C): 36.3 (10 Олег 2022 05:12), Max: 36.8 (09 Jun 2022 21:05)  T(F): 97.4 (10 Олег 2022 05:12), Max: 98.2 (09 Jun 2022 21:05)  HR: 62 (10 Олег 2022 05:12) (62 - 87)  BP: 169/52 (10 Олег 2022 05:12) (124/75 - 169/52)  BP(mean): --  RR: 17 (10 Олег 2022 05:12) (17 - 18)  SpO2: 97% (10 Олег 2022 05:12) (96% - 97%)    PHYSICAL EXAMINATION:  GENERAL: NAD, well built  HEAD:  Atraumatic, Normocephalic  EYES:  conjunctiva and sclera clear  NECK: Supple, No JVD, Normal thyroid  CHEST/LUNG: Clear to auscultation. Clear to percussion bilaterally; No rales, rhonchi, wheezing, or rubs  HEART: Regular rate and rhythm; No murmurs, rubs, or gallops  ABDOMEN: Soft, Nontender, Nondistended; Bowel sounds present  NERVOUS SYSTEM:  Alert & Oriented X3,    EXTREMITIES:  2+ Peripheral Pulses, No clubbing, cyanosis, or edema  SKIN: warm dry                          10.9   5.48  )-----------( 292      ( 10 Олег 2022 06:20 )             34.4     06-10    138  |  107  |  18  ----------------------------<  139<H>  3.2<L>   |  25  |  0.81    Ca    9.0      10 Олег 2022 06:20  Phos  3.6     06-10  Mg     2.4     06-10    TPro  6.2  /  Alb  2.7<L>  /  TBili  0.4  /  DBili  x   /  AST  16  /  ALT  19  /  AlkPhos  68  06-10    LIVER FUNCTIONS - ( 10 Олег 2022 06:20 )  Alb: 2.7 g/dL / Pro: 6.2 g/dL / ALK PHOS: 68 U/L / ALT: 19 U/L DA / AST: 16 U/L / GGT: x                   CAPILLARY BLOOD GLUCOSE      RADIOLOGY & ADDITIONAL TESTS:                   PGY1 Progress Note discussed with attending     PAGER #: [[phone]] TILL 5:00 PM  PLEASE CONTACT ON CALL TEAM:  - On Call Team (Please refer to [place]) FROM 5:00 PM - 8:30PM  - Nightfloat Team FROM 8:30 -7:30 AM    CHIEF COMPLAINT & BRIEF HOSPITAL COURSE:    INTERVAL HPI/OVERNIGHT EVENTS: Patient is Cantonese speaking female  ID ALY [id number] patient more responsive today. Says she has pain in the Left knee and shoulder. Denies any chest pain, sob. Patient overnight, noted to be retaining 400cc and was straight cath x1.       REVIEW OF SYSTEMS:  CONSTITUTIONAL: No fever, weight loss, or fatigue  RESPIRATORY: No cough, wheezing, chills or hemoptysis; No shortness of breath  CARDIOVASCULAR: No chest pain, palpitations, dizziness, or leg swelling  GASTROINTESTINAL: No abdominal pain. No nausea, vomiting, or hematemesis; No diarrhea or constipation. No melena or hematochezia.  GENITOURINARY: No dysuria or hematuria, urinary frequency  NEUROLOGICAL: No headaches, memory loss,  numbness, or tremors  SKIN: No itching, burning, rashes, or lesions   MSK: Left knee and shoulder pain     MEDICATIONS  (STANDING):  acetaminophen     Tablet .. 650 milliGRAM(s) Oral every 6 hours  amLODIPine   Tablet 10 milliGRAM(s) Oral daily  artificial  tears Solution 1 Drop(s) Both EYES two times a day  ascorbic acid 500 milliGRAM(s) Oral daily  atorvastatin 20 milliGRAM(s) Oral at bedtime  carvedilol 3.125 milliGRAM(s) Oral every 12 hours  dextrose 5%. 1000 milliLiter(s) (100 mL/Hr) IV Continuous <Continuous>  dextrose 5%. 1000 milliLiter(s) (50 mL/Hr) IV Continuous <Continuous>  dextrose 5%. 1000 milliLiter(s) (60 mL/Hr) IV Continuous <Continuous>  dextrose 50% Injectable 25 Gram(s) IV Push once  dextrose 50% Injectable 12.5 Gram(s) IV Push once  dextrose 50% Injectable 25 Gram(s) IV Push once  donepezil 10 milliGRAM(s) Oral at bedtime  enoxaparin Injectable 40 milliGRAM(s) SubCutaneous every 24 hours  escitalopram 10 milliGRAM(s) Oral daily  ferrous    sulfate 325 milliGRAM(s) Oral two times a day  gabapentin 100 milliGRAM(s) Oral at bedtime  glucagon  Injectable 1 milliGRAM(s) IntraMuscular once  insulin lispro (ADMELOG) corrective regimen sliding scale   SubCutaneous three times a day before meals  lactated ringers. 1000 milliLiter(s) (80 mL/Hr) IV Continuous <Continuous>  latanoprost 0.005% Ophthalmic Solution 1 Drop(s) Both EYES at bedtime  lidocaine   4% Patch 2 Patch Transdermal every 24 hours  pantoprazole    Tablet 40 milliGRAM(s) Oral before breakfast  potassium chloride    Tablet ER 40 milliEquivalent(s) Oral once  QUEtiapine 25 milliGRAM(s) Oral at bedtime  senna 2 Tablet(s) Oral at bedtime  timolol 0.25% Solution 1 Drop(s) Both EYES two times a day    MEDICATIONS  (PRN):  dextrose Oral Gel 15 Gram(s) Oral once PRN Blood Glucose LESS THAN 70 milliGRAM(s)/deciliter    Vital Signs Last 24 Hrs  T(C): 36.3 (10 Олег 2022 05:12), Max: 36.8 (09 Jun 2022 21:05)  T(F): 97.4 (10 Олег 2022 05:12), Max: 98.2 (09 Jun 2022 21:05)  HR: 62 (10 Олег 2022 05:12) (62 - 87)  BP: 169/52 (10 Олег 2022 05:12) (124/75 - 169/52)  BP(mean): --  RR: 17 (10 Олег 2022 05:12) (17 - 18)  SpO2: 97% (10 Олег 2022 05:12) (96% - 97%)    PHYSICAL EXAMINATION:  GENERAL: NAD, thin   HEAD:  Atraumatic, Normocephalic  EYES:  conjunctiva and sclera clear  NECK: Supple, No JVD, Normal thyroid  CHEST/LUNG: Clear to auscultation. No rales, rhonchi, wheezing, or rubs  HEART: Regular rate and rhythm; No murmurs, rubs, or gallops  ABDOMEN: Soft, Nontender, Nondistended; Bowel sounds present  NERVOUS SYSTEM:  Alert & Oriented X1 , unable to assess, noted to have tremors and rigidity   EXTREMITIES:  2+ Peripheral Pulses, No clubbing, cyanosis, or edema, lidocaine patch noted on left knee   SKIN: warm dry                          10.9   5.48  )-----------( 292      ( 10 Олег 2022 06:20 )             34.4     06-10    138  |  107  |  18  ----------------------------<  139<H>  3.2<L>   |  25  |  0.81    Ca    9.0      10 Олег 2022 06:20  Phos  3.6     06-10  Mg     2.4     06-10    TPro  6.2  /  Alb  2.7<L>  /  TBili  0.4  /  DBili  x   /  AST  16  /  ALT  19  /  AlkPhos  68  06-10    LIVER FUNCTIONS - ( 10 Олег 2022 06:20 )  Alb: 2.7 g/dL / Pro: 6.2 g/dL / ALK PHOS: 68 U/L / ALT: 19 U/L DA / AST: 16 U/L / GGT: x                   CAPILLARY BLOOD GLUCOSE      RADIOLOGY & ADDITIONAL TESTS:

## 2022-06-10 NOTE — PROGRESS NOTE ADULT - PROBLEM SELECTOR PLAN 1
H/o dementia w/ behavior disturbance.    prior admission patient seen by neuro had lumbar puncture for concern for encephalitis, all workup at the time was normal   medications obtained by son   c/w  donepezil 10mg   decrease seroquel to 25mg at bedtime   c/w  gabapentin to 100mg qd at bedtime due to being drowsy   reorientation as necessary

## 2022-06-10 NOTE — PROGRESS NOTE ADULT - PROBLEM SELECTOR PLAN 3
- patient noted to be retaining urine on bladder scan 400cc overnight and straight cath  - f/u bladder scans  - OOB to commode

## 2022-06-10 NOTE — PROGRESS NOTE ADULT - PROBLEM SELECTOR PLAN 6
Pt. resting quietly in bed. No acute distress noted. 2l nc in place. Call light in reach. Instructed to call for assistance. No LOC or Synope   CT head no acute pathology  EKG -wnl

## 2022-06-11 LAB
ALBUMIN SERPL ELPH-MCNC: 2.8 G/DL — LOW (ref 3.5–5)
ALP SERPL-CCNC: 69 U/L — SIGNIFICANT CHANGE UP (ref 40–120)
ALT FLD-CCNC: 27 U/L DA — SIGNIFICANT CHANGE UP (ref 10–60)
ANION GAP SERPL CALC-SCNC: 7 MMOL/L — SIGNIFICANT CHANGE UP (ref 5–17)
AST SERPL-CCNC: 27 U/L — SIGNIFICANT CHANGE UP (ref 10–40)
BILIRUB SERPL-MCNC: 0.4 MG/DL — SIGNIFICANT CHANGE UP (ref 0.2–1.2)
BUN SERPL-MCNC: 14 MG/DL — SIGNIFICANT CHANGE UP (ref 7–18)
CALCIUM SERPL-MCNC: 8.9 MG/DL — SIGNIFICANT CHANGE UP (ref 8.4–10.5)
CHLORIDE SERPL-SCNC: 108 MMOL/L — SIGNIFICANT CHANGE UP (ref 96–108)
CO2 SERPL-SCNC: 25 MMOL/L — SIGNIFICANT CHANGE UP (ref 22–31)
CREAT SERPL-MCNC: 0.82 MG/DL — SIGNIFICANT CHANGE UP (ref 0.5–1.3)
EGFR: 70 ML/MIN/1.73M2 — SIGNIFICANT CHANGE UP
GLUCOSE BLDC GLUCOMTR-MCNC: 129 MG/DL — HIGH (ref 70–99)
GLUCOSE BLDC GLUCOMTR-MCNC: 140 MG/DL — HIGH (ref 70–99)
GLUCOSE BLDC GLUCOMTR-MCNC: 161 MG/DL — HIGH (ref 70–99)
GLUCOSE BLDC GLUCOMTR-MCNC: 171 MG/DL — HIGH (ref 70–99)
GLUCOSE SERPL-MCNC: 113 MG/DL — HIGH (ref 70–99)
HCT VFR BLD CALC: 34.5 % — SIGNIFICANT CHANGE UP (ref 34.5–45)
HGB BLD-MCNC: 10.8 G/DL — LOW (ref 11.5–15.5)
MAGNESIUM SERPL-MCNC: 2.3 MG/DL — SIGNIFICANT CHANGE UP (ref 1.6–2.6)
MCHC RBC-ENTMCNC: 26.7 PG — LOW (ref 27–34)
MCHC RBC-ENTMCNC: 31.3 GM/DL — LOW (ref 32–36)
MCV RBC AUTO: 85.2 FL — SIGNIFICANT CHANGE UP (ref 80–100)
NRBC # BLD: 0 /100 WBCS — SIGNIFICANT CHANGE UP (ref 0–0)
PHOSPHATE SERPL-MCNC: 3.5 MG/DL — SIGNIFICANT CHANGE UP (ref 2.5–4.5)
PLATELET # BLD AUTO: 302 K/UL — SIGNIFICANT CHANGE UP (ref 150–400)
POTASSIUM SERPL-MCNC: 4.1 MMOL/L — SIGNIFICANT CHANGE UP (ref 3.5–5.3)
POTASSIUM SERPL-SCNC: 4.1 MMOL/L — SIGNIFICANT CHANGE UP (ref 3.5–5.3)
PROT SERPL-MCNC: 6.4 G/DL — SIGNIFICANT CHANGE UP (ref 6–8.3)
RBC # BLD: 4.05 M/UL — SIGNIFICANT CHANGE UP (ref 3.8–5.2)
RBC # FLD: 13.7 % — SIGNIFICANT CHANGE UP (ref 10.3–14.5)
SODIUM SERPL-SCNC: 140 MMOL/L — SIGNIFICANT CHANGE UP (ref 135–145)
WBC # BLD: 4.61 K/UL — SIGNIFICANT CHANGE UP (ref 3.8–10.5)
WBC # FLD AUTO: 4.61 K/UL — SIGNIFICANT CHANGE UP (ref 3.8–10.5)

## 2022-06-11 PROCEDURE — 99233 SBSQ HOSP IP/OBS HIGH 50: CPT

## 2022-06-11 RX ADMIN — CARVEDILOL PHOSPHATE 3.12 MILLIGRAM(S): 80 CAPSULE, EXTENDED RELEASE ORAL at 06:01

## 2022-06-11 RX ADMIN — SENNA PLUS 2 TABLET(S): 8.6 TABLET ORAL at 21:28

## 2022-06-11 RX ADMIN — Medication 1 DROP(S): at 06:11

## 2022-06-11 RX ADMIN — AMLODIPINE BESYLATE 10 MILLIGRAM(S): 2.5 TABLET ORAL at 06:01

## 2022-06-11 RX ADMIN — Medication 1 DROP(S): at 17:50

## 2022-06-11 RX ADMIN — LIDOCAINE 2 PATCH: 4 CREAM TOPICAL at 11:58

## 2022-06-11 RX ADMIN — Medication 650 MILLIGRAM(S): at 06:03

## 2022-06-11 RX ADMIN — PANTOPRAZOLE SODIUM 40 MILLIGRAM(S): 20 TABLET, DELAYED RELEASE ORAL at 06:03

## 2022-06-11 RX ADMIN — Medication 650 MILLIGRAM(S): at 07:49

## 2022-06-11 RX ADMIN — Medication 500 MILLIGRAM(S): at 11:58

## 2022-06-11 RX ADMIN — Medication 1: at 17:40

## 2022-06-11 RX ADMIN — Medication 1 DROP(S): at 17:42

## 2022-06-11 RX ADMIN — Medication 325 MILLIGRAM(S): at 06:01

## 2022-06-11 RX ADMIN — CARVEDILOL PHOSPHATE 3.12 MILLIGRAM(S): 80 CAPSULE, EXTENDED RELEASE ORAL at 17:42

## 2022-06-11 RX ADMIN — Medication 650 MILLIGRAM(S): at 16:59

## 2022-06-11 RX ADMIN — Medication 650 MILLIGRAM(S): at 19:25

## 2022-06-11 RX ADMIN — Medication 650 MILLIGRAM(S): at 00:45

## 2022-06-11 RX ADMIN — LATANOPROST 1 DROP(S): 0.05 SOLUTION/ DROPS OPHTHALMIC; TOPICAL at 21:29

## 2022-06-11 RX ADMIN — LIDOCAINE 2 PATCH: 4 CREAM TOPICAL at 00:59

## 2022-06-11 RX ADMIN — QUETIAPINE FUMARATE 25 MILLIGRAM(S): 200 TABLET, FILM COATED ORAL at 21:28

## 2022-06-11 RX ADMIN — Medication 650 MILLIGRAM(S): at 17:41

## 2022-06-11 RX ADMIN — LIDOCAINE 2 PATCH: 4 CREAM TOPICAL at 19:25

## 2022-06-11 RX ADMIN — Medication 1 DROP(S): at 06:04

## 2022-06-11 RX ADMIN — ATORVASTATIN CALCIUM 20 MILLIGRAM(S): 80 TABLET, FILM COATED ORAL at 21:28

## 2022-06-11 RX ADMIN — ESCITALOPRAM OXALATE 10 MILLIGRAM(S): 10 TABLET, FILM COATED ORAL at 12:01

## 2022-06-11 RX ADMIN — Medication 650 MILLIGRAM(S): at 11:58

## 2022-06-11 RX ADMIN — GABAPENTIN 100 MILLIGRAM(S): 400 CAPSULE ORAL at 21:29

## 2022-06-11 RX ADMIN — ENOXAPARIN SODIUM 40 MILLIGRAM(S): 100 INJECTION SUBCUTANEOUS at 06:01

## 2022-06-11 RX ADMIN — Medication 325 MILLIGRAM(S): at 17:42

## 2022-06-11 RX ADMIN — DONEPEZIL HYDROCHLORIDE 10 MILLIGRAM(S): 10 TABLET, FILM COATED ORAL at 21:28

## 2022-06-11 RX ADMIN — Medication 1: at 11:55

## 2022-06-11 RX ADMIN — Medication 650 MILLIGRAM(S): at 02:39

## 2022-06-11 NOTE — CHART NOTE - NSCHARTNOTEFT_GEN_A_CORE
Endorsed by primary team to f/u on TOV.  Pt retaining 600 cc of urine. Placed order for mckeon catheter. Endorsed by primary team to f/u on TOV.  Pt retaining 655 cc of urine. Placed order for mckeon catheter. Endorsed by primary team to f/u on TOV.  Pt retaining 655 cc of urine. Placed order for mckeon catheter.  PETER RN to place mckeon     primary team to determine when to repeat TOV

## 2022-06-11 NOTE — PROGRESS NOTE ADULT - SUBJECTIVE AND OBJECTIVE BOX
Patient is a 82y old  Female who presents with a chief complaint of leg pain (10 Олег 2022 11:49)    Patient was seen and examined at bedside   Denies any complains, more alert today, AAOx2  Grand kids are at bedside, talking with them    INTERVAL HPI/OVERNIGHT EVENTS:  T(C): 37.1 (06-11-22 @ 14:39), Max: 37.1 (06-11-22 @ 14:39)  HR: 71 (06-11-22 @ 14:39) (62 - 76)  BP: 148/67 (06-11-22 @ 14:39) (140/69 - 165/65)  RR: 18 (06-11-22 @ 14:39) (18 - 18)  SpO2: 97% (06-11-22 @ 14:39) (95% - 97%)  Wt(kg): --  I&O's Summary    10 Олег 2022 07:01  -  11 Jun 2022 07:00  --------------------------------------------------------  IN: 0 mL / OUT: 1000 mL / NET: -1000 mL    11 Jun 2022 07:01  -  11 Jun 2022 17:20  --------------------------------------------------------  IN: 0 mL / OUT: 800 mL / NET: -800 mL        REVIEW OF SYSTEMS: denies fever, chills, SOB, palpitations, chest pain, abdominal pain, nausea    MEDICATIONS  (STANDING):  acetaminophen     Tablet .. 650 milliGRAM(s) Oral every 6 hours  amLODIPine   Tablet 10 milliGRAM(s) Oral daily  artificial  tears Solution 1 Drop(s) Both EYES two times a day  ascorbic acid 500 milliGRAM(s) Oral daily  atorvastatin 20 milliGRAM(s) Oral at bedtime  carvedilol 3.125 milliGRAM(s) Oral every 12 hours  dextrose 5%. 1000 milliLiter(s) (100 mL/Hr) IV Continuous <Continuous>  dextrose 5%. 1000 milliLiter(s) (50 mL/Hr) IV Continuous <Continuous>  dextrose 5%. 1000 milliLiter(s) (60 mL/Hr) IV Continuous <Continuous>  dextrose 50% Injectable 25 Gram(s) IV Push once  dextrose 50% Injectable 12.5 Gram(s) IV Push once  dextrose 50% Injectable 25 Gram(s) IV Push once  donepezil 10 milliGRAM(s) Oral at bedtime  enoxaparin Injectable 40 milliGRAM(s) SubCutaneous every 24 hours  escitalopram 10 milliGRAM(s) Oral daily  ferrous    sulfate 325 milliGRAM(s) Oral two times a day  gabapentin 100 milliGRAM(s) Oral at bedtime  glucagon  Injectable 1 milliGRAM(s) IntraMuscular once  insulin lispro (ADMELOG) corrective regimen sliding scale   SubCutaneous three times a day before meals  lactated ringers. 1000 milliLiter(s) (80 mL/Hr) IV Continuous <Continuous>  latanoprost 0.005% Ophthalmic Solution 1 Drop(s) Both EYES at bedtime  lidocaine   4% Patch 2 Patch Transdermal every 24 hours  pantoprazole    Tablet 40 milliGRAM(s) Oral before breakfast  QUEtiapine 25 milliGRAM(s) Oral at bedtime  senna 2 Tablet(s) Oral at bedtime  timolol 0.25% Solution 1 Drop(s) Both EYES two times a day    MEDICATIONS  (PRN):  dextrose Oral Gel 15 Gram(s) Oral once PRN Blood Glucose LESS THAN 70 milliGRAM(s)/deciliter      PHYSICAL EXAM:  GENERAL: NAD, well-groomed, well-developed  HEAD:  Atraumatic, Normocephalic  NERVOUS SYSTEM:  Alert & Oriented X2, no focal deficit   CHEST/LUNG: Clear to auscultation bilaterally; No rales, rhonchi, wheezing, or rubs  HEART: Regular rate and rhythm; No murmurs, rubs, or gallops  ABDOMEN: Soft, Nontender, Nondistended; Bowel sounds present  EXTREMITIES: mildly tender left knee,  2+ Peripheral Pulses, No clubbing, cyanosis, or edema  SKIN: No rashes or lesions    LABS:                        10.8   4.61  )-----------( 302      ( 11 Jun 2022 06:27 )             34.5     06-11    140  |  108  |  14  ----------------------------<  113<H>  4.1   |  25  |  0.82    Ca    8.9      11 Jun 2022 06:27  Phos  3.5     06-11  Mg     2.3     06-11    TPro  6.4  /  Alb  2.8<L>  /  TBili  0.4  /  DBili  x   /  AST  27  /  ALT  27  /  AlkPhos  69  06-11        CAPILLARY BLOOD GLUCOSE      POCT Blood Glucose.: 171 mg/dL (11 Jun 2022 11:32)  POCT Blood Glucose.: 129 mg/dL (11 Jun 2022 07:35)  POCT Blood Glucose.: 135 mg/dL (10 Олег 2022 20:57)

## 2022-06-12 ENCOUNTER — TRANSCRIPTION ENCOUNTER (OUTPATIENT)
Age: 82
End: 2022-06-12

## 2022-06-12 LAB
ALBUMIN SERPL ELPH-MCNC: 2.9 G/DL — LOW (ref 3.5–5)
ALP SERPL-CCNC: 65 U/L — SIGNIFICANT CHANGE UP (ref 40–120)
ALT FLD-CCNC: 25 U/L DA — SIGNIFICANT CHANGE UP (ref 10–60)
ANION GAP SERPL CALC-SCNC: 8 MMOL/L — SIGNIFICANT CHANGE UP (ref 5–17)
AST SERPL-CCNC: 16 U/L — SIGNIFICANT CHANGE UP (ref 10–40)
BILIRUB SERPL-MCNC: 0.3 MG/DL — SIGNIFICANT CHANGE UP (ref 0.2–1.2)
BUN SERPL-MCNC: 11 MG/DL — SIGNIFICANT CHANGE UP (ref 7–18)
CALCIUM SERPL-MCNC: 8.8 MG/DL — SIGNIFICANT CHANGE UP (ref 8.4–10.5)
CHLORIDE SERPL-SCNC: 109 MMOL/L — HIGH (ref 96–108)
CO2 SERPL-SCNC: 24 MMOL/L — SIGNIFICANT CHANGE UP (ref 22–31)
CREAT SERPL-MCNC: 0.78 MG/DL — SIGNIFICANT CHANGE UP (ref 0.5–1.3)
EGFR: 75 ML/MIN/1.73M2 — SIGNIFICANT CHANGE UP
GLUCOSE BLDC GLUCOMTR-MCNC: 110 MG/DL — HIGH (ref 70–99)
GLUCOSE BLDC GLUCOMTR-MCNC: 135 MG/DL — HIGH (ref 70–99)
GLUCOSE BLDC GLUCOMTR-MCNC: 178 MG/DL — HIGH (ref 70–99)
GLUCOSE BLDC GLUCOMTR-MCNC: 190 MG/DL — HIGH (ref 70–99)
GLUCOSE SERPL-MCNC: 139 MG/DL — HIGH (ref 70–99)
HCT VFR BLD CALC: 34.5 % — SIGNIFICANT CHANGE UP (ref 34.5–45)
HGB BLD-MCNC: 11 G/DL — LOW (ref 11.5–15.5)
MAGNESIUM SERPL-MCNC: 2.3 MG/DL — SIGNIFICANT CHANGE UP (ref 1.6–2.6)
MCHC RBC-ENTMCNC: 26.7 PG — LOW (ref 27–34)
MCHC RBC-ENTMCNC: 31.9 GM/DL — LOW (ref 32–36)
MCV RBC AUTO: 83.7 FL — SIGNIFICANT CHANGE UP (ref 80–100)
NRBC # BLD: 0 /100 WBCS — SIGNIFICANT CHANGE UP (ref 0–0)
PHOSPHATE SERPL-MCNC: 3 MG/DL — SIGNIFICANT CHANGE UP (ref 2.5–4.5)
PLATELET # BLD AUTO: 292 K/UL — SIGNIFICANT CHANGE UP (ref 150–400)
POTASSIUM SERPL-MCNC: 3.8 MMOL/L — SIGNIFICANT CHANGE UP (ref 3.5–5.3)
POTASSIUM SERPL-SCNC: 3.8 MMOL/L — SIGNIFICANT CHANGE UP (ref 3.5–5.3)
PROT SERPL-MCNC: 6.3 G/DL — SIGNIFICANT CHANGE UP (ref 6–8.3)
RBC # BLD: 4.12 M/UL — SIGNIFICANT CHANGE UP (ref 3.8–5.2)
RBC # FLD: 14.1 % — SIGNIFICANT CHANGE UP (ref 10.3–14.5)
SODIUM SERPL-SCNC: 141 MMOL/L — SIGNIFICANT CHANGE UP (ref 135–145)
WBC # BLD: 5.09 K/UL — SIGNIFICANT CHANGE UP (ref 3.8–10.5)
WBC # FLD AUTO: 5.09 K/UL — SIGNIFICANT CHANGE UP (ref 3.8–10.5)

## 2022-06-12 PROCEDURE — 99233 SBSQ HOSP IP/OBS HIGH 50: CPT | Mod: GC

## 2022-06-12 RX ORDER — ESCITALOPRAM OXALATE 10 MG/1
1 TABLET, FILM COATED ORAL
Qty: 0 | Refills: 0 | DISCHARGE

## 2022-06-12 RX ORDER — GABAPENTIN 400 MG/1
1 CAPSULE ORAL
Qty: 90 | Refills: 0
Start: 2022-06-12 | End: 2022-07-11

## 2022-06-12 RX ORDER — QUETIAPINE FUMARATE 200 MG/1
1 TABLET, FILM COATED ORAL
Qty: 0 | Refills: 0 | DISCHARGE

## 2022-06-12 RX ORDER — ESCITALOPRAM OXALATE 10 MG/1
1 TABLET, FILM COATED ORAL
Qty: 30 | Refills: 0
Start: 2022-06-12 | End: 2022-07-11

## 2022-06-12 RX ORDER — LIDOCAINE 4 G/100G
1 CREAM TOPICAL
Qty: 30 | Refills: 0
Start: 2022-06-12 | End: 2022-07-11

## 2022-06-12 RX ORDER — GABAPENTIN 400 MG/1
1 CAPSULE ORAL
Qty: 0 | Refills: 0 | DISCHARGE

## 2022-06-12 RX ORDER — QUETIAPINE FUMARATE 200 MG/1
1 TABLET, FILM COATED ORAL
Qty: 30 | Refills: 0
Start: 2022-06-12 | End: 2022-07-11

## 2022-06-12 RX ADMIN — PANTOPRAZOLE SODIUM 40 MILLIGRAM(S): 20 TABLET, DELAYED RELEASE ORAL at 06:22

## 2022-06-12 RX ADMIN — Medication 1 DROP(S): at 06:24

## 2022-06-12 RX ADMIN — Medication 1: at 11:37

## 2022-06-12 RX ADMIN — Medication 650 MILLIGRAM(S): at 17:38

## 2022-06-12 RX ADMIN — LIDOCAINE 2 PATCH: 4 CREAM TOPICAL at 00:00

## 2022-06-12 RX ADMIN — Medication 1: at 17:11

## 2022-06-12 RX ADMIN — Medication 325 MILLIGRAM(S): at 06:23

## 2022-06-12 RX ADMIN — Medication 1 DROP(S): at 17:39

## 2022-06-12 RX ADMIN — Medication 650 MILLIGRAM(S): at 06:22

## 2022-06-12 RX ADMIN — CARVEDILOL PHOSPHATE 3.12 MILLIGRAM(S): 80 CAPSULE, EXTENDED RELEASE ORAL at 17:38

## 2022-06-12 RX ADMIN — Medication 325 MILLIGRAM(S): at 17:38

## 2022-06-12 RX ADMIN — LATANOPROST 1 DROP(S): 0.05 SOLUTION/ DROPS OPHTHALMIC; TOPICAL at 22:34

## 2022-06-12 RX ADMIN — ENOXAPARIN SODIUM 40 MILLIGRAM(S): 100 INJECTION SUBCUTANEOUS at 06:23

## 2022-06-12 RX ADMIN — Medication 650 MILLIGRAM(S): at 00:00

## 2022-06-12 RX ADMIN — AMLODIPINE BESYLATE 10 MILLIGRAM(S): 2.5 TABLET ORAL at 06:22

## 2022-06-12 RX ADMIN — Medication 650 MILLIGRAM(S): at 00:29

## 2022-06-12 NOTE — PROGRESS NOTE ADULT - PROBLEM SELECTOR PLAN 3
- patient noted to be retaining urine on bladder scan 400cc overnight and straight cath  - mckeon placed  - OOB to commode  - possible TOV - patient noted to be retaining urine on bladder scan 400cc overnight and straight cath  - mckeon placed  - OOB to commode  - TOV today

## 2022-06-12 NOTE — DISCHARGE NOTE PROVIDER - HOSPITAL COURSE
83yo F, from home, lives with son, ambulates w/ assist + walker, prior mckeon for retention, DM2, HTN, HLD, dementia w/ behavior disturbance and PE (10/2021 was on xarelto).  Presented to ED w/ left leg pain after walking all day. After walking many hours she developed acute left leg pain in the anterior shin and posterior calf so she lowered herself on to ground to sit, denies LOC. Patient admitted for suspected UTI, left lower extremity weakness.     Dementia: Patient has history dementia w/ behavior disturbance.  Patient's home medications started including seroquel 50mg one tablet at night and this was titrated down to seroquel 25mg one tablet at night. Donepezil continued.     Left leg pain: Patient presented with sudden leg pain after walking all day. X ray of the left knee showing degenrative changes, x ray of the pelvis free of degeneration.   Creatinine kinase was normal. JAIRO  vascular studies were done showing no arterial disease of the lower extremities. Patient managed with lidocaine patch, tylenol and fall precautions. Patient's home gabapentin reduce from 300mg TID, to 100mg qd at bedtime. Pain improved. PT consulted and recommended MARIAA.     Exposure to COVID-19 virus: - patient exposed to COVID, patient managed with isolation precautions and supportive measures. Patient saturated well on room air.     KIMBERLY (acute kidney injury): Patient presented with SCr of 1.28, c/w pre renal. Patient's renal function improved with D5 fluid.     Pre-syncope: No LOC or Synope CT head no acute pathology EKG -wnl.    Acute UTI:  (+) UA positive, urine cultures showing no growth. Patient initially managed with ceftriaxone.     HTN (hypertension): BP stable, managed with home medications of amlodipine and coreg.      Hyperlipidemia: Managed with Atorvastatin.    DM (diabetes mellitus): Managed with SSI.    This is just a breif medical summary. For further details, please see medical chart. Case discussed with Primary medical attending. 83yo F, from home, lives with son, ambulates w/ assist + walker, prior mckeon for retention, DM2, HTN, HLD, dementia w/ behavior disturbance and PE (10/2021 was on xarelto).  Presented to ED w/ left leg pain after walking all day. After walking many hours she developed acute left leg pain in the anterior shin and posterior calf so she lowered herself on to ground to sit, denies LOC. Patient admitted for suspected UTI, left lower extremity weakness.     Dementia: Patient has history dementia w/ behavior disturbance.  Patient's home medications started including seroquel 50mg one tablet at night and this was titrated down to seroquel 25mg one tablet at night. Donepezil continued.     Left leg pain: Patient presented with sudden leg pain after walking all day. X ray of the left knee showing degenrative changes, x ray of the pelvis free of degeneration.   Creatinine kinase was normal. JAIRO  vascular studies were done showing no arterial disease of the lower extremities. Patient managed with lidocaine patch, tylenol and fall precautions. Patient's home gabapentin reduce from 300mg TID, to 100mg qd at bedtime. Pain improved. PT consulted and recommended MARIAA.     Exposure to COVID-19 virus: - patient exposed to COVID, patient managed with isolation precautions and supportive measures. Patient saturated well on room air. She was placed on droplet precaution until 6/16/2022.    KIMBERLY (acute kidney injury): Patient presented with SCr of 1.28, c/w pre renal. Patient's renal function improved with D5 fluid.     Pre-syncope: No LOC or Synope CT head no acute pathology EKG -wnl.    Acute UTI:  (+) UA positive, urine cultures showing no growth. Patient initially managed with ceftriaxone and completed 3 day course.    HTN (hypertension): BP stable, managed with home medications of amlodipine and coreg.      Hyperlipidemia: Managed with Atorvastatin.    DM (diabetes mellitus): Managed with SSI.    This is just a breif medical summary. For further details, please see medical chart. Case discussed with Primary medical attending. 81yo F, from home, lives with son, ambulates w/ assist + walker, prior mckeon for retention, DM2, HTN, HLD, dementia w/ behavior disturbance and PE (10/2021 was on xarelto).  Presented to ED w/ left leg pain after walking all day. After walking many hours she developed acute left leg pain in the anterior shin and posterior calf so she lowered herself on to ground to sit, denies LOC. Patient admitted for suspected UTI, left lower extremity weakness.     Dementia: Patient has history dementia w/ behavior disturbance.  Patient's home medications started including seroquel 50mg one tablet at night and this was titrated down to seroquel 25mg one tablet at night. Donepezil continued.     Left leg pain: Patient presented with sudden leg pain after walking all day. X ray of the left knee showing degenrative changes, x ray of the pelvis free of degeneration.   Creatinine kinase was normal. JAIRO  vascular studies were done showing no arterial disease of the lower extremities. Patient managed with lidocaine patch, tylenol and fall precautions. Patient's home gabapentin reduce from 300mg TID, to 100mg qd TID. Pain improved. PT consulted and recommended MARIAA.     Exposure to COVID-19 virus: - patient exposed to COVID, patient managed with isolation precautions and supportive measures. Patient saturated well on room air. She was placed on droplet precaution until 6/16/2022.    KIMBERLY (acute kidney injury): Patient presented with SCr of 1.28, c/w pre renal. Patient's renal function improved with D5 fluid.     Pre-syncope: No LOC or Synope CT head no acute pathology EKG -wnl.    Acute UTI:  (+) UA positive, urine cultures showing no growth. Patient initially managed with ceftriaxone and completed 3 day course.    HTN (hypertension): BP stable, managed with home medications of amlodipine and coreg.      Hyperlipidemia: Managed with Atorvastatin.    DM (diabetes mellitus): Managed with SSI.    This is just a breif medical summary. For further details, please see medical chart. Case discussed with Primary medical attending.

## 2022-06-12 NOTE — PROGRESS NOTE ADULT - PROBLEM SELECTOR PLAN 1
H/o dementia w/ behavior disturbance.    prior admission patient seen by neuro had lumbar puncture for concern for encephalitis, all workup at the time was normal   medications obtained by son   better improved today   c/w  donepezil 10mg   decrease seroquel to 25mg at bedtime   c/w  gabapentin to 100mg qd at bedtime due to being drowsy   reorientation as necessary

## 2022-06-12 NOTE — DISCHARGE NOTE PROVIDER - NSDCCPCAREPLAN_GEN_ALL_CORE_FT
PRINCIPAL DISCHARGE DIAGNOSIS  Diagnosis: Left leg weakness  Assessment and Plan of Treatment: You came into the hosptial with left leg pain and weakness. X rays of the pelvis was normal. However, x rays of the left lower extremity was showing degenrative changes, possibly Osteoarthritis. Your vessels were tested and showed no arterial disease in either extremity. You were managed with lidocaine patch and Tylenol. Your gabapentin was reduced from 300mg one tablet three times a day to 100mg one tablet at bedtime. PLEASE TAKE GABAPENTIN 100MG ONE TABLET THREE TIMES A DAY. PLEASE FOLLOW WITH YOUR PRIMARY CARE PROVIDER. You were seen by Physical therapy which recommended Sub Acute Rehab.      SECONDARY DISCHARGE DIAGNOSES  Diagnosis: Dementia  Assessment and Plan of Treatment: You have a history of dementia with behavior disturbance.  PLEASE CONTINUE TO TAKE DONEPEZIL 10MG ONE TABLET AT BEDTIME. PLEASE TAKE QUETIAPINE 25MG ONE TABLET AT BEDTIME. PLEASE FOLLOW WITH YOUR PRIMARY MEDICAL PROVIDER.    Diagnosis: Exposure to COVID-19 virus  Assessment and Plan of Treatment: You were exposed to COVID, you remained asymptomatic including no fevers, white blood cell count or hypoxemia. Your oxygen was normal on room air. PLEASE RETURN TO THE HOSPITAL IF YOU DEVELOP FEVERS, COUGH OR WORSENING SHORTNESS OF BREATH. PLEASE FOLLOW WITH YOUR PRIMARY MEDICAL DOCTOR.    Diagnosis: KIMBERLY (acute kidney injury)  Assessment and Plan of Treatment: Initially on presentation you had elevated renal function. Your renal function improved with fluids, most likely your initial abnormal renal function was due to dehydration. Your renal function normalized. PLEASE FOLLOW WITH YOUR PRIMARY MEDICAL DOCTOR.    Diagnosis: Acute UTI  Assessment and Plan of Treatment: Your urine studies were concerning for infection, so antibiotics were started. Urine cultures showed no growth. PLEASE RETURN TO THE HOSPITAL IF YOU DEVELOP FEVERS, BURINING OR PAIN WITH URINATION. PLEASE FOLLOW WITH YOUR PRIMARY MEDICAL PROVIDER.    Diagnosis: HTN (hypertension)  Assessment and Plan of Treatment: You have high blood pressure. PLEASE CONSUME A LOW SALT DIET AND PLEASE CHECK YOUR BLOOD PRESSURE EVERYDAY. PLEASE CONTINUE TO TAKE AMLODIPINE 10MG ONE TABLET A DAY AND COREG 3.125MG ONE TABLET TWO TIMES A DAY. PLEASE FOLLOW WITH YOUR PRIMARY CARE PROVIDER.       Diagnosis: DM (diabetes mellitus)  Assessment and Plan of Treatment: You have diabetes. PLEASE CONTINUE TO TAKE JANUMET XR 100mg-1000mg 1 tablet  a day. PLEASE CHECK YOUR BLOOD GLUCOSE EVERYDAY. PLEASE FOLLOW WITH YOUR PRIMARY CARE PROVIDER.    Diagnosis: Urine retention  Assessment and Plan of Treatment: You were not urinating on your own and this is called retention of urine. You had a mckeon catheter placed to help you urinate. Mckeon was removed a trial of void was done showing...  PLEASE CONTINUE.......    Diagnosis: Hyperlipidemia  Assessment and Plan of Treatment: PLEASE CONTINUE WITH ATORVASTATIN 20MG ONE TABLET AT BEDTIME. PLEASE FOLLOW WITH YOUR PRIMARY CARE PROVIDER.     PRINCIPAL DISCHARGE DIAGNOSIS  Diagnosis: Left leg weakness  Assessment and Plan of Treatment: You came into the hosptial with left leg pain and weakness. X rays of the pelvis was normal. However, x rays of the left lower extremity was showing degenrative changes, possibly Osteoarthritis. Your vessels were tested and showed no arterial disease in either extremity. You were managed with lidocaine patch and Tylenol. Your gabapentin was reduced from 300mg one tablet three times a day to 100mg one tablet at bedtime. PLEASE TAKE GABAPENTIN 100MG ONE TABLET THREE TIMES A DAY. PLEASE FOLLOW WITH YOUR PRIMARY CARE PROVIDER. You were seen by Physical therapy which recommended Sub Acute Rehab.      SECONDARY DISCHARGE DIAGNOSES  Diagnosis: Dementia  Assessment and Plan of Treatment: You have a history of dementia with behavior disturbance.  PLEASE CONTINUE TO TAKE DONEPEZIL 10MG ONE TABLET AT BEDTIME. PLEASE TAKE QUETIAPINE 25MG ONE TABLET AT BEDTIME. PLEASE FOLLOW WITH YOUR PRIMARY MEDICAL PROVIDER.    Diagnosis: Urine retention  Assessment and Plan of Treatment: You were not urinating on your own and this is called retention of urine. You had a mckeon catheter placed to help you urinate. Mckeon was removed a trial of void was done and she was able to urinate on her own.    Diagnosis: Exposure to COVID-19 virus  Assessment and Plan of Treatment: You were exposed to COVID, you remained asymptomatic including no fevers, white blood cell count or hypoxemia. Your oxygen was normal on room air. PLEASE RETURN TO THE HOSPITAL IF YOU DEVELOP FEVERS, COUGH OR WORSENING SHORTNESS OF BREATH. PLEASE FOLLOW WITH YOUR PRIMARY MEDICAL DOCTOR.    Diagnosis: KIMBERLY (acute kidney injury)  Assessment and Plan of Treatment: Initially on presentation you had elevated renal function. Your renal function improved with fluids, most likely your initial abnormal renal function was due to dehydration. Your renal function normalized. PLEASE FOLLOW WITH YOUR PRIMARY MEDICAL DOCTOR.    Diagnosis: Acute UTI  Assessment and Plan of Treatment: Your urine studies were concerning for infection, so antibiotics were started. Urine cultures showed no growth. PLEASE RETURN TO THE HOSPITAL IF YOU DEVELOP FEVERS, BURINING OR PAIN WITH URINATION. PLEASE FOLLOW WITH YOUR PRIMARY MEDICAL PROVIDER.    Diagnosis: HTN (hypertension)  Assessment and Plan of Treatment: You have high blood pressure. PLEASE CONSUME A LOW SALT DIET AND PLEASE CHECK YOUR BLOOD PRESSURE EVERYDAY. PLEASE CONTINUE TO TAKE AMLODIPINE 10MG ONE TABLET A DAY AND COREG 3.125MG ONE TABLET TWO TIMES A DAY. PLEASE FOLLOW WITH YOUR PRIMARY CARE PROVIDER.       Diagnosis: DM (diabetes mellitus)  Assessment and Plan of Treatment: You have diabetes. PLEASE CONTINUE TO TAKE JANUMET XR 100mg-1000mg 1 tablet  a day. PLEASE CHECK YOUR BLOOD GLUCOSE EVERYDAY. PLEASE FOLLOW WITH YOUR PRIMARY CARE PROVIDER.    Diagnosis: Hyperlipidemia  Assessment and Plan of Treatment: PLEASE CONTINUE WITH ATORVASTATIN 20MG ONE TABLET AT BEDTIME. PLEASE FOLLOW WITH YOUR PRIMARY CARE PROVIDER.     PRINCIPAL DISCHARGE DIAGNOSIS  Diagnosis: Left leg weakness  Assessment and Plan of Treatment: You came into the hosptial with left leg pain and weakness. X rays of the pelvis was normal. However, x rays of the left lower extremity was showing degenrative changes, possibly Osteoarthritis. Your vessels were tested and showed no arterial disease in either extremity. You were managed with lidocaine patch and Tylenol. Your gabapentin was reduced from 300mg one tablet three times a day to 100mg one tablet at bedtime. PLEASE TAKE GABAPENTIN 100MG ONE TABLET THREE TIMES A DAY. PLEASE FOLLOW WITH YOUR PRIMARY CARE PROVIDER. You were seen by Physical therapy which recommended Sub Acute Rehab.      SECONDARY DISCHARGE DIAGNOSES  Diagnosis: Dementia  Assessment and Plan of Treatment: You have a history of dementia with behavior disturbance.  PLEASE CONTINUE TO TAKE DONEPEZIL 10MG ONE TABLET AT BEDTIME. PLEASE TAKE QUETIAPINE 25MG ONE TABLET AT BEDTIME. PLEASE FOLLOW WITH YOUR PRIMARY MEDICAL PROVIDER.    Diagnosis: Urine retention  Assessment and Plan of Treatment: You were not urinating on your own and this is called retention of urine. You had a mckeon catheter placed to help you urinate. Mckeon was removed a trial of void was done and she was able to urinate on her own.    Diagnosis: Exposure to COVID-19 virus  Assessment and Plan of Treatment: You were exposed to COVID, you remained asymptomatic including no fevers, white blood cell count or hypoxemia. Your oxygen was normal on room air. PLEASE RETURN TO THE HOSPITAL IF YOU DEVELOP FEVERS, COUGH OR WORSENING SHORTNESS OF BREATH. REPEAT COVID TEST IS NEGATIVE (6/13/22). INFECTION CONTROL IS RECOMMENDING DROPLET PRECAUTION UNTIL 6/16/2022 TO COMPLETE 8 DAYS. PLEASE FOLLOW WITH YOUR PRIMARY MEDICAL DOCTOR.    Diagnosis: KIMBERLY (acute kidney injury)  Assessment and Plan of Treatment: Initially on presentation you had elevated renal function. Your renal function improved with fluids, most likely your initial abnormal renal function was due to dehydration. Your renal function normalized. PLEASE FOLLOW WITH YOUR PRIMARY MEDICAL DOCTOR.    Diagnosis: Acute UTI  Assessment and Plan of Treatment: Your urine studies were concerning for infection, so antibiotics were started. Urine cultures showed no growth. PLEASE RETURN TO THE HOSPITAL IF YOU DEVELOP FEVERS, BURINING OR PAIN WITH URINATION. PLEASE FOLLOW WITH YOUR PRIMARY MEDICAL PROVIDER.    Diagnosis: HTN (hypertension)  Assessment and Plan of Treatment: You have high blood pressure. PLEASE CONSUME A LOW SALT DIET AND PLEASE CHECK YOUR BLOOD PRESSURE EVERYDAY. PLEASE CONTINUE TO TAKE AMLODIPINE 10MG ONE TABLET A DAY AND COREG 3.125MG ONE TABLET TWO TIMES A DAY. PLEASE FOLLOW WITH YOUR PRIMARY CARE PROVIDER.       Diagnosis: DM (diabetes mellitus)  Assessment and Plan of Treatment: You have diabetes. PLEASE CONTINUE TO TAKE JANUMET XR 100mg-1000mg 1 tablet  a day. PLEASE CHECK YOUR BLOOD GLUCOSE EVERYDAY. PLEASE FOLLOW WITH YOUR PRIMARY CARE PROVIDER.    Diagnosis: Hyperlipidemia  Assessment and Plan of Treatment: PLEASE CONTINUE WITH ATORVASTATIN 20MG ONE TABLET AT BEDTIME. PLEASE FOLLOW WITH YOUR PRIMARY CARE PROVIDER.

## 2022-06-12 NOTE — DISCHARGE NOTE PROVIDER - NSDCMRMEDTOKEN_GEN_ALL_CORE_FT
amLODIPine 10 mg oral tablet: 1 tab(s) orally once a day  ascorbic acid 500 mg oral tablet: 1 tab(s) orally once a day  Aspercreme with Lidocaine 4% topical film: Apply topically to affected area once a day, AS NEEDED FOR PAIN   atorvastatin 20 mg oral tablet: 1 tab(s) orally once a day (at bedtime)  carvedilol 3.125 mg oral tablet: 1 tab(s) orally 2 times a day  donepezil 10 mg oral tablet: 1 tab(s) orally once a day (at bedtime)  escitalopram 10 mg oral tablet: 1 tab(s) orally once a day  ferrous sulfate 325 mg (65 mg elemental iron) oral tablet: 1 tab(s) orally 2 times a day  gabapentin 100 mg oral capsule: 1 cap(s) orally 3 times a day   Janumet  mg-1000 mg oral tablet, extended release: 1 tab(s) orally once a day (in the evening)  latanoprost 0.005% ophthalmic solution: 1 drop(s) to each affected eye once a day (at bedtime)  ocular lubricant ophthalmic solution: 1 drop(s) to each affected eye 2 times a day  pantoprazole 40 mg oral delayed release tablet: 1 tab(s) orally once a day (before a meal)  QUEtiapine 25 mg oral tablet: 1 tab(s) orally once a day (at bedtime)  timolol maleate 0.25% ophthalmic solution: 1 drop(s) to each affected eye 2 times a day

## 2022-06-12 NOTE — PROGRESS NOTE ADULT - ATTENDING COMMENTS
Patient was seen and examined at bedside   [employer]  used [id number]  Patient is confused, complains of Left toe pain     Vitals noted     P/E:  NAD  Confused, not following commands but moving all 4 extremities   CTABL  S1S2 WNL  Abd soft, non tender, BS present   BL LE no edema or calf tenderness, no joint swelling or tenderness     Labs noted     A/P:  Delirium possibly multifactorial due to UTI, hospital stay, worsening dementia  UTI   Leg pain   COVID 19 exposure   HTN  HLD   DM    Plan:   Given one dose of Zyprexa today  Avoid anticholinergics, benzodiazepines, limit lines/tubes/tethers/restraints, encourage frequent reorientation/reassurance by staff, encourage good sleep hygiene, adequate lighting  Cont ceftriaxone   Ucx NGTD  No respiratory symptoms, no visitors came after exposure  Cont droplet isolation   Rest of the management as above
Patient was seen and examined at bedside   [facility]  used [id number]  More alert and participates in conversation   Complains of pain in left knee and ankle   Retaining urine     Vitals noted     P/E:  NAD  Sleepy, moving all 4 extremities voluntarily and spontaneously  CTABL  S1S2 WNL  Abd soft, non tender, BS present   BL LE mild tenderness in knee and Ankle     Labs noted     A/P:  Delirium possibly multifactorial due to UTI, hospital stay, worsening dementia  UTI   Urinary retention  Chronic OA  COVID 19 exposure   HTN  HLD   DM  Dementia with behavioral disturbance     Plan:   Place [place] if still retaining tonight   Decrease Seroquel to 25mg at night, cont Gabapentin 100mg at night   Mental status improved   Avoid anticholinergics, benzodiazepines, limit lines/tubes/tethers/restraints, encourage frequent reorientation/reassurance by staff, encourage good sleep hygiene, adequate lighting  Completed ceftriaxone x3days  Ucx NGTD  Exposed to COVID, No respiratory symptoms, son visits patient from outside room  Cont droplet isolation   Standing Tylenol and Lidocaine patch for pain   PT recommends MARIAA  CM and SW for safe discharge  GOC as above
Patient was seen and examined at bedside. [employer]  used [id number]  Patient denies any complains   Insisting to go home     Vitals noted     P/E:  NAD  AAOx3, No focal deficit   CTABL  S1S2 WNL, no MRG  Abd soft, non tender, BS present   BL LE no edema or calf tenderness, no knee or ankle tenderness    Labs noted     A/P:  Delirium possibly multifactorial due to UTI, hospital stay, worsening dementia- resolved   UTI   Urinary retention  Chronic OA  COVID 19 exposure   HTN  HLD   DM  Dementia with behavioral disturbance     Plan:   TOV today  Cont Seroquel to 25mg at night, cont Gabapentin 100mg tid  Mental status improved   Avoid anticholinergics, benzodiazepines, limit lines/tubes/tethers/restraints, encourage frequent reorientation/reassurance by staff, encourage good sleep hygiene, adequate lighting  Completed ceftriaxone x3days  Ucx NGTD  Exposed to COVID, No respiratory symptoms  Cont droplet isolation   Standing Tylenol and Lidocaine patch for pain   PT recommends MARIAA  CM and SW for safe discharge
Patient was seen and examined at bedside   [employer]  called but patient did not communicate, closing her eyes voluntarily   Denies any complains   Responds to "Good Morning" and "How are you?" but did not engage in conversation    Vitals noted     P/E:  NAD  Sleepy, moving all 4 extremities voluntarily and spontaneously  CTABL  S1S2 WNL  Abd soft, non tender, BS present   BL LE no edema or calf tenderness, no joint swelling or tenderness     Labs noted     A/P:  Delirium possibly multifactorial due to UTI, hospital stay, worsening dementia  UTI   Leg pain   COVID 19 exposure   HTN  HLD   DM  Dementia with behavioral disturbance     Plan:   Resumed home meds for dementia, decrease dose of Gabapentin   Poor oral intake due to mental status, will start on IVF  Cont to monitor mental status, initial CT head was neg   Avoid anticholinergics, benzodiazepines, limit lines/tubes/tethers/restraints, encourage frequent reorientation/reassurance by staff, encourage good sleep hygiene, adequate lighting  Cont ceftriaxone x3days  Ucx NGTD  No respiratory symptoms, son visits patient from outside room  Cont droplet isolation   Patient was able to stand up with PT. recommends MARIAA  CM and SW for safe discharge  GOC as above

## 2022-06-12 NOTE — PROGRESS NOTE ADULT - SUBJECTIVE AND OBJECTIVE BOX
PGY1 Progress Note discussed with attending     PAGER #: [[phone]] TILL 5:00 PM  PLEASE CONTACT ON CALL TEAM:  - On Call Team (Please refer to [place]) FROM 5:00 PM - 8:30PM  - Nightfloat Team FROM 8:30 -7:30 AM    CHIEF COMPLAINT & BRIEF HOSPITAL COURSE:    INTERVAL HPI/OVERNIGHT EVENTS: No acute events noted overnight. Patient is Cantonese speaking female,  Yosef ID [id number]. Patient denies chest pain, sob or palpitations. Patient denies any pain in the joints. Denies fevers or chills.       REVIEW OF SYSTEMS:  CONSTITUTIONAL: No fever, weight loss, or fatigue  RESPIRATORY: No cough, wheezing, chills or hemoptysis; No shortness of breath  CARDIOVASCULAR: No chest pain, palpitations, dizziness, or leg swelling  GASTROINTESTINAL: No abdominal pain. No nausea, vomiting, or hematemesis; No diarrhea or constipation. No melena or hematochezia.  GENITOURINARY: No dysuria or hematuria, urinary frequency  NEUROLOGICAL: No headaches, memory loss, loss of strength, numbness, or tremors  SKIN: No itching, burning, rashes, or lesions     MEDICATIONS  (STANDING):  acetaminophen     Tablet .. 650 milliGRAM(s) Oral every 6 hours  amLODIPine   Tablet 10 milliGRAM(s) Oral daily  artificial  tears Solution 1 Drop(s) Both EYES two times a day  ascorbic acid 500 milliGRAM(s) Oral daily  atorvastatin 20 milliGRAM(s) Oral at bedtime  carvedilol 3.125 milliGRAM(s) Oral every 12 hours  dextrose 5%. 1000 milliLiter(s) (100 mL/Hr) IV Continuous <Continuous>  dextrose 5%. 1000 milliLiter(s) (50 mL/Hr) IV Continuous <Continuous>  dextrose 5%. 1000 milliLiter(s) (60 mL/Hr) IV Continuous <Continuous>  dextrose 50% Injectable 25 Gram(s) IV Push once  dextrose 50% Injectable 12.5 Gram(s) IV Push once  dextrose 50% Injectable 25 Gram(s) IV Push once  donepezil 10 milliGRAM(s) Oral at bedtime  enoxaparin Injectable 40 milliGRAM(s) SubCutaneous every 24 hours  escitalopram 10 milliGRAM(s) Oral daily  ferrous    sulfate 325 milliGRAM(s) Oral two times a day  gabapentin 100 milliGRAM(s) Oral at bedtime  glucagon  Injectable 1 milliGRAM(s) IntraMuscular once  insulin lispro (ADMELOG) corrective regimen sliding scale   SubCutaneous three times a day before meals  lactated ringers. 1000 milliLiter(s) (80 mL/Hr) IV Continuous <Continuous>  latanoprost 0.005% Ophthalmic Solution 1 Drop(s) Both EYES at bedtime  lidocaine   4% Patch 2 Patch Transdermal every 24 hours  pantoprazole    Tablet 40 milliGRAM(s) Oral before breakfast  QUEtiapine 25 milliGRAM(s) Oral at bedtime  senna 2 Tablet(s) Oral at bedtime  timolol 0.25% Solution 1 Drop(s) Both EYES two times a day    MEDICATIONS  (PRN):  dextrose Oral Gel 15 Gram(s) Oral once PRN Blood Glucose LESS THAN 70 milliGRAM(s)/deciliter    Vital Signs Last 24 Hrs  T(C): 36.8 (12 Jun 2022 05:48), Max: 37.6 (11 Jun 2022 21:03)  T(F): 98.2 (12 Jun 2022 05:48), Max: 99.7 (11 Jun 2022 21:03)  HR: 65 (12 Jun 2022 05:48) (63 - 76)  BP: 160/74 (12 Jun 2022 05:48) (139/59 - 160/74)  BP(mean): --  RR: 18 (12 Jun 2022 05:48) (17 - 18)  SpO2: 98% (12 Jun 2022 05:48) (97% - 98%)    PHYSICAL EXAMINATION:  GENERAL: NAD, thin   HEAD:  Atraumatic, Normocephalic  EYES:  conjunctiva and sclera clear  NECK: Supple, No JVD, Normal thyroid  CHEST/LUNG: Clear to auscultation. No rales, rhonchi, wheezing, or rubs  HEART: Regular rate and rhythm; No murmurs, rubs, or gallops  ABDOMEN: Soft, Nontender, Nondistended; Bowel sounds present  NERVOUS SYSTEM:  Alert & Oriented X1-2 , unable to assess, noted to have tremors and rigidity   EXTREMITIES:  2+ Peripheral Pulses, No clubbing, cyanosis, or edema, no erythema on joints   SKIN: warm dry                          11.0   5.09  )-----------( 292      ( 12 Jun 2022 07:00 )             34.5     06-12    141  |  109<H>  |  11  ----------------------------<  139<H>  3.8   |  24  |  0.78    Ca    8.8      12 Jun 2022 07:00  Phos  3.0     06-12  Mg     2.3     06-12    TPro  6.3  /  Alb  2.9<L>  /  TBili  0.3  /  DBili  x   /  AST  16  /  ALT  25  /  AlkPhos  65  06-12    LIVER FUNCTIONS - ( 12 Jun 2022 07:00 )  Alb: 2.9 g/dL / Pro: 6.3 g/dL / ALK PHOS: 65 U/L / ALT: 25 U/L DA / AST: 16 U/L / GGT: x                   CAPILLARY BLOOD GLUCOSE      RADIOLOGY & ADDITIONAL TESTS:

## 2022-06-13 ENCOUNTER — TRANSCRIPTION ENCOUNTER (OUTPATIENT)
Age: 82
End: 2022-06-13

## 2022-06-13 VITALS
SYSTOLIC BLOOD PRESSURE: 152 MMHG | OXYGEN SATURATION: 97 % | HEART RATE: 85 BPM | DIASTOLIC BLOOD PRESSURE: 62 MMHG | RESPIRATION RATE: 18 BRPM | TEMPERATURE: 98 F

## 2022-06-13 LAB
ALBUMIN SERPL ELPH-MCNC: 3.3 G/DL — LOW (ref 3.5–5)
ALP SERPL-CCNC: 73 U/L — SIGNIFICANT CHANGE UP (ref 40–120)
ALT FLD-CCNC: 32 U/L DA — SIGNIFICANT CHANGE UP (ref 10–60)
ANION GAP SERPL CALC-SCNC: 9 MMOL/L — SIGNIFICANT CHANGE UP (ref 5–17)
AST SERPL-CCNC: 24 U/L — SIGNIFICANT CHANGE UP (ref 10–40)
BILIRUB SERPL-MCNC: 0.4 MG/DL — SIGNIFICANT CHANGE UP (ref 0.2–1.2)
BUN SERPL-MCNC: 12 MG/DL — SIGNIFICANT CHANGE UP (ref 7–18)
CALCIUM SERPL-MCNC: 9 MG/DL — SIGNIFICANT CHANGE UP (ref 8.4–10.5)
CHLORIDE SERPL-SCNC: 108 MMOL/L — SIGNIFICANT CHANGE UP (ref 96–108)
CO2 SERPL-SCNC: 23 MMOL/L — SIGNIFICANT CHANGE UP (ref 22–31)
CREAT SERPL-MCNC: 0.93 MG/DL — SIGNIFICANT CHANGE UP (ref 0.5–1.3)
EGFR: 61 ML/MIN/1.73M2 — SIGNIFICANT CHANGE UP
GLUCOSE BLDC GLUCOMTR-MCNC: 127 MG/DL — HIGH (ref 70–99)
GLUCOSE BLDC GLUCOMTR-MCNC: 174 MG/DL — HIGH (ref 70–99)
GLUCOSE BLDC GLUCOMTR-MCNC: 182 MG/DL — HIGH (ref 70–99)
GLUCOSE SERPL-MCNC: 208 MG/DL — HIGH (ref 70–99)
HCT VFR BLD CALC: 36.7 % — SIGNIFICANT CHANGE UP (ref 34.5–45)
HGB BLD-MCNC: 11.4 G/DL — LOW (ref 11.5–15.5)
MAGNESIUM SERPL-MCNC: 2.1 MG/DL — SIGNIFICANT CHANGE UP (ref 1.6–2.6)
MCHC RBC-ENTMCNC: 26.1 PG — LOW (ref 27–34)
MCHC RBC-ENTMCNC: 31.1 GM/DL — LOW (ref 32–36)
MCV RBC AUTO: 84.2 FL — SIGNIFICANT CHANGE UP (ref 80–100)
NRBC # BLD: 0 /100 WBCS — SIGNIFICANT CHANGE UP (ref 0–0)
PHOSPHATE SERPL-MCNC: 2.7 MG/DL — SIGNIFICANT CHANGE UP (ref 2.5–4.5)
PLATELET # BLD AUTO: 299 K/UL — SIGNIFICANT CHANGE UP (ref 150–400)
POTASSIUM SERPL-MCNC: 3.9 MMOL/L — SIGNIFICANT CHANGE UP (ref 3.5–5.3)
POTASSIUM SERPL-SCNC: 3.9 MMOL/L — SIGNIFICANT CHANGE UP (ref 3.5–5.3)
PROT SERPL-MCNC: 6.9 G/DL — SIGNIFICANT CHANGE UP (ref 6–8.3)
RBC # BLD: 4.36 M/UL — SIGNIFICANT CHANGE UP (ref 3.8–5.2)
RBC # FLD: 14.1 % — SIGNIFICANT CHANGE UP (ref 10.3–14.5)
SARS-COV-2 RNA SPEC QL NAA+PROBE: SIGNIFICANT CHANGE UP
SODIUM SERPL-SCNC: 140 MMOL/L — SIGNIFICANT CHANGE UP (ref 135–145)
WBC # BLD: 6.24 K/UL — SIGNIFICANT CHANGE UP (ref 3.8–10.5)
WBC # FLD AUTO: 6.24 K/UL — SIGNIFICANT CHANGE UP (ref 3.8–10.5)

## 2022-06-13 PROCEDURE — 84300 ASSAY OF URINE SODIUM: CPT

## 2022-06-13 PROCEDURE — 82962 GLUCOSE BLOOD TEST: CPT

## 2022-06-13 PROCEDURE — 83735 ASSAY OF MAGNESIUM: CPT

## 2022-06-13 PROCEDURE — 73562 X-RAY EXAM OF KNEE 3: CPT

## 2022-06-13 PROCEDURE — 83935 ASSAY OF URINE OSMOLALITY: CPT

## 2022-06-13 PROCEDURE — 97116 GAIT TRAINING THERAPY: CPT

## 2022-06-13 PROCEDURE — 85027 COMPLETE CBC AUTOMATED: CPT

## 2022-06-13 PROCEDURE — 71045 X-RAY EXAM CHEST 1 VIEW: CPT

## 2022-06-13 PROCEDURE — 99239 HOSP IP/OBS DSCHRG MGMT >30: CPT

## 2022-06-13 PROCEDURE — 93923 UPR/LXTR ART STDY 3+ LVLS: CPT

## 2022-06-13 PROCEDURE — 83930 ASSAY OF BLOOD OSMOLALITY: CPT

## 2022-06-13 PROCEDURE — 82570 ASSAY OF URINE CREATININE: CPT

## 2022-06-13 PROCEDURE — 99285 EMERGENCY DEPT VISIT HI MDM: CPT | Mod: 25

## 2022-06-13 PROCEDURE — 84100 ASSAY OF PHOSPHORUS: CPT

## 2022-06-13 PROCEDURE — 87086 URINE CULTURE/COLONY COUNT: CPT

## 2022-06-13 PROCEDURE — 84484 ASSAY OF TROPONIN QUANT: CPT

## 2022-06-13 PROCEDURE — 85025 COMPLETE CBC W/AUTO DIFF WBC: CPT

## 2022-06-13 PROCEDURE — 36415 COLL VENOUS BLD VENIPUNCTURE: CPT

## 2022-06-13 PROCEDURE — 80053 COMPREHEN METABOLIC PANEL: CPT

## 2022-06-13 PROCEDURE — 87635 SARS-COV-2 COVID-19 AMP PRB: CPT

## 2022-06-13 PROCEDURE — 96372 THER/PROPH/DIAG INJ SC/IM: CPT

## 2022-06-13 PROCEDURE — 82550 ASSAY OF CK (CPK): CPT

## 2022-06-13 PROCEDURE — 97162 PT EVAL MOD COMPLEX 30 MIN: CPT

## 2022-06-13 PROCEDURE — 70450 CT HEAD/BRAIN W/O DYE: CPT | Mod: MA

## 2022-06-13 PROCEDURE — 93005 ELECTROCARDIOGRAM TRACING: CPT

## 2022-06-13 PROCEDURE — 83036 HEMOGLOBIN GLYCOSYLATED A1C: CPT

## 2022-06-13 PROCEDURE — 81001 URINALYSIS AUTO W/SCOPE: CPT

## 2022-06-13 PROCEDURE — 72170 X-RAY EXAM OF PELVIS: CPT

## 2022-06-13 RX ADMIN — Medication 650 MILLIGRAM(S): at 05:02

## 2022-06-13 RX ADMIN — Medication 1 DROP(S): at 05:02

## 2022-06-13 RX ADMIN — LIDOCAINE 2 PATCH: 4 CREAM TOPICAL at 11:53

## 2022-06-13 RX ADMIN — Medication 1 DROP(S): at 04:57

## 2022-06-13 RX ADMIN — AMLODIPINE BESYLATE 10 MILLIGRAM(S): 2.5 TABLET ORAL at 04:55

## 2022-06-13 RX ADMIN — PANTOPRAZOLE SODIUM 40 MILLIGRAM(S): 20 TABLET, DELAYED RELEASE ORAL at 05:02

## 2022-06-13 RX ADMIN — ESCITALOPRAM OXALATE 10 MILLIGRAM(S): 10 TABLET, FILM COATED ORAL at 11:52

## 2022-06-13 RX ADMIN — CARVEDILOL PHOSPHATE 3.12 MILLIGRAM(S): 80 CAPSULE, EXTENDED RELEASE ORAL at 04:55

## 2022-06-13 RX ADMIN — ENOXAPARIN SODIUM 40 MILLIGRAM(S): 100 INJECTION SUBCUTANEOUS at 04:45

## 2022-06-13 RX ADMIN — Medication 500 MILLIGRAM(S): at 11:52

## 2022-06-13 RX ADMIN — Medication 650 MILLIGRAM(S): at 11:52

## 2022-06-13 RX ADMIN — Medication 650 MILLIGRAM(S): at 04:57

## 2022-06-13 RX ADMIN — Medication 325 MILLIGRAM(S): at 04:55

## 2022-06-13 RX ADMIN — Medication 1: at 08:18

## 2022-06-13 RX ADMIN — Medication 1: at 11:53

## 2022-06-13 NOTE — PROGRESS NOTE ADULT - PROBLEM SELECTOR PROBLEM 3
Urine retention
Exposure to COVID-19 virus
Urine retention
Acute UTI
Urine retention
Urine retention

## 2022-06-13 NOTE — PROGRESS NOTE ADULT - PROBLEM SELECTOR PLAN 12
CM consult pending  PT recommending MARIAA

## 2022-06-13 NOTE — PROGRESS NOTE ADULT - PROBLEM SELECTOR PLAN 10
FS stable   C/w HSS
C/w Lovenox for DVT ppx  C/w Protonix for GI ppx
FS stable   C/w HSS
PT & CM consult pending

## 2022-06-13 NOTE — DISCHARGE NOTE NURSING/CASE MANAGEMENT/SOCIAL WORK - PATIENT PORTAL LINK FT
You can access the FollowMyHealth Patient Portal offered by [facility] by registering at the following website: [email or web address]. By joining [employer]’s FollowMyHealth portal, you will also be able to view your health information using other applications (apps) compatible with our system.

## 2022-06-13 NOTE — PROGRESS NOTE ADULT - PROBLEM SELECTOR PLAN 3
- patient noted to be retaining urine on bladder scan 400cc overnight and straight cath  - mckeon placed  - OOB to commode  - TOV today

## 2022-06-13 NOTE — PROGRESS NOTE ADULT - PROBLEM SELECTOR PROBLEM 4
Exposure to COVID-19 virus
KIMBERLY (acute kidney injury)
KIMBERLY (acute kidney injury)
Exposure to COVID-19 virus

## 2022-06-13 NOTE — PROGRESS NOTE ADULT - PROBLEM SELECTOR PLAN 6
No LOC or Synope   CT head no acute pathology  EKG -wnl No LOC or Syncope   CT head no acute pathology  EKG -wnl

## 2022-06-13 NOTE — PROGRESS NOTE ADULT - PROBLEM SELECTOR PROBLEM 5
KIMBERLY (acute kidney injury)
Dementia
KIMBERLY (acute kidney injury)
KIMBERLY (acute kidney injury)
Pre-syncope
KIMBERLY (acute kidney injury)

## 2022-06-13 NOTE — PROGRESS NOTE ADULT - PROBLEM SELECTOR PROBLEM 8
HTN (hypertension)
DM (diabetes mellitus)
HTN (hypertension)
Hyperlipidemia
HTN (hypertension)
HTN (hypertension)

## 2022-06-13 NOTE — DISCHARGE NOTE NURSING/CASE MANAGEMENT/SOCIAL WORK - NSDCPEFALRISK_GEN_ALL_CORE
For information on Fall & Injury Prevention, visit: [email or web address] OR  [email or web address] OR  https://www.cdc.gov/steadi/patient.html

## 2022-06-13 NOTE — PROGRESS NOTE ADULT - PROBLEM SELECTOR PROBLEM 11
Prophylactic measure
Discharge planning issues
Prophylactic measure

## 2022-06-13 NOTE — PROGRESS NOTE ADULT - SUBJECTIVE AND OBJECTIVE BOX
PGY-1 Progress Note discussed with attending    PAGER #: [[phone]] TILL 5:00 PM  PLEASE CONTACT ON CALL TEAM:  - On Call Team (Please refer to [place]) FROM 5:00 PM - 8:30PM  - Nightfloat Team FROM 8:30 -7:30 AM    CHIEF COMPLAINT & BRIEF HOSPITAL COURSE:    81 yo F, from home, lives with son, billie w/ assist + walker, prior mckeon  for retention, DM2, HTN, HLD, dementia w/ behavior disturbance and PE (10/2021 was on xarelto) p/w left leg pain after walking all day. Son at bedside said that patient has been in usual state of health and today from 10 am and onwards spent the day walking with her walker. No falls or injury. After walking many hours she developed acute left leg pain in the anterior shin and posterior calf so she lowered herself on to ground to sit. No swelling or skin changes. Has had similar pain in leg in the past but son doesn't know what was the cause then. No LOC or other complaints.      INTERVAL HPI/OVERNIGHT EVENTS:       REVIEW OF SYSTEMS:  CONSTITUTIONAL: No fever, weight loss, or fatigue  RESPIRATORY: No cough, wheezing, chills or hemoptysis; No shortness of breath  CARDIOVASCULAR: No chest pain, palpitations, dizziness, or leg swelling  GASTROINTESTINAL: No abdominal pain. No nausea, vomiting, or hematemesis; No diarrhea or constipation. No melena or hematochezia.  GENITOURINARY: No dysuria or hematuria, urinary frequency  NEUROLOGICAL: No headaches, memory loss, loss of strength, numbness, or tremors  SKIN: No itching, burning, rashes, or lesions     MEDICATIONS  (STANDING):  acetaminophen     Tablet .. 650 milliGRAM(s) Oral every 6 hours  amLODIPine   Tablet 10 milliGRAM(s) Oral daily  artificial  tears Solution 1 Drop(s) Both EYES two times a day  ascorbic acid 500 milliGRAM(s) Oral daily  atorvastatin 20 milliGRAM(s) Oral at bedtime  carvedilol 3.125 milliGRAM(s) Oral every 12 hours  dextrose 5%. 1000 milliLiter(s) (100 mL/Hr) IV Continuous <Continuous>  dextrose 5%. 1000 milliLiter(s) (50 mL/Hr) IV Continuous <Continuous>  dextrose 5%. 1000 milliLiter(s) (60 mL/Hr) IV Continuous <Continuous>  dextrose 50% Injectable 25 Gram(s) IV Push once  dextrose 50% Injectable 12.5 Gram(s) IV Push once  dextrose 50% Injectable 25 Gram(s) IV Push once  donepezil 10 milliGRAM(s) Oral at bedtime  enoxaparin Injectable 40 milliGRAM(s) SubCutaneous every 24 hours  escitalopram 10 milliGRAM(s) Oral daily  ferrous    sulfate 325 milliGRAM(s) Oral two times a day  gabapentin 100 milliGRAM(s) Oral at bedtime  glucagon  Injectable 1 milliGRAM(s) IntraMuscular once  insulin lispro (ADMELOG) corrective regimen sliding scale   SubCutaneous three times a day before meals  lactated ringers. 1000 milliLiter(s) (80 mL/Hr) IV Continuous <Continuous>  latanoprost 0.005% Ophthalmic Solution 1 Drop(s) Both EYES at bedtime  lidocaine   4% Patch 2 Patch Transdermal every 24 hours  pantoprazole    Tablet 40 milliGRAM(s) Oral before breakfast  QUEtiapine 25 milliGRAM(s) Oral at bedtime  senna 2 Tablet(s) Oral at bedtime  timolol 0.25% Solution 1 Drop(s) Both EYES two times a day    MEDICATIONS  (PRN):  dextrose Oral Gel 15 Gram(s) Oral once PRN Blood Glucose LESS THAN 70 milliGRAM(s)/deciliter      Vital Signs Last 24 Hrs  T(C): 37.1 (13 Jun 2022 05:26), Max: 37.1 (13 Jun 2022 05:26)  T(F): 98.7 (13 Jun 2022 05:26), Max: 98.7 (13 Jun 2022 05:26)  HR: 82 (13 Jun 2022 05:26) (66 - 82)  BP: 163/84 (13 Jun 2022 05:26) (146/67 - 163/84)  BP(mean): --  RR: 18 (13 Jun 2022 05:26) (18 - 18)  SpO2: 97% (13 Jun 2022 05:26) (93% - 98%)    PHYSICAL EXAMINATION:  GENERAL: NAD, well built  HEAD:  Atraumatic, Normocephalic  EYES:  conjunctiva and sclera clear  NECK: Supple, No JVD, Normal thyroid  CHEST/LUNG: Clear to auscultation. Clear to percussion bilaterally; No rales, rhonchi, wheezing, or rubs  HEART: Regular rate and rhythm; No murmurs, rubs, or gallops  ABDOMEN: Soft, Nontender, Nondistended; Bowel sounds present, no pain or masses on palpation  NERVOUS SYSTEM:  Alert & Oriented X3  : voiding well  EXTREMITIES:  2+ Peripheral Pulses, No clubbing, cyanosis, or edema  SKIN: warm dry                          11.4   6.24  )-----------( 299      ( 13 Jun 2022 05:57 )             36.7     06-13    140  |  108  |  12  ----------------------------<  208<H>  3.9   |  23  |  0.93    Ca    9.0      13 Jun 2022 05:57  Phos  2.7     06-13  Mg     2.1     06-13    TPro  6.9  /  Alb  3.3<L>  /  TBili  0.4  /  DBili  x   /  AST  24  /  ALT  32  /  AlkPhos  73  06-13    LIVER FUNCTIONS - ( 13 Jun 2022 05:57 )  Alb: 3.3 g/dL / Pro: 6.9 g/dL / ALK PHOS: 73 U/L / ALT: 32 U/L DA / AST: 24 U/L / GGT: x                   I&O's Summary    12 Jun 2022 07:01  -  13 Jun 2022 07:00  --------------------------------------------------------  IN: 0 mL / OUT: 500 mL / NET: -500 mL            CAPILLARY BLOOD GLUCOSE      RADIOLOGY & ADDITIONAL TESTS:                   PGY-1 Progress Note discussed with attending    PAGER #: [[phone]] TILL 5:00 PM  PLEASE CONTACT ON CALL TEAM:  - On Call Team (Please refer to [place]) FROM 5:00 PM - 8:30PM  - Nightfloat Team FROM 8:30 -7:30 AM    CHIEF COMPLAINT & BRIEF HOSPITAL COURSE:    83 yo F, from home, lives with son, billie w/ assist + walker, prior mckeon  for retention, DM2, HTN, HLD, dementia w/ behavior disturbance and PE (10/2021 was on xarelto) p/w left leg pain after walking all day. Son at bedside said that patient has been in usual state of health and today from 10 am and onwards spent the day walking with her walker. No falls or injury. After walking many hours she developed acute left leg pain in the anterior shin and posterior calf so she lowered herself on to ground to sit. No swelling or skin changes. Has had similar pain in leg in the past but son doesn't know what was the cause then. No LOC or other complaints.      INTERVAL HPI/OVERNIGHT EVENTS:     Patient was examined at bedside, AAOx1, stable, NAD. We spoke to her using a Cantonese , José ([id number]). She is cooperative, non-agressive. She passed trial of void and can micturate independently. No need to reinsert IFC. She was exposed to COVID-19 on 6/8/22. As per Infection Control ([place]ois), since she is negative on the 5th day (6/13/22), continue droplet precaution for 3 more days. Can be taken off isolation on 6/16/22.    REVIEW OF SYSTEMS:  CONSTITUTIONAL: No fever, weight loss, or fatigue  RESPIRATORY: No cough, wheezing, chills or hemoptysis; No shortness of breath  CARDIOVASCULAR: No chest pain, palpitations, dizziness, or leg swelling  GASTROINTESTINAL: No abdominal pain. No nausea, vomiting, or hematemesis; No diarrhea or constipation. No melena or hematochezia.  GENITOURINARY: No dysuria or hematuria, urinary frequency  NEUROLOGICAL: No headaches, memory loss, loss of strength, numbness, or tremors  SKIN: No itching, burning, rashes, or lesions     MEDICATIONS  (STANDING):  acetaminophen     Tablet .. 650 milliGRAM(s) Oral every 6 hours  amLODIPine   Tablet 10 milliGRAM(s) Oral daily  artificial  tears Solution 1 Drop(s) Both EYES two times a day  ascorbic acid 500 milliGRAM(s) Oral daily  atorvastatin 20 milliGRAM(s) Oral at bedtime  carvedilol 3.125 milliGRAM(s) Oral every 12 hours  dextrose 5%. 1000 milliLiter(s) (100 mL/Hr) IV Continuous <Continuous>  dextrose 5%. 1000 milliLiter(s) (50 mL/Hr) IV Continuous <Continuous>  dextrose 5%. 1000 milliLiter(s) (60 mL/Hr) IV Continuous <Continuous>  dextrose 50% Injectable 25 Gram(s) IV Push once  dextrose 50% Injectable 12.5 Gram(s) IV Push once  dextrose 50% Injectable 25 Gram(s) IV Push once  donepezil 10 milliGRAM(s) Oral at bedtime  enoxaparin Injectable 40 milliGRAM(s) SubCutaneous every 24 hours  escitalopram 10 milliGRAM(s) Oral daily  ferrous    sulfate 325 milliGRAM(s) Oral two times a day  gabapentin 100 milliGRAM(s) Oral at bedtime  glucagon  Injectable 1 milliGRAM(s) IntraMuscular once  insulin lispro (ADMELOG) corrective regimen sliding scale   SubCutaneous three times a day before meals  lactated ringers. 1000 milliLiter(s) (80 mL/Hr) IV Continuous <Continuous>  latanoprost 0.005% Ophthalmic Solution 1 Drop(s) Both EYES at bedtime  lidocaine   4% Patch 2 Patch Transdermal every 24 hours  pantoprazole    Tablet 40 milliGRAM(s) Oral before breakfast  QUEtiapine 25 milliGRAM(s) Oral at bedtime  senna 2 Tablet(s) Oral at bedtime  timolol 0.25% Solution 1 Drop(s) Both EYES two times a day    MEDICATIONS  (PRN):  dextrose Oral Gel 15 Gram(s) Oral once PRN Blood Glucose LESS THAN 70 milliGRAM(s)/deciliter      Vital Signs Last 24 Hrs  T(C): 37.1 (13 Jun 2022 05:26), Max: 37.1 (13 Jun 2022 05:26)  T(F): 98.7 (13 Jun 2022 05:26), Max: 98.7 (13 Jun 2022 05:26)  HR: 82 (13 Jun 2022 05:26) (66 - 82)  BP: 163/84 (13 Jun 2022 05:26) (146/67 - 163/84)  BP(mean): --  RR: 18 (13 Jun 2022 05:26) (18 - 18)  SpO2: 97% (13 Jun 2022 05:26) (93% - 98%)    PHYSICAL EXAMINATION:  GENERAL: NAD  HEAD:  Atraumatic, Normocephalic  EYES:  conjunctiva and sclera clear  NECK: Supple, No JVD, Normal thyroid  CHEST/LUNG: Clear to auscultation. Clear to percussion bilaterally; No rales, rhonchi, wheezing, or rubs  HEART: Regular rate and rhythm; No murmurs, rubs, or gallops  ABDOMEN: Soft, Nontender, Nondistended; Bowel sounds present, no pain or masses on palpation  NERVOUS SYSTEM:  Alert & Oriented X1-2 (baseline)  : voiding well  EXTREMITIES:  2+ Peripheral Pulses, No clubbing, cyanosis, or edema  SKIN: warm dry                          11.4   6.24  )-----------( 299      ( 13 Jun 2022 05:57 )             36.7     06-13    140  |  108  |  12  ----------------------------<  208<H>  3.9   |  23  |  0.93    Ca    9.0      13 Jun 2022 05:57  Phos  2.7     06-13  Mg     2.1     06-13    TPro  6.9  /  Alb  3.3<L>  /  TBili  0.4  /  DBili  x   /  AST  24  /  ALT  32  /  AlkPhos  73  06-13    LIVER FUNCTIONS - ( 13 Jun 2022 05:57 )  Alb: 3.3 g/dL / Pro: 6.9 g/dL / ALK PHOS: 73 U/L / ALT: 32 U/L DA / AST: 24 U/L / GGT: x                   I&O's Summary    12 Jun 2022 07:01  -  13 Jun 2022 07:00  --------------------------------------------------------  IN: 0 mL / OUT: 500 mL / NET: -500 mL            CAPILLARY BLOOD GLUCOSE      RADIOLOGY & ADDITIONAL TESTS:

## 2022-06-13 NOTE — PROGRESS NOTE ADULT - PROBLEM SELECTOR PROBLEM 9
Hyperlipidemia
Prophylactic measure
DM (diabetes mellitus)
Hyperlipidemia

## 2022-06-13 NOTE — PROGRESS NOTE ADULT - PROBLEM SELECTOR PLAN 11
PT & CM consult pending
C/w Lovenox for DVT ppx  C/w Protonix for GI ppx

## 2022-06-13 NOTE — PROGRESS NOTE ADULT - ASSESSMENT
83yo F, from home, lives with son, ambulates w/ assist + walker, prior mckeon for retention, DM2, HTN, HLD, dementia w/ behavior disturbance and PE (10/2021 was on xarelto).  Presented to ED w/ left leg pain after walking all day. After walking many hours she developed acute left leg pain in the anterior shin and posterior calf so she lowered herself on to ground to sit, denies LOC. Admitted for UTI and LLE pain  
Delirium possibly multifactorial due to UTI, hospital stay, worsening dementia  UTI   Urinary retention  Chronic OA  COVID 19 exposure   HTN  HLD   DM  Dementia with behavioral disturbance     Plan:   Cont [place],  TOV tomorrow   Cont Seroquel to 25mg at night, cont Gabapentin 100mg at night   Mental status improved   Avoid anticholinergics, benzodiazepines, limit lines/tubes/tethers/restraints, encourage frequent reorientation/reassurance by staff, encourage good sleep hygiene, adequate lighting  Completed ceftriaxone x3days  Ucx NGTD  Exposed to COVID, No respiratory symptoms, son visits patient from outside room  Cont droplet isolation   Standing Tylenol and Lidocaine patch for pain   PT recommends MARIAA  CM and SW for safe discharge  GOC as above .  Visitors: Maria Teresa Hillman: [phone]; Uliecs Kline [phone]
81yo F, from home, lives with son, ambulates w/ assist + walker, prior mckeon for retention, DM2, HTN, HLD, dementia w/ behavior disturbance and PE (10/2021 was on xarelto).  Presented to ED w/ left leg pain after walking all day. After walking many hours she developed acute left leg pain in the anterior shin and posterior calf so she lowered herself on to ground to sit, denies LOC. Admitted for UTI and LLE pain  
83yo F, from home, lives with son, ambulates w/ assist + walker, prior mckeon for retention, DM2, HTN, HLD, dementia w/ behavior disturbance and PE (10/2021 was on xarelto).  Presented to ED w/ left leg pain after walking all day. After walking many hours she developed acute left leg pain in the anterior shin and posterior calf so she lowered herself on to ground to sit, denies LOC. Admitted for UTI and LLE pain

## 2022-06-13 NOTE — PROGRESS NOTE ADULT - PROBLEM SELECTOR PLAN 1
H/o dementia w/ behavior disturbance.    prior admission patient seen by neuro had lumbar puncture for concern for encephalitis, all workup at the time was normal   medications obtained by son   better improved today   c/w  donepezil 10mg   decrease seroquel to 25mg at bedtime   c/w  gabapentin to 100mg qd at bedtime due to being drowsy   reorientation as necessary H/o dementia w/ behavior disturbance.    prior admission patient seen by neuro had lumbar puncture for concern for encephalitis, all workup at the time was normal   medications obtained by son   better improved today   c/w  donepezil 10mg   decrease seroquel to 25mg at bedtime   c/w  gabapentin to 100mg qd at bedtime due to being drowsy  currently cooperative and non-aggressive  reorientation as necessary

## 2022-07-22 NOTE — PROGRESS NOTE ADULT - ASSESSMENT
79 yo F with a PMH of HTN, diabetes mellitus type II, hallucinations, and recent falls presented to ED with HA and dizziness and admitted to floor for possible encephalopathy. moderate

## 2023-01-23 NOTE — PROGRESS NOTE ADULT - PROBLEM SELECTOR PLAN 8
No IMPROVE VTE Individual Risk Assessment    RISK                                                          Points  [] Previous VTE                                           3  [] Thrombophilia                                        2  [] Lower limb paralysis                              2   [] Current Cancer                                       2   [] Immobilization > 24 hrs                        1  [x] ICU/CCU stay > 24 hours                       1  [x Age > 60                                                   1    IMPROVE VTE Score: 2  scd  ppi IMPROVE VTE Individual Risk Assessment    RISK                                                          Points  [] Previous VTE                                           3  [] Thrombophilia                                        2  [] Lower limb paralysis                               2   [] Current Cancer                                       2   [] Immobilization > 24 hrs                          1  [x] ICU/CCU stay > 24 hours                       1  [x Age > 60                                               1    IMPROVE VTE Score: 2  scd  ppi Pt and son agreed for DNR and trial of intubation on previous admission documented 10/21/2020

## 2023-02-07 NOTE — PATIENT PROFILE ADULT - NSPROPTRIGHTCAREGIVER_GEN_A_NUR
Reason for Admission:  Sepsis                     RUR Score:  7%                   Plan for utilizing home health:     No     PCP: First and Last name:  Jovana Matias MD     Name of Practice:    Are you a current patient: Yes/No: Yes   Approximate date of last visit: November 2022   Can you participate in a virtual visit with your PCP:                     Current Advanced Directive/Advance Care Plan: No Order      Healthcare Decision Maker:   Click here to complete [address] including selection of the Healthcare Decision Maker Relationship (ie \"Primary\")           Lucy Rivers (sister) [phone]                  Transition of Care Plan:                      Cm met with pt and pt's family at the bedside to complete discharge assessment. Cm verified home address and emergency contact - April Bryant (sister). Pt would like to add her sister - Lucy Rivers [phone] as her primary emergency contact. Pt does not have a medical POA or advance directive in place. Pt lives at home with her children. Pt ambulates without DME and is not current with home health. Cm verified pt's PCP and health care insurance. Cm will continue to follow. information could not be obtained

## 2023-06-28 NOTE — PATIENT PROFILE ADULT - NSTRANSFERBELONGINGSDISPO_GEN_A_NUR
Impression: Pinguecula, bilateral: H11.153. Plan: Recommended UV protection when outdoors (hat / UV rated sunglasses). Discussed the benefit of using lubricant eye drops. with patient

## 2023-07-06 NOTE — PROGRESS NOTE ADULT - PROBLEM SELECTOR PLAN 6
Patient called today to see if MD Diego Burrows has sent the gabapentin in yet. Stated tried ibuprofen but not helping pain. Advised new rx has not yet been sent. Another patient had advised Ashradana was out of office? but was not provided that info. Please advise if this request should have gone to another provider?   (Was sent to Diego Burrows late yesterday afternoon.)      Thanks, Danyelle Han
awaiting placement for LTC  PT recommending home PT  SW following
awaiting placement for LTC  PT recommending home PT  SW following  pending HemOnc consult

## 2023-08-19 NOTE — DISCHARGE NOTE PROVIDER - ATTENDING DISCHARGE PHYSICAL EXAMINATION:
Patient was seen and examined at bedside. [employer]  used [id number]. Denies any complains.     Vitals noted     P/E:  NAD  AAOx2, No focal deficit   CTABL  S1S2 WNL, no MRG  Abd soft, non tender, BS present   BL LE no edema or calf tenderness, no knee or ankle tenderness    Labs noted     A/P:  Delirium possibly multifactorial due to UTI, hospital stay, worsening dementia- resolved   UTI - treated   Urinary retention  Chronic OA  COVID 19 exposure   HTN  HLD   DM  Dementia with behavioral disturbance     Plan:   Passed TOV  Cont Seroquel to 25mg at night, cont Gabapentin 100mg tid  Mental status at baseline   Avoid anticholinergics, benzodiazepines, limit lines/tubes/tethers/restraints, encourage frequent reorientation/reassurance by staff, encourage good sleep hygiene, adequate lighting  Completed ceftriaxone x3days  Ucx NGTD  Exposed to COVID, No respiratory symptoms  PT recommends MARIAA  CM and SW for safe discharge   Stable to be discharged.   
No

## 2023-08-31 NOTE — ED ADULT NURSE NOTE - CAS EDP DISCH DISPOSITION ADMI
You can access the FollowMyHealth Patient Portal offered by [facility] by registering at the following website: [email or web address]. By joining [employer]s FollowMyHealth portal, you will also be able to view your health information using other applications (apps) compatible with our system.
[facility]

## 2023-10-02 NOTE — PATIENT PROFILE ADULT - FUNCTIONAL ASSESSMENT - BASIC MOBILITY 2.
What Type Of Note Output Would You Prefer (Optional)?: Standard Output Is This A New Presentation, Or A Follow-Up?: Skin Lesions 3 = A little assistance

## 2024-05-12 NOTE — DISCHARGE NOTE PROVIDER - NSDCHHATTENDCERT_GEN_ALL_CORE
My signature below certifies that the above stated patient is homebound and upon completion of the Face-To-Face encounter, has the need for intermittent skilled nursing, physical therapy and/or speech or occupational therapy services in their home for their current diagnosis as outlined in their initial plan of care. These services will continue to be monitored by myself or another physician. Stable.

## 2025-01-21 NOTE — ED PROVIDER NOTE - WR ORDER NAME 1
Awake/Alert Xray Chest 1 View-PORTABLE IMMEDIATE Enbrel Counseling:  I discussed with the patient the risks of etanercept including but not limited to myelosuppression, immunosuppression, autoimmune hepatitis, demyelinating diseases, lymphoma, and infections.  The patient understands that monitoring is required including a PPD at baseline and must alert us or the primary physician if symptoms of infection or other concerning signs are noted.

## 2025-06-10 NOTE — PHYSICAL THERAPY INITIAL EVALUATION ADULT - PHYSICAL ASSIST/NONPHYSICAL ASSIST: GAIT, REHAB EVAL
Head,  normocephalic,  atraumatic,  Face,  Face within normal limits,  Ears,  External ears within normal limits,  Nose/Nasopharynx,  External nose  normal appearance, no nasal discharge,  Mouth and Throat,  Oral cavity appearance normal Lips,  Appearance normal
1 person assist/supervision

## 2025-07-02 NOTE — PHYSICAL THERAPY INITIAL EVALUATION ADULT - ASSISTIVE DEVICE:SUPINE/SIT, REHAB EVAL
Physical Therapy Daily Treatment Note    [facility]  [address]  [place] [address]     Patient: Liza Aaron   : 1958  Referring practitioner: Cesar Johnson MD  Date of Initial Visit: Type: THERAPY  Noted: 2025  Today's Date: 2025  Patient seen for 31 sessions         Liza Aaron reports: Continuing to work right knee out as best I can at home with the exercises.  Can't push right knee as much as you all do states patient  Subjective     Objective   See Exercise, Manual, and Modality Logs for complete treatment.       Assessment/Plan progressing well with current exercise regimen in regards to right knee range of motion, flexibility and strengthening.  Continues to benefit from manual interventions to right knee to push into full knee extension as well as to continue to improve flexion range of motion.  Patient is compliant, cooperative and motivated with all rehab efforts to date                   Timed:  Manual Therapy:  15       mins  61406;  Therapeutic Exercise:    26     mins  16917;     Neuromuscular Fide:        mins  55290;    Therapeutic Activity:   12       mins  10990;     Gait Training:           mins  70325;     Ultrasound:          mins  69555;    Self Care                    ___      mins [id number]    Untimed:  Electrical Stimulation:         mins  94053 ( );  Mechanical Traction:         mins  56679;     Timed Treatment:  53    mins   Total Treatment:   63    mins  Osiel Burns PTA  Physical Therapist  Assistant  [id number]   bed rails

## 2025-07-12 NOTE — ED PROVIDER NOTE - CHILD ABUSE FACILITY
Goal Outcome Evaluation:  Plan of Care Reviewed With: patient        Progress: improving  Outcome Evaluation: Patient complained of pain and medicated per orders, vitals stable, q2hr turns.                              H